# Patient Record
Sex: MALE
[De-identification: names, ages, dates, MRNs, and addresses within clinical notes are randomized per-mention and may not be internally consistent; named-entity substitution may affect disease eponyms.]

---

## 2017-05-05 ENCOUNTER — HOSPITAL ENCOUNTER (EMERGENCY)
Dept: HOSPITAL 31 - C.ER | Age: 77
Discharge: HOME | End: 2017-05-05
Payer: MEDICARE

## 2017-05-05 VITALS
HEART RATE: 97 BPM | TEMPERATURE: 98.7 F | RESPIRATION RATE: 24 BRPM | SYSTOLIC BLOOD PRESSURE: 120 MMHG | DIASTOLIC BLOOD PRESSURE: 63 MMHG

## 2017-05-05 VITALS — BODY MASS INDEX: 26.8 KG/M2

## 2017-05-05 VITALS — OXYGEN SATURATION: 96 %

## 2017-05-05 DIAGNOSIS — J45.901: Primary | ICD-10-CM

## 2017-05-05 LAB
ALBUMIN/GLOB SERPL: 1.4 {RATIO} (ref 1–2.1)
ALP SERPL-CCNC: 37 U/L (ref 38–126)
ALT SERPL-CCNC: 18 U/L (ref 21–72)
AST SERPL-CCNC: 26 U/L (ref 17–59)
BASOPHILS # BLD AUTO: 0 K/UL (ref 0–0.2)
BASOPHILS NFR BLD: 0.1 % (ref 0–2)
BILIRUB SERPL-MCNC: 0.3 MG/DL (ref 0.2–1.3)
BUN SERPL-MCNC: 17 MG/DL (ref 9–20)
CALCIUM SERPL-MCNC: 8.8 MG/DL (ref 8.6–10.4)
CHLORIDE SERPL-SCNC: 99 MMOL/L (ref 98–107)
CO2 SERPL-SCNC: 19 MMOL/L (ref 22–30)
EOSINOPHIL # BLD AUTO: 0 K/UL (ref 0–0.7)
EOSINOPHIL NFR BLD: 0 % (ref 0–4)
ERYTHROCYTE [DISTWIDTH] IN BLOOD BY AUTOMATED COUNT: 18.7 % (ref 11.5–14.5)
GLOBULIN SER-MCNC: 3.2 GM/DL (ref 2.2–3.9)
GLUCOSE SERPL-MCNC: 196 MG/DL (ref 75–110)
HCT VFR BLD CALC: 39.6 % (ref 35–51)
LYMPHOCYTES # BLD AUTO: 0.5 K/UL (ref 1–4.3)
LYMPHOCYTES NFR BLD AUTO: 5.7 % (ref 20–40)
MCH RBC QN AUTO: 23.5 PG (ref 27–31)
MCHC RBC AUTO-ENTMCNC: 31.4 G/DL (ref 33–37)
MCV RBC AUTO: 74.9 FL (ref 80–94)
MONOCYTES # BLD: 0.5 K/UL (ref 0–0.8)
MONOCYTES NFR BLD: 5.7 % (ref 0–10)
NEUTROPHILS NFR BLD AUTO: 92 % (ref 50–75)
NRBC BLD AUTO-RTO: 0 % (ref 0–2)
PLATELET # BLD: 276 K/UL (ref 130–400)
PMV BLD AUTO: 8 FL (ref 7.2–11.7)
POTASSIUM SERPL-SCNC: 5 MMOL/L (ref 3.6–5.2)
PROT SERPL-MCNC: 7.7 G/DL (ref 6.3–8.3)
SODIUM SERPL-SCNC: 133 MMOL/L (ref 132–148)
TOTAL CELLS COUNTED BLD: 100
WBC # BLD AUTO: 8.5 K/UL (ref 4.8–10.8)

## 2017-05-05 PROCEDURE — 96374 THER/PROPH/DIAG INJ IV PUSH: CPT

## 2017-05-05 PROCEDURE — 94150 VITAL CAPACITY TEST: CPT

## 2017-05-05 PROCEDURE — 99285 EMERGENCY DEPT VISIT HI MDM: CPT

## 2017-05-05 PROCEDURE — 96375 TX/PRO/DX INJ NEW DRUG ADDON: CPT

## 2017-05-05 PROCEDURE — 94640 AIRWAY INHALATION TREATMENT: CPT

## 2017-05-05 PROCEDURE — 80053 COMPREHEN METABOLIC PANEL: CPT

## 2017-05-05 PROCEDURE — 84484 ASSAY OF TROPONIN QUANT: CPT

## 2017-05-05 PROCEDURE — 85025 COMPLETE CBC W/AUTO DIFF WBC: CPT

## 2017-05-05 NOTE — C.PDOC
History Of Present Illness


77 year old patient, with a past medical history of arthritis, asthma, 

bronchitis, and hypertension, presents to the ED complaining of


Time Seen by Provider: 05/05/17 18:30


Chief Complaint (Nursing): Shortness Of Breath


History Per: Patient


History/Exam Limitations: no limitations


Current Symptoms Are (Timing): Still Present


Severity: Mild


Pain Scale Rating Of: 3





Past Medical History


Reviewed: Historical Data, Nursing Documentation, Vital Signs


Vital Signs: 





 Last Vital Signs











Temp  98.1 F   05/05/17 17:26


 


Pulse  102 H  05/05/17 17:26


 


Resp  20   05/05/17 17:35


 


BP  120/64   05/05/17 17:26


 


Pulse Ox  96   05/05/17 17:35














- Medical History


PMH: Arthritis, Asthma, Gastritis, HTN


Surgical History: Endoscopy (2013)





- [facility] Procedures











EXCISION OF UPPER ESOPHAGUS, ENDO, DIAGN (04/27/16)








Family History: States: Unknown Family Hx





- Social History


Hx Alcohol Use: No


Hx Substance Use: No





Review Of Systems


Except As Marked, All Systems Reviewed And Found Negative.





Physical Exam





- Physical Exam


Appears: Non-toxic, No Acute Distress





ED Course And Treatment





- Laboratory Results


Result Diagrams: 


 05/05/17 18:19





 05/05/17 18:19


O2 Sat by Pulse Oximetry: 96 (room air)


Pulse Ox Interpretation: Normal


Progress Note: Plan:  -Labs.  -Duoneb, Pepcid, Solu-Medrol





- Scribe Statement


The provider has reviewed the documentation as recorded by the Scribe


Dyana Street


Provider Attestation: 


All medical record entries made by the Scribe were at my direction and 

personally dictated by me. I have reviewed the chart and agree that the record 

accurately reflects my personal performance of the history, physical exam, 

medical decision making, and the department course for this patient. I have 

also personally directed, reviewed, and agree with the discharge instructions 

and disposition.

## 2017-05-05 NOTE — C.PDOC
History Of Present Illness


77 year old patient, with a past medical history of arthritis, asthma, 

bronchitis, and hypertension, presents to the ED complaining of wheezing for 

the past 2 days. Patient denies fever, chills, chest pain, nausea, or vomiting.


Time Seen by Provider: 17 18:30


Chief Complaint (Nursing): Shortness Of Breath


History Per: Patient


History/Exam Limitations: no limitations


Onset/Duration Of Symptoms: Days (2)


Current Symptoms Are (Timing): Still Present


Current Respiratory Medications: See Home Med List


Severity: Mild


Pain Scale Rating Of: 3


Reports Recently: Treated By A Physician


Recent travel outside of the [place]: No





Past Medical History


Reviewed: Historical Data, Nursing Documentation, Vital Signs


Vital Signs: 


 Last Vital Signs











Temp  98.1 F   17 17:26


 


Pulse  102 H  17 17:26


 


Resp  20   17 17:35


 


BP  120/64   17 17:26


 


Pulse Ox  96   17 19:42














- Medical History


PMH: Arthritis, Asthma, Gastritis, HTN


Surgical History: Endoscopy ()





- [facility] Procedures








EXCISION OF UPPER ESOPHAGUS, ENDO, DIAGN (16)








Family History: States: Unknown Family Hx





- Social History


Hx Alcohol Use: No


Hx Substance Use: No





Review Of Systems


Except As Marked, All Systems Reviewed And Found Negative.


Constitutional: Negative for: Fever, Chills


Cardiovascular: Negative for: Chest Pain


Respiratory: Positive for: Wheezing


Gastrointestinal: Negative for: Nausea, Vomiting





Physical Exam





- Physical Exam


Appears: Non-toxic, No Acute Distress


Skin: Warm, Dry


Head: Atraumatic, Normacephalic


Eye(s): bilateral: Normal Inspection, EOMI


Ear(s): Bilateral: Normal


Nose: Normal


Throat: Normal, No Erythema, No Exudate


Neck: Normal ROM, Supple


Chest: Symmetrical


Cardiovascular: Rhythm Regular


Respiratory: No Accessory Muscle Use, No Rales, No Rhonchi, Wheezing (scant and 

scattered)


Gastrointestinal/Abdominal: Soft, No Tenderness


Back: Normal Inspection, No CVA Tenderness


Extremity: Normal ROM


Neurological/Psych: Oriented x3, Normal Speech, Normal Cognition


Gait: Steady





ED Course And Treatment





- Laboratory Results


Result Diagrams: 


 17 18:19





 17 18:19


Lab Interpretation: Normal (trop neg.)


ECG: Interpreted By Me


ECG Rhythm: Sinus Rhythm


ECG Interpretation: Normal


Rate From EC


O2 Sat by Pulse Oximetry: 96


Pulse Ox Interpretation: Normal


Progress Note: Plan:  -Labs.  -Duoneb, Pepcid, Solu-Medrol.  Case discussed 

with ISAURA Dennis who is aware of the plan. She states patient was seen in 

the office 2 days ago. He was started on Medrol dose packs at that time. He did 

not need refills at that time.


Reevaluation Time: 19:41


Reassessment Condition: Improved





Critical Care Time





- Critical Care Note


Total Time (in mins): 90


Documented critical care: time excludes all time spent performing seperately 

billable procedures.





Medical Decision Making


Medical Decision Making: 





asthma exacerbation, low susp of PNA





Disposition


Doctor Will See Patient In The: Office


Counseled Patient/Family Regarding: Studies Performed, Diagnosis





- Disposition


Referrals: 


Julissa Lee MD [Staff Provider] - 


Disposition: HOME/ ROUTINE


Disposition Time: 19:41


Condition: GOOD


Additional Instructions: 


Duoneb inhaled treatments with 2 ampules every 4 hours


Prednisone 40 mg daily for 4 more days (given prednisone 40 in ED)


COntinue Omeprazole @ night to prevent stomach irritation from the steroids





Albuterol puffer 2 puffs with Aerochamber Spacer every 4 hours when not @ home.





Call to follow-up w Dr. Varner in 2 days for re-evaluation or return to the ED


Prescriptions: 


Albuterol HFA [Ventolin HFA 90 mcg/actuation (8 g)] 200 puff IH Q4H PRN #2 puff


 PRN Reason: asthma


Albuterol/Ipratropium [Duoneb 3 MG/3 Ml-0.5 MG/3 Ml 3 Ml] 6 ml IH Q4H PRN #100 

neb


 PRN Reason: asthma


Prednisone [Deltasone] 40 mg PO DAILY #8 tablet


Spacer, Inhalation [Aerochamber] 1 dev IH DAILY #1 dev


Instructions:  Asthma (ED)





- Clinical Impression


Clinical Impression: 


 Asthma attack








- Scribe Statement


The provider has reviewed the documentation as recorded by the Scribe


Dyana Street


Provider Attestation: 


All medical record entries made by the Scribe were at my direction and 

personally dictated by me. I have reviewed the chart and agree that the record 

accurately reflects my personal performance of the history, physical exam, 

medical decision making, and the department course for this patient. I have 

also personally directed, reviewed, and agree with the discharge instructions 

and disposition.

## 2017-05-16 ENCOUNTER — HOSPITAL ENCOUNTER (INPATIENT)
Dept: HOSPITAL 31 - C.ER | Age: 77
LOS: 4 days | Discharge: HOME | DRG: 190 | End: 2017-05-20
Attending: INTERNAL MEDICINE | Admitting: INTERNAL MEDICINE
Payer: MEDICARE

## 2017-05-16 VITALS — BODY MASS INDEX: 26.8 KG/M2

## 2017-05-16 DIAGNOSIS — J44.1: Primary | ICD-10-CM

## 2017-05-16 DIAGNOSIS — I25.10: ICD-10-CM

## 2017-05-16 DIAGNOSIS — I44.7: ICD-10-CM

## 2017-05-16 DIAGNOSIS — K30: ICD-10-CM

## 2017-05-16 DIAGNOSIS — K27.9: ICD-10-CM

## 2017-05-16 DIAGNOSIS — Z79.4: ICD-10-CM

## 2017-05-16 DIAGNOSIS — I49.3: ICD-10-CM

## 2017-05-16 DIAGNOSIS — E11.65: ICD-10-CM

## 2017-05-16 DIAGNOSIS — I27.2: ICD-10-CM

## 2017-05-16 DIAGNOSIS — M19.90: ICD-10-CM

## 2017-05-16 DIAGNOSIS — J18.9: ICD-10-CM

## 2017-05-16 DIAGNOSIS — J45.901: ICD-10-CM

## 2017-05-16 DIAGNOSIS — K21.9: ICD-10-CM

## 2017-05-16 DIAGNOSIS — Z87.891: ICD-10-CM

## 2017-05-16 DIAGNOSIS — I10: ICD-10-CM

## 2017-05-16 LAB
ALBUMIN/GLOB SERPL: 1.1 {RATIO} (ref 1–2.1)
ALP SERPL-CCNC: 61 U/L (ref 38–126)
ALT SERPL-CCNC: 38 U/L (ref 21–72)
APTT BLD: 30 SECONDS (ref 21–34)
ARTERIAL PATENCY WRIST A: (no result)
AST SERPL-CCNC: 26 U/L (ref 17–59)
BASOPHILS # BLD AUTO: 0.1 K/UL (ref 0–0.2)
BASOPHILS NFR BLD: 0.4 % (ref 0–2)
BILIRUB SERPL-MCNC: 1 MG/DL (ref 0.2–1.3)
BILIRUB UR-MCNC: NEGATIVE MG/DL
BUN SERPL-MCNC: 19 MG/DL (ref 9–20)
CALCIUM SERPL-MCNC: 8.4 MG/DL (ref 8.6–10.4)
CHLORIDE SERPL-SCNC: 92 MMOL/L (ref 98–107)
CO2 SERPL-SCNC: 27 MMOL/L (ref 22–30)
DRAW SITE: (no result)
EOSINOPHIL # BLD AUTO: 0 K/UL (ref 0–0.7)
EOSINOPHIL NFR BLD: 0.1 % (ref 0–4)
ERYTHROCYTE [DISTWIDTH] IN BLOOD BY AUTOMATED COUNT: 18.6 % (ref 11.5–14.5)
GLOBULIN SER-MCNC: 3.3 GM/DL (ref 2.2–3.9)
GLUCOSE SERPL-MCNC: 162 MG/DL (ref 75–110)
GLUCOSE UR STRIP-MCNC: NORMAL MG/DL
HCT VFR BLD CALC: 43.4 % (ref 35–51)
INR PPP: 1.1
KETONES UR STRIP-MCNC: NEGATIVE MG/DL
LEUKOCYTE ESTERASE UR-ACNC: (no result) LEU/UL
LYMPHOCYTES # BLD AUTO: 1.3 K/UL (ref 1–4.3)
LYMPHOCYTES NFR BLD AUTO: 7.1 % (ref 20–40)
MAGNESIUM SERPL-MCNC: 1.8 MG/DL (ref 1.6–2.3)
MCH RBC QN AUTO: 24 PG (ref 27–31)
MCHC RBC AUTO-ENTMCNC: 32.5 G/DL (ref 33–37)
MCV RBC AUTO: 73.8 FL (ref 80–94)
MONOCYTES # BLD: 2.3 K/UL (ref 0–0.8)
MONOCYTES NFR BLD: 12.9 % (ref 0–10)
NEUTROPHILS NFR BLD AUTO: 77 % (ref 50–75)
NRBC BLD AUTO-RTO: 0 % (ref 0–2)
PH UR STRIP: 6 [PH] (ref 5–8)
PLATELET # BLD: 238 K/UL (ref 130–400)
PMV BLD AUTO: 7.8 FL (ref 7.2–11.7)
POTASSIUM SERPL-SCNC: 4.2 MMOL/L (ref 3.6–5.2)
PROT SERPL-MCNC: 6.9 G/DL (ref 6.3–8.3)
PROT UR STRIP-MCNC: NEGATIVE MG/DL
RBC # UR STRIP: NEGATIVE /UL
RBC #/AREA URNS HPF: 1 /HPF (ref 0–3)
SODIUM SERPL-SCNC: 129 MMOL/L (ref 132–148)
SP GR UR STRIP: 1.02 (ref 1–1.03)
TOTAL CELLS COUNTED BLD: 100
UROBILINOGEN UR-MCNC: NORMAL MG/DL (ref 0.2–1)
VARIANT LYMPHS NFR BLD MANUAL: 4 % (ref 0–0)
WBC # BLD AUTO: 17.9 K/UL (ref 4.8–10.8)
WBC #/AREA URNS HPF: < 1 /HPF (ref 0–5)

## 2017-05-16 RX ADMIN — TAZOBACTAM SODIUM AND PIPERACILLIN SODIUM SCH MLS/HR: 375; 3 INJECTION, SOLUTION INTRAVENOUS at 22:16

## 2017-05-16 RX ADMIN — IPRATROPIUM BROMIDE AND ALBUTEROL SULFATE SCH ML: .5; 3 SOLUTION RESPIRATORY (INHALATION) at 19:17

## 2017-05-16 RX ADMIN — METHYLPREDNISOLONE SODIUM SUCCINATE SCH MG: 40 INJECTION, POWDER, FOR SOLUTION INTRAMUSCULAR; INTRAVENOUS at 11:31

## 2017-05-16 RX ADMIN — IPRATROPIUM BROMIDE AND ALBUTEROL SULFATE SCH ML: .5; 3 SOLUTION RESPIRATORY (INHALATION) at 13:21

## 2017-05-16 RX ADMIN — METHYLPREDNISOLONE SODIUM SUCCINATE SCH MG: 40 INJECTION, POWDER, FOR SOLUTION INTRAMUSCULAR; INTRAVENOUS at 17:44

## 2017-05-16 RX ADMIN — TAZOBACTAM SODIUM AND PIPERACILLIN SODIUM SCH MLS/HR: 375; 3 INJECTION, SOLUTION INTRAVENOUS at 11:32

## 2017-05-16 RX ADMIN — TAZOBACTAM SODIUM AND PIPERACILLIN SODIUM SCH MLS/HR: 375; 3 INJECTION, SOLUTION INTRAVENOUS at 17:00

## 2017-05-16 RX ADMIN — ENOXAPARIN SODIUM SCH MG: 40 INJECTION SUBCUTANEOUS at 11:30

## 2017-05-16 RX ADMIN — HUMAN INSULIN SCH UNIT: 100 INJECTION, SOLUTION SUBCUTANEOUS at 22:16

## 2017-05-16 RX ADMIN — IPRATROPIUM BROMIDE AND ALBUTEROL SULFATE SCH: .5; 3 SOLUTION RESPIRATORY (INHALATION) at 11:49

## 2017-05-16 NOTE — RAD
PROCEDURE:  CHEST RADIOGRAPH, 1 VIEW



HISTORY:

Shortness of breath 



COMPARISON:

03/06/2013



FINDINGS:



LUNGS:

Moderate to severe venous congestion with diffuse increased 

interstitial lung markings.  Biapical pleural thickening with upper 

lobe granulomatous changes.  Right hilar prominence. Small left 

pleural effusion. 



PLEURA:

As above.



CARDIOVASCULAR:

Cardiomegaly.



OSSEOUS STRUCTURES:

No significant abnormalities.



VISUALIZED UPPER ABDOMEN:

Normal.



OTHER FINDINGS:

None. 



IMPRESSION:

Moderate to severe venous congestion with diffuse increased 

interstitial lung markings.  Biapical pleural thickening with upper 

lobe granulomatous changes.  Right hilar prominence. Small left 

pleural effusion. 



Cardiomegaly.

## 2017-05-16 NOTE — CT
CT chest with IV contrast 



Indication: Rule out malignancy 



Technique: 



Contiguous axial images were obtained through the chest without 

intravenous contrast enhancement. Sagittal and coronal 

reconstructions were generated and reviewed. 



This CT exam was performed using 1 or more of the falling dose 

reduction techniques: Automated exposure control, adjustment of the 

MAA and/or kV according to patient size, and/or use of iterative 

reconstruction technique. 



IV Contrast: None available 







Comparison: Chest x-ray performed 5/16/17 



Findings: 



Visualized portions of the inferior thyroid gland appear 

unremarkable. Cardiomegaly. 



Consolidation and/or spiculated lesion within the medial right lung 

apex. Consolidation and/or spiculated mass within the medial right 

middle lobe. Scattered tiny nodular densities throughout the upper 

lobes worrisome for infection or neoplasm. Hazy but somewhat focal 

opacity at the right lung base spanning approximately 2.4 x 2.4 cm 

(series 3, image 78). Biapical pleural thickening. No pleural 

effusion.  No pneumothorax. 



1.7 x 1.7 cm low-density approximately 3 HU rounded focus abutting or 

arising from the right distal esophagus (series 4, image 73). Small 

to moderate hiatal hernia/distal esophageal wall thickening. 



Limited visualization of the noncontrast upper abdomen reveals upper 

abdominal calcifications, possibly calcified lymph nodes.  Fatty 

atrophy of the pancreas. Partially imaged probable exophytic left 

renal cyst. 



Degenerative changes. Kyphosis. 



Impression:  



Regions of consolidation and/or malignant neoplasm involving the 

medial right lung apex, and medial right middle lobe. Hazy but 

somewhat focal opacity at the right lung base measuring approximately 

2.4 x 2.4 cm.  Recommend clinical correlation and further evaluation 

as indicated with biopsy, PET- CT, or follow-up CT at 3 months, and 9 

months, and 24 months. 



1.7 x 1.7 cm low-density rounded focus abutting or arising from the 

right distal esophagus. Diverticulum is a consideration. Focus 

appears cystic. Correlate clinically. 



Additional findings as above.

## 2017-05-16 NOTE — C.PDOC
History Of Present Illness


Patient presents with shortness of breath, wheezing. Did 1 neb treatment at home

, but no improvement. Speaking in 2-3 word sentences


Time Seen by Provider: 05/16/17 05:47


History Per: Patient


History/Exam Limitations: no limitations


Onset/Duration Of Symptoms: Hrs


Current Symptoms Are (Timing): Worse


Initiating Event: Other


Exacerbating Factor(s): Exertion, Coughing


Current Respiratory Medications: See Home Med List


Severity: Severe


Pain Scale Rating Of: 7


Associated Symptoms: denies: Fever, Chills


Reports Recently: Seen In ED, Treated By A Physician


Recent travel outside of the [place]: No


Additional History Per: Family





Past Medical History


Reviewed: Historical Data, Nursing Documentation, Vital Signs


Vital Signs: 


 Last Vital Signs











Temp  99.9 F H  05/16/17 06:46


 


Pulse  97 H  05/16/17 06:08


 


Resp  34 H  05/16/17 06:08


 


BP  126/72   05/16/17 06:08


 


Pulse Ox  94 L  05/16/17 06:08














- Medical History


PMH: Arthritis, Asthma, Gastritis, HTN


   Denies: Chronic Kidney Disease


Surgical History: Endoscopy (2013)





- [facility] Procedures








EXCISION OF UPPER ESOPHAGUS, ENDO, DIAGN (04/27/16)








Family History: States: No Known Family Hx





- Social History


Hx Alcohol Use: No


Hx Substance Use: No





Review Of Systems


Constitutional: Negative for: Fever, Chills


Eyes: Negative for: Redness


ENT: Negative for: Throat Pain


Cardiovascular: Negative for: Chest Pain, Palpitations


Respiratory: Positive for: Shortness of Breath, Wheezing


Gastrointestinal: Negative for: Nausea, Vomiting


Genitourinary: Negative for: Dysuria


Musculoskeletal: Negative for: Back Pain


Skin: Negative for: Rash, Lesions, Jaundice, Bruising


Neurological: Negative for: Weakness


Psych: Negative for: Anxiety





Physical Exam





- Physical Exam


Appears: In Acute Distress


Skin: Warm, Dry


Head: Normacephalic


Eye(s): bilateral: Normal Inspection


Oral Mucosa: Moist


Throat: No Erythema


Neck: Trachea Midline, Supple


Chest: Symmetrical


Cardiovascular: Rhythm Regular


Respiratory: Decreased Breath Sounds, No Rales, No Rhonchi, Wheezing


Gastrointestinal/Abdominal: Soft, No Tenderness, No Distention, No Guarding


Back: Normal Inspection


Extremity: Normal ROM


Extremity: Bilateral: Atraumatic, Normal Color And Temperature, Normal ROM


Neurological/Psych: Oriented x3, Normal Speech, Normal Cognition


Gait: Unable To Assess





ED Course And Treatment





- Laboratory Results


Result Diagrams: 


 05/16/17 05:59





 05/16/17 05:59


ECG: Interpreted By Me, Viewed By Me


ECG Rhythm: Sinus Rhythm (94), R BBB, Nonspecific Changes (freq pvc's)


O2 Sat by Pulse Oximetry: 94


Pulse Ox Interpretation: Normal





- Radiology


CXR: Interpreted by Me, Viewed By Me


CXR Interpretation: Yes: Cardiomegaly, Other (rll infiltrate, also mild vasc 

congestion)


Progress Note: blood work, nebs., steroids





Critical Care Time





- Critical Care Note


Total Time (in mins): 30


Documented critical care: time excludes all time spent performing seperately 

billable procedures.





Disposition


Discussed With Dr.: Raza Gonsalez


Comment: accepted the pt on his service and took over the care at 6:47 AM


Doctor Will See Patient In The: Hospital


Counseled Patient/Family Regarding: Studies Performed, Diagnosis





- Disposition


Disposition: HOSPITALIZED


Disposition Time: 05:50


Condition: FAIR





- POA


Present On Arrival: Poor Glycemic Control





- Clinical Impression


Clinical Impression: 


 Respiratory distress, Asthma exacerbation, Pneumonia








Decision To Admit





- Pt Status Changed To:


Hospital Disposition Of: Inpatient





- Admit Certification


Admit to Inpatient:: After my assessment, the patient will require 

hospitalization for at least two midnights.  This is because of the severity of 

symptoms shown, intensity of services needed, and/or the medical risk in this 

patient being treated as an outpatient.





- InPatient:


Physician Admission Certification: I certify that this patient requires 2 or 

more midnights of care for the following reason:: After my assessment, the 

patient will require hospitalization for at least two midnights.  This is 

because of the severity of symptoms shown, intensity of services needed, and/or 

the medical risk in this patient being treated as an outpatient.





- .


Bed Request Type: Telemetry


Admitting Physician: Raza Gonsalez


Patient Diagnosis: 


 Respiratory distress, Asthma exacerbation

## 2017-05-16 NOTE — CP.PCM.HP
History of Present Illness





- History of Present Illness


History of Present Illness: 





Shortness of breath and cough





77-year-old male with history of COPD, hypertension, diabetes, GERD presented 

to the emergency room with progressively worsening shortness of breath and 

cough for the past few weeks. Patient treated with antibiotics, steroids and 

bronchodilators without any relief. Also complaining of cough productive cough. 

Phlegm. Denies fever or chills, denies chest pain.





Present on Admission





- Present on Admission


Any Indicators Present on Admission: No


History of DVT/PE: No


History of Uncontrolled Diabetes: No


Urinary Catheter: No


Decubitus Ulcer Present: No





Review of Systems





- Review of Systems


All systems: reviewed and no additional remarkable complaints except (Shortness 

of breath and cough)





Past Patient History





- Infectious Disease


Hx of Infectious Diseases: None





- Past Medical History & Family History


Past Medical History?: Yes





- Past Social History


Smoking Status: Former Smoker





- CARDIAC


Hx Hypertension: Yes





- PULMONARY


Hx Asthma: Yes





- NEUROLOGICAL


Hx Neurological Disorder: No





- HEENT


Hx HEENT Problems: No





- RENAL


Hx Chronic Kidney Disease: No





- ENDOCRINE/METABOLIC


Hx Endocrine Disorders: Yes


Hx Diabetes Mellitus Type 1: Yes





- HEMATOLOGICAL/ONCOLOGICAL


Hx Blood Disorders: No





- INTEGUMENTARY


Hx Dermatological Problems: Yes


Other/Comment: SKIN ASTHMA





- MUSCULOSKELETAL/RHEUMATOLOGICAL


Hx Arthritis: Yes





- GASTROINTESTINAL


Hx Gastritis: Yes





- GENITOURINARY/GYNECOLOGICAL


Hx Genitourinary Disorders: Yes


Hx Prostate Problems: Yes





- PSYCHIATRIC


Hx Substance Use: No





- SURGICAL HISTORY


Hx Surgeries: Yes





- ANESTHESIA


Hx Anesthesia: Yes


Hx Anesthesia Reactions: No


Hx Malignant Hyperthermia: No





Meds


Allergies/Adverse Reactions: 


 Allergies











Allergy/AdvReac Type Severity Reaction Status Date / Time


 


seasonal allergies Allergy Mild CONGESTION Uncoded 05/16/17 05:54














Physical Exam





- Constitutional


Appears: No Acute Distress





- Head Exam


Head Exam: ATRAUMATIC, NORMOCEPHALIC





- Eye Exam


Eye Exam: Normal appearance





- ENT Exam


ENT Exam: Mucous Membranes Moist





- Neck Exam


Neck exam: Positive for: Normal Inspection





- Respiratory Exam


Respiratory Exam: Rales, Rhonchi





- Cardiovascular Exam


Cardiovascular Exam: REGULAR RHYTHM





- GI/Abdominal Exam


GI & Abdominal Exam: Normal Bowel Sounds, Soft





- Extremities Exam


Extremities exam: Positive for: normal inspection





- Neurological Exam


Neurological exam: Alert, Oriented x3





Results





- Vital Signs


Recent Vital Signs: 





 Last Vital Signs











Temp  98.2 F   05/16/17 08:48


 


Pulse  87   05/16/17 08:48


 


Resp  16   05/16/17 08:48


 


BP  125/73   05/16/17 08:48


 


Pulse Ox  94 L  05/16/17 08:48














- Labs


Result Diagrams: 


 05/16/17 05:59





 05/16/17 05:59


Labs: 





 Laboratory Results - last 24 hr











  05/16/17





  07:09


 


Urine Color  Yellow


 


Urine Clarity  Clear


 


Urine pH  6.0


 


Ur Specific Gravity  1.016


 


Urine Protein  Negative


 


Urine Glucose (UA)  Normal


 


Urine Ketones  Negative


 


Urine Blood  Negative


 


Urine Nitrate  Negative


 


Urine Bilirubin  Negative


 


Urine Urobilinogen  Normal


 


Ur Leukocyte Esterase  Neg


 


Urine WBC (Auto)  < 1


 


Urine RBC (Auto)  1


 


Ur Squamous Epith Cells  < 1














Assessment & Plan


(1) Pneumonia


Status: Acute   


Comment: Chest x-ray consistent with right lung infiltrate.  IV antibiotics.  

Continue bronchodilators and IV steroids.  Follow-up culture and sensitivity.  

CAT scan of the chest   





(2) COPD exacerbation


Status: Acute

## 2017-05-17 LAB
ALBUMIN/GLOB SERPL: 0.9 {RATIO} (ref 1–2.1)
ALP SERPL-CCNC: 49 U/L (ref 38–126)
ALT SERPL-CCNC: 37 U/L (ref 21–72)
AMYLASE SERPL-CCNC: 45 U/L (ref 30–110)
AST SERPL-CCNC: 19 U/L (ref 17–59)
BASOPHILS # BLD AUTO: 0 K/UL (ref 0–0.2)
BASOPHILS NFR BLD: 0.1 % (ref 0–2)
BILIRUB SERPL-MCNC: 0.8 MG/DL (ref 0.2–1.3)
BUN SERPL-MCNC: 25 MG/DL (ref 9–20)
CALCIUM SERPL-MCNC: 8 MG/DL (ref 8.6–10.4)
CANCER AG19-9 SERPL-ACNC: 39.3 U/ML (ref 0–37)
CEA SERPL-MCNC: 3.3 NG/ML (ref 0–3)
CHLORIDE SERPL-SCNC: 96 MMOL/L (ref 98–107)
CO2 SERPL-SCNC: 25 MMOL/L (ref 22–30)
EOSINOPHIL # BLD AUTO: 0 K/UL (ref 0–0.7)
EOSINOPHIL NFR BLD: 0.1 % (ref 0–4)
ERYTHROCYTE [DISTWIDTH] IN BLOOD BY AUTOMATED COUNT: 18.4 % (ref 11.5–14.5)
GLOBULIN SER-MCNC: 3.4 GM/DL (ref 2.2–3.9)
GLUCOSE SERPL-MCNC: 210 MG/DL (ref 75–110)
HCT VFR BLD CALC: 40 % (ref 35–51)
LG PLATELETS BLD QL SMEAR: PRESENT
LYMPHOCYTES # BLD AUTO: 0.5 K/UL (ref 1–4.3)
LYMPHOCYTES NFR BLD AUTO: 2.6 % (ref 20–40)
MAGNESIUM SERPL-MCNC: 2.1 MG/DL (ref 1.6–2.3)
MCH RBC QN AUTO: 24.1 PG (ref 27–31)
MCHC RBC AUTO-ENTMCNC: 32 G/DL (ref 33–37)
MCV RBC AUTO: 75.1 FL (ref 80–94)
MONOCYTES # BLD: 0.5 K/UL (ref 0–0.8)
MONOCYTES NFR BLD: 2.9 % (ref 0–10)
NEUTROPHILS NFR BLD AUTO: 93 % (ref 50–75)
NRBC BLD AUTO-RTO: 0 % (ref 0–2)
PHOSPHATE SERPL-MCNC: 3.4 MG/DL (ref 2.5–4.5)
PLATELET # BLD: 239 K/UL (ref 130–400)
PMV BLD AUTO: 8.4 FL (ref 7.2–11.7)
POTASSIUM SERPL-SCNC: 4 MMOL/L (ref 3.6–5.2)
PROT SERPL-MCNC: 6.2 G/DL (ref 6.3–8.3)
SODIUM SERPL-SCNC: 132 MMOL/L (ref 132–148)
TOTAL CELLS COUNTED BLD: 100
WBC # BLD AUTO: 18.1 K/UL (ref 4.8–10.8)

## 2017-05-17 RX ADMIN — HUMAN INSULIN SCH UNIT: 100 INJECTION, SOLUTION SUBCUTANEOUS at 11:58

## 2017-05-17 RX ADMIN — IPRATROPIUM BROMIDE AND ALBUTEROL SULFATE SCH ML: .5; 3 SOLUTION RESPIRATORY (INHALATION) at 07:31

## 2017-05-17 RX ADMIN — METHYLPREDNISOLONE SODIUM SUCCINATE SCH MG: 40 INJECTION, POWDER, FOR SOLUTION INTRAMUSCULAR; INTRAVENOUS at 17:49

## 2017-05-17 RX ADMIN — IPRATROPIUM BROMIDE AND ALBUTEROL SULFATE SCH ML: .5; 3 SOLUTION RESPIRATORY (INHALATION) at 01:25

## 2017-05-17 RX ADMIN — TIOTROPIUM BROMIDE SCH MCG: 18 CAPSULE ORAL; RESPIRATORY (INHALATION) at 07:31

## 2017-05-17 RX ADMIN — IPRATROPIUM BROMIDE AND ALBUTEROL SULFATE SCH ML: .5; 3 SOLUTION RESPIRATORY (INHALATION) at 13:43

## 2017-05-17 RX ADMIN — TAZOBACTAM SODIUM AND PIPERACILLIN SODIUM SCH MLS/HR: 375; 3 INJECTION, SOLUTION INTRAVENOUS at 22:06

## 2017-05-17 RX ADMIN — METHYLPREDNISOLONE SODIUM SUCCINATE SCH MG: 40 INJECTION, POWDER, FOR SOLUTION INTRAMUSCULAR; INTRAVENOUS at 03:01

## 2017-05-17 RX ADMIN — TAZOBACTAM SODIUM AND PIPERACILLIN SODIUM SCH MLS/HR: 375; 3 INJECTION, SOLUTION INTRAVENOUS at 03:03

## 2017-05-17 RX ADMIN — TAZOBACTAM SODIUM AND PIPERACILLIN SODIUM SCH MLS/HR: 375; 3 INJECTION, SOLUTION INTRAVENOUS at 09:47

## 2017-05-17 RX ADMIN — ENOXAPARIN SODIUM SCH MG: 40 INJECTION SUBCUTANEOUS at 09:40

## 2017-05-17 RX ADMIN — METHYLPREDNISOLONE SODIUM SUCCINATE SCH MG: 40 INJECTION, POWDER, FOR SOLUTION INTRAMUSCULAR; INTRAVENOUS at 09:41

## 2017-05-17 RX ADMIN — IPRATROPIUM BROMIDE AND ALBUTEROL SULFATE SCH ML: .5; 3 SOLUTION RESPIRATORY (INHALATION) at 19:30

## 2017-05-17 RX ADMIN — INSULIN GLARGINE SCH UNITS: 100 INJECTION, SOLUTION SUBCUTANEOUS at 22:07

## 2017-05-17 RX ADMIN — HUMAN INSULIN SCH UNIT: 100 INJECTION, SOLUTION SUBCUTANEOUS at 16:46

## 2017-05-17 RX ADMIN — HUMAN INSULIN SCH UNIT: 100 INJECTION, SOLUTION SUBCUTANEOUS at 07:40

## 2017-05-17 RX ADMIN — HUMAN INSULIN SCH UNIT: 100 INJECTION, SOLUTION SUBCUTANEOUS at 22:08

## 2017-05-17 RX ADMIN — TAZOBACTAM SODIUM AND PIPERACILLIN SODIUM SCH MLS/HR: 375; 3 INJECTION, SOLUTION INTRAVENOUS at 15:55

## 2017-05-17 NOTE — CP.PCM.PN
Subjective





- Date & Time of Evaluation


Date of Evaluation: 05/17/17


Time of Evaluation: 07:00





- Subjective


Subjective: 





Patient seen and examined.


still complaining of cough and dyspnea on minimal exertion


Denies fevers chills, denies chest pain


Frequent PVCs noted on the EKG monitor








Objective





- Vital Signs/Intake and Output


Vital Signs (last 24 hours): 


 











Temp Pulse Resp BP Pulse Ox


 


 98.2 F   78   17   115/67   98 


 


 05/17/17 09:00  05/17/17 11:10  05/17/17 09:00  05/17/17 09:00  05/17/17 09:00








Intake and Output: 


 











 05/17/17 05/17/17





 06:59 18:59


 


Intake Total 965 590


 


Output Total 900 


 


Balance 65 590














- Medications


Medications: 


 Current Medications





Albuterol/Ipratropium (Duoneb 3 Mg/0.5 Mg (3 Ml) Ud)  3 ml INH RQ6 [facility]


   Last Admin: 05/17/17 13:43 Dose:  3 ml


Amlodipine Besylate (Norvasc)  10 mg PO DAILY [facility]


   Last Admin: 05/17/17 09:40 Dose:  10 mg


Enoxaparin Sodium (Lovenox)  40 mg SC DAILY [facility]


   Last Admin: 05/17/17 09:40 Dose:  40 mg


Finasteride (Proscar)  5 mg PO DAILY [facility]


   Last Admin: 05/17/17 09:40 Dose:  5 mg


Piperacillin Sod/Tazobactam Sod (Zosyn 3.375 Gm Iv Premix)  3.375 gm in 50 mls 

@ 100 mls/hr IVPB Q6H [facility]


   Last Admin: 05/17/17 09:47 Dose:  100 mls/hr


Azithromycin 500 mg/ Sodium (Chloride)  250 mls @ 250 mls/hr IVPB DAILY@1030 [facility]


   Last Admin: 05/17/17 09:46 Dose:  250 mls/hr


Insulin Human Regular (Novolin R)  0 unit SC ACHS [facility]


   PRN Reason: Protocol


   Last Admin: 05/17/17 11:58 Dose:  6 unit


Losartan Potassium (Cozaar)  100 mg PO DAILY [facility]


   Last Admin: 05/17/17 09:41 Dose:  100 mg


Metformin HCl (Glucophage)  500 mg PO BIDCC [facility]


   Last Admin: 05/17/17 09:00 Dose:  500 mg


Methylprednisolone (Solu-Medrol)  40 mg IVP Q8H [facility]


   Last Admin: 05/17/17 09:41 Dose:  40 mg


Pantoprazole Sodium (Protonix Inj)  40 mg IVP DAILY [facility]


   Last Admin: 05/17/17 09:41 Dose:  40 mg


Promethazine HCl/Dextromethorphan (Phenergan Dm Syrup)  5 ml PO Q6H PRN


   PRN Reason: Cough and congestion


Tiotropium Bromide (Spiriva)  18 mcg INH RQ24 [facility]


   Last Admin: 05/17/17 07:31 Dose:  18 mcg











- Labs


Labs: 


 





 05/17/17 06:34 





 05/17/17 06:34 





 











PT  12.0 SECONDS (9.7-12.2)   05/16/17  05:59    


 


INR  1.1   05/16/17  05:59    


 


APTT  30 SECONDS (21-34)   05/16/17  05:59    














- Constitutional


Appears: No Acute Distress





- Head Exam


Head Exam: ATRAUMATIC, NORMOCEPHALIC





- Eye Exam


Eye Exam: Normal appearance





- Neck Exam


Neck Exam: Normal Inspection





- Respiratory Exam


Respiratory Exam: Rales, Rhonchi





- Cardiovascular Exam


Cardiovascular Exam: REGULAR RHYTHM





- GI/Abdominal Exam


GI & Abdominal Exam: Soft, Normal Bowel Sounds





- Extremities Exam


Extremities Exam: Normal Inspection





- Neurological Exam


Neurological Exam: Alert, Oriented x3





Assessment and Plan


(1) Pneumonia


Assessment & Plan: 


CAT scan of the chest consistent with pneumonia/rule out malignancy


Continue antibiotics and follow up culture and sensitivity


Continue nebulizer treatment


Cardiology evaluation for left bundle branch block and frequent PVCs


GI consult for esophageal thickening


Continue Protonix and DVT prophylaxis


Status: Acute   





(2) COPD exacerbation


Status: Acute

## 2017-05-17 NOTE — CON
DATE: 05/17/2017



REASON FOR CONSULTATION:  I was called for GI consultation by the admitting MD.  The patient is seen 
and fully examined on 05/17/2017.  The entire chart is reviewed, including but not limited to the mos
t recent lab and radiology study results, current and previous medication list, current and previous 
medical events, allergy to medication list, as well as all the available current and previous medical
 record.  Case discussed at length with the staff in the intensive care unit.



HISTORY OF PRESENT ILLNESS:  This is a 77-year-old male who was admitted to the hospital with the rickey
n complaint of shortness of breath, bilateral wheezing with mid epigastric and mid abdominal line [place]
n, dyspepsia with some nausea.  No reported active bleeding, but coughing, productive.  No reported c
hills or fever at home, but in the hospital.



PAST MEDICAL HISTORY:  Including but not limited to:

1.  Bronchial asthma.

2.  Peptic ulcer disease.

3.  Osteoarthritis.

4.  Hypertension.



FAMILY HISTORY:  Unknown.



SOCIAL HISTORY:  Denied any alcohol intake or recent history of cigarette smoking.



CURRENT MEDICATIONS:  Medication lists were reviewed.



ALLERGY TO MEDICATION:  None.  The patient has seasonal allergies.



LABORATORY AND DIAGNOSTIC DATA: After being admitted to the hospital, the most recent lab results don
e today showed leukocytosis of 18.1 with normal hemoglobin and hematocrit as well as normal platelet 
count with increased BUN at 25, but normal creatinine, with increased blood glucose level to 248 with
 low calcium of 8.0, total protein 6.2 and low albumin 2.9.



The patient had a chest CAT scan yesterday after the chest x-ray. Reports and films are seen, indicat
cata with possible consolidation and/or malignant neoplasm involving the middle right lung apex and th
e right middle lobe.  There is also low density around the focus area arising from the right distal e
sophagus with possible diverticulum.



PHYSICAL EXAMINATION:

GENERAL:  A 77-year-old male appeared to be awake, alert, oriented, complaining of dysphagia and dysp
epsia.

VITAL SIGNS:  Afebrile at the time he was seen by me with pulse of 76, respiratory rate 20-22, blood 
pressure of 108/64.

HEENT:  Showed pale, dry oral mucoid membrane.  Nonicteric sclerae.

LUNGS:  Few scattered bilateral crepitation with decreased air entry at bases.

HEART:  Positive S1 and S2.

LYMPH NODES:  No lymphadenitis or lymphadenopathy.

ABDOMEN:  Soft.  Bowel sounds are present with slight to moderate midepigastric and mid abdominal leola
e tenderness.  No mass or organomegaly.  No rebound tenderness or guarding.

RECTAL:  Deferred due to the patient's respiratory status.

EXTREMITIES:  With mild lower extremity edematous changes.  No clubbing or cyanosis.

NEUROLOGIC:  No new reported neurological deficit, sensory or motor.



IMPRESSION:

1.  Dysphagia with dyspepsia and abnormal chest CAT scan with possible esophageal diverticulum versus
 mass lesion.

2.  Reexacerbation of peptic ulcer disease.

3.  Reexacerbation of bronchial asthma.

4.  Known history of hypertension with osteoarthritis.

5.  Possible pneumonia versus lung mass lesion.

6.  Hyperglycemia.



SUGGESTION:

1.  Agree with your plan.

2.  Due to the patient's current shortness of breath, barium swallow with Gastrografin material is watson
ggested in the meantime. Then may consider upper endoscopy when the patient is more stable clinically
.

3.  Sectional abdominal and pelvic CAT scan.

4.  Cancer markers.

5.  Carafate p.o.

6.  PPI IV. 

7.  Guaiac all the stool _____ x 3.

8.  Further recommendation to follow.



Thank you for letting me participate in your patient's case management.





__________________________________________

Candace Solorio MD







cc:



DD: 05/17/2017 11:33:13  14

TT: 05/17/2017 17:32:24

Confirmation # [id number]

Dictation # [id number]

ln

## 2017-05-18 LAB
ALBUMIN/GLOB SERPL: 0.6 {RATIO} (ref 1–2.1)
ALP SERPL-CCNC: 42 U/L (ref 38–126)
ALT SERPL-CCNC: 38 U/L (ref 21–72)
AST SERPL-CCNC: 19 U/L (ref 17–59)
BASOPHILS # BLD AUTO: 0.1 K/UL (ref 0–0.2)
BASOPHILS NFR BLD: 0.2 % (ref 0–2)
BILIRUB SERPL-MCNC: 0.6 MG/DL (ref 0.2–1.3)
BUN SERPL-MCNC: 24 MG/DL (ref 9–20)
CALCIUM SERPL-MCNC: 6.5 MG/DL (ref 8.6–10.4)
CHLORIDE SERPL-SCNC: 88 MMOL/L (ref 98–107)
CO2 SERPL-SCNC: 17 MMOL/L (ref 22–30)
EOSINOPHIL # BLD AUTO: 0 K/UL (ref 0–0.7)
EOSINOPHIL NFR BLD: 0 % (ref 0–4)
ERYTHROCYTE [DISTWIDTH] IN BLOOD BY AUTOMATED COUNT: 18.5 % (ref 11.5–14.5)
GLOBULIN SER-MCNC: 3.7 GM/DL (ref 2.2–3.9)
GLUCOSE SERPL-MCNC: 229 MG/DL (ref 75–110)
HCT VFR BLD CALC: 37.8 % (ref 35–51)
LYMPHOCYTES # BLD AUTO: 0.6 K/UL (ref 1–4.3)
LYMPHOCYTES NFR BLD AUTO: 2.4 % (ref 20–40)
MAGNESIUM SERPL-MCNC: 1.9 MG/DL (ref 1.6–2.3)
MCH RBC QN AUTO: 23.7 PG (ref 27–31)
MCHC RBC AUTO-ENTMCNC: 31.6 G/DL (ref 33–37)
MCV RBC AUTO: 75.1 FL (ref 80–94)
MONOCYTES # BLD: 1 K/UL (ref 0–0.8)
MONOCYTES NFR BLD: 4.2 % (ref 0–10)
NRBC BLD AUTO-RTO: 0 % (ref 0–2)
PHOSPHATE SERPL-MCNC: 2.6 MG/DL (ref 2.5–4.5)
PLATELET # BLD: 216 K/UL (ref 130–400)
PMV BLD AUTO: 8.4 FL (ref 7.2–11.7)
POTASSIUM SERPL-SCNC: 4 MMOL/L (ref 3.6–5.2)
PROT SERPL-MCNC: 6 G/DL (ref 6.3–8.3)
SODIUM SERPL-SCNC: 134 MMOL/L (ref 132–148)
WBC # BLD AUTO: 23.4 K/UL (ref 4.8–10.8)

## 2017-05-18 RX ADMIN — HUMAN INSULIN SCH: 100 INJECTION, SOLUTION SUBCUTANEOUS at 21:55

## 2017-05-18 RX ADMIN — INSULIN GLARGINE SCH: 100 INJECTION, SOLUTION SUBCUTANEOUS at 21:54

## 2017-05-18 RX ADMIN — IPRATROPIUM BROMIDE AND ALBUTEROL SULFATE SCH ML: .5; 3 SOLUTION RESPIRATORY (INHALATION) at 01:30

## 2017-05-18 RX ADMIN — IPRATROPIUM BROMIDE AND ALBUTEROL SULFATE SCH ML: .5; 3 SOLUTION RESPIRATORY (INHALATION) at 20:15

## 2017-05-18 RX ADMIN — TAZOBACTAM SODIUM AND PIPERACILLIN SODIUM SCH: 375; 3 INJECTION, SOLUTION INTRAVENOUS at 21:56

## 2017-05-18 RX ADMIN — TIOTROPIUM BROMIDE SCH MCG: 18 CAPSULE ORAL; RESPIRATORY (INHALATION) at 07:19

## 2017-05-18 RX ADMIN — IPRATROPIUM BROMIDE AND ALBUTEROL SULFATE SCH ML: .5; 3 SOLUTION RESPIRATORY (INHALATION) at 13:20

## 2017-05-18 RX ADMIN — METHYLPREDNISOLONE SODIUM SUCCINATE SCH: 40 INJECTION, POWDER, FOR SOLUTION INTRAMUSCULAR; INTRAVENOUS at 21:55

## 2017-05-18 RX ADMIN — IPRATROPIUM BROMIDE AND ALBUTEROL SULFATE SCH ML: .5; 3 SOLUTION RESPIRATORY (INHALATION) at 07:19

## 2017-05-18 NOTE — CARD
--------------- APPROVED REPORT --------------





EXAM: Two-dimensional and M-mode echocardiogram with Doppler and 

color Doppler.



Other Information 

Quality : GoodRhythm : NSR



INDICATION

Dyspnea 



2D DIMENSIONS 

LVOT Diameter2.1   (1.8-2.4cm)



M-Mode DIMENSIONS 

RVDd3.06   (2.1-3.2cm)Left Atrium (MM)3.39   (2.5-4.0cm)

IVSd1.03   (0.7-1.1cm)Aortic Root3.61   (2.2-3.7cm)

LVDd5.49   (4.0-5.6cm)Aortic Cusp Exc.2.18   (1.5-2.0cm)

PWd1.14   (0.7-1.1cm)FS (%) 23   %

LVDs4.24   (2.0-3.8cm)LVEF (%)45   (>50%)



Aortic Valve

AoV Peak Dksdbflh709.6cm/sAoV VTI53.4cmAO Peak GR.28mmHg

LVOT Peak Pghclraz048.4cm/sLVOT VTI22.37cmAO Mean GR.15mmHg

RIKKI (VMAX)1.02qc5MNW (VTI)1.86od3XG P 1/2 Evqa771xi



Mitral Valve

MV E Xjzekzrn46.4cm/sMV A Klfbgggs81.8cm/sE/A ratio0.4



TDI

E/Lateral E'0.0E/Medial E'0.0



Tricuspid Valve

TR Peak Rifohbnn066cq/sTR Peak Gr.53mhMfWBAR87hiTy



 LEFT VENTRICLE 

The left ventricle is normal size.

There is normal left ventricular wall thickness. 

The left ventricular function is normal.

The left ventricular ejection fraction is within the normal range.

No regional wall motion abnormalities noted.

Transmitral Doppler flow pattern is Grade I-abnormal relaxation 

pattern.

No left ventricle thrombus noted on this study.

There is no ventricular septal defect visualized.

There is no left ventricular aneurysm. 

There is no mass noted in the left ventricle.



 RIGHT VENTRICLE 

The right ventricle is mildly to moderately dilated.

There is normal right ventricular wall thickness.

The right ventricular systolic function is normal.



 ATRIA 

The left atrium size is normal.

The right atrium size is normal.

The interatrial septum is intact with no evidence for an atrial 

septal defect.



 AORTIC VALVE 

The aortic valve ,LEFT CORONARY CUSP REVEALS  VARIEGATED ECHODENSE 

MASS, MOBILE MOST PROBABLY PAPILLOMA 

There is mild to moderate aortic regurgitation.

There is no aortic valvular stenosis. 

There is no aortic valvular vegetation.



 MITRAL VALVE 

The mitral valve is normal in structure and function.

There is no evidence of mitral valve prolapse.

There is no mitral valve stenosis. 

Mitral regurgitation is mild.



 TRICUSPID VALVE 

The tricuspid valve is normal in structure and function.

There is mild to moderate tricuspid regurgitation.

Right ventricular systolic pressure is estimated at 50-60 mmHg. 

There is moderate pulmonary hypertension.

There is no tricuspid valve prolapse or vegetation.

There is no tricuspid valve stenosis. 



 PULMONIC VALVE 

The pulmonary valve is normal in structure and function.

There is no pulmonic valvular regurgitation. 

There is no pulmonic valvular stenosis.



 GREAT VESSELS 

The aortic root is normal in size.

The ascending aorta is normal in size.

The pulmonary artery is normal.

The IVC is normal in size and collapses >50% with inspiration.



 PERICARDIAL EFFUSION 

The pericardium appears normal.

There is no pleural effusion.



<Conclusion>

The left ventricular function is normal.

The left ventricular ejection fraction is within the normal range.

No regional wall motion abnormalities noted.

The right ventricle is mildly to moderately dilated.

There is mild to moderate aortic regurgitation.

The aortic valve ,LEFT CORONARY CUSP REVEALS  VARIEGATED ECHODENSE 

MASS, MOBILE MOST PROBABLY PAPILLOMA

There is mild to moderate tricuspid regurgitation.

Right ventricular systolic pressure is estimated at 50-60 mmHg. 

There is moderate pulmonary hypertension.

Mitral regurgitation is mild.

## 2017-05-18 NOTE — CP.PCM.PN
Subjective





- Date & Time of Evaluation


Date of Evaluation: 05/18/17


Time of Evaluation: 17:30





- Subjective


Subjective: 





Patient seen and evaluated


Still experiencing dyspnea


For Cath tomorrow





Review Of Systems


Constitutional: Negative for: Fever, Chills


Eyes: Negative for: Redness


ENT: Negative for: Throat Pain


Cardiovascular: Negative for: Chest Pain, Palpitations


Respiratory: Positive for: Shortness of Breath, Wheezing


Gastrointestinal: Negative for: Nausea, Vomiting


Genitourinary: Negative for: Dysuria


Musculoskeletal: Negative for: Back Pain


Skin: Negative for: Rash, Lesions, Jaundice, Bruising


Neurological: Negative for: Weakness


Psych: Negative for: Anxiety





Physical Exam





- Physical Exam


Appears: In Acute Distress


Skin: Warm, Dry


Head: Normacephalic


Eye(s): bilateral: Normal Inspection


Oral Mucosa: Moist


Throat: No Erythema


Neck: Trachea Midline, Supple


Chest: Symmetrical


Cardiovascular: Rhythm Regular


Respiratory: Decreased Breath Sounds, No Rales, No Rhonchi, Wheezing


Gastrointestinal/Abdominal: Soft, No Tenderness, No Distention, No Guarding


Back: Normal Inspection


Extremity: Normal ROM


Extremity: Bilateral: Atraumatic, Normal Color And Temperature, Normal ROM


Neurological/Psych: Oriented x3, Normal Speech, Normal Cognition


Gait: Unable To Assess





Objective





- Vital Signs/Intake and Output


Vital Signs (last 24 hours): 


 











Temp Pulse Resp BP Pulse Ox


 


 98.2 F   96 H  18   117/69   98 


 


 05/17/17 21:00  05/17/17 21:00  05/17/17 21:00  05/17/17 21:00  05/17/17 21:00











- Medications


Medications: 


 Current Medications





Albuterol/Ipratropium (Duoneb 3 Mg/0.5 Mg (3 Ml) Ud)  3 ml INH RQ6 [facility]


   Last Admin: 05/18/17 20:15 Dose:  3 ml


Amlodipine Besylate (Norvasc)  10 mg PO DAILY [facility]


   Last Admin: 05/17/17 09:40 Dose:  10 mg


Enoxaparin Sodium (Lovenox)  40 mg SC DAILY [facility]


   Last Admin: 05/17/17 09:40 Dose:  40 mg


Finasteride (Proscar)  5 mg PO DAILY [facility]


   Last Admin: 05/17/17 09:40 Dose:  5 mg


Piperacillin Sod/Tazobactam Sod (Zosyn 3.375 Gm Iv Premix)  3.375 gm in 50 mls 

@ 100 mls/hr IVPB Q6H [facility]


   Last Admin: 05/17/17 22:06 Dose:  100 mls/hr


Azithromycin 500 mg/ Sodium (Chloride)  250 mls @ 250 mls/hr IVPB DAILY@1030 [facility]


   Last Admin: 05/17/17 09:46 Dose:  250 mls/hr


Insulin Glargine (Lantus)  10 unit SC Q12 [facility]


   Last Admin: 05/17/17 22:07 Dose:  10 units


Insulin Human Regular (Novolin R)  0 unit SC ACHS [facility]


   PRN Reason: Protocol


   Last Admin: 05/17/17 22:08 Dose:  3 unit


Losartan Potassium (Cozaar)  100 mg PO DAILY [facility]


   Last Admin: 05/17/17 09:41 Dose:  100 mg


Metformin HCl (Glucophage)  500 mg PO BIDCC [facility]


   Last Admin: 05/17/17 16:46 Dose:  500 mg


Methylprednisolone (Solu-Medrol)  40 mg IVP Q8H [facility]


   Last Admin: 05/17/17 17:49 Dose:  40 mg


Pantoprazole Sodium (Protonix Inj)  40 mg IVP DAILY [facility]


   Last Admin: 05/17/17 09:41 Dose:  40 mg


Promethazine HCl/Dextromethorphan (Phenergan Dm Syrup)  5 ml PO Q6H [facility]


   PRN Reason: Cough and congestion


Tiotropium Bromide (Spiriva)  18 mcg INH RQ24 [facility]


   Last Admin: 05/17/17 07:31 Dose:  18 mcg











- Labs


Labs: 


 





 05/17/17 06:34 





 05/17/17 06:34 





 











PT  12.0 SECONDS (9.7-12.2)   05/16/17  05:59    


 


INR  1.1   05/16/17  05:59    


 


APTT  30 SECONDS (21-34)   05/16/17  05:59    














Assessment and Plan





- Assessment and Plan (Free Text)


Assessment: 





Patient was a Chronic smoker


Admitted for COPD exacerbation


Abnormal EKG (LBBB and multiple PVCs)


Caronary risk factors


Not a good candidate for stress test


For Cath Tomorrow

## 2017-05-19 LAB
BUN SERPL-MCNC: 23 MG/DL (ref 9–20)
CALCIUM SERPL-MCNC: 8.2 MG/DL (ref 8.6–10.4)
CHLORIDE SERPL-SCNC: 97 MMOL/L (ref 98–107)
CO2 SERPL-SCNC: 27 MMOL/L (ref 22–30)
ERYTHROCYTE [DISTWIDTH] IN BLOOD BY AUTOMATED COUNT: 19 % (ref 11.5–14.5)
GLUCOSE SERPL-MCNC: 277 MG/DL (ref 75–110)
HCT VFR BLD CALC: 39.1 % (ref 35–51)
MCH RBC QN AUTO: 24 PG (ref 27–31)
MCHC RBC AUTO-ENTMCNC: 32.4 G/DL (ref 33–37)
MCV RBC AUTO: 74.2 FL (ref 80–94)
NEUTROPHILS NFR BLD AUTO: 96 % (ref 50–75)
PLATELET # BLD: 232 K/UL (ref 130–400)
PMV BLD AUTO: 8.1 FL (ref 7.2–11.7)
POTASSIUM SERPL-SCNC: 4.4 MMOL/L (ref 3.6–5.2)
SODIUM SERPL-SCNC: 133 MMOL/L (ref 132–148)
TOTAL CELLS COUNTED BLD: 100
WBC # BLD AUTO: 17.1 K/UL (ref 4.8–10.8)

## 2017-05-19 PROCEDURE — B2161ZZ FLUOROSCOPY OF RIGHT AND LEFT HEART USING LOW OSMOLAR CONTRAST: ICD-10-PCS | Performed by: INTERNAL MEDICINE

## 2017-05-19 PROCEDURE — B2111ZZ FLUOROSCOPY OF MULTIPLE CORONARY ARTERIES USING LOW OSMOLAR CONTRAST: ICD-10-PCS | Performed by: INTERNAL MEDICINE

## 2017-05-19 PROCEDURE — 4A023N8 MEASUREMENT OF CARDIAC SAMPLING AND PRESSURE, BILATERAL, PERCUTANEOUS APPROACH: ICD-10-PCS | Performed by: INTERNAL MEDICINE

## 2017-05-19 RX ADMIN — TIOTROPIUM BROMIDE SCH MCG: 18 CAPSULE ORAL; RESPIRATORY (INHALATION) at 07:19

## 2017-05-19 RX ADMIN — TAZOBACTAM SODIUM AND PIPERACILLIN SODIUM SCH MLS/HR: 375; 3 INJECTION, SOLUTION INTRAVENOUS at 21:47

## 2017-05-19 RX ADMIN — INSULIN GLARGINE SCH UNITS: 100 INJECTION, SOLUTION SUBCUTANEOUS at 21:48

## 2017-05-19 RX ADMIN — HUMAN INSULIN SCH UNIT: 100 INJECTION, SOLUTION SUBCUTANEOUS at 18:10

## 2017-05-19 RX ADMIN — IPRATROPIUM BROMIDE AND ALBUTEROL SULFATE SCH ML: .5; 3 SOLUTION RESPIRATORY (INHALATION) at 07:18

## 2017-05-19 RX ADMIN — METHYLPREDNISOLONE SODIUM SUCCINATE SCH MG: 40 INJECTION, POWDER, FOR SOLUTION INTRAMUSCULAR; INTRAVENOUS at 21:47

## 2017-05-19 RX ADMIN — TAZOBACTAM SODIUM AND PIPERACILLIN SODIUM SCH MLS/HR: 375; 3 INJECTION, SOLUTION INTRAVENOUS at 11:20

## 2017-05-19 RX ADMIN — HUMAN INSULIN SCH UNIT: 100 INJECTION, SOLUTION SUBCUTANEOUS at 21:48

## 2017-05-19 RX ADMIN — IPRATROPIUM BROMIDE AND ALBUTEROL SULFATE SCH ML: .5; 3 SOLUTION RESPIRATORY (INHALATION) at 13:07

## 2017-05-19 RX ADMIN — HUMAN INSULIN SCH UNIT: 100 INJECTION, SOLUTION SUBCUTANEOUS at 08:41

## 2017-05-19 RX ADMIN — ENOXAPARIN SODIUM SCH: 40 INJECTION SUBCUTANEOUS at 11:05

## 2017-05-19 RX ADMIN — IPRATROPIUM BROMIDE AND ALBUTEROL SULFATE SCH ML: .5; 3 SOLUTION RESPIRATORY (INHALATION) at 20:14

## 2017-05-19 RX ADMIN — IPRATROPIUM BROMIDE AND ALBUTEROL SULFATE SCH ML: .5; 3 SOLUTION RESPIRATORY (INHALATION) at 01:14

## 2017-05-19 RX ADMIN — HUMAN INSULIN SCH: 100 INJECTION, SOLUTION SUBCUTANEOUS at 14:35

## 2017-05-19 RX ADMIN — INSULIN GLARGINE SCH UNITS: 100 INJECTION, SOLUTION SUBCUTANEOUS at 11:21

## 2017-05-19 RX ADMIN — TAZOBACTAM SODIUM AND PIPERACILLIN SODIUM SCH MLS/HR: 375; 3 INJECTION, SOLUTION INTRAVENOUS at 18:10

## 2017-05-19 RX ADMIN — TAZOBACTAM SODIUM AND PIPERACILLIN SODIUM SCH MLS/HR: 375; 3 INJECTION, SOLUTION INTRAVENOUS at 03:45

## 2017-05-19 RX ADMIN — METHYLPREDNISOLONE SODIUM SUCCINATE SCH MG: 40 INJECTION, POWDER, FOR SOLUTION INTRAMUSCULAR; INTRAVENOUS at 11:19

## 2017-05-19 RX ADMIN — HUMAN INSULIN SCH UNIT: 100 INJECTION, SOLUTION SUBCUTANEOUS at 08:45

## 2017-05-19 RX ADMIN — METHYLPREDNISOLONE SODIUM SUCCINATE SCH MG: 40 INJECTION, POWDER, FOR SOLUTION INTRAMUSCULAR; INTRAVENOUS at 02:33

## 2017-05-19 RX ADMIN — METHYLPREDNISOLONE SODIUM SUCCINATE SCH MG: 40 INJECTION, POWDER, FOR SOLUTION INTRAMUSCULAR; INTRAVENOUS at 02:34

## 2017-05-19 NOTE — CARDCATH
PROCEDURE DATE: 05/19/2017



PROCEDURES:

1.  Right heart catheterization.

2.  Left heart catheterization.

3.  Coronary angiogram.



REFERRING PHYSICIAN:  Dr. Wallace Person.



PERFORMING PHYSICIAN:  Dr. Quinton Randle.



CLINICAL INDICATIONS:

1.  Dyspnea.

2.  Chronic obstructive pulmonary disease.

3.  Hypertension.

4.  Hyperlipidemia.

5.  Left bundle branch block with PVCs.



PROCEDURE:  After informed consent, patient was prepped and draped in the usual sterile fashion.  2% 
lidocaine was given in the right groin for local anesthesia.  Using micropuncture technique, 6-Georgian
 sheath was introduced into right common femoral artery and 7-Georgian sheath was introduced into right
 common femoral vein.



Using Warwick-Pratima catheter and usual diagnostic catheters, both left and right heart catheterization wa
s performed.  The patient tolerated the procedure well.



FINDINGS OF LEFT HEART CATHETERIZATION:

1.  Left main coronary artery is patent.

2.  LAD has a proximal 30% nonobstructive stenosis.  Mid and distal LAD is patent.  Diagonal branches
 are patent.

3.  Left circumflex and obtuse marginal branches are patent.

4.  Right coronary artery is dominant and patent.

5.  LV ejection fraction is 65%.  No wall motion abnormalities.  EDP is 25.  No gradient across the a
ortic valve.



FINDINGS OF THE RIGHT HEART CATHETERIZATION: RV pressure 66 x 6/15, RA 12, PA 73 x 21/40.



IMPRESSION:

1.  Nonobstructive coronaries.

2.  Normal left ventricular systolic function.

3.  Severe pulmonary hypertension.





__________________________________________

Quinton Randle MD







cc:



DD: 05/19/2017 10:43:59  308

TT: 05/19/2017 12:40:31

Job # [id number]

rn

## 2017-05-19 NOTE — CP.PCM.PN
Subjective





- Date & Time of Evaluation


Date of Evaluation: 05/19/17


Time of Evaluation: 14:20





- Subjective


Subjective: 





Patient seen and examined.


Breathing and cough  improved


Status post cardiac cath


Afebrile


Dyspnea on exertion





Objective





- Vital Signs/Intake and Output


Vital Signs (last 24 hours): 


 











Temp Pulse Resp BP Pulse Ox


 


 98.4 F   86   22   117/62   96 


 


 05/19/17 05:00  05/19/17 05:00  05/19/17 05:00  05/19/17 05:00  05/19/17 05:00








Intake and Output: 


 











 05/19/17 05/19/17





 06:59 18:59


 


Intake Total 500 


 


Output Total 1250 


 


Balance -750 














- Medications


Medications: 


 Current Medications





Albuterol/Ipratropium (Duoneb 3 Mg/0.5 Mg (3 Ml) Ud)  3 ml INH RQ6 [facility]


   Last Admin: 05/19/17 13:07 Dose:  3 ml


Amlodipine Besylate (Norvasc)  10 mg PO DAILY [facility]


   Last Admin: 05/19/17 11:20 Dose:  10 mg


Enoxaparin Sodium (Lovenox)  40 mg SC DAILY [facility]


   Last Admin: 05/19/17 11:05 Dose:  Not Given


Finasteride (Proscar)  5 mg PO DAILY [facility]


   Last Admin: 05/19/17 11:20 Dose:  5 mg


Piperacillin Sod/Tazobactam Sod (Zosyn 3.375 Gm Iv Premix)  3.375 gm in 50 mls 

@ 100 mls/hr IVPB Q6H [facility]


   Last Admin: 05/19/17 11:20 Dose:  100 mls/hr


Azithromycin 500 mg/ Sodium (Chloride)  250 mls @ 250 mls/hr IVPB DAILY@1030 [facility]


   Last Admin: 05/19/17 11:20 Dose:  250 mls/hr


Sodium Chloride (Sodium Chloride 0.9%)  1,000 mls @ 100 mls/hr IV .Q10H [facility]


   Last Admin: 05/19/17 08:41 Dose:  100 mls/hr


Insulin Glargine (Lantus)  10 unit SC Q12 [facility]


   Last Admin: 05/19/17 11:21 Dose:  10 units


Insulin Human Regular (Novolin R)  0 unit SC ACHS [facility]


   PRN Reason: Protocol


   Last Admin: 05/19/17 14:35 Dose:  Not Given


Losartan Potassium (Cozaar)  100 mg PO DAILY [facility]


   Last Admin: 05/17/17 09:41 Dose:  100 mg


Metformin HCl (Glucophage)  500 mg PO BIDCC [facility]


   Last Admin: 05/18/17 21:50 Dose:  Not Given


Methylprednisolone (Solu-Medrol)  40 mg IVP Q8H [facility]


   Last Admin: 05/19/17 11:19 Dose:  40 mg


Pantoprazole Sodium (Protonix Inj)  40 mg IVP DAILY [facility]


   Last Admin: 05/19/17 11:19 Dose:  40 mg


Promethazine HCl/Dextromethorphan (Phenergan Dm Syrup)  5 ml PO Q6H [facility]


   PRN Reason: Cough and congestion


Tiotropium Bromide (Spiriva)  18 mcg INH RQ24 [facility]


   Last Admin: 05/19/17 07:19 Dose:  18 mcg











- Labs


Labs: 


 





 05/19/17 06:04 





 05/19/17 06:04 





 











PT  12.0 SECONDS (9.7-12.2)   05/16/17  05:59    


 


INR  1.1   05/16/17  05:59    


 


APTT  30 SECONDS (21-34)   05/16/17  05:59    














- Head Exam


Head Exam: ATRAUMATIC, NORMOCEPHALIC





- Eye Exam


Eye Exam: Normal appearance





- ENT Exam


ENT Exam: Mucous Membranes Moist





- Neck Exam


Neck Exam: Normal Inspection





- Respiratory Exam


Respiratory Exam: Decreased Breath Sounds





- Cardiovascular Exam


Cardiovascular Exam: REGULAR RHYTHM





- GI/Abdominal Exam


GI & Abdominal Exam: Soft, Normal Bowel Sounds





- Extremities Exam


Extremities Exam: Normal Inspection





Assessment and Plan


(1) Pneumonia


Assessment & Plan: 


Continue antibiotics and follow up chest x-ray


Status: Acute   





(2) COPD exacerbation


Assessment & Plan: 


Taper steroids and continue nebulizer treatment


Status: Acute

## 2017-05-19 NOTE — CP.PCM.PN
Subjective





- Date & Time of Evaluation


Date of Evaluation: 05/19/17


Time of Evaluation: 15:25





- Subjective


Subjective: 





Patient s/p [facility]





1. Non Obstructive Coronaries


2. Normal EF


3. Severe Pulmonary HTN (PASP 77mmHg)





Medical management





Objective





- Vital Signs/Intake and Output


Vital Signs (last 24 hours): 


 











Temp Pulse Resp BP Pulse Ox


 


 98.4 F   86   22   117/62   96 


 


 05/19/17 05:00  05/19/17 05:00  05/19/17 05:00  05/19/17 05:00  05/19/17 05:00








Intake and Output: 


 











 05/19/17 05/19/17





 06:59 18:59


 


Intake Total 500 


 


Output Total 1250 


 


Balance -750 














- Medications


Medications: 


 Current Medications





Albuterol/Ipratropium (Duoneb 3 Mg/0.5 Mg (3 Ml) Ud)  3 ml [facility]H [facility]Q6 [facility]


   Last Admin: 05/19/17 13:07 Dose:  3 ml


Amlodipine Besylate (Norvasc)  10 mg PO DAILY [facility]


   Last Admin: 05/19/17 11:20 Dose:  10 mg


Enoxaparin Sodium (Lovenox)  40 mg SC DAILY [facility]


   Last Admin: 05/19/17 11:05 Dose:  Not Given


Finasteride (Proscar)  5 mg PO DAILY [facility]


   Last Admin: 05/19/17 11:20 Dose:  5 mg


Piperacillin Sod/Tazobactam Sod (Zosyn 3.375 Gm Iv Premix)  3.375 gm in 50 mls 

@ 100 mls/hr IVPB [facility]6H [facility]


   Last Admin: 05/19/17 11:20 Dose:  100 mls/hr


Azithromycin 500 mg/ Sodium (Chloride)  250 mls @ 250 mls/hr IVPB DAILY@1030 [facility]


   Last Admin: 05/19/17 11:20 Dose:  250 mls/hr


Sodium Chloride (Sodium Chloride 0.9%)  1,000 mls @ 100 mls/hr IV .Q10H [facility]


   Last Admin: 05/19/17 08:41 Dose:  100 mls/hr


Insulin Glargine (Lantus)  10 unit SC Q12 [facility]


   Last Admin: 05/19/17 11:21 Dose:  10 units


Insulin Human Regular (Novolin R)  0 unit SC ACHS [facility]


   PRN Reason: Protocol


   Last Admin: 05/19/17 14:35 Dose:  Not Given


Losartan Potassium (Cozaar)  100 mg PO DAILY [facility]


   Last Admin: 05/17/17 09:41 Dose:  100 mg


Metformin HCl (Glucophage)  500 mg PO BIDCC [facility]


   Last Admin: 05/18/17 21:50 Dose:  Not Given


Methylprednisolone (Solu-Medrol)  40 mg IVP Q8H [facility]


   Last Admin: 05/19/17 11:19 Dose:  40 mg


Pantoprazole Sodium (Protonix Inj)  40 mg IVP DAILY [facility]


   Last Admin: 05/19/17 11:19 Dose:  40 mg


Promethazine HCl/Dextromethorphan (Phenergan Dm Syrup)  5 ml PO Q6H [facility]


   PRN Reason: Cough and congestion


Tiotropium Bromide (Spiriva)  18 mcg INH RQ24 [facility]


   Last Admin: 05/19/17 07:19 Dose:  18 mcg











- Labs


Labs: 


 





 05/19/17 06:04 





 05/19/17 06:04 





 











PT  12.0 SECONDS (9.7-12.2)   05/16/17  05:59    


 


INR  1.1   05/16/17  05:59    


 


APTT  30 SECONDS (21-34)   05/16/17  05:59

## 2017-05-19 NOTE — PN
DATE: 05/19/2017



LOCATION:  ICU 11.



This 77-year-old male is post-cardiac catheterization, seen on rounds today without significant clini
demetris changes or reported active bleeding.  Report of the cardiac catheterization is seen, but with reji
dence of severe pulmonary edema.



The patient is still having intermittent periods of dyspepsia and dysphagia.  The entire chart is rev
iewed, including but not limited to the most recent lab and radiology study results, current and prev
ious medication lists, current and the previous medical events.  The latest white blood cells is 17.1
 with low indices but normal hemoglobin and hematocrit with BUN of 23, but normal creatinine with ryne
vated blood glucose level to 277 with low calcium 8.2 with low albumin 2.3.



PHYSICAL EXAMINATION:

GENERAL:  A 77-year-old male appeared to be awake, alert.

VITAL SIGNS:  Afebrile with pulse of 80, respiratory rate 20-22, blood pressure 122/64.

HEENT:  Showed pale, dry oral mucoid membrane.  Nonicteric sclerae.

LUNGS:  Few scattered crepitation, decreased air entry at bases.

HEART:  Positive S1 and S2.

ABDOMEN:  Soft.  Bowel sounds are present.  No mass or organomegaly.  No rebound tenderness or guardi
ng.

EXTREMITIES:  Without significant edema, clubbing or cyanosis.

NEUROLOGIC:  No reported new neurological deficits, sensory or motor.



IMPRESSION:

1.  Dysphagia with abnormal chest CAT scan with possible diverticulum versus intraluminal esophageal 
lesion.

2.  Reexacerbation of bronchial asthma.

3.  Pulmonary hypertension.

4.  Known history of osteoarthritis.

5.  Known history of hypertension.

6.  Possible pneumonia versus lung mass lesion.

7.  Hyperglycemia by recent history.



SUGGESTIONS:

1.  Continue current management.

2.  Awaiting for esophagogram to be done before any endoscopic evaluation of the upper GI tract.

3.  Further recommendation to follow.





__________________________________________

Candace Solorio MD







cc:



DD: 05/19/2017 13:15:52  14

TT: 05/19/2017 13:40:33

Confirmation # [id number]

Dictation # [id number]

sha

## 2017-05-20 VITALS — HEART RATE: 83 BPM | DIASTOLIC BLOOD PRESSURE: 71 MMHG | SYSTOLIC BLOOD PRESSURE: 136 MMHG | TEMPERATURE: 97.5 F

## 2017-05-20 RX ADMIN — INSULIN GLARGINE SCH UNITS: 100 INJECTION, SOLUTION SUBCUTANEOUS at 09:38

## 2017-05-20 RX ADMIN — HUMAN INSULIN SCH: 100 INJECTION, SOLUTION SUBCUTANEOUS at 12:14

## 2017-05-20 RX ADMIN — TIOTROPIUM BROMIDE SCH MCG: 18 CAPSULE ORAL; RESPIRATORY (INHALATION) at 07:50

## 2017-05-20 RX ADMIN — METHYLPREDNISOLONE SODIUM SUCCINATE SCH MG: 40 INJECTION, POWDER, FOR SOLUTION INTRAMUSCULAR; INTRAVENOUS at 09:36

## 2017-05-20 RX ADMIN — IPRATROPIUM BROMIDE AND ALBUTEROL SULFATE SCH: .5; 3 SOLUTION RESPIRATORY (INHALATION) at 02:15

## 2017-05-20 RX ADMIN — TAZOBACTAM SODIUM AND PIPERACILLIN SODIUM SCH MLS/HR: 375; 3 INJECTION, SOLUTION INTRAVENOUS at 03:15

## 2017-05-20 RX ADMIN — ENOXAPARIN SODIUM SCH MG: 40 INJECTION SUBCUTANEOUS at 09:36

## 2017-05-20 RX ADMIN — IPRATROPIUM BROMIDE AND ALBUTEROL SULFATE SCH ML: .5; 3 SOLUTION RESPIRATORY (INHALATION) at 07:50

## 2017-05-20 RX ADMIN — TAZOBACTAM SODIUM AND PIPERACILLIN SODIUM SCH MLS/HR: 375; 3 INJECTION, SOLUTION INTRAVENOUS at 09:40

## 2017-05-20 RX ADMIN — HUMAN INSULIN SCH UNIT: 100 INJECTION, SOLUTION SUBCUTANEOUS at 08:10

## 2017-05-20 RX ADMIN — HUMAN INSULIN SCH UNIT: 100 INJECTION, SOLUTION SUBCUTANEOUS at 08:12

## 2017-05-20 NOTE — CP.PCM.PN
Subjective





- Date & Time of Evaluation


Date of Evaluation: 05/20/17


Time of Evaluation: 07:00





- Subjective


Subjective: 





Patient seen and examined.


Still complained of cough and dyspnea on minimal exertion


Overall feeling  better


CAT scan report explained to the family and patient


Status post cardiac cath





Objective





- Vital Signs/Intake and Output


Vital Signs (last 24 hours): 


 











Temp Pulse Resp BP Pulse Ox


 


 97.5 F L  83   20   136/71   96 


 


 05/20/17 08:00  05/20/17 08:00  05/20/17 08:00  05/20/17 08:00  05/20/17 08:00








Intake and Output: 


 











 05/20/17 05/20/17





 06:59 18:59


 


Intake Total 750 


 


Output Total 600 


 


Balance 150 














- Medications


Medications: 


 Current Medications





Albuterol/Ipratropium (Duoneb 3 Mg/0.5 Mg (3 Ml) Ud)  3 ml INH RQ6 [facility]


   Last Admin: 05/20/17 07:50 Dose:  3 ml


Amlodipine Besylate (Norvasc)  10 mg PO DAILY [facility]


   Last Admin: 05/20/17 09:36 Dose:  10 mg


Enoxaparin Sodium (Lovenox)  40 mg SC DAILY [facility]


   Last Admin: 05/20/17 09:36 Dose:  40 mg


Finasteride (Proscar)  5 mg PO DAILY [facility]


   Last Admin: 05/20/17 09:36 Dose:  5 mg


Piperacillin Sod/Tazobactam Sod (Zosyn 3.375 Gm Iv Premix)  3.375 gm in 50 mls 

@ 100 mls/hr IVPB Q6H [facility]


   Last Admin: 05/20/17 09:40 Dose:  100 mls/hr


Azithromycin 500 mg/ Sodium (Chloride)  250 mls @ 250 mls/hr IVPB DAILY@1030 [facility]


   Last Admin: 05/20/17 09:39 Dose:  250 mls/hr


Insulin Glargine (Lantus)  10 unit SC Q12 [facility]


   Last Admin: 05/20/17 09:38 Dose:  10 units


Insulin Human Regular (Novolin R)  0 unit SC ACHS [facility]


   PRN Reason: Protocol


   Last Admin: 05/20/17 08:12 Dose:  3 unit


Losartan Potassium (Cozaar)  100 mg PO DAILY [facility]


   Last Admin: 05/17/17 09:41 Dose:  100 mg


Metformin HCl (Glucophage)  500 mg PO BIDCC [facility]


   Last Admin: 05/18/17 21:50 Dose:  Not Given


Methylprednisolone (Solu-Medrol)  40 mg IVP Q12 [facility]


   Last Admin: 05/20/17 09:36 Dose:  40 mg


Pantoprazole Sodium (Protonix Inj)  40 mg IVP DAILY [facility]


   Last Admin: 05/20/17 09:33 Dose:  40 mg


Promethazine HCl/Dextromethorphan (Phenergan Dm Syrup)  5 ml PO Q6H [facility]


   PRN Reason: Cough and congestion


Tiotropium Bromide (Spiriva)  18 mcg INH RQ24 [facility]


   Last Admin: 05/20/17 07:50 Dose:  18 mcg











- Labs


Labs: 


 





 05/19/17 06:04 





 05/19/17 06:04 





 











PT  12.0 SECONDS (9.7-12.2)   05/16/17  05:59    


 


INR  1.1   05/16/17  05:59    


 


APTT  30 SECONDS (21-34)   05/16/17  05:59    














Assessment and Plan


(1) Pneumonia


Status: Acute   





(2) COPD exacerbation


Status: Acute

## 2017-05-20 NOTE — RAD
HISTORY:

pneumonia  



COMPARISON:

05/16/2017 



FINDINGS:



LUNGS:

Biapical pleural thickening with upper lobe granulomatous changes.  

Focal consolidation seen within the medial right lung apex.  

Bilateral paratracheal prominence may represent prominent 

vascularity. Right hilar consolidation and or prominence.



PLEURA:

As above.



CARDIOVASCULAR:

Cardiomegaly.



OSSEOUS STRUCTURES:

Degenerative changes in the spine and shoulders.



VISUALIZED UPPER ABDOMEN:

Normal.



OTHER FINDINGS:

None.



IMPRESSION:

Biapical pleural thickening with upper lobe granulomatous changes.  

Focal consolidation seen within the medial right lung apex.  

Bilateral paratracheal prominence may represent prominent 

vascularity. Right hilar consolidation and or prominence.

## 2017-05-21 NOTE — CARD
--------------- APPROVED REPORT --------------





EKG Measurement

Heart [id number]

CO 200P75

[id number]

[id number]

[id number]



<Conclusion>

Sinus rhythm with frequent premature ventricular complexes

Left axis deviation

Right bundle branch block

Abnormal ECG

## 2017-05-21 NOTE — CARD
--------------- APPROVED REPORT --------------





EKG Measurement

Heart [id number]

ND 178P37

[id number]

[id number]

[id number]



<Conclusion>

Sinus rhythm with frequent premature ventricular complexes

Right bundle branch block

Abnormal ECG

## 2017-05-22 VITALS — RESPIRATION RATE: 18 BRPM | OXYGEN SATURATION: 98 %

## 2017-05-24 NOTE — CP.PCM.DIS
Provider





- Provider


Date of Admission: 


05/16/17 06:49





Attending physician: 


Raza Gonsalez MD





Time Spent in preparation of Discharge (in minutes): 35





Diagnosis





- Discharge Diagnosis


(1) Pneumonia


Status: Acute   





(2) COPD exacerbation


Status: Acute   





Hospital Course





- Lab Results


Lab Results: 


 Most Recent Lab Values











WBC  17.1 K/uL (4.8-10.8)  H  05/19/17  06:04    


 


RBC  5.27 Mil/uL (4.40-5.90)   05/19/17  06:04    


 


Hgb  12.7 g/dL (12.0-18.0)   05/19/17  06:04    


 


Hct  39.1 % (35.0-51.0)   05/19/17  06:04    


 


MCV  74.2 fL (80.0-94.0)  L  05/19/17  06:04    


 


MCH  24.0 pg (27.0-31.0)  L  05/19/17  06:04    


 


MCHC  32.4 g/dL (33.0-37.0)  L  05/19/17  06:04    


 


RDW  19.0 % (11.5-14.5)  H  05/19/17  06:04    


 


Plt Count  232 K/uL (130-400)   05/19/17  06:04    


 


MPV  8.1 fL (7.2-11.7)   05/19/17  06:04    


 


Neut % (Auto)  93.2 % (50.0-75.0)  H  05/18/17  06:00    


 


Lymph % (Auto)  2.4 % (20.0-40.0)  L  05/18/17  06:00    


 


Mono % (Auto)  4.2 % (0.0-10.0)   05/18/17  06:00    


 


Eos % (Auto)  0.0 % (0.0-4.0)   05/18/17  06:00    


 


Baso % (Auto)  0.2 % (0.0-2.0)   05/18/17  06:00    


 


Neut #  21.8 K/uL (1.8-7.0)  H  05/18/17  06:00    


 


Lymph #  0.6 K/uL (1.0-4.3)  L  05/18/17  06:00    


 


Mono #  1.0 K/uL (0.0-0.8)  H  05/18/17  06:00    


 


Eos #  0.0 K/uL (0.0-0.7)   05/18/17  06:00    


 


Baso #  0.1 K/uL (0.0-0.2)   05/18/17  06:00    


 


Neutrophils % (Manual)  96 % (50-75)  H  05/18/17  06:00    


 


Band Neutrophils %  2 % (0-2)   05/17/17  06:34    


 


Lymphocytes % (Manual)  2 % (20-40)  L  05/18/17  06:00    


 


Reactive Lymphs %  4 % (0-0)  H  05/16/17  05:59    


 


Monocytes % (Manual)  2 % (0-10)   05/18/17  06:00    


 


Toxic Granulation  Present   05/17/17  06:34    


 


Platelet Estimate  Normal  (NORMAL)   05/18/17  06:00    


 


Large Platelets  Present   05/17/17  06:34    


 


Hypochromasia (manual)  Slight   05/18/17  06:00    


 


Poikilocytosis (manual  Slight   05/18/17  06:00    


 


Anisocytosis (manual)  Slight   05/18/17  06:00    


 


Microcytosis (manual)  Slight   05/16/17  05:59    


 


Ovalocytes  Slight   05/18/17  06:00    


 


PT  12.0 SECONDS (9.7-12.2)   05/16/17  05:59    


 


INR  1.1   05/16/17  05:59    


 


APTT  30 SECONDS (21-34)   05/16/17  05:59    


 


Puncture Site  Rt radial   05/16/17  06:09    


 


pCO2  35 mm/Hg (35-45)   05/16/17  06:09    


 


pO2  101 mm/Hg ()  H  05/16/17  06:09    


 


HCO3  25.5 mmol/L (21-28)   05/16/17  06:09    


 


ABG pH  7.45  (7.35-7.45)   05/16/17  06:09    


 


ABG Total CO2  25.4 mmol/L (22-28)   05/16/17  06:09    


 


ABG O2 Saturation  96.4 % (95-98)   05/16/17  06:09    


 


ABG Base Excess  0.7 mmol/L (-2.0-3.0)   05/16/17  06:09    


 


[place] Test  Pos   05/16/17  06:09    


 


ABG Potassium  3.5 mmol/L (3.6-5.2)  L  05/16/17  06:09    


 


Sodium  133.0 mmol/l (132-148)   05/16/17  06:09    


 


Chloride  102.0 mmol/L ()   05/16/17  06:09    


 


Glucose  154 mg/dl ()  H  05/16/17  06:09    


 


Lactate  1.2 mmol/L (0.7-2.1)   05/16/17  06:09    


 


Crit Value Called To  Md cort[place]   05/16/17  06:09    


 


Crit Value Called By  R alert rrt   05/16/17  06:09    


 


Crit Value Read Back  Y   05/16/17  06:09    


 


Blood Gas Notified Time  625   05/16/17  06:09    


 


Sodium  133 mmol/L (132-148)   05/19/17  06:04    


 


Potassium  4.4 mmol/L (3.6-5.2)   05/19/17  06:04    


 


Chloride  97 mmol/L ()  L  05/19/17  06:04    


 


Carbon Dioxide  27 mmol/L (22-30)   05/19/17  06:04    


 


Anion Gap  13  (10-20)   05/19/17  06:04    


 


BUN  23 mg/dL (9-20)  H  05/19/17  06:04    


 


Creatinine  0.7 MG/DL (0.8-1.5)  L  05/19/17  06:04    


 


Est GFR ( Amer)  > 60   05/19/17  06:04    


 


Est GFR (Non-Af Amer)  > 60   05/19/17  06:04    


 


POC Glucose (mg/dL)  405 mg/dL ()  H*  05/20/17  11:35    


 


Random Glucose  277 mg/dL ()  H  05/19/17  06:04    


 


Calcium  8.2 mg/dl (8.6-10.4)  L  05/19/17  06:04    


 


Phosphorus  2.6 mg/dL (2.5-4.5)   05/18/17  06:00    


 


Magnesium  1.9 mg/dL (1.6-2.3)   05/18/17  06:00    


 


Total Bilirubin  0.6 mg/dL (0.2-1.3)   05/18/17  06:00    


 


AST  19 U/L (17-59)   05/18/17  06:00    


 


ALT  38 U/L (21-72)   05/18/17  06:00    


 


Alkaline Phosphatase  42 U/L ()   05/18/17  06:00    


 


Troponin I  < 0.0120 ng/mL (0.00-0.120)   05/16/17  06:08    


 


NT-Pro-B Natriuret Pep  153 pg/mL (0-900)   05/16/17  06:08    


 


Total Protein  6.0 g/dL (6.3-8.3)  L  05/18/17  06:00    


 


Albumin  2.3 g/dL (3.5-5.0)  L D 05/18/17  06:00    


 


Globulin  3.7 gm/dL (2.2-3.9)   05/18/17  06:00    


 


Albumin/Globulin Ratio  0.6  (1.0-2.1)  L  05/18/17  06:00    


 


Amylase  45 U/L ()   05/17/17  13:50    


 


Lipase  38 U/L ()   05/17/17  13:50    


 


Carcinoembryonic Ag  3.3 ng/mL (0-3.0)  H  05/17/17  13:50    


 


CA 19-9 Antigen  39.3 U/mL (0-37)  H  05/17/17  13:50    


 


Arterial Blood Potassium  3.5 mmol/L (3.6-5.2)  L  05/16/17  06:09    


 


Urine Color  Yellow  (YELLOW)   05/16/17  07:09    


 


Urine Clarity  Clear  (Clear)   05/16/17  07:09    


 


Urine pH  6.0  (5.0-8.0)   05/16/17  07:09    


 


Ur Specific Gravity  1.016  (1.003-1.030)   05/16/17  07:09    


 


Urine Protein  Negative mg/dL (NEGATIVE)   05/16/17  07:09    


 


Urine Glucose (UA)  Normal mg/dL (Normal)   05/16/17  07:09    


 


Urine Ketones  Negative mg/dL (NEGATIVE)   05/16/17  07:09    


 


Urine Blood  Negative  (NEGATIVE)   05/16/17  07:09    


 


Urine Nitrate  Negative  (NEGATIVE)   05/16/17  07:09    


 


Urine Bilirubin  Negative  (NEGATIVE)   05/16/17  07:09    


 


Urine Urobilinogen  Normal mg/dL (0.2-1.0)   05/16/17  07:09    


 


Ur Leukocyte Esterase  Neg Sherry/uL (Negative)   05/16/17  07:09    


 


Urine WBC (Auto)  < 1 /hpf (0-5)   05/16/17  07:09    


 


Urine RBC (Auto)  1 /hpf (0-3)   05/16/17  07:09    


 


Ur Squamous Epith Cells  < 1 /hpf (0-5)   05/16/17  07:09    














- Hospital Course


Hospital Course: 





On hospital presentation:





77-year-old male with history of COPD, hypertension, diabetes, GERD presented 

to the emergency room with progressively worsening shortness of breath and 

cough for the past few weeks. Patient treated with antibiotics, steroids and 

bronchodilators without any relief. Also complaining of cough productive cough. 

Phlegm. Denies fever or chills, denies chest pain.





Hospital course:


Patient was started on IV antibiotics, IV steroids and bronchodilators. CAT 

scan of the chest done showed pneumonia or possible mass. GI was consulted for 

mucosal thickening. Patient condition significantly improved and was discharged 

on by mouth antibiotics, by mouth prednisone, inhaled steroids and albuterol. 

Patient was instructed/advised to get PET scan of the chest to rule out 

malignancy.








Diagnoses


Pneumonia and COPD exacerbation


Possible lung mass














Discharge Exam





- Head Exam


Head Exam: ATRAUMATIC, NORMOCEPHALIC





Discharge Plan





- Follow Up Plan


Condition: IMPROVED


Disposition: HOME/ ROUTINE


Patient education suggested?: Yes


Instructions:  Pneumonia (DC)

## 2017-05-27 ENCOUNTER — HOSPITAL ENCOUNTER (INPATIENT)
Dept: HOSPITAL 31 - C.ER | Age: 77
LOS: 9 days | Discharge: NURSING, REHAB OR LONG-TERM CARE | DRG: 190 | End: 2017-06-05
Attending: INTERNAL MEDICINE | Admitting: INTERNAL MEDICINE
Payer: MEDICARE

## 2017-05-27 VITALS — BODY MASS INDEX: 26.8 KG/M2

## 2017-05-27 DIAGNOSIS — J44.1: ICD-10-CM

## 2017-05-27 DIAGNOSIS — N39.0: ICD-10-CM

## 2017-05-27 DIAGNOSIS — I10: ICD-10-CM

## 2017-05-27 DIAGNOSIS — B96.20: ICD-10-CM

## 2017-05-27 DIAGNOSIS — K21.9: ICD-10-CM

## 2017-05-27 DIAGNOSIS — J44.0: Primary | ICD-10-CM

## 2017-05-27 DIAGNOSIS — Z87.891: ICD-10-CM

## 2017-05-27 DIAGNOSIS — Z79.899: ICD-10-CM

## 2017-05-27 DIAGNOSIS — J18.9: ICD-10-CM

## 2017-05-27 DIAGNOSIS — J45.901: ICD-10-CM

## 2017-05-27 DIAGNOSIS — N40.0: ICD-10-CM

## 2017-05-27 DIAGNOSIS — Z79.4: ICD-10-CM

## 2017-05-27 DIAGNOSIS — E10.9: ICD-10-CM

## 2017-05-27 LAB
ALBUMIN/GLOB SERPL: 1.2 {RATIO} (ref 1–2.1)
ALP SERPL-CCNC: 45 U/L (ref 38–126)
ALT SERPL-CCNC: 27 U/L (ref 21–72)
AST SERPL-CCNC: 13 U/L (ref 17–59)
BASOPHILS # BLD AUTO: 0.1 K/UL (ref 0–0.2)
BASOPHILS NFR BLD: 0.5 % (ref 0–2)
BILIRUB SERPL-MCNC: 0.6 MG/DL (ref 0.2–1.3)
BUN SERPL-MCNC: 28 MG/DL (ref 9–20)
CALCIUM SERPL-MCNC: 8.5 MG/DL (ref 8.6–10.4)
CHLORIDE SERPL-SCNC: 96 MMOL/L (ref 98–107)
CO2 SERPL-SCNC: 24 MMOL/L (ref 22–30)
EOSINOPHIL # BLD AUTO: 0 K/UL (ref 0–0.7)
EOSINOPHIL NFR BLD: 0 % (ref 0–4)
ERYTHROCYTE [DISTWIDTH] IN BLOOD BY AUTOMATED COUNT: 18.9 % (ref 11.5–14.5)
GLOBULIN SER-MCNC: 2.7 GM/DL (ref 2.2–3.9)
GLUCOSE SERPL-MCNC: 196 MG/DL (ref 75–110)
HCT VFR BLD CALC: 40.6 % (ref 35–51)
LYMPHOCYTES # BLD AUTO: 0.6 K/UL (ref 1–4.3)
LYMPHOCYTES NFR BLD AUTO: 3.4 % (ref 20–40)
MCH RBC QN AUTO: 24.3 PG (ref 27–31)
MCHC RBC AUTO-ENTMCNC: 32.3 G/DL (ref 33–37)
MCV RBC AUTO: 75 FL (ref 80–94)
MONOCYTES # BLD: 1 K/UL (ref 0–0.8)
MONOCYTES NFR BLD: 5.9 % (ref 0–10)
NEUTROPHILS NFR BLD AUTO: 89 % (ref 50–75)
NRBC BLD AUTO-RTO: 0 % (ref 0–2)
PLATELET # BLD: 197 K/UL (ref 130–400)
PMV BLD AUTO: 7.5 FL (ref 7.2–11.7)
POTASSIUM SERPL-SCNC: 4.6 MMOL/L (ref 3.6–5.2)
PROT SERPL-MCNC: 5.9 G/DL (ref 6.3–8.3)
SODIUM SERPL-SCNC: 130 MMOL/L (ref 132–148)
TOTAL CELLS COUNTED BLD: 100
VARIANT LYMPHS NFR BLD MANUAL: 2 % (ref 0–0)
WBC # BLD AUTO: 16.7 K/UL (ref 4.8–10.8)

## 2017-05-27 RX ADMIN — SODIUM CHLORIDE SCH MLS/HR: 9 INJECTION, SOLUTION INTRAVENOUS at 20:20

## 2017-05-27 RX ADMIN — METHYLPREDNISOLONE SODIUM SUCCINATE SCH MG: 40 INJECTION, POWDER, FOR SOLUTION INTRAMUSCULAR; INTRAVENOUS at 18:10

## 2017-05-27 RX ADMIN — IPRATROPIUM BROMIDE AND ALBUTEROL SULFATE SCH ML: .5; 3 SOLUTION RESPIRATORY (INHALATION) at 12:36

## 2017-05-27 RX ADMIN — HUMAN INSULIN SCH UNIT: 100 INJECTION, SOLUTION SUBCUTANEOUS at 16:30

## 2017-05-27 RX ADMIN — HUMAN INSULIN SCH: 100 INJECTION, SOLUTION SUBCUTANEOUS at 21:53

## 2017-05-27 RX ADMIN — IPRATROPIUM BROMIDE AND ALBUTEROL SULFATE SCH ML: .5; 3 SOLUTION RESPIRATORY (INHALATION) at 22:18

## 2017-05-27 NOTE — CP.PCM.HP
<TommySurekha - Last Filed: 05/28/17 13:02>





History of Present Illness





- History of Present Illness


History of Present Illness: 





CC: "cough and pneumonia"





78 yearold male with history of asthma/COPD, hypertension, diabetes, GERD 

presents to the ED with 2 week history of cough. He denies SOB at this time. 

Patient with recent admission on 5/16/16 and discharged 3 days ago on 5/24/17 

under Dr. Gonsalez's service. Patient was treated for pneumonia and COPD 

exacerbation at the time. Denies ever being intubated. Admits to compliance 

with medications. Admits cough was productive for the first 2 weeks of cough (

while hospitalized) and now cough is dry. He was unable to sleep last night 

secondary to cough. Denies chest pain, SOB, fevers, chills, nausea, vomiting, 

diarrhea, constipation. Patient's PMD is Dr. Lee whom he saw 3 days ago. 





PMHx: asthma/COPD, hypertension, diabetes, GERD 


SHx: none


Medications: patient does not know them. 


Family Hx: none


NKDA


Social Hx: denies tobacco, alcohol or illicit drug use. 


PMD:  Dr. Lee 





Present on Admission





- Present on Admission


Any Indicators Present on Admission: Yes


History of Uncontrolled Diabetes: Yes





Review of Systems





- Constitutional


Constitutional: absent: Chills, Fever, Frequent Falls





- EENT


Eyes: absent: Blurred Vision, Change in Vision





- Cardiovascular


Cardiovascular: absent: Chest Pain, Chest Pain with Activity, Dyspnea, Dyspnea 

on Exertion, Edema





- Respiratory


Respiratory: Cough.  absent: Dyspnea, Hemoptysis, Dyspnea on Exertion, Wheezing





- Gastrointestinal


Gastrointestinal: absent: Abdominal Pain, Constipation, Diarrhea, Nausea, 

Vomiting





- Genitourinary


Genitourinary: absent: Difficulty Urinating, Dysuria





- Musculoskeletal


Musculoskeletal: absent: Back Pain, Numbness, Tingling





- Integumentary


Integumentary: absent: Rash, Wounds





- Neurological


Neurological: absent: Dizziness, Numbness, Syncope, Tingling, Weakness





- Psychiatric


Psychiatric: absent: Anxiety





- Endocrine


Endocrine: absent: Polyuria





- Hematologic/Lymphatic


Hematologic: absent: Easy Bruising





Past Patient History





- Infectious Disease


Hx of Infectious Diseases: None





- Past Medical History & Family History


Past Medical History?: Yes





- Past Social History


Smoking Status: Former Smoker





- CARDIAC


Hx Hypertension: Yes





- PULMONARY


Hx Asthma: Yes





- NEUROLOGICAL


Hx Neurological Disorder: No





- HEENT


Hx HEENT Problems: No





- RENAL


Hx Chronic Kidney Disease: No





- ENDOCRINE/METABOLIC


Hx Endocrine Disorders: Yes


Hx Diabetes Mellitus Type 1: Yes





- HEMATOLOGICAL/ONCOLOGICAL


Hx Blood Disorders: No





- INTEGUMENTARY


Hx Dermatological Problems: Yes


Other/Comment: SKIN ASTHMA





- MUSCULOSKELETAL/RHEUMATOLOGICAL


Hx Arthritis: Yes





- GASTROINTESTINAL


Hx Gastritis: Yes





- GENITOURINARY/GYNECOLOGICAL


Hx Genitourinary Disorders: Yes


Hx Prostate Problems: Yes





- PSYCHIATRIC


Hx Substance Use: No





- SURGICAL HISTORY


Hx Surgeries: Yes





- ANESTHESIA


Hx Anesthesia: Yes


Hx Anesthesia Reactions: No


Hx Malignant Hyperthermia: No





Meds


Allergies/Adverse Reactions: 


 Allergies











Allergy/AdvReac Type Severity Reaction Status Date / Time


 


seasonal allergies Allergy Mild CONGESTION Uncoded 05/27/17 12:17














Physical Exam





- Constitutional


Appears: No Acute Distress, Chronically Ill





- Head Exam


Head Exam: NORMAL INSPECTION, NORMOCEPHALIC





- Eye Exam


Eye Exam: EOMI, Normal appearance





- ENT Exam


ENT Exam: Mucous Membranes Moist





- Neck Exam


Neck exam: Positive for: Full Rom, Normal Inspection





- Respiratory Exam


Respiratory Exam: Decreased Breath Sounds, Rhonchi, Wheezes





- Cardiovascular Exam


Cardiovascular Exam: REGULAR RHYTHM, +S1, +S2.  absent: Tachycardia





- GI/Abdominal Exam


GI & Abdominal Exam: Soft.  absent: Distended, Tenderness





- Extremities Exam


Extremities exam: Positive for: full ROM, pedal edema.  Negative for: tenderness





- Back Exam


Back exam: NORMAL INSPECTION





- Neurological Exam


Neurological exam: Alert, Oriented x3





Results





- Vital Signs


Recent Vital Signs: 





 Last Vital Signs











Temp  98.0 F   05/27/17 12:14


 


Pulse  90   05/27/17 12:14


 


Resp  25 H  05/27/17 12:14


 


BP  118/70   05/27/17 12:14


 


Pulse Ox  97   05/27/17 13:36














- Labs


Result Diagrams: 


 05/28/17 11:44





 05/28/17 11:44





Assessment & Plan


(1) Pneumonia


Assessment and Plan: 


Patient with Leukocytosis on admission: 16.7. Afebrile. 


CXR 5/27/17 shows right lower lobe infiltrate, unchanged compared to prior 

studies.


PET/CT scan done 5/24/17: consolidation on b/l upper lobe as well as RLL and 

RML. Low probability of neoplasm. 


* Start Vanco IVPB


* Start Zosyn IVPB


* Start Tessalon Pearls TID 


f/u sputum cultures





ID consult placed- Dr. Freed-help appreciated. 


Pulm consult placed-Dr. Gonsalez- help appreciated. 


Status: Acute   





(2) COPD exacerbation


Assessment and Plan: 


Start Vanco IVPB 


Start Zosyn IVPB


Start Tessalon Pearls TID


Start Solumedrol 40 mg IVP Q8H


Start Singulair 10 mg PO HS





Pulm consult placed- Dr. Gonsalez- help appreciated. 





Status: Acute   





(3) Diabetes


Assessment and Plan: 


Accuchecks ACHS


ISS


Consistent Carb Diet





Status: Acute   





(4) BPH (benign prostatic hyperplasia)


Assessment and Plan: 


Proscar 5 mg PO daily 


Flomax 0.4 mg PO BID





Status: Acute   





(5) HTN (hypertension)


Assessment and Plan: 


Norvasc 10 mg PO daily


Cozaar 100 mg PO daily 





Status: Acute   





(6) Prophylactic measure


Assessment and Plan: 


Protonix 40 mg PO daily 


Lovenox 40 mg SC daily 


Status: Acute   





<Trent Brooks M - Last Filed: 05/28/17 14:13>





Results





- Vital Signs


Recent Vital Signs: 





 Last Vital Signs











Temp  97.5 F L  05/28/17 07:23


 


Pulse  83   05/28/17 07:23


 


Resp  20   05/28/17 07:23


 


BP  143/77   05/28/17 07:23


 


Pulse Ox  98   05/28/17 07:23














- Labs


Result Diagrams: 


 05/28/17 11:44





 05/28/17 11:44


Labs: 





 Laboratory Results - last 24 hr











  05/27/17 05/27/17 05/28/17





  16:26 21:05 07:27


 


WBC   


 


RBC   


 


Hgb   


 


Hct   


 


MCV   


 


MCH   


 


MCHC   


 


RDW   


 


Plt Count   


 


MPV   


 


Neut % (Auto)   


 


Lymph % (Auto)   


 


Mono % (Auto)   


 


Eos % (Auto)   


 


Baso % (Auto)   


 


Neut #   


 


Lymph #   


 


Mono #   


 


Eos #   


 


Baso #   


 


Neutrophils % (Manual)   


 


Lymphocytes % (Manual)   


 


Monocytes % (Manual)   


 


Toxic Granulation   


 


Platelet Estimate   


 


Large Platelets   


 


Polychromasia   


 


Hypochromasia (manual)   


 


Poikilocytosis (manual   


 


Anisocytosis (manual)   


 


Target Cells   


 


Tear Drop Cells   


 


Ovalocytes   


 


Sodium   


 


Potassium   


 


Chloride   


 


Carbon Dioxide   


 


Anion Gap   


 


BUN   


 


Creatinine   


 


Est GFR ( Amer)   


 


Est GFR (Non-Af Amer)   


 


POC Glucose (mg/dL)  238 H  218 H  212 H


 


Random Glucose   


 


Calcium   


 


Phosphorus   


 


Magnesium   


 


Total Bilirubin   


 


AST   


 


ALT   


 


Alkaline Phosphatase   


 


Total Protein   


 


Albumin   


 


Globulin   


 


Albumin/Globulin Ratio   














  05/28/17 05/28/17 05/28/17





  11:20 11:44 11:44


 


WBC   12.6 H 


 


RBC   5.17 


 


Hgb   12.4 


 


Hct   39.3 


 


MCV   76.0 L 


 


MCH   24.1 L 


 


MCHC   31.7 L 


 


RDW   19.4 H 


 


Plt Count   210 


 


MPV   7.9 


 


Neut % (Auto)   93.6 H 


 


Lymph % (Auto)   2.0 L 


 


Mono % (Auto)   4.3 


 


Eos % (Auto)   0.0 


 


Baso % (Auto)   0.1 


 


Neut #   11.8 H 


 


Lymph #   0.3 L 


 


Mono #   0.5 


 


Eos #   0.0 


 


Baso #   0.0 


 


Neutrophils % (Manual)   96 H 


 


Lymphocytes % (Manual)   2 L 


 


Monocytes % (Manual)   2 


 


Toxic Granulation   Present 


 


Platelet Estimate   Normal 


 


Large Platelets   Present 


 


Polychromasia   Slight 


 


Hypochromasia (manual)   Slight 


 


Poikilocytosis (manual   Slight 


 


Anisocytosis (manual)   Slight 


 


Target Cells   Slight 


 


Tear Drop Cells   Slight 


 


Ovalocytes   Slight 


 


Sodium    131 L


 


Potassium    4.0


 


Chloride    94 L


 


Carbon Dioxide    27


 


Anion Gap    14


 


BUN    24 H


 


Creatinine    0.8


 


Est GFR ( Amer)    > 60


 


Est GFR (Non-Af Amer)    > 60


 


POC Glucose (mg/dL)  302 H  


 


Random Glucose    326 H


 


Calcium    8.1 L


 


Phosphorus    2.6


 


Magnesium    2.0


 


Total Bilirubin    0.5


 


AST    15 L


 


ALT    26


 


Alkaline Phosphatase    48


 


Total Protein    5.8 L


 


Albumin    3.2 L


 


Globulin    2.6


 


Albumin/Globulin Ratio    1.2














Attending/Attestation





- Attestation


I have personally seen and examined this patient.: Yes


I have fully participated in the care of the patient.: Yes


I have reviewed all pertinent clinical information: Yes


Notes (Text): 





05/28/17 14:12


Patient was seen and examined at bedside with the resident. 


Patient failed outpatient therapy for pneumonia. Started on treatment for 

recurrent pneumonia and COPD


I reviewed the medical record, labs imaging studies . I agree with above 

history and physical and assessment/plan by the resident.

## 2017-05-27 NOTE — C.PDOC
History Of Present Illness


Patient is a 76 y/o male, whose PMHx includes HTN, and asthma, presents to the 

ED for evaluation of cough and shortness of breath for the past week. Patient 

reports being seen by PMD last week, and was given antibiotics. PET CT from 

[facility] shows findings likely infection or inflammation process, 

less likely neoplasm. Otherwise, denies any chest pain, sputum, wheezing, fever

, chills, or any other associated symptoms at this time.  





Time Seen by Provider: 17 12:21


Chief Complaint (Nursing): Shortness Of Breath


History Per: Patient


History/Exam Limitations: no limitations


Onset/Duration Of Symptoms: Days


Current Symptoms Are (Timing): Still Present


Severity: None


Pain Scale Rating Of: 0


Associated Symptoms: denies: Fever, Chills, Sweating, Chest Pain, Bloody Cough, 

Heart Racing, Leg/Calf Pain, Ankle/Leg Swelling, Dizziness, Light-headedness, 

Anxiety, Tingling In Hands Or Face, Musle Spasms In Hands Or Feet


Recent travel outside of the [place]: No


Additional History Per: Patient





Past Medical History


Reviewed: Historical Data, Nursing Documentation, Vital Signs


Vital Signs: 


 Last Vital Signs











Temp  98.0 F   17 12:14


 


Pulse  90   17 12:14


 


Resp  25 H  17 12:14


 


BP  118/70   17 12:14


 


Pulse Ox  97   17 13:28














- Medical History


PMH: Arthritis, Asthma, Gastritis, HTN


   Denies: Chronic Kidney Disease


Surgical History: Endoscopy ()





- [facility] Procedures








EXCISION OF UPPER ESOPHAGUS, ENDO, DIAGN (16)


FLUOROSCOPY OF MULT COR ART USING L OSM CONTRAST (17)


FLUOROSCOPY OF RIGHT AND LEFT HEART USING L OSM CONTRAST (17)


MEASURE CARDIAC SAMPL & PRESSURE, BILATERAL, PERC (17)








Family History: States: Unknown Family Hx





- Social History


Hx Alcohol Use: No


Hx Substance Use: No





- Immunization History


Hx Tetanus Toxoid Vaccination: No


Hx Influenza Vaccination: No


Hx Pneumococcal Vaccination: No





Review Of Systems


Except As Marked, All Systems Reviewed And Found Negative.


Constitutional: Negative for: Fever, Chills


Cardiovascular: Negative for: Chest Pain, Palpitations, Light Headedness


Respiratory: Positive for: Cough, Shortness of Breath.  Negative for: Hemoptysis

, Sputum, Wheezing


Gastrointestinal: Negative for: Nausea, Vomiting


Neurological: Negative for: Dizziness





Physical Exam





- Physical Exam


Appears: Non-toxic, No Acute Distress


Skin: Normal Color, Warm, Dry


Head: Atraumatic, Normacephalic


Eye(s): bilateral: Normal Inspection, EOMI


Neck: Normal ROM, Supple


Chest: Symmetrical, No Tenderness


Cardiovascular: Rhythm Regular, No Murmur


Respiratory: Decreased Breath Sounds, No Rales, No Rhonchi, No Wheezing


Gastrointestinal/Abdominal: Soft, No Tenderness


Extremity: Normal ROM, No Deformity


Neurological/Psych: Oriented x3, Normal Speech, Normal Cognition





ED Course And Treatment





- Laboratory Results


Result Diagrams: 


 17 12:49





 17 12:49


ECG: Interpreted By Me, Viewed By Me


ECG Rhythm: Sinus Rhythm


ECG Interpretation: No Acute Changes, No Changes From Prior


Interpretation Of ECG: RBBB. Similar to prior ECG from 17.


Rate From EC (bpm)


O2 Sat by Pulse Oximetry: 97 (on RA)


Pulse Ox Interpretation: Normal





Medical Decision Making


Medical Decision Making: 





CXR


FINDINGS:





LUNGS:


Stable perihilar, right lower lobe infiltrate.





PLEURA:


No significant pleural effusion identified, no pneumothorax apparent.





CARDIOVASCULAR:


Cardiomegaly.  No evidence of acute, significant cardiovascular disease. 





OSSEOUS STRUCTURES:


No significant abnormalities.





VISUALIZED UPPER ABDOMEN:


Normal.





OTHER FINDINGS:


None.





IMPRESSION:


Right lower lobe infiltrate, unchanged compared to prior studies.








135pm disc w Dr Gonsalez- becki laws spec abx and admit to hospitalist





Disposition





- Disposition


Disposition: HOSPITALIZED


Disposition Time: 13:36


Condition: STABLE





- Clinical Impression


Clinical Impression: 


 Pneumonia








- Scribe Statement


The provider has reviewed the documentation as recorded by the [address]


Provider Attestation: 








All medical record entries made by the Malick were at my direction and 

personally dictated by me. I have reviewed the chart and agree that the record 

accurately reflects my personal performance of the history, physical exam, 

medical decision making, and the department course for this patient. I have 

also personally directed, reviewed, and agree with the discharge instructions 

and disposition.

## 2017-05-28 LAB
ALBUMIN/GLOB SERPL: 1.2 {RATIO} (ref 1–2.1)
ALP SERPL-CCNC: 48 U/L (ref 38–126)
ALT SERPL-CCNC: 26 U/L (ref 21–72)
AST SERPL-CCNC: 15 U/L (ref 17–59)
BASOPHILS # BLD AUTO: 0 K/UL (ref 0–0.2)
BASOPHILS NFR BLD: 0.1 % (ref 0–2)
BILIRUB SERPL-MCNC: 0.5 MG/DL (ref 0.2–1.3)
BUN SERPL-MCNC: 24 MG/DL (ref 9–20)
CALCIUM SERPL-MCNC: 8.1 MG/DL (ref 8.6–10.4)
CHLORIDE SERPL-SCNC: 94 MMOL/L (ref 98–107)
CO2 SERPL-SCNC: 27 MMOL/L (ref 22–30)
EOSINOPHIL # BLD AUTO: 0 K/UL (ref 0–0.7)
EOSINOPHIL NFR BLD: 0 % (ref 0–4)
ERYTHROCYTE [DISTWIDTH] IN BLOOD BY AUTOMATED COUNT: 19.4 % (ref 11.5–14.5)
GLOBULIN SER-MCNC: 2.6 GM/DL (ref 2.2–3.9)
GLUCOSE SERPL-MCNC: 326 MG/DL (ref 75–110)
HCT VFR BLD CALC: 39.3 % (ref 35–51)
LG PLATELETS BLD QL SMEAR: PRESENT
LYMPHOCYTES # BLD AUTO: 0.3 K/UL (ref 1–4.3)
LYMPHOCYTES NFR BLD AUTO: 2 % (ref 20–40)
MAGNESIUM SERPL-MCNC: 2 MG/DL (ref 1.6–2.3)
MCH RBC QN AUTO: 24.1 PG (ref 27–31)
MCHC RBC AUTO-ENTMCNC: 31.7 G/DL (ref 33–37)
MCV RBC AUTO: 76 FL (ref 80–94)
MONOCYTES # BLD: 0.5 K/UL (ref 0–0.8)
MONOCYTES NFR BLD: 4.3 % (ref 0–10)
NEUTROPHILS NFR BLD AUTO: 96 % (ref 50–75)
NRBC BLD AUTO-RTO: 0 % (ref 0–2)
PHOSPHATE SERPL-MCNC: 2.6 MG/DL (ref 2.5–4.5)
PLATELET # BLD: 210 K/UL (ref 130–400)
PMV BLD AUTO: 7.9 FL (ref 7.2–11.7)
POTASSIUM SERPL-SCNC: 4 MMOL/L (ref 3.6–5.2)
PROT SERPL-MCNC: 5.8 G/DL (ref 6.3–8.3)
SODIUM SERPL-SCNC: 131 MMOL/L (ref 132–148)
TOTAL CELLS COUNTED BLD: 100
WBC # BLD AUTO: 12.6 K/UL (ref 4.8–10.8)

## 2017-05-28 RX ADMIN — METHYLPREDNISOLONE SODIUM SUCCINATE SCH MG: 40 INJECTION, POWDER, FOR SOLUTION INTRAMUSCULAR; INTRAVENOUS at 17:06

## 2017-05-28 RX ADMIN — INSULIN GLARGINE SCH U: 100 INJECTION, SOLUTION SUBCUTANEOUS at 13:49

## 2017-05-28 RX ADMIN — ENOXAPARIN SODIUM SCH MG: 40 INJECTION SUBCUTANEOUS at 10:17

## 2017-05-28 RX ADMIN — HUMAN INSULIN SCH UNIT: 100 INJECTION, SOLUTION SUBCUTANEOUS at 12:30

## 2017-05-28 RX ADMIN — METHYLPREDNISOLONE SODIUM SUCCINATE SCH MG: 40 INJECTION, POWDER, FOR SOLUTION INTRAMUSCULAR; INTRAVENOUS at 23:57

## 2017-05-28 RX ADMIN — SODIUM CHLORIDE SCH MLS/HR: 9 INJECTION, SOLUTION INTRAVENOUS at 13:43

## 2017-05-28 RX ADMIN — HUMAN INSULIN SCH UNIT: 100 INJECTION, SOLUTION SUBCUTANEOUS at 08:30

## 2017-05-28 RX ADMIN — IPRATROPIUM BROMIDE AND ALBUTEROL SULFATE SCH ML: .5; 3 SOLUTION RESPIRATORY (INHALATION) at 01:06

## 2017-05-28 RX ADMIN — HUMAN INSULIN SCH UNIT: 100 INJECTION, SOLUTION SUBCUTANEOUS at 17:12

## 2017-05-28 RX ADMIN — METHYLPREDNISOLONE SODIUM SUCCINATE SCH MG: 40 INJECTION, POWDER, FOR SOLUTION INTRAMUSCULAR; INTRAVENOUS at 08:51

## 2017-05-28 RX ADMIN — SODIUM CHLORIDE SCH MLS/HR: 9 INJECTION, SOLUTION INTRAVENOUS at 20:00

## 2017-05-28 RX ADMIN — SODIUM CHLORIDE SCH MLS/HR: 9 INJECTION, SOLUTION INTRAVENOUS at 08:51

## 2017-05-28 RX ADMIN — PANTOPRAZOLE SODIUM SCH MG: 40 TABLET, DELAYED RELEASE ORAL at 10:17

## 2017-05-28 RX ADMIN — IPRATROPIUM BROMIDE AND ALBUTEROL SULFATE SCH ML: .5; 3 SOLUTION RESPIRATORY (INHALATION) at 08:06

## 2017-05-28 RX ADMIN — IPRATROPIUM BROMIDE AND ALBUTEROL SULFATE SCH ML: .5; 3 SOLUTION RESPIRATORY (INHALATION) at 19:26

## 2017-05-28 RX ADMIN — METHYLPREDNISOLONE SODIUM SUCCINATE SCH MG: 40 INJECTION, POWDER, FOR SOLUTION INTRAMUSCULAR; INTRAVENOUS at 00:04

## 2017-05-28 RX ADMIN — SODIUM CHLORIDE SCH MLS/HR: 9 INJECTION, SOLUTION INTRAVENOUS at 01:05

## 2017-05-28 RX ADMIN — IPRATROPIUM BROMIDE AND ALBUTEROL SULFATE SCH ML: .5; 3 SOLUTION RESPIRATORY (INHALATION) at 13:34

## 2017-05-28 RX ADMIN — HUMAN INSULIN SCH UNIT: 100 INJECTION, SOLUTION SUBCUTANEOUS at 22:54

## 2017-05-28 NOTE — CP.PCM.PN
<Surekha Sanders - Last Filed: 05/28/17 13:09>





Subjective





- Date & Time of Evaluation


Date of Evaluation: 05/28/17


Time of Evaluation: 13:10





- Subjective


Subjective: 





Medicine Progress Note- Dr. Brooks's Service:





Patient seen and examined at bedside this AM. Patient was up and ambulating. He 

continues to have episodes of coughing fits. He was able to sleep throught the 

night. No acute events overnight. Denies chest pain, fevers, chills, nausea, 

vomiting. No other complaints at this time. 





Objective





- Vital Signs/Intake and Output


Vital Signs (last 24 hours): 


 











Temp Pulse Resp BP Pulse Ox


 


 97.5 F L  83   20   143/77   98 


 


 05/28/17 07:23  05/28/17 07:23  05/28/17 07:23  05/28/17 07:23  05/28/17 07:23








Intake and Output: 


 











 05/28/17 05/28/17





 06:59 18:59


 


Intake Total 990 


 


Balance 990 














- Medications


Medications: 


 Current Medications





Acetaminophen (Tylenol 325mg Tab)  650 mg PO Q6 PRN


   PRN Reason: Pain, Mild (1-3)


Albuterol/Ipratropium (Duoneb 3 Mg/0.5 Mg (3 Ml) Ud)  3 ml INH RQ6 [facility]


   Last Admin: 05/28/17 08:06 Dose:  3 ml


Amlodipine Besylate (Norvasc)  10 mg PO DAILY [facility]


   Last Admin: 05/28/17 10:17 Dose:  10 mg


Benzonatate (Tessalon Perles)  100 mg PO TID [facility]


   Last Admin: 05/28/17 10:17 Dose:  100 mg


Enoxaparin Sodium (Lovenox)  40 mg SC DAILY [facility]


   Last Admin: 05/28/17 10:17 Dose:  40 mg


Ferrous Sulfate (Feosol)  325 mg PO DAILY [facility]


   Last Admin: 05/28/17 10:17 Dose:  325 mg


Finasteride (Proscar)  5 mg PO DAILY [facility]


   Last Admin: 05/28/17 10:17 Dose:  5 mg


Home Med (Mirabegron [Myrbetriq])  1 tab PO DAILY [facility]


Piperacillin Sod/Tazobactam (Sod 3.375 gm/ Sodium Chloride)  100 mls @ 200 mls/

hr IVPB Q6H [facility]


   Last Admin: 05/28/17 08:51 Dose:  200 mls/hr


Vancomycin HCl 1 gm/ Sodium (Chloride)  250 mls @ 166.7 mls/hr IVPB Q24H [facility]


Insulin Human Regular (Novolin R)  0 unit SC ACHS [facility]


   PRN Reason: Protocol


   Last Admin: 05/28/17 12:30 Dose:  6 unit


Losartan Potassium (Cozaar)  100 mg PO DAILY [facility]


   Last Admin: 05/28/17 10:17 Dose:  100 mg


Methylprednisolone (Solu-Medrol)  40 mg IVP Q8H [facility]


   Last Admin: 05/28/17 08:51 Dose:  40 mg


Montelukast Sodium (Singulair)  10 mg PO HS [facility]


   Last Admin: 05/27/17 21:53 Dose:  10 mg


Pantoprazole Sodium (Protonix Ec Tab)  40 mg PO DAILY [facility]


   Last Admin: 05/28/17 10:17 Dose:  40 mg


Tamsulosin HCl (Flomax)  0.4 mg PO BID [facility]


   Last Admin: 05/28/17 10:17 Dose:  0.4 mg











- Labs


Labs: 


 





 05/28/17 11:44 





 05/28/17 11:44 











- Constitutional


Appears: No Acute Distress





- Head Exam


Head Exam: NORMAL INSPECTION, NORMOCEPHALIC





- Eye Exam


Eye Exam: EOMI, Normal appearance





- ENT Exam


ENT Exam: Mucous Membranes Moist





- Neck Exam


Neck Exam: Full ROM, Normal Inspection





- Respiratory Exam


Respiratory Exam: Decreased Breath Sounds, Rhonchi, Wheezes





- Cardiovascular Exam


Cardiovascular Exam: REGULAR RHYTHM, +S1, +S2





- GI/Abdominal Exam


GI & Abdominal Exam: Soft.  absent: Distended, Tenderness





- Extremities Exam


Extremities Exam: Full ROM, Normal Inspection.  absent: Pedal Edema, Tenderness





- Back Exam


Back Exam: NORMAL INSPECTION





- Neurological Exam


Neurological Exam: Alert, Awake, Oriented x3





- Psychiatric Exam


Psychiatric exam: Normal Affect, Normal Mood





- Skin


Skin Exam: Normal Color, Warm





Assessment and Plan





- Assessment and Plan (Free Text)


Assessment: 





(1) Pneumonia


Assessment and Plan: 


Patient with Leukocytosis on admission: 16.7. Now 12.6. Afebrile. 


CXR 5/27/17 shows right lower lobe infiltrate, unchanged compared to prior 

studies.


PET/CT scan done 5/24/17: consolidation on b/l upper lobe as well as RLL and 

RML. Low probability of neoplasm. 


* Vanco IVPB- DAY 2


* Zosyn IVPB- DAY 2


* Tessalon Pearls TID 


f/u sputum cultures


f/y legionella and mycoplasma





ID consult placed- Dr. Freed-help appreciated. 


Pulm consult placed-Dr. Gonsalez- help appreciated. 


Status: Acute   





(2) COPD exacerbation


Assessment and Plan: 


* Vanco IVPB 


* Zosyn IVPB


* Solumedrol 40 mg IVP Q8H


* Singulair 10 mg PO HS





Pulm consult placed- Dr. Gonsalez- help appreciated. 


Status: Acute   





(3) Diabetes


Assessment and Plan: 


Accuchecks ACHS


ISS


Consistent Carb Diet


Status: Chronic   





(4) BPH (benign prostatic hyperplasia)


Assessment and Plan: 


* Proscar 5 mg PO daily 


* Flomax 0.4 mg PO BID





Status: Chronic





(5) HTN (hypertension)


Assessment and Plan: 


* Norvasc 10 mg PO daily


* Cozaar 100 mg PO daily 


Status: Chronic   





(6) Prophylactic measure


Assessment and Plan: 


* Protonix 40 mg PO daily 


* Lovenox 40 mg SC daily 








<Trent Brooks - Last Filed: 05/28/17 14:16>





Objective





- Vital Signs/Intake and Output


Vital Signs (last 24 hours): 


 











Temp Pulse Resp BP Pulse Ox


 


 97.5 F L  83   20   143/77   98 


 


 05/28/17 07:23  05/28/17 07:23  05/28/17 07:23  05/28/17 07:23  05/28/17 07:23








Intake and Output: 


 











 05/28/17 05/28/17





 06:59 18:59


 


Intake Total 990 


 


Balance 990 














- Medications


Medications: 


 Current Medications





Acetaminophen (Tylenol 325mg Tab)  650 mg PO Q6 PRN


   PRN Reason: Pain, Mild (1-3)


Albuterol/Ipratropium (Duoneb 3 Mg/0.5 Mg (3 Ml) Ud)  3 ml INH RQ6 [facility]


   Last Admin: 05/28/17 13:34 Dose:  3 ml


Amlodipine Besylate (Norvasc)  10 mg PO DAILY [facility]


   Last Admin: 05/28/17 10:17 Dose:  10 mg


Benzonatate (Tessalon Perles)  100 mg PO TID [facility]


   Last Admin: 05/28/17 13:44 Dose:  100 mg


Enoxaparin Sodium (Lovenox)  40 mg SC DAILY [facility]


   Last Admin: 05/28/17 10:17 Dose:  40 mg


Ferrous Sulfate (Feosol)  325 mg PO DAILY [facility]


   Last Admin: 05/28/17 10:17 Dose:  325 mg


Finasteride (Proscar)  5 mg PO DAILY [facility]


   Last Admin: 05/28/17 10:17 Dose:  5 mg


Home Med (Mirabegron [Myrbetriq])  1 tab PO DAILY [facility]


Piperacillin Sod/Tazobactam (Sod 3.375 gm/ Sodium Chloride)  100 mls @ 200 mls/

hr IVPB Q6H [facility]


   Last Admin: 05/28/17 13:43 Dose:  200 mls/hr


Vancomycin HCl 1 gm/ Sodium (Chloride)  250 mls @ 166.7 mls/hr IVPB Q24H [facility]


Insulin Glargine (Lantus)  10 unit SC DAILY [facility]


   Last Admin: 05/28/17 13:49 Dose:  10 u


Insulin Human Regular (Novolin R)  0 unit SC ACHS [facility]


   PRN Reason: Protocol


   Last Admin: 05/28/17 12:30 Dose:  6 unit


Losartan Potassium (Cozaar)  100 mg PO DAILY [facility]


   Last Admin: 05/28/17 10:17 Dose:  100 mg


Methylprednisolone (Solu-Medrol)  40 mg IVP Q8H [facility]


   Last Admin: 05/28/17 08:51 Dose:  40 mg


Montelukast Sodium (Singulair)  10 mg PO HS [facility]


   Last Admin: 05/27/17 21:53 Dose:  10 mg


Pantoprazole Sodium (Protonix Ec Tab)  40 mg PO DAILY [facility]


   Last Admin: 05/28/17 10:17 Dose:  40 mg


Tamsulosin HCl (Flomax)  0.4 mg PO BID [facility]


   Last Admin: 05/28/17 10:17 Dose:  0.4 mg











- Labs


Labs: 


 





 05/28/17 11:44 





 05/28/17 11:44 











Attending/Attestation





- Attestation


I have personally seen and examined this patient.: Yes


I have fully participated in the care of the patient.: Yes


I have reviewed all pertinent clinical information, including history, physical 

exam and plan: Yes


Notes (Text): 





05/28/17 14:14


Patient was seen and examined at bedside. 


Still has coughing spells. Continue antibiotics, steroids and nebulizing  

treatment


Pulmonary and ID consultation requested.


I reviewed the chart, labs imaging studies . I agree with above history and 

physical and assessment/plan by the resident.

## 2017-05-28 NOTE — CP.PCM.CON
History of Present Illness





- History of Present Illness


History of Present Illness: 





Complaining of shortness of breath and cough








78 yearold male with history of COPD, hypertension, diabetes, GERD presents to 

the ED with 2 week history of cough and shortness of breath. Patient with 

recent admission on 5/16/16 and discharged 3 days ago on 5/24/17,  Patient was 

treated for pneumonia and COPD exacerbation at the time. Denies ever being 

intubated. Admits to compliance with medications. Admits cough was productive 

for the first 2 weeks of cough (while hospitalized) and now cough is dry. 

Patient had PET scan done outpatient consistent with inflammation/pneumonia. 

Patient was started on Levaquin and prednisone by PMD and condition was 

improving.





PMHx: COPD, hypertension, diabetes, GERD 


SHx: none


Medications: patient does not know them. 


Family Hx: none


NKDA


Social Hx: Former smoker, alcohol or illicit drug use. 





Review of Systems





- Review of Systems


All systems: reviewed and no additional remarkable complaints except (Shortness 

of breath and cough)





Past Patient History





- Infectious Disease


Hx of Infectious Diseases: None





- Past Medical History & Family History


Past Medical History?: Yes





- Past Social History


Smoking Status: Former Smoker





- CARDIAC


Hx Hypertension: Yes





- PULMONARY


Hx Asthma: Yes





- NEUROLOGICAL


Hx Neurological Disorder: No





- HEENT


Hx HEENT Problems: No





- RENAL


Hx Chronic Kidney Disease: No





- ENDOCRINE/METABOLIC


Hx Endocrine Disorders: Yes


Hx Diabetes Mellitus Type 1: Yes





- HEMATOLOGICAL/ONCOLOGICAL


Hx Blood Disorders: No





- INTEGUMENTARY


Hx Dermatological Problems: Yes


Other/Comment: SKIN ASTHMA





- MUSCULOSKELETAL/RHEUMATOLOGICAL


Hx Arthritis: Yes





- GASTROINTESTINAL


Hx Gastritis: Yes





- GENITOURINARY/GYNECOLOGICAL


Hx Genitourinary Disorders: Yes


Hx Prostate Problems: Yes





- PSYCHIATRIC


Hx Substance Use: No





- SURGICAL HISTORY


Hx Surgeries: Yes





- ANESTHESIA


Hx Anesthesia: Yes


Hx Anesthesia Reactions: No


Hx Malignant Hyperthermia: No





Meds


Allergies/Adverse Reactions: 


 Allergies











Allergy/AdvReac Type Severity Reaction Status Date / Time


 


seasonal allergies Allergy Mild CONGESTION Uncoded 05/27/17 12:17














- Medications


Medications: 


 Current Medications





Acetaminophen (Tylenol 325mg Tab)  650 mg PO Q6 PRN


   PRN Reason: Pain, Mild (1-3)


Albuterol/Ipratropium (Duoneb 3 Mg/0.5 Mg (3 Ml) Ud)  3 ml INH RQ6 [facility]


   Last Admin: 05/28/17 13:34 Dose:  3 ml


Amlodipine Besylate (Norvasc)  10 mg PO DAILY [facility]


   Last Admin: 05/28/17 10:17 Dose:  10 mg


Benzonatate (Tessalon Perles)  100 mg PO TID [facility]


   Last Admin: 05/28/17 13:44 Dose:  100 mg


Enoxaparin Sodium (Lovenox)  40 mg SC DAILY [facility]


   Last Admin: 05/28/17 10:17 Dose:  40 mg


Ferrous Sulfate (Feosol)  325 mg PO DAILY [facility]


   Last Admin: 05/28/17 10:17 Dose:  325 mg


Finasteride (Proscar)  5 mg PO DAILY [facility]


   Last Admin: 05/28/17 10:17 Dose:  5 mg


Home Med (Mirabegron [Myrbetriq])  1 tab PO DAILY [facility]


Piperacillin Sod/Tazobactam (Sod 3.375 gm/ Sodium Chloride)  100 mls @ 200 mls/

hr IVPB Q6H [facility]


   Last Admin: 05/28/17 13:43 Dose:  200 mls/hr


Vancomycin HCl 1 gm/ Sodium (Chloride)  250 mls @ 166.7 mls/hr IVPB Q24H [facility]


Insulin Glargine (Lantus)  10 unit SC DAILY [facility]


   Last Admin: 05/28/17 13:49 Dose:  10 u


Ins[facility]  [facility]


   PRN Reason: Protocol


   Last Admin: 05/28/17 12:30 Dose:  6 unit


Losartan Potassium (Cozaar)  100 mg PO DAILY [facility]


   Last Admin: 05/28/17 10:17 Dose:  100 mg


Methylprednisolone (Solu-Medrol)  40 mg IVP Q8H [facility]


   Last Admin: 05/28/17 08:51 Dose:  40 mg


Montelukast Sodium (Singulair)  10 mg PO HS [facility]


   Last Admin: 05/27/17 21:53 Dose:  10 mg


Pantoprazole Sodium (Protonix Ec Tab)  40 mg PO DAILY [facility]


   Last Admin: 05/28/17 10:17 Dose:  40 mg


Tamsulosin HCl (Flomax)  0.4 mg PO BID [facility]


   Last Admin: 05/28/17 10:17 Dose:  0.4 mg











Physical Exam





- Head Exam


Head Exam: ATRAUMATIC, NORMOCEPHALIC





- Eye Exam


Eye Exam: Normal appearance





- ENT Exam


ENT Exam: Mucous Membranes Moist





- Neck Exam


Neck exam: Positive for: Normal Inspection





- Respiratory Exam


Respiratory Exam: Rales, Rhonchi, Wheezes





- Cardiovascular Exam


Cardiovascular Exam: REGULAR RHYTHM





- GI/Abdominal Exam


GI & Abdominal Exam: Normal Bowel Sounds, Soft





- Extremities Exam


Extremities exam: Positive for: normal inspection





- Neurological Exam


Neurological exam: Alert, Oriented x3





Results





- Vital Signs


Recent Vital Signs: 


 Last Vital Signs











Temp  97.5 F L  05/28/17 07:23


 


Pulse  83   05/28/17 07:23


 


Resp  20   05/28/17 07:23


 


BP  143/77   05/28/17 07:23


 


Pulse Ox  98   05/28/17 07:23














- Labs


Result Diagrams: 


 05/30/17 06:46





 05/29/17 06:11


Labs: 


 Laboratory Results - last 24 hr











  05/27/17 05/27/17 05/28/17





  16:26 21:05 07:27


 


WBC   


 


RBC   


 


Hgb   


 


Hct   


 


MCV   


 


MCH   


 


MCHC   


 


RDW   


 


Plt Count   


 


MPV   


 


Neut % (Auto)   


 


Lymph % (Auto)   


 


Mono % (Auto)   


 


Eos % (Auto)   


 


Baso % (Auto)   


 


Neut #   


 


Lymph #   


 


Mono #   


 


Eos #   


 


Baso #   


 


Neutrophils % (Manual)   


 


Lymphocytes % (Manual)   


 


Monocytes % (Manual)   


 


Toxic Granulation   


 


Platelet Estimate   


 


Large Platelets   


 


Polychromasia   


 


Hypochromasia (manual)   


 


Poikilocytosis (manual   


 


Anisocytosis (manual)   


 


Target Cells   


 


Tear Drop Cells   


 


Ovalocytes   


 


Sodium   


 


Potassium   


 


Chloride   


 


Carbon Dioxide   


 


Anion Gap   


 


BUN   


 


Creatinine   


 


Est GFR ( Amer)   


 


Est GFR (Non-Af Amer)   


 


POC Glucose (mg/dL)  238 H  218 H  212 H


 


Random Glucose   


 


Calcium   


 


Phosphorus   


 


Magnesium   


 


Total Bilirubin   


 


AST   


 


ALT   


 


Alkaline Phosphatase   


 


Total Protein   


 


Albumin   


 


Globulin   


 


Albumin/Globulin Ratio   














  05/28/17 05/28/17 05/28/17





  11:20 11:44 11:44


 


WBC   12.6 H 


 


RBC   5.17 


 


Hgb   12.4 


 


Hct   39.3 


 


MCV   76.0 L 


 


MCH   24.1 L 


 


MCHC   31.7 L 


 


RDW   19.4 H 


 


Plt Count   210 


 


MPV   7.9 


 


Neut % (Auto)   93.6 H 


 


Lymph % (Auto)   2.0 L 


 


Mono % (Auto)   4.3 


 


Eos % (Auto)   0.0 


 


Baso % (Auto)   0.1 


 


Neut #   11.8 H 


 


Lymph #   0.3 L 


 


Mono #   0.5 


 


Eos #   0.0 


 


Baso #   0.0 


 


Neutrophils % (Manual)   96 H 


 


Lymphocytes % (Manual)   2 L 


 


Monocytes % (Manual)   2 


 


Toxic Granulation   Present 


 


Platelet Estimate   Normal 


 


Large Platelets   Present 


 


Polychromasia   Slight 


 


Hypochromasia (manual)   Slight 


 


Poikilocytosis (manual   Slight 


 


Anisocytosis (manual)   Slight 


 


Target Cells   Slight 


 


Tear Drop Cells   Slight 


 


Ovalocytes   Slight 


 


Sodium    131 L


 


Potassium    4.0


 


Chloride    94 L


 


Carbon Dioxide    27


 


Anion Gap    14


 


BUN    24 H


 


Creatinine    0.8


 


Est GFR ( Amer)    > 60


 


Est GFR (Non-Af Amer)    > 60


 


POC Glucose (mg/dL)  302 H  


 


Random Glucose    326 H


 


Calcium    8.1 L


 


Phosphorus    2.6


 


Magnesium    2.0


 


Total Bilirubin    0.5


 


AST    15 L


 


ALT    26


 


Alkaline Phosphatase    48


 


Total Protein    5.8 L


 


Albumin    3.2 L


 


Globulin    2.6


 


Albumin/Globulin Ratio    1.2














Assessment & Plan


(1) Pneumonia


Status: Acute   


Comment: Continue IV antibiotics and follow up culture and sensitivity.  

Continue IV steroids.  Consider ID evaluation.  BiPAP as needed.  daliresp   





(2) COPD exacerbation


Status: Acute   





(3) Diabetes


Status: Acute

## 2017-05-28 NOTE — CP.PCM.CON
History of Present Illness





- History of Present Illness


History of Present Illness: 





76 yearold male with history of asthma/COPD, hypertension, diabetes, GERD 

presents to the ED with 2 week history of cough. 





 Patient with recent admission on 5/16/16 and discharged 3 days ago   Patient 

was treated for pneumonia and COPD exacerbation at the time. Denies ever being 

intubated. Admits to compliance with medications. Admitted for failed out pt rx 





PMHx: asthma/COPD, hypertension, diabetes, GERD 


SHx: none


Medications: patient does not know them. 


Family Hx: none


NKDA


Social Hx: denies tobacco, alcohol or illicit drug use. 





Review of Systems





- Constitutional


Constitutional: As Per HPI





- EENT


Eyes: As Per HPI.  absent: Blind Spots, Blurred Vision, Change in Vision, 

Decreased Night Vision, Diplopia, Discharge, Dry Eye, Exophthalmos, Floaters, 

Irritation, Itchy Eyes, Loss of Peripheral Vision, Pain, Photophobia, Requires 

Corrective Lenses, Sees Flashes, Spots in Vision, Tunnel Vision, Other Visual 

Disturbances, Loss of Vision, Other


Ears: absent: As Per HPI, Decreased Hearing, Ear Discharge, Ear Pain, Tinnitus, 

Abnormal Hearing, Disequilibrium, Dizziness, Other


Nose/Mouth/Throat: absent: As Per HPI, Epistaxis, Nasal Congestion, Nasal 

Discharge, Nasal Obstruction, Nasal Trauma, Nose Pain, Post Nasal Drip, Sinus 

Pain, Sinus Pressure, Bleeding Gums, Change in Voice, Dental Pain, Dry Mouth, 

Dysphagia, Halitosis, Hoarsness, Lip Swelling, Mouth Lesions, Mouth Pain, 

Odynophagia, Sore Throat, Throat Swelling, Tongue Swelling, Facial Pain, Neck 

Pain, Neck Mass, Other





- Cardiovascular


Cardiovascular: As Per HPI





- Respiratory


Respiratory: As Per HPI, Cough, Dyspnea.  absent: Hemoptysis





- Gastrointestinal


Gastrointestinal: absent: As Per HPI, Abdominal Pain, Belching, Bloating, 

Change in Bowel Habits, Change in Stool Character, Coffee Ground Emesis, 

Constipation, Cramping, Diarrhea, Dyspepsia, Dysphagia, Early Satiety, 

Excessive Flatus, Fecal Incontinence, Heartburn, Hematemesis, Hematochezia, 

Loose Stools, Melena, Nausea, Odynophagia, Temesmus, Vomiting, Other





- Genitourinary


Genitourinary: absent: As Per HPI, Change in Urinary Stream, Difficulty 

Urinating, Dysuria, Flank Pain, Hematuria, Pyuria, Nocturia, Urinary 

Incontinence, Urinary Frequency, Urinary Hesitance, Urinary Urgency, Voiding 

Freq/Small Amts, Freq UTI, Hx Renal/Bladder Calculi, Hx /Renal Surgery, 

Bladder Distension, Other





- Musculoskeletal


Musculoskeletal: absent: As Per HPI, Abnormal Gait, Arthralgias, Atrophy, Back 

Pain, Deformity, Joint Swelling, Limited Range of Motion, Loss of Height, 

Muscle Cramps, Muscle Weakness, Myalgias, Neck Pain, Numbness, Radiating Pain 

into Limb, Stiffness, Tingling, Other





- Integumentary


Integumentary: absent: As Per HPI, Acne, Alopecia, Bleeding Lesions, Change in 

Hair, Change in Nails, Change in Pigmentation, Changing Lesions, Dry Skin, 

Erythema, Furuncle, Hirsutism, Lesions, New Lesions, Non-Healing Lesions, 

Photosensitivity, Pruritus, Rash, Skin Pain, Skin Ulcer, Sores, Striae, Swelling

, Unusual Bruising, Wounds, Jaundice, Other





- Neurological


Neurological: absent: As Per HPI, Abnormal Gait, Abnormal Hearing, Abnormal 

Movements, Abnormal Speech, Behavioral Changes, Burning Sensations, Confusion, 

Convulsions, Disequilibrium, Dizziness, Numbness, Focal Weakness, Frequent Falls

, Headaches, Lack of Coordination, Loss of Vision, Memory Loss, Paresthesias, 

Radicular Pain, Restless Legs, Sensory Deficit, Syncope, Tingling, Tremor, 

Vertigo, Weakness, Other Visual Disturbances, Other





- Psychiatric


Psychiatric: absent: As Per HPI, Abnormal Sleep Pattern, Anhedonia, Anxiety, 

Auditory Hallucinations, Behavioral Changes, Change in Appetite, Change in 

Libido, Confusion, Depression, Difficulty Concentrating, Hallucinations, 

Homicidal Ideation, Hopelessness, Irritability, Memory Loss, Mood Swings, Panic 

Attacks, Paranoia, Suicidal Ideation, Visual Hallucinations, Tactile 

Hallucinations, Other





- Endocrine


Endocrine: absent: As Per HPI, Change in Body Appearance, Change in Libido, 

Cold Intolorance, Deepening of Voice, Excessive Sweating, Fatigue, Flushing, 

Heat Intolorance, Increase in Ring/Shoe/Hat Size, Palpitations, Polydipsia, 

Polyphagia, Polyuria, Other





- Hematologic/Lymphatic


Hematologic: absent: As Per HPI, Easy Bleeding, Easy Bruising, Lymphadenopathy, 

Other





Past Patient History





- Infectious Disease


Hx of Infectious Diseases: None





- Past Medical History & Family History


Past Medical History?: Yes





- Past Social History


Smoking Status: Former Smoker





- CARDIAC


Hx Hypertension: Yes





- PULMONARY


Hx Asthma: Yes





- NEUROLOGICAL


Hx Neurological Disorder: No





- HEENT


Hx HEENT Problems: No





- RENAL


Hx Chronic Kidney Disease: No





- ENDOCRINE/METABOLIC


Hx Endocrine Disorders: Yes


Hx Diabetes Mellitus Type 1: Yes





- HEMATOLOGICAL/ONCOLOGICAL


Hx Blood Disorders: No





- INTEGUMENTARY


Hx Dermatological Problems: Yes


Other/Comment: SKIN ASTHMA





- MUSCULOSKELETAL/RHEUMATOLOGICAL


Hx Arthritis: Yes





- GASTROINTESTINAL


Hx Gastritis: Yes





- GENITOURINARY/GYNECOLOGICAL


Hx Genitourinary Disorders: Yes


Hx Prostate Problems: Yes





- PSYCHIATRIC


Hx Substance Use: No





- SURGICAL HISTORY


Hx Surgeries: Yes





- ANESTHESIA


Hx Anesthesia: Yes


Hx Anesthesia Reactions: No


Hx Malignant Hyperthermia: No





Meds


Allergies/Adverse Reactions: 


 Allergies











Allergy/AdvReac Type Severity Reaction Status Date / Time


 


seasonal allergies Allergy Mild CONGESTION Uncoded 05/27/17 12:17














- Medications


Medications: 


 Current Medications





Acetaminophen (Tylenol 325mg Tab)  650 mg PO Q6 PRN


   PRN Reason: Pain, Mild (1-3)


Albuterol/Ipratropium (Duoneb 3 Mg/0.5 Mg (3 Ml) Ud)  3 ml INH RQ6 [facility]


   Last Admin: 05/28/17 13:34 Dose:  3 ml


Amlodipine Besylate (Norvasc)  10 mg PO DAILY [facility]


   Last Admin: 05/28/17 10:17 Dose:  10 mg


Benzonatate (Tessalon Perles)  100 mg PO TID [facility]


   Last Admin: 05/28/17 13:44 Dose:  100 mg


Enoxaparin Sodium (Lovenox)  40 mg SC DAILY [facility]


   Last Admin: 05/28/17 10:17 Dose:  40 mg


Ferrous Sulfate (Feosol)  325 mg PO DAILY [facility]


   Last Admin: 05/28/17 10:17 Dose:  325 mg


Finasteride (Proscar)  5 mg PO DAILY [facility]


   Last Admin: 05/28/17 10:17 Dose:  5 mg


Home Med (Mirabegron [Myrbetriq])  1 tab PO DAILY [facility]


Piperacillin Sod/Tazobactam (Sod 3.375 gm/ Sodium Chloride)  100 mls @ 200 mls/

hr IVPB Q6H [facility]


   Last Admin: 05/28/17 13:43 Dose:  200 mls/hr


Vancomycin HCl 1 gm/ Sodium (Chloride)  250 mls @ 166.7 mls/hr IVPB Q24H [facility]


   Last Admin: 05/28/17 14:45 Dose:  166.7 mls/hr


Insulin Glargine (Lantus)  10 unit SC DAILY [facility]


   Last Admin: 05/28/17 13:49 Dose:  10 u


Ins[facility]  [facility]


   PRN Reason: Protocol


   Last Admin: 05/28/17 12:30 Dose:  6 unit


Losartan Potassium (Cozaar)  100 mg PO DAILY [facility]


   Last Admin: 05/28/17 10:17 Dose:  100 mg


Methylprednisolone (Solu-Medrol)  40 mg IVP Q8H [facility]


   Last Admin: 05/28/17 08:51 Dose:  40 mg


Montelukast Sodium (Singulair)  10 mg PO HS [facility]


   Last Admin: 05/27/17 21:53 Dose:  10 mg


Pantoprazole Sodium (Protonix Ec Tab)  40 mg PO DAILY [facility]


   Last Admin: 05/28/17 10:17 Dose:  40 mg


Tamsulosin HCl (Flomax)  0.4 mg PO BID [facility]


   Last Admin: 05/28/17 10:17 Dose:  0.4 mg


Tiotropium Bromide (Spiriva)  18 mcg INH RQ24 [facility]











Physical Exam





- Constitutional


Appears: Non-toxic, Cachectic, Chronically Ill





- Head Exam


Head Exam: ATRAUMATIC, NORMAL INSPECTION, NORMOCEPHALIC





- Eye Exam


Eye Exam: PERRL.  absent: Scleral icterus





- ENT Exam


ENT Exam: Mucous Membranes Dry, Normal External Ear Exam, Normal Oropharynx





- Neck Exam


Neck exam: Negative for: Lymphadenopathy, Thyromegaly





- Respiratory Exam


Respiratory Exam: Decreased Breath Sounds, Rales, Rhonchi





- Cardiovascular Exam


Cardiovascular Exam: Tachycardia, REGULAR RHYTHM, +S1, +S2





- GI/Abdominal Exam


GI & Abdominal Exam: Diminished Bowel Sounds, Soft.  absent: Guarding, Rebound, 

Rigid, Tenderness





- Rectal Exam


Rectal Exam: Deferred





-  Exam


 Exam: NORMAL INSPECTION





- Extremities Exam


Extremities exam: Positive for: pedal pulses present.  Negative for: calf 

tenderness, pedal edema, tenderness





- Back Exam


Back exam: absent: CVA tenderness (L), CVA tenderness (R), paraspinal tenderness





- Neurological Exam


Neurological exam: Alert, CN II-XII Intact, Oriented x3, Reflexes Normal





- Psychiatric Exam


Psychiatric exam: Depressed





- Skin


Skin Exam: Dry, Intact





Results





- Vital Signs


Recent Vital Signs: 


 Last Vital Signs











Temp  97.5 F L  05/28/17 07:23


 


Pulse  83   05/28/17 07:23


 


Resp  20   05/28/17 07:23


 


BP  143/77   05/28/17 07:23


 


Pulse Ox  98   05/28/17 07:23














- Labs


Result Diagrams: 


 05/28/17 11:44





 05/28/17 11:44


Labs: 


 Laboratory Results - last 24 hr











  05/27/17 05/27/17 05/28/17





  16:26 21:05 07:27


 


WBC   


 


RBC   


 


Hgb   


 


Hct   


 


MCV   


 


MCH   


 


MCHC   


 


RDW   


 


Plt Count   


 


MPV   


 


Neut % (Auto)   


 


Lymph % (Auto)   


 


Mono % (Auto)   


 


Eos % (Auto)   


 


Baso % (Auto)   


 


Neut #   


 


Lymph #   


 


Mono #   


 


Eos #   


 


Baso #   


 


Neutrophils % (Manual)   


 


Lymphocytes % (Manual)   


 


Monocytes % (Manual)   


 


Toxic Granulation   


 


Platelet Estimate   


 


Large Platelets   


 


Polychromasia   


 


Hypochromasia (manual)   


 


Poikilocytosis (manual   


 


Anisocytosis (manual)   


 


Target Cells   


 


Tear Drop Cells   


 


Ovalocytes   


 


Sodium   


 


Potassium   


 


Chloride   


 


Carbon Dioxide   


 


Anion Gap   


 


BUN   


 


Creatinine   


 


Est GFR ( Amer)   


 


Est GFR (Non-Af Amer)   


 


POC Glucose (mg/dL)  238 H  218 H  212 H


 


Random Glucose   


 


Calcium   


 


Phosphorus   


 


Magnesium   


 


Total Bilirubin   


 


AST   


 


ALT   


 


Alkaline Phosphatase   


 


Total Protein   


 


Albumin   


 


Globulin   


 


Albumin/Globulin Ratio   














  05/28/17 05/28/17 05/28/17





  11:20 11:44 11:44


 


WBC   12.6 H 


 


RBC   5.17 


 


Hgb   12.4 


 


Hct   39.3 


 


MCV   76.0 L 


 


MCH   24.1 L 


 


MCHC   31.7 L 


 


RDW   19.4 H 


 


Plt Count   210 


 


MPV   7.9 


 


Neut % (Auto)   93.6 H 


 


Lymph % (Auto)   2.0 L 


 


Mono % (Auto)   4.3 


 


Eos % (Auto)   0.0 


 


Baso % (Auto)   0.1 


 


Neut #   11.8 H 


 


Lymph #   0.3 L 


 


Mono #   0.5 


 


Eos #   0.0 


 


Baso #   0.0 


 


Neutrophils % (Manual)   96 H 


 


Lymphocytes % (Manual)   2 L 


 


Monocytes % (Manual)   2 


 


Toxic Granulation   Present 


 


Platelet Estimate   Normal 


 


Large Platelets   Present 


 


Polychromasia   Slight 


 


Hypochromasia (manual)   Slight 


 


Poikilocytosis (manual   Slight 


 


Anisocytosis (manual)   Slight 


 


Target Cells   Slight 


 


Tear Drop Cells   Slight 


 


Ovalocytes   Slight 


 


Sodium    131 L


 


Potassium    4.0


 


Chloride    94 L


 


Carbon Dioxide    27


 


Anion Gap    14


 


BUN    24 H


 


Creatinine    0.8


 


Est GFR ( Amer)    > 60


 


Est GFR (Non-Af Amer)    > 60


 


POC Glucose (mg/dL)  302 H  


 


Random Glucose    326 H


 


Calcium    8.1 L


 


Phosphorus    2.6


 


Magnesium    2.0


 


Total Bilirubin    0.5


 


AST    15 L


 


ALT    26


 


Alkaline Phosphatase    48


 


Total Protein    5.8 L


 


Albumin    3.2 L


 


Globulin    2.6


 


Albumin/Globulin Ratio    1.2














Assessment & Plan


(1) BPH (benign prostatic hyperplasia)


Status: Acute   





(2) Diabetes


Status: Acute   





(3) HTN (hypertension)


Status: Acute   





(4) Pneumonia


Status: Acute   





(5) Asthma attack


Status: Acute   





(6) Asthma exacerbation


Status: Acute   





(7) COPD exacerbation


Status: Acute   





- Assessment and Plan (Free Text)


Assessment: 





will check cultures


cont iv antibiotics

## 2017-05-29 LAB
ALBUMIN/GLOB SERPL: 1.2 {RATIO} (ref 1–2.1)
ALP SERPL-CCNC: 45 U/L (ref 38–126)
ALT SERPL-CCNC: 29 U/L (ref 21–72)
AST SERPL-CCNC: 17 U/L (ref 17–59)
BASOPHILS # BLD AUTO: 0 K/UL (ref 0–0.2)
BASOPHILS NFR BLD: 0 % (ref 0–2)
BILIRUB SERPL-MCNC: 0.7 MG/DL (ref 0.2–1.3)
BUN SERPL-MCNC: 25 MG/DL (ref 9–20)
CALCIUM SERPL-MCNC: 8.2 MG/DL (ref 8.6–10.4)
CHLORIDE SERPL-SCNC: 98 MMOL/L (ref 98–107)
CO2 SERPL-SCNC: 28 MMOL/L (ref 22–30)
EOSINOPHIL # BLD AUTO: 0 K/UL (ref 0–0.7)
EOSINOPHIL NFR BLD: 0 % (ref 0–4)
ERYTHROCYTE [DISTWIDTH] IN BLOOD BY AUTOMATED COUNT: 19.3 % (ref 11.5–14.5)
GLOBULIN SER-MCNC: 2.5 GM/DL (ref 2.2–3.9)
GLUCOSE SERPL-MCNC: 271 MG/DL (ref 75–110)
HCT VFR BLD CALC: 37.7 % (ref 35–51)
LG PLATELETS BLD QL SMEAR: PRESENT
LYMPHOCYTES # BLD AUTO: 0.2 K/UL (ref 1–4.3)
LYMPHOCYTES NFR BLD AUTO: 1.8 % (ref 20–40)
MAGNESIUM SERPL-MCNC: 2.2 MG/DL (ref 1.6–2.3)
MCH RBC QN AUTO: 24.1 PG (ref 27–31)
MCHC RBC AUTO-ENTMCNC: 32.4 G/DL (ref 33–37)
MCV RBC AUTO: 74.6 FL (ref 80–94)
MONOCYTES # BLD: 0.5 K/UL (ref 0–0.8)
MONOCYTES NFR BLD: 3.4 % (ref 0–10)
NEUTROPHILS NFR BLD AUTO: 92 % (ref 50–75)
NRBC BLD AUTO-RTO: 0 % (ref 0–2)
PLATELET # BLD: 220 K/UL (ref 130–400)
PMV BLD AUTO: 8 FL (ref 7.2–11.7)
POTASSIUM SERPL-SCNC: 4.5 MMOL/L (ref 3.6–5.2)
PROT SERPL-MCNC: 5.5 G/DL (ref 6.3–8.3)
SODIUM SERPL-SCNC: 133 MMOL/L (ref 132–148)
TOTAL CELLS COUNTED BLD: 100
WBC # BLD AUTO: 13.8 K/UL (ref 4.8–10.8)

## 2017-05-29 RX ADMIN — METHYLPREDNISOLONE SODIUM SUCCINATE SCH MG: 40 INJECTION, POWDER, FOR SOLUTION INTRAMUSCULAR; INTRAVENOUS at 17:27

## 2017-05-29 RX ADMIN — IPRATROPIUM BROMIDE AND ALBUTEROL SULFATE SCH ML: .5; 3 SOLUTION RESPIRATORY (INHALATION) at 08:21

## 2017-05-29 RX ADMIN — SODIUM CHLORIDE SCH MLS/HR: 9 INJECTION, SOLUTION INTRAVENOUS at 08:30

## 2017-05-29 RX ADMIN — IPRATROPIUM BROMIDE AND ALBUTEROL SULFATE SCH ML: .5; 3 SOLUTION RESPIRATORY (INHALATION) at 20:29

## 2017-05-29 RX ADMIN — PANTOPRAZOLE SODIUM SCH MG: 40 TABLET, DELAYED RELEASE ORAL at 09:10

## 2017-05-29 RX ADMIN — SODIUM CHLORIDE SCH MLS/HR: 9 INJECTION, SOLUTION INTRAVENOUS at 14:57

## 2017-05-29 RX ADMIN — SODIUM CHLORIDE SCH MLS/HR: 9 INJECTION, SOLUTION INTRAVENOUS at 02:26

## 2017-05-29 RX ADMIN — HUMAN INSULIN SCH: 100 INJECTION, SOLUTION SUBCUTANEOUS at 21:44

## 2017-05-29 RX ADMIN — METHYLPREDNISOLONE SODIUM SUCCINATE SCH MG: 40 INJECTION, POWDER, FOR SOLUTION INTRAMUSCULAR; INTRAVENOUS at 08:29

## 2017-05-29 RX ADMIN — IPRATROPIUM BROMIDE AND ALBUTEROL SULFATE SCH: .5; 3 SOLUTION RESPIRATORY (INHALATION) at 01:28

## 2017-05-29 RX ADMIN — SODIUM CHLORIDE SCH MLS/HR: 9 INJECTION, SOLUTION INTRAVENOUS at 20:00

## 2017-05-29 RX ADMIN — TIOTROPIUM BROMIDE SCH: 18 CAPSULE ORAL; RESPIRATORY (INHALATION) at 10:26

## 2017-05-29 RX ADMIN — HUMAN INSULIN SCH UNIT: 100 INJECTION, SOLUTION SUBCUTANEOUS at 08:29

## 2017-05-29 RX ADMIN — INSULIN GLARGINE SCH U: 100 INJECTION, SOLUTION SUBCUTANEOUS at 09:14

## 2017-05-29 RX ADMIN — ENOXAPARIN SODIUM SCH MG: 40 INJECTION SUBCUTANEOUS at 09:09

## 2017-05-29 RX ADMIN — IPRATROPIUM BROMIDE AND ALBUTEROL SULFATE SCH ML: .5; 3 SOLUTION RESPIRATORY (INHALATION) at 14:40

## 2017-05-29 RX ADMIN — HUMAN INSULIN SCH UNIT: 100 INJECTION, SOLUTION SUBCUTANEOUS at 17:28

## 2017-05-29 RX ADMIN — HUMAN INSULIN SCH UNIT: 100 INJECTION, SOLUTION SUBCUTANEOUS at 12:13

## 2017-05-29 NOTE — CP.PCM.PN
<Malcolm Dietz - Last Filed: 05/29/17 04:04>





Subjective





- Date & Time of Evaluation


Date of Evaluation: 05/29/17


Time of Evaluation: 03:49





- Subjective


Subjective: 





PGY-1 Medicine Progress Note for Dr. Menezes





Patient seen and examined at bedside. No acute event overnight. Patient resting 

in bed comfortably.  Patient still complaining of cough. He states SOB has 

slightly improved. Patient tolerating diet. No other acute complaints at this 

time. Denies chest pain, fevers/chills, abd pain, n/v/d, incontinence, urinary 

symptoms.











Objective





- Vital Signs/Intake and Output


Vital Signs (last 24 hours): 


 











Temp Pulse Resp BP Pulse Ox


 


 98.3 F   86   20   120/66   98 


 


 05/29/17 00:00  05/29/17 00:00  05/29/17 00:00  05/29/17 00:00  05/29/17 00:00








Intake and Output: 


 











 05/28/17 05/29/17





 18:59 06:59


 


Intake Total 830 1100


 


Balance 830 1100














- Medications


Medications: 


 Current Medications





Acetaminophen (Tylenol 325mg Tab)  650 mg PO Q6 PRN


   PRN Reason: Pain, Mild (1-3)


Albuterol/Ipratropium (Duoneb 3 Mg/0.5 Mg (3 Ml) Ud)  3 ml INH RQ6 [facility]


   Last Admin: 05/29/17 01:28 Dose:  Not Given


Amlodipine Besylate (Norvasc)  10 mg PO DAILY [facility]


   Last Admin: 05/28/17 10:17 Dose:  10 mg


Benzonatate (Tessalon Perles)  100 mg PO TID [facility]


   Last Admin: 05/28/17 17:06 Dose:  100 mg


Enoxaparin Sodium (Lovenox)  40 mg SC DAILY [facility]


   Last Admin: 05/28/17 10:17 Dose:  40 mg


Ferrous Sulfate (Feosol)  325 mg PO DAILY [facility]


   Last Admin: 05/28/17 10:17 Dose:  325 mg


Finasteride (Proscar)  5 mg PO DAILY [facility]


   Last Admin: 05/28/17 10:17 Dose:  5 mg


Home Med (Mirabegron [Myrbetriq])  1 tab PO DAILY [facility]


Piperacillin Sod/Tazobactam (Sod 3.375 gm/ Sodium Chloride)  100 mls @ 200 mls/

hr IVPB Q6H [facility]


   Last Admin: 05/29/17 02:26 Dose:  200 mls/hr


Vancomycin HCl 1 gm/ Sodium (Chloride)  250 mls @ 166.7 mls/hr IVPB Q24H [facility]


   Last Admin: 05/28/17 14:45 Dose:  166.7 mls/hr


Insulin Glargine (Lantus)  10 unit SC DAILY [facility]


   Last Admin: 05/28/17 13:49 Dose:  10 u


Insulin Human Regular (Novolin R)  0 unit SC ACHS [facility]


   PRN Reason: Protocol


   Last Admin: 05/28/17 22:54 Dose:  2 unit


Losartan Potassium (Cozaar)  100 mg PO DAILY [facility]


   Last Admin: 05/28/17 10:17 Dose:  100 mg


Methylprednisolone (Solu-Medrol)  40 mg IVP Q8H [facility]


   Last Admin: 05/28/17 23:57 Dose:  40 mg


Montelukast Sodium (Singulair)  10 mg PO HS [facility]


   Last Admin: 05/28/17 21:33 Dose:  10 mg


Pantoprazole Sodium (Protonix Ec Tab)  40 mg PO DAILY [facility]


   Last Admin: 05/28/17 10:17 Dose:  40 mg


Tamsulosin HCl (Flomax)  0.4 mg PO BID [facility]


   Last Admin: 05/28/17 17:06 Dose:  0.4 mg


Tiotropium Bromide (Spiriva)  18 mcg INH RQ24 [facility]











- Labs


Labs: 


 





 05/28/17 11:44 





 05/28/17 11:44 











- Constitutional


Appears: No Acute Distress





- Head Exam


Head Exam: ATRAUMATIC, NORMOCEPHALIC





- Eye Exam


Eye Exam: EOMI, Normal appearance


Pupil Exam: PERRL





- ENT Exam


ENT Exam: Mucous Membranes Moist





- Neck Exam


Neck Exam: Normal Inspection





- Respiratory Exam


Respiratory Exam: Rhonchi, Wheezes, NORMAL BREATHING PATTERN.  absent: 

Accessory Muscle Use, Respiratory Distress





- Cardiovascular Exam


Cardiovascular Exam: REGULAR RHYTHM, +S1, +S2





- GI/Abdominal Exam


GI & Abdominal Exam: Soft, Normal Bowel Sounds.  absent: Tenderness





- Rectal Exam


Rectal Exam: Deferred





- Extremities Exam


Extremities Exam: Normal Capillary Refill.  absent: Calf Tenderness





- Back Exam


Back Exam: absent: CVA tenderness (L), CVA tenderness (R)





- Neurological Exam


Neurological Exam: Alert, Awake, CN II-XII Intact, Oriented x3





- Psychiatric Exam


Psychiatric exam: Normal Affect, Normal Mood





- Skin


Skin Exam: Dry, Intact, Normal Color, Warm





Assessment and Plan





- Assessment and Plan (Free Text)


Plan: 





(1) Pneumonia


Assessment and Plan: 


Leukocytosis, Afebrile 


CXR 5/27/17 shows right lower lobe infiltrate, unchanged compared to prior 

studies.


PET/CT scan done 5/24/17: consolidation on b/l upper lobe as well as RLL and 

RML. Low probability of neoplasm. 


Vanco IVPB DAY 3


Zosyn IVPB DAY 3


Tessalon Pearls TID 


Blood culture negative


f/u sputum cultures


f/u legionella and mycoplasma


ID consult placed- Dr. Freed-help appreciated. 


Pulm consult placed-Dr. Gonsalez- help appreciated. 


  





(2) COPD exacerbation


Assessment and Plan: 


Vanco IVPB 


Zosyn IVPB


Solumedrol 40 mg IVP Q8H


Singulair 10 mg PO HS


Pulm consult placed, Dr. Gonsalez, help appreciated. 








(3) Diabetes


Assessment and Plan: 


Accuchecks ACHS


ISS


Consistent Carb Diet


 





(4) BPH (benign prostatic hyperplasia)


Assessment and Plan: 


Proscar 5 mg PO daily 


Flomax 0.4 mg PO BID








(5) HTN (hypertension)


Assessment and Plan: 


Norvasc 10 mg PO daily


Cozaar 100 mg PO daily 


   





(6) Prophylactic measure


Assessment and Plan: 


Protonix 40 mg PO daily 


Lovenox 40 mg SC daily 





<Mihir Menezes - Last Filed: 05/29/17 12:31>





Objective





- Vital Signs/Intake and Output


Vital Signs (last 24 hours): 


 











Temp Pulse Resp BP Pulse Ox


 


 99.4 F   90   20   116/75   97 


 


 05/29/17 08:00  05/29/17 08:00  05/29/17 08:00  05/29/17 08:00  05/29/17 08:00








Intake and Output: 


 











 05/29/17 05/29/17





 06:59 18:59


 


Intake Total 1560 


 


Balance 1560 














- Medications


Medications: 


 Current Medications





Acetaminophen (Tylenol 325mg Tab)  650 mg PO Q6 PRN


   PRN Reason: Pain, Mild (1-3)


Albuterol/Ipratropium (Duoneb 3 Mg/0.5 Mg (3 Ml) Ud)  3 ml INH RQ6 [facility]


   Last Admin: 05/29/17 08:21 Dose:  3 ml


Amlodipine Besylate (Norvasc)  10 mg PO DAILY [facility]


   Last Admin: 05/29/17 09:10 Dose:  10 mg


Benzonatate (Tessalon Perles)  100 mg PO TID [facility]


   Last Admin: 05/29/17 09:10 Dose:  100 mg


Enoxaparin Sodium (Lovenox)  40 mg SC DAILY [facility]


   Last Admin: 05/29/17 09:09 Dose:  40 mg


Ferrous Sulfate (Feosol)  325 mg PO DAILY [facility]


   Last Admin: 05/29/17 09:10 Dose:  325 mg


Finasteride (Proscar)  5 mg PO DAILY [facility]


   Last Admin: 05/29/17 09:10 Dose:  5 mg


Home Med (Mirabegron [Myrbetriq])  1 tab PO DAILY [facility]


Piperacillin Sod/Tazobactam (Sod 3.375 gm/ Sodium Chloride)  100 mls @ 200 mls/

hr IVPB Q6H [facility]


   Last Admin: 05/29/17 08:30 Dose:  200 mls/hr


Vancomycin HCl 1 gm/ Sodium (Chloride)  250 mls @ 166.7 mls/hr IVPB Q24H [facility]


   Last Admin: 05/28/17 14:45 Dose:  166.7 mls/hr


Insulin Glargine (Lantus)  10 unit SC DAILY [facility]


   Last Admin: 05/29/17 09:14 Dose:  10 u


[facility]  [facility]


   PRN Reason: Protocol


   Last Admin: 05/29/17 12:13 Dose:  4 unit


Losartan Potassium (Cozaar)  100 mg PO DAILY [facility]


   Last Admin: 05/29/17 09:10 Dose:  100 mg


Methylprednisolone (Solu-Medrol)  40 mg IVP Q8H [facility]


   Last Admin: 05/29/17 08:29 Dose:  40 mg


Montelukast Sodium (Singulair)  10 mg PO HS [facility]


   Last Admin: 05/28/17 21:33 Dose:  10 mg


Pantoprazole Sodium (Protonix Ec Tab)  40 mg PO DAILY [facility]


   Last Admin: 05/29/17 09:10 Dose:  40 mg


Tamsulosin HCl (Flomax)  0.4 mg PO BID [facility]


   Last Admin: 05/29/17 09:10 Dose:  0.4 mg


Tiotropium Bromide (Spiriva)  18 mcg INH RQ24 [facility]


   Last Admin: 05/29/17 10:26 Dose:  Not Given











- Labs


Labs: 


 





 05/29/17 06:11 





 05/29/17 06:11 











Attending/Attestation





- Attestation


I have personally seen and examined this patient.: Yes


I have fully participated in the care of the patient.: Yes


I have reviewed all pertinent clinical information, including history, physical 

exam and plan: Yes


Notes (Text): 





05/29/17 12:28


Medical Attending: Patient was seen and examined by me. Agree with the above 

note by the resident. 


Family members were present and translated. 


Patient feels better, he reports able to walk to and from the bathroom several 

times without shortness of breath.


The patient was recently in hospital and then discharged but then came back. 


Currently on IV abx and also IV solumedrol





Mihir Menezes

## 2017-05-30 LAB
ALBUMIN/GLOB SERPL: 1.2 {RATIO} (ref 1–2.1)
ALP SERPL-CCNC: 49 U/L (ref 38–126)
ALT SERPL-CCNC: 32 U/L (ref 21–72)
AST SERPL-CCNC: 27 U/L (ref 17–59)
BASOPHILS # BLD AUTO: 0 K/UL (ref 0–0.2)
BASOPHILS NFR BLD: 0.3 % (ref 0–2)
BILIRUB SERPL-MCNC: 0.6 MG/DL (ref 0.2–1.3)
BUN SERPL-MCNC: 25 MG/DL (ref 9–20)
CALCIUM SERPL-MCNC: 8 MG/DL (ref 8.6–10.4)
CHLORIDE SERPL-SCNC: 98 MMOL/L (ref 98–107)
CO2 SERPL-SCNC: 27 MMOL/L (ref 22–30)
EOSINOPHIL # BLD AUTO: 0 K/UL (ref 0–0.7)
EOSINOPHIL NFR BLD: 0 % (ref 0–4)
ERYTHROCYTE [DISTWIDTH] IN BLOOD BY AUTOMATED COUNT: 19.9 % (ref 11.5–14.5)
GLOBULIN SER-MCNC: 2.6 GM/DL (ref 2.2–3.9)
GLUCOSE SERPL-MCNC: 325 MG/DL (ref 75–110)
HCT VFR BLD CALC: 38.8 % (ref 35–51)
LG PLATELETS BLD QL SMEAR: PRESENT
LYMPHOCYTES # BLD AUTO: 0.2 K/UL (ref 1–4.3)
LYMPHOCYTES NFR BLD AUTO: 1.9 % (ref 20–40)
MAGNESIUM SERPL-MCNC: 2.4 MG/DL (ref 1.6–2.3)
MCH RBC QN AUTO: 24.5 PG (ref 27–31)
MCHC RBC AUTO-ENTMCNC: 32.3 G/DL (ref 33–37)
MCV RBC AUTO: 75.9 FL (ref 80–94)
MONOCYTES # BLD: 0.4 K/UL (ref 0–0.8)
MONOCYTES NFR BLD: 3.6 % (ref 0–10)
NEUTROPHILS NFR BLD AUTO: 95 % (ref 50–75)
NRBC BLD AUTO-RTO: 0 % (ref 0–2)
PHOSPHATE SERPL-MCNC: 2.8 MG/DL (ref 2.5–4.5)
PLATELET # BLD: 204 K/UL (ref 130–400)
PMV BLD AUTO: 8.2 FL (ref 7.2–11.7)
POTASSIUM SERPL-SCNC: 4.6 MMOL/L (ref 3.6–5.2)
PROT SERPL-MCNC: 5.6 G/DL (ref 6.3–8.3)
SODIUM SERPL-SCNC: 132 MMOL/L (ref 132–148)
TOTAL CELLS COUNTED BLD: 100
WBC # BLD AUTO: 12.1 K/UL (ref 4.8–10.8)

## 2017-05-30 RX ADMIN — HUMAN INSULIN SCH UNIT: 100 INJECTION, SOLUTION SUBCUTANEOUS at 17:27

## 2017-05-30 RX ADMIN — HUMAN INSULIN SCH UNIT: 100 INJECTION, SOLUTION SUBCUTANEOUS at 21:57

## 2017-05-30 RX ADMIN — SODIUM CHLORIDE SCH MLS/HR: 9 INJECTION, SOLUTION INTRAVENOUS at 08:43

## 2017-05-30 RX ADMIN — INSULIN GLARGINE SCH U: 100 INJECTION, SOLUTION SUBCUTANEOUS at 11:54

## 2017-05-30 RX ADMIN — SODIUM CHLORIDE SCH MLS/HR: 9 INJECTION, SOLUTION INTRAVENOUS at 01:48

## 2017-05-30 RX ADMIN — ENOXAPARIN SODIUM SCH MG: 40 INJECTION SUBCUTANEOUS at 11:49

## 2017-05-30 RX ADMIN — METHYLPREDNISOLONE SODIUM SUCCINATE SCH MG: 40 INJECTION, POWDER, FOR SOLUTION INTRAMUSCULAR; INTRAVENOUS at 00:01

## 2017-05-30 RX ADMIN — IPRATROPIUM BROMIDE AND ALBUTEROL SULFATE SCH ML: .5; 3 SOLUTION RESPIRATORY (INHALATION) at 19:58

## 2017-05-30 RX ADMIN — METHYLPREDNISOLONE SODIUM SUCCINATE SCH MG: 40 INJECTION, POWDER, FOR SOLUTION INTRAMUSCULAR; INTRAVENOUS at 18:06

## 2017-05-30 RX ADMIN — SODIUM CHLORIDE SCH MLS/HR: 9 INJECTION, SOLUTION INTRAVENOUS at 20:30

## 2017-05-30 RX ADMIN — IPRATROPIUM BROMIDE AND ALBUTEROL SULFATE SCH ML: .5; 3 SOLUTION RESPIRATORY (INHALATION) at 02:09

## 2017-05-30 RX ADMIN — ACETYLCYSTEINE SCH ML: 200 INHALANT RESPIRATORY (INHALATION) at 19:58

## 2017-05-30 RX ADMIN — HUMAN INSULIN SCH UNIT: 100 INJECTION, SOLUTION SUBCUTANEOUS at 08:36

## 2017-05-30 RX ADMIN — HUMAN INSULIN SCH UNIT: 100 INJECTION, SOLUTION SUBCUTANEOUS at 12:35

## 2017-05-30 RX ADMIN — ACETYLCYSTEINE SCH ML: 200 INHALANT RESPIRATORY (INHALATION) at 14:36

## 2017-05-30 RX ADMIN — PANTOPRAZOLE SODIUM SCH MG: 40 TABLET, DELAYED RELEASE ORAL at 11:49

## 2017-05-30 RX ADMIN — METHYLPREDNISOLONE SODIUM SUCCINATE SCH MG: 40 INJECTION, POWDER, FOR SOLUTION INTRAMUSCULAR; INTRAVENOUS at 08:43

## 2017-05-30 RX ADMIN — SODIUM CHLORIDE SCH MLS/HR: 9 INJECTION, SOLUTION INTRAVENOUS at 13:30

## 2017-05-30 RX ADMIN — IPRATROPIUM BROMIDE AND ALBUTEROL SULFATE SCH ML: .5; 3 SOLUTION RESPIRATORY (INHALATION) at 09:16

## 2017-05-30 RX ADMIN — TIOTROPIUM BROMIDE SCH MCG: 18 CAPSULE ORAL; RESPIRATORY (INHALATION) at 10:00

## 2017-05-30 RX ADMIN — IPRATROPIUM BROMIDE AND ALBUTEROL SULFATE SCH ML: .5; 3 SOLUTION RESPIRATORY (INHALATION) at 14:36

## 2017-05-30 RX ADMIN — IPRATROPIUM BROMIDE AND ALBUTEROL SULFATE SCH: .5; 3 SOLUTION RESPIRATORY (INHALATION) at 01:16

## 2017-05-30 NOTE — CP.PCM.PN
<Eliel Wei - Last Filed: 05/30/17 11:43>





Subjective





- Date & Time of Evaluation


Date of Evaluation: 05/30/17


Time of Evaluation: 11:40





- Subjective


Subjective: 











Medicine progress note.








Attending: Dr. Menezes








Pt seen and examined at bedside. No acute distress. No events overnight. Pt 

still feeling congestion and unable to cough it up. No fevers, chills, vomiting

, diarrhea. 





Objective





- Vital Signs/Intake and Output


Vital Signs (last 24 hours): 


 











Temp Pulse Resp BP Pulse Ox


 


 98 F   100 H  22   153/77 H  97 


 


 05/30/17 08:00  05/30/17 08:00  05/30/17 08:00  05/30/17 08:00  05/30/17 08:00








Intake and Output: 


 











 05/30/17 05/30/17





 06:59 18:59


 


Intake Total 340 


 


Balance 340 














- Medications


Medications: 


 Current Medications





Acetaminophen (Tylenol 325mg Tab)  650 mg PO Q6 PRN


   PRN Reason: Pain, Mild (1-3)


Acetylcysteine (Acetylcysteine 20%)  4 ml INH Q6H [facility]


Albuterol/Ipratropium (Duoneb 3 Mg/0.5 Mg (3 Ml) Ud)  3 ml INH RQ6 [facility]


   Last Admin: 05/30/17 09:16 Dose:  3 ml


Amlodipine Besylate (Norvasc)  10 mg PO DAILY Count includes the [facility]


   Last Admin: 05/29/17 09:10 Dose:  10 mg


Benzonatate (Tessalon Perles)  100 mg PO TID Count includes the [facility]


   Last Admin: 05/29/17 17:27 Dose:  100 mg


Enoxaparin Sodium (Lovenox)  40 mg SC DAILY Count includes the [facility]


   Last Admin: 05/29/17 09:09 Dose:  40 mg


Ferrous Sulfate (Feosol)  325 mg PO DAILY Count includes the [facility]


   Last Admin: 05/29/17 09:10 Dose:  325 mg


Finasteride (Proscar)  5 mg PO DAILY Count includes the [facility]


   Last Admin: 05/29/17 09:10 Dose:  5 mg


Home Med (Mirabegron [Myrbetriq])  1 tab PO DAILY Count includes the [facility]


Piperacillin Sod/Tazobactam (Sod 3.375 gm/ Sodium Chloride)  100 mls @ 200 mls/

hr IVPB Q6H Count includes the [facility]


   Last Admin: 05/30/17 08:43 Dose:  200 mls/hr


Vancomycin HCl 1 gm/ Sodium (Chloride)  250 mls @ 166.7 mls/hr IVPB Q24H Count includes the [facility]


   Last Admin: 05/29/17 14:57 Dose:  166.7 mls/hr


Insulin Glargine (Lantus)  10 unit SC DAILY Count includes the [facility]


   Last Admin: 05/29/17 09:14 Dose:  10 u


Insulin Human Regular (Novolin R)  0 unit SC ACHS Count includes the [facility]


   PRN Reason: Protocol


   Last Admin: 05/30/17 08:36 Dose:  6 unit


Losartan Potassium (Cozaar)  100 mg PO DAILY Count includes the [facility]


   Last Admin: 05/29/17 09:10 Dose:  100 mg


Methylprednisolone (Solu-Medrol)  40 mg IVP Q8H Count includes the [facility]


   Last Admin: 05/30/17 08:43 Dose:  40 mg


Montelukast Sodium (Singulair)  10 mg PO HS Count includes the [facility]


   Last Admin: 05/29/17 21:44 Dose:  10 mg


Pantoprazole Sodium (Protonix Ec Tab)  40 mg PO DAILY Count includes the [facility]


   Last Admin: 05/29/17 09:10 Dose:  40 mg


Tamsulosin HCl (Flomax)  0.4 mg PO BID Count includes the [facility]


   Last Admin: 05/29/17 17:27 Dose:  0.4 mg


Tiotropium Bromide (Spiriva)  18 mcg INH RQ24 Count includes the [facility]


   Last Admin: 05/30/17 10:00 Dose:  18 mcg











- Labs


Labs: 


 





 05/30/17 06:46 





 05/30/17 06:46 











- Constitutional


Appears: Non-toxic, No Acute Distress





- Head Exam


Head Exam: ATRAUMATIC, NORMAL INSPECTION, NORMOCEPHALIC





- Eye Exam


Eye Exam: EOMI





- ENT Exam


ENT Exam: Mucous Membranes Moist





- Neck Exam


Neck Exam: Full ROM, Normal Inspection





- Respiratory Exam


Respiratory Exam: Decreased Breath Sounds.  absent: Respiratory Distress





- Cardiovascular Exam


Cardiovascular Exam: +S1, +S2





- GI/Abdominal Exam


GI & Abdominal Exam: Soft, Normal Bowel Sounds.  absent: Tenderness





- Extremities Exam


Extremities Exam: Full ROM, Normal Inspection





- Back Exam


Back Exam: NORMAL INSPECTION





- Neurological Exam


Neurological Exam: Alert, Awake, Oriented x3





- Psychiatric Exam


Psychiatric exam: Normal Affect, Normal Mood





- Skin


Skin Exam: Dry, Intact, Normal Color, Warm





Assessment and Plan





- Assessment and Plan (Free Text)


Assessment: 








This is a 76 yo male with past medical hx of asthma/copd, htn, dm, gerd 

presenting with 


(1) Pneumonia


Assessment and Plan: 


Leukocytosis improving, Afebrile 


CXR 5/27/17 shows right lower lobe infiltrate, unchanged compared to prior 

studies.


PET/CT scan done 5/24/17: consolidation on b/l upper lobe as well as RLL and 

RML. Low probability of neoplasm. 


Vanco IVPB 1 g daily DAY 4


Zosyn IVPB q 6 hrs DAY 4


Tessalon Pearls TID 


Blood culture negative x 48 hrs


f/u sputum cultures


f/u legionella and mycoplasma>>> both negative.


ID consult placed- Dr. Freed-help appreciated. 


Pulm consult placed-Dr. Gonsalez- help appreciated. 


  





(2) COPD exacerbation


Assessment and Plan: 


Vanco IVPB as above


Zosyn IVPB as above


Solumedrol 40 mg IVP Q8H


Singulair 10 mg PO HS


Pulm consult placed, Dr. Gonsalez, help appreciated. 


continue spiriva daily


will add mucomyst by nebulizer today








(3) Diabetes


Assessment and Plan: 


Accuchecks ACHS


ISS


Consistent Carb Diet


 





(4) BPH (benign prostatic hyperplasia)


Assessment and Plan: 


Proscar 5 mg PO daily 


Flomax 0.4 mg PO BID








(5) HTN (hypertension)


Assessment and Plan: 


Norvasc 10 mg PO daily


Cozaar 100 mg PO daily 


   





(6) Prophylactic measure


Assessment and Plan: 


Protonix 40 mg PO daily 


Lovenox 40 mg SC daily


will add SCDs


Lower ext dopplers negative








discussed with Dr. Menezes 





<Mihir Menezes - Last Filed: 05/31/17 07:43>





Objective





- Vital Signs/Intake and Output


Vital Signs (last 24 hours): 


 











Temp Pulse Resp BP Pulse Ox


 


 98.1 F   83   20   128/82   95 


 


 05/31/17 00:00  05/31/17 00:00  05/31/17 00:00  05/31/17 00:00  05/31/17 00:00








Intake and Output: 


 











 05/31/17 05/31/17





 06:59 18:59


 


Intake Total 400 


 


Balance 400 














- Medications


Medications: 


 Current Medications





Acetaminophen (Tylenol 325mg Tab)  650 mg PO Q6 PRN


   PRN Reason: Pain, Mild (1-3)


Acetylcysteine (Acetylcysteine 20%)  4 ml INH RQ6 [facility]


   Last Admin: 05/31/17 01:33 Dose:  4 ml


Albuterol/Ipratropium (Duoneb 3 Mg/0.5 Mg (3 Ml) Ud)  3 ml INH RQ6 Count includes the [facility]


   Last Admin: 05/31/17 01:33 Dose:  3 ml


Amlodipine Besylate (Norvasc)  10 mg PO DAILY Count includes the [facility]


   Last Admin: 05/30/17 11:49 Dose:  10 mg


Benzonatate (Tessalon Perles)  100 mg PO TID Count includes the [facility]


   Last Admin: 05/30/17 17:27 Dose:  100 mg


Enoxaparin Sodium (Lovenox)  40 mg SC DAILY Count includes the [facility]


   Last Admin: 05/30/17 11:49 Dose:  40 mg


Ferrous Sulfate (Feosol)  325 mg PO DAILY Count includes the [facility]


   Last Admin: 05/30/17 11:49 Dose:  325 mg


Finasteride (Proscar)  5 mg PO DAILY Count includes the [facility]


   Last Admin: 05/30/17 11:49 Dose:  5 mg


Home Med (Mirabegron [Myrbetriq])  1 tab PO DAILY Count includes the [facility]


Piperacillin Sod/Tazobactam (Sod 3.375 gm/ Sodium Chloride)  100 mls @ 200 mls/

hr IVPB Q6H Count includes the [facility]


   Last Admin: 05/31/17 02:35 Dose:  200 mls/hr


Vancomycin HCl 1 gm/ Sodium (Chloride)  250 mls @ 166.7 mls/hr IVPB Q24H Count includes the [facility]


   Last Admin: 05/30/17 15:13 Dose:  166.7 mls/hr


Insulin Glargine (Lantus)  10 unit SC DAILY Count includes the [facility]


   Last Admin: 05/30/17 11:54 Dose:  10 u


Insulin Human [facility] ([facility] R)  [facility]


   PRN Reason: Protocol


   Last Admin: 05/30/17 21:57 Dose:  3 unit


Losartan Potassium (Cozaar)  100 mg PO DAILY Count includes the [facility]


   Last Admin: 05/30/17 11:48 Dose:  100 mg


Methylprednisolone (Solu-Medrol)  20 mg IVP Q8H Count includes the [facility]


   Last Admin: 05/31/17 00:20 Dose:  20 mg


Montelukast Sodium (Singulair)  10 mg PO HS Count includes the [facility]


   Last Admin: 05/30/17 21:21 Dose:  10 mg


Pantoprazole Sodium (Protonix Ec Tab)  40 mg PO DAILY Count includes the [facility]


   Last Admin: 05/30/17 11:49 Dose:  40 mg


Tamsulosin HCl (Flomax)  0.4 mg PO BID Count includes the [facility]


   Last Admin: 05/30/17 17:27 Dose:  0.4 mg


Tiotropium Bromide (Spiriva)  18 mcg INH RQ24 Count includes the [facility]


   Last Admin: 05/30/17 10:00 Dose:  18 mcg











- Labs


Labs: 


 





 05/31/17 06:24 





 05/31/17 06:24 











Attending/Attestation





- Attestation


I have personally seen and examined this patient.: Yes


I have fully participated in the care of the patient.: Yes


I have reviewed all pertinent clinical information, including history, physical 

exam and plan: Yes


Notes (Text): 





05/31/17 07:39


Medical Attending: Patient was seen and examined by me. Agree with the above 

note by the resident. 


The patient was breathing better, however still reporting heavy coughing. He 

feels as if he has pheglm/sputum that is difficult for him to bring up.


Will try mucomyst nebulizers. So far the cultures are negative at this time. 





Will decrease the IV solumedrol, the WBC is 15 this maybe from the IV 

solumedrol. Continue on nebulizers, spiriva, and 





05/31/17 07:42

## 2017-05-30 NOTE — CARD
--------------- APPROVED REPORT --------------





EKG Measurement

Heart [id number]

VT [id number]

[id number]

[id number]

[id number]



<Conclusion>

Sinus rhythm with occasional premature ventricular complexes

Right bundle branch block

Abnormal ECG

## 2017-05-30 NOTE — VASCLAB
PROCEDURE:  Lower Extremity Venous Duplex Exam.



HISTORY:

assess for DVT 



PRIORS:

None. 



TECHNIQUE:

Bilateral common femoral, femoral, popliteal and posterior tibial, 

peroneal and great saphenous veins were evaluated. Flow was assessed 

with color Doppler, compressibility, assessment of phasic flow and 

augmentation response.



Report prepared by   Abhay Guardado, JORDON, RVT



FINDINGS:



RIGHT:

1. Common Femoral Vein: 



1.1. Compressibility - Fully compressible: Thrombus -  None : Flow - 

Phasic: Augmentation -Normal: Reflux - None.



2. Femoral Vein:



2.1. Compressibility - Fully compressible: Thrombus -  None : Flow - 

Phasic: Augmentation -Normal: Reflux - None.



3. Popliteal Vein: 



3.1. Compressibility - Fully compressible: Thrombus - None :  Flow - 

Phasic: Augmentation -Normal: Reflux - None.



4. Posterior Tibial Vein: 



4.1. Compressibility - Fully compressible: Thrombus -  None: Flow - 

Phasic: Augmentation -Normal: Reflux - None.



5. Peroneal Vein:



5.1. Compressibility - Fully compressible: Thrombus -  None: Flow - 

Phasic: Augmentation -Normal: Reflux - None.



6. Great Saphenous Vein:

6.1. Compressibility - Fully compressible: Thrombus - None: Flow - 

Phasic: Augmentation - Normal: Reflux - Severe.





LEFT:

1. Common Femoral Vein:



1.1.  Compressibility - Fully compressible: Thrombus -  None: Flow - 

Phasic: Augmentation -Normal: Reflux - None.



2. Femoral Vein:



2.1.  Compressibility - Fully compressible: Thrombus -  None: Flow - 

Phasic: Augmentation -Normal: Reflux - None.



3. Popliteal Vein:



3.1.  Compressibility - Fully compressible: Thrombus -  None : Flow - 

Phasic: Augmentation -Normal: Reflux - None.



4. Posterior Tibial Vein:



4.1.  Compressibility - Fully compressible: Thrombus -  None: Flow - 

Phasic: Augmentation -Normal: Reflux - None.



5. Peroneal Vein:



5.1.  Compressibility - Fully compressible: Thrombus -  None: Flow - 

Phasic: Augmentation -Normal: Reflux - None.



6. Great Saphenous Vein:

6.1.  Compressibility - Fully compressible: Thrombus -  None: Flow - 

Phasic: Augmentation - Normal: Reflux - None.





OTHER FINDINGS:  Right: Severe valvular incompetence of the right 

greater saphenous vein.



Left: None significant.



IMPRESSION:

Right: 



No evidence of deep or superficial vein thrombosis of the right lower 

extremity.     



Left: 



No evidence of deep or superficial vein thrombosis of the left lower 

extremity. Normal valve function noted of the left side.

## 2017-05-30 NOTE — CP.PCM.PN
Subjective





- Date & Time of Evaluation


Date of Evaluation: 05/30/17


Time of Evaluation: 08:00





- Subjective


Subjective: 





Patient seen and examined.


Sitting comfortably in no acute distress but still complaining of cough and 

dyspnea on minimal exertion


Denies fevers chills, denies chest pain





Objective





- Vital Signs/Intake and Output


Vital Signs (last 24 hours): 


 











Temp Pulse Resp BP Pulse Ox


 


 98 F   100 H  22   153/77 H  97 


 


 05/30/17 08:00  05/30/17 08:00  05/30/17 08:00  05/30/17 08:00  05/30/17 08:00








Intake and Output: 


 











 05/30/17 05/30/17





 06:59 18:59


 


Intake Total 340 


 


Balance 340 














- Medications


Medications: 


 Current Medications





Acetaminophen (Tylenol 325mg Tab)  650 mg PO Q6 PRN


   PRN Reason: Pain, Mild (1-3)


Acetylcysteine (Acetylcysteine 20%)  4 ml [facility]H [facility]Q6 [facility]


   Last Admin: 05/30/17 14:36 Dose:  4 ml


Albuterol/Ipratropium (Duoneb 3 Mg/0.5 Mg (3 Ml) Ud)  3 ml INH RQ6 [facility]


   Last Admin: 05/30/17 14:36 Dose:  3 ml


Amlodipine Besylate (Norvasc)  10 mg PO DAILY [facility]


   Last Admin: 05/30/17 11:49 Dose:  10 mg


Benzonatate (Tessalon Perles)  100 mg PO TID [facility]


   Last Admin: 05/30/17 15:07 Dose:  100 mg


Enoxaparin Sodium (Lovenox)  40 mg SC DAILY [facility]


   Last Admin: 05/30/17 11:49 Dose:  40 mg


Ferrous Sulfate (Feosol)  325 mg PO DAILY [facility]


   Last Admin: 05/30/17 11:49 Dose:  325 mg


Finasteride (Proscar)  5 mg PO DAILY [facility]


   Last Admin: 05/30/17 11:49 Dose:  5 mg


Home Med (Mirabegron [Myrbetriq])  1 tab PO DAILY [facility]


Piperacillin Sod/Tazobactam (Sod 3.375 gm/ Sodium Chloride)  100 mls @ 200 mls/

hr IVPB Q6H [facility]


   Last Admin: 05/30/17 13:30 Dose:  200 mls/hr


Vancomycin HCl 1 gm/ Sodium (Chloride)  250 mls @ 166.7 mls/hr IVPB Q24H [facility]


   Last Admin: 05/30/17 15:13 Dose:  166.7 mls/hr


Insulin Glargine (Lantus)  10 unit SC DAILY [facility]


   Last Admin: 05/30/17 11:54 Dose:  10 u


Insulin Human Regular (Novolin R)  0 unit SC ACHS [facility]


   PRN Reason: Protocol


   Last Admin: 05/30/17 12:35 Dose:  8 unit


Losartan Potassium (Cozaar)  100 mg PO DAILY [facility]


   Last Admin: 05/30/17 11:48 Dose:  100 mg


Methylprednisolone (Solu-Medrol)  40 mg IVP Q8H [facility]


   Last Admin: 05/30/17 08:43 Dose:  40 mg


Montelukast Sodium (Singulair)  10 mg PO HS [facility]


   Last Admin: 05/29/17 21:44 Dose:  10 mg


Pantoprazole Sodium (Protonix Ec Tab)  40 mg PO DAILY [facility]


   Last Admin: 05/30/17 11:49 Dose:  40 mg


Tamsulosin HCl (Flomax)  0.4 mg PO BID [facility]


   Last Admin: 05/30/17 11:48 Dose:  0.4 mg


Tiotropium Bromide (Spiriva)  18 mcg INH RQ24 [facility]


   Last Admin: 05/30/17 10:00 Dose:  18 mcg











- Labs


Labs: 


 





 05/30/17 06:46 





 05/30/17 06:46 











- Constitutional


Appears: No Acute Distress





- Head Exam


Head Exam: ATRAUMATIC, NORMOCEPHALIC





- Eye Exam


Eye Exam: Normal appearance





- ENT Exam


ENT Exam: Mucous Membranes Moist





- Respiratory Exam


Respiratory Exam: Rhonchi, Wheezes





- Cardiovascular Exam


Cardiovascular Exam: REGULAR RHYTHM





- GI/Abdominal Exam


GI & Abdominal Exam: Soft, Normal Bowel Sounds





- Extremities Exam


Extremities Exam: Full ROM, Normal Inspection





- Neurological Exam


Neurological Exam: Alert, Oriented x3





Assessment and Plan


(1) Pneumonia


Assessment & Plan: 


Continue antibiotics per infectious disease


Continue nebulizer treatment, steroids and Spiriva





Status: Acute   





(2) COPD exacerbation


Status: Acute   





(3) Diabetes


Status: Acute

## 2017-05-30 NOTE — CP.PCM.PN
Subjective





- Date & Time of Evaluation


Date of Evaluation: 05/30/17


Time of Evaluation: 08:00





- Subjective


Subjective: 





slow progress


no new cultures  cont rx





Objective





- Vital Signs/Intake and Output


Vital Signs (last 24 hours): 


 











Temp Pulse Resp BP Pulse Ox


 


 98 F   100 H  22   153/77 H  97 


 


 05/30/17 08:00  05/30/17 08:00  05/30/17 08:00  05/30/17 08:00  05/30/17 08:00








Intake and Output: 


 











 05/30/17 05/30/17





 06:59 18:59


 


Intake Total 340 


 


Balance 340 














- Medications


Medications: 


 Current Medications





Acetaminophen (Tylenol 325mg Tab)  650 mg PO Q6 PRN


   PRN Reason: Pain, Mild (1-3)


Acetylcysteine (Acetylcysteine 20%)  4 ml INH Q6H [facility]


Albuterol/Ipratropium (Duoneb 3 Mg/0.5 Mg (3 Ml) Ud)  3 ml INH RQ6 [facility]


   Last Admin: 05/30/17 09:16 Dose:  3 ml


Amlodipine Besylate (Norvasc)  10 mg PO DAILY [facility]


   Last Admin: 05/30/17 11:49 Dose:  10 mg


Benzonatate (Tessalon Perles)  100 mg PO TID [facility]


   Last Admin: 05/30/17 11:49 Dose:  100 mg


Enoxaparin Sodium (Lovenox)  40 mg SC DAILY [facility]


   Last Admin: 05/30/17 11:49 Dose:  40 mg


Ferrous Sulfate (Feosol)  325 mg PO DAILY [facility]


   Last Admin: 05/30/17 11:49 Dose:  325 mg


Finasteride (Proscar)  5 mg PO DAILY [facility]


   Last Admin: 05/30/17 11:49 Dose:  5 mg


Home Med (Mirabegron [Myrbetriq])  1 tab PO DAILY [facility]


Piperacillin Sod/Tazobactam (Sod 3.375 gm/ Sodium Chloride)  100 mls @ 200 mls/

hr IVPB Q6H [facility]


   Last Admin: 05/30/17 08:43 Dose:  200 mls/hr


Vancomycin HCl 1 gm/ Sodium (Chloride)  250 mls @ 166.7 mls/hr IVPB Q24H [facility]


   Last Admin: 05/29/17 14:57 Dose:  166.7 mls/hr


Insulin Glargine (Lantus)  10 unit SC DAILY [facility]


   Last Admin: 05/30/17 11:54 Dose:  10 u


Insulin Human Regular (Novolin R)  0 unit SC ACHS [facility]


   PRN Reason: Protocol


   Last Admin: 05/30/17 08:36 Dose:  6 unit


Losartan Potassium (Cozaar)  100 mg PO DAILY [facility]


   Last Admin: 05/30/17 11:48 Dose:  100 mg


Methylprednisolone (Solu-Medrol)  40 mg IVP Q8H [facility]


   Last Admin: 05/30/17 08:43 Dose:  40 mg


Montelukast Sodium (Singulair)  10 mg PO HS [facility]


   Last Admin: 05/29/17 21:44 Dose:  10 mg


Pantoprazole Sodium (Protonix Ec Tab)  40 mg PO DAILY [facility]


   Last Admin: 05/30/17 11:49 Dose:  40 mg


Tamsulosin HCl (Flomax)  0.4 mg PO BID [facility]


   Last Admin: 05/30/17 11:48 Dose:  0.4 mg


Tiotropium Bromide (Spiriva)  18 mcg INH RQ24 [facility]


   Last Admin: 05/30/17 10:00 Dose:  18 mcg











- Labs


Labs: 


 





 05/30/17 06:46 





 05/30/17 06:46 











Assessment and Plan


(1) BPH (benign prostatic hyperplasia)


Status: Acute   





(2) Diabetes


Status: Acute   





(3) HTN (hypertension)


Status: Acute   





(4) Pneumonia


Status: Acute   





(5) Asthma attack


Status: Acute   





(6) Asthma exacerbation


Status: Acute   





(7) COPD exacerbation


Status: Acute

## 2017-05-31 LAB
ALBUMIN/GLOB SERPL: 1.3 {RATIO} (ref 1–2.1)
ALP SERPL-CCNC: 45 U/L (ref 38–126)
ALT SERPL-CCNC: 37 U/L (ref 21–72)
AST SERPL-CCNC: 17 U/L (ref 17–59)
BASOPHILS # BLD AUTO: 0 K/UL (ref 0–0.2)
BASOPHILS NFR BLD: 0.1 % (ref 0–2)
BILIRUB SERPL-MCNC: 0.9 MG/DL (ref 0.2–1.3)
BUN SERPL-MCNC: 21 MG/DL (ref 9–20)
CALCIUM SERPL-MCNC: 7.9 MG/DL (ref 8.6–10.4)
CHLORIDE SERPL-SCNC: 96 MMOL/L (ref 98–107)
CO2 SERPL-SCNC: 26 MMOL/L (ref 22–30)
EOSINOPHIL # BLD AUTO: 0 K/UL (ref 0–0.7)
EOSINOPHIL NFR BLD: 0 % (ref 0–4)
ERYTHROCYTE [DISTWIDTH] IN BLOOD BY AUTOMATED COUNT: 20.3 % (ref 11.5–14.5)
GLOBULIN SER-MCNC: 2.3 GM/DL (ref 2.2–3.9)
GLUCOSE SERPL-MCNC: 418 MG/DL (ref 75–110)
HCT VFR BLD CALC: 40.3 % (ref 35–51)
LYMPHOCYTES # BLD AUTO: 0.2 K/UL (ref 1–4.3)
LYMPHOCYTES NFR BLD AUTO: 1.1 % (ref 20–40)
MAGNESIUM SERPL-MCNC: 2.2 MG/DL (ref 1.6–2.3)
MCH RBC QN AUTO: 24.2 PG (ref 27–31)
MCHC RBC AUTO-ENTMCNC: 32 G/DL (ref 33–37)
MCV RBC AUTO: 75.6 FL (ref 80–94)
MONOCYTES # BLD: 0.8 K/UL (ref 0–0.8)
MONOCYTES NFR BLD: 5.6 % (ref 0–10)
NEUTROPHILS NFR BLD AUTO: 92 % (ref 50–75)
NRBC BLD AUTO-RTO: 0 % (ref 0–2)
PHOSPHATE SERPL-MCNC: 2.2 MG/DL (ref 2.5–4.5)
PLATELET # BLD: 204 K/UL (ref 130–400)
PMV BLD AUTO: 8.2 FL (ref 7.2–11.7)
POTASSIUM SERPL-SCNC: 4.3 MMOL/L (ref 3.6–5.2)
PROT SERPL-MCNC: 5.4 G/DL (ref 6.3–8.3)
SODIUM SERPL-SCNC: 131 MMOL/L (ref 132–148)
TOTAL CELLS COUNTED BLD: 100
WBC # BLD AUTO: 15.3 K/UL (ref 4.8–10.8)

## 2017-05-31 RX ADMIN — PANTOPRAZOLE SODIUM SCH MG: 40 TABLET, DELAYED RELEASE ORAL at 10:33

## 2017-05-31 RX ADMIN — HUMAN INSULIN SCH UNIT: 100 INJECTION, SOLUTION SUBCUTANEOUS at 08:29

## 2017-05-31 RX ADMIN — METHYLPREDNISOLONE SODIUM SUCCINATE SCH MG: 40 INJECTION, POWDER, FOR SOLUTION INTRAMUSCULAR; INTRAVENOUS at 00:20

## 2017-05-31 RX ADMIN — SODIUM CHLORIDE SCH MLS/HR: 9 INJECTION, SOLUTION INTRAVENOUS at 02:35

## 2017-05-31 RX ADMIN — SODIUM CHLORIDE SCH MLS/HR: 9 INJECTION, SOLUTION INTRAVENOUS at 12:17

## 2017-05-31 RX ADMIN — INSULIN GLARGINE SCH U: 100 INJECTION, SOLUTION SUBCUTANEOUS at 11:00

## 2017-05-31 RX ADMIN — HUMAN INSULIN SCH UNIT: 100 INJECTION, SOLUTION SUBCUTANEOUS at 11:49

## 2017-05-31 RX ADMIN — METHYLPREDNISOLONE SODIUM SUCCINATE SCH MG: 40 INJECTION, POWDER, FOR SOLUTION INTRAMUSCULAR; INTRAVENOUS at 08:29

## 2017-05-31 RX ADMIN — IPRATROPIUM BROMIDE AND ALBUTEROL SULFATE SCH ML: .5; 3 SOLUTION RESPIRATORY (INHALATION) at 13:39

## 2017-05-31 RX ADMIN — ACETYLCYSTEINE SCH ML: 200 INHALANT RESPIRATORY (INHALATION) at 01:33

## 2017-05-31 RX ADMIN — IPRATROPIUM BROMIDE AND ALBUTEROL SULFATE SCH ML: .5; 3 SOLUTION RESPIRATORY (INHALATION) at 07:54

## 2017-05-31 RX ADMIN — ENOXAPARIN SODIUM SCH MG: 40 INJECTION SUBCUTANEOUS at 10:34

## 2017-05-31 RX ADMIN — TIOTROPIUM BROMIDE SCH MCG: 18 CAPSULE ORAL; RESPIRATORY (INHALATION) at 07:54

## 2017-05-31 RX ADMIN — ACETYLCYSTEINE SCH ML: 200 INHALANT RESPIRATORY (INHALATION) at 13:39

## 2017-05-31 RX ADMIN — ACETYLCYSTEINE SCH: 200 INHALANT RESPIRATORY (INHALATION) at 07:54

## 2017-05-31 RX ADMIN — IPRATROPIUM BROMIDE AND ALBUTEROL SULFATE SCH ML: .5; 3 SOLUTION RESPIRATORY (INHALATION) at 19:05

## 2017-05-31 RX ADMIN — HUMAN INSULIN SCH UNIT: 100 INJECTION, SOLUTION SUBCUTANEOUS at 21:23

## 2017-05-31 RX ADMIN — ACETYLCYSTEINE SCH: 200 INHALANT RESPIRATORY (INHALATION) at 19:05

## 2017-05-31 RX ADMIN — HUMAN INSULIN SCH UNIT: 100 INJECTION, SOLUTION SUBCUTANEOUS at 17:35

## 2017-05-31 RX ADMIN — SODIUM CHLORIDE SCH MLS/HR: 9 INJECTION, SOLUTION INTRAVENOUS at 17:33

## 2017-05-31 RX ADMIN — METHYLPREDNISOLONE SODIUM SUCCINATE SCH MG: 40 INJECTION, POWDER, FOR SOLUTION INTRAMUSCULAR; INTRAVENOUS at 16:30

## 2017-05-31 RX ADMIN — IPRATROPIUM BROMIDE AND ALBUTEROL SULFATE SCH ML: .5; 3 SOLUTION RESPIRATORY (INHALATION) at 01:33

## 2017-05-31 NOTE — CP.PCM.PN
<Eliel Wei - Last Filed: 05/31/17 14:29>





Subjective





- Date & Time of Evaluation


Date of Evaluation: 05/31/17


Time of Evaluation: 14:30





- Subjective


Subjective: 








Med progress note.








Attending: Dr. Menezes








Pt seen and examined at bedside. No acute distress. No events overnight. Pt has 

been able to walk around. No fevers, chills, vomiting, diarrhea.





Objective





- Vital Signs/Intake and Output


Vital Signs (last 24 hours): 


 











Temp Pulse Resp BP Pulse Ox


 


 97.5 F L  78   20   133/82   98 


 


 05/31/17 07:57  05/31/17 07:57  05/31/17 07:57  05/31/17 07:57  05/31/17 07:57








Intake and Output: 


 











 05/31/17 05/31/17





 06:59 18:59


 


Intake Total 400 


 


Balance 400 














- Medications


Medications: 


 Current Medications





Acetaminophen (Tylenol 325mg Tab)  650 mg PO Q6 PRN


   PRN Reason: Pain, Mild (1-3)


Acetylcysteine (Acetylcysteine 20%)  4 ml INH RQ6 [facility]


   Last Admin: 05/31/17 13:39 Dose:  4 ml


Albuterol/Ipratropium (Duoneb 3 Mg/0.5 Mg (3 Ml) Ud)  3 ml INH RQ6 [facility]


   Last Admin: 05/31/17 13:39 Dose:  3 ml


Amlodipine Besylate (Norvasc)  10 mg PO DAILY [facility]


   Last Admin: 05/31/17 10:34 Dose:  10 mg


Benzonatate (Tessalon Perles)  100 mg PO TID [facility]


   Last Admin: 05/31/17 14:22 Dose:  100 mg


Enoxaparin Sodium (Lovenox)  40 mg SC DAILY [facility]


   Last Admin: 05/31/17 10:34 Dose:  40 mg


Ferrous Sulfate (Feosol)  325 mg PO DAILY [facility]


   Last Admin: 05/31/17 10:33 Dose:  325 mg


Finasteride (Proscar)  5 mg PO DAILY [facility]


   Last Admin: 05/31/17 10:34 Dose:  5 mg


Home Med (Mirabegron [Myrbetriq])  1 tab PO DAILY [facility]


Vancomycin HCl 1 gm/ Sodium (Chloride)  250 mls @ 166.7 mls/hr IVPB Q24H [facility]


   Last Admin: 05/31/17 14:22 Dose:  166.7 mls/hr


Piperacillin Sod/Tazobactam (Sod 3.375 gm/ Sodium Chloride)  100 mls @ 200 mls/

hr IVPB Q6 [facility]


   Last Admin: 05/31/17 12:17 Dose:  200 mls/hr


Insulin Glargine (Lantus)  10 unit SC DAILY [facility]


   Last Admin: 05/31/17 11:00 Dose:  10 u


Insulin Human Regular (Novolin R)  0 unit SC ACHS [facility]


   PRN Reason: Protocol


   Last Admin: 05/31/17 11:49 Dose:  4 unit


Losartan Potassium (Cozaar)  100 mg PO DAILY [facility]


   Last Admin: 05/31/17 10:34 Dose:  100 mg


Methylprednisolone (Solu-Medrol)  20 mg IVP Q8H [facility]


   Last Admin: 05/31/17 08:29 Dose:  20 mg


Montelukast Sodium (Singulair)  10 mg PO HS [facility]


   Last Admin: 05/30/17 21:21 Dose:  10 mg


Pantoprazole Sodium (Protonix Ec Tab)  40 mg PO DAILY [facility]


   Last Admin: 05/31/17 10:33 Dose:  40 mg


Tamsulosin HCl (Flomax)  0.4 mg PO BID [facility]


   Last Admin: 05/31/17 10:33 Dose:  0.4 mg


Tiotropium Bromide (Spiriva)  18 mcg INH RQ24 [facility]


   Last Admin: 05/31/17 07:54 Dose:  18 mcg











- Labs


Labs: 


 





 05/31/17 06:24 





 05/31/17 06:24 











- Constitutional


Appears: Non-toxic, No Acute Distress





- Head Exam


Head Exam: ATRAUMATIC, NORMAL INSPECTION, NORMOCEPHALIC





- Eye Exam


Eye Exam: EOMI





- ENT Exam


ENT Exam: Mucous Membranes Moist





- Neck Exam


Neck Exam: Full ROM, Normal Inspection





- Respiratory Exam


Respiratory Exam: NORMAL BREATHING PATTERN.  absent: Respiratory Distress





- Cardiovascular Exam


Cardiovascular Exam: +S1, +S2





- GI/Abdominal Exam


GI & Abdominal Exam: Soft, Normal Bowel Sounds.  absent: Tenderness





- Back Exam


Back Exam: NORMAL INSPECTION





- Neurological Exam


Neurological Exam: Alert, Awake, Oriented x3





- Psychiatric Exam


Psychiatric exam: Normal Affect, Normal Mood





- Skin


Skin Exam: Dry, Intact, Normal Color, Warm





Assessment and Plan





- Assessment and Plan (Free Text)


Assessment: 











This is a 78 yo male with past medical hx of asthma/copd, htn, dm, gerd 

presenting with 


(1) Pneumonia


Assessment and Plan: 


Leukocytosis likely due to steroids, Afebrile 


CXR 5/27/17 shows right lower lobe infiltrate, unchanged compared to prior 

studies.


PET/CT scan done 5/24/17: consolidation on b/l upper lobe as well as RLL and 

RML. Low probability of neoplasm. 


Vanco IVPB 1 g daily DAY 5


Zosyn IVPB q 6 hrs DAY 5


Tessalon Pearls TID 


Blood culture negative x 72 hrs


f/u sputum cultures


f/u legionella and mycoplasma>>> both negative.


ID consult placed- Dr. Freed-help appreciated. 


Pulm consult placed-Dr. Gonsalez- help appreciated. 


  





(2) COPD exacerbation


Assessment and Plan: 


Vanco IVPB as above


Zosyn IVPB as above


Solumedrol 20 mg IVP Q8H


Singulair 10 mg PO HS


Pulm consult placed, Dr. Gonsalez, help appreciated. 


continue spiriva daily


continue mucomyst by nebulizer 








(3) Diabetes


Assessment and Plan: 


Accuchecks ACHS


ISS


Consistent Carb Diet


 





(4) BPH (benign prostatic hyperplasia)


Assessment and Plan: 


Proscar 5 mg PO daily 


Flomax 0.4 mg PO BID








(5) HTN (hypertension)


Assessment and Plan: 


Norvasc 10 mg PO daily


Cozaar 100 mg PO daily 


   





(6) Prophylactic measure


Assessment and Plan: 


Protonix 40 mg PO daily 


Lovenox 40 mg SC daily


will add SCDs


Lower ext dopplers negative








discussed with Dr. Menezes 





<Mihir Menezes - Last Filed: 05/31/17 16:48>





Objective





- Vital Signs/Intake and Output


Vital Signs (last 24 hours): 


 











Temp Pulse Resp BP Pulse Ox


 


 97.5 F L  72   20   123/82   95 


 


 05/31/17 07:57  05/31/17 14:02  05/31/17 07:57  05/31/17 14:02  05/31/17 14:02








Intake and Output: 


 











 05/31/17 05/31/17





 06:59 18:59


 


Intake Total 400 


 


Balance 400 














- Medications


Medications: 


 Current Medications





Acetaminophen (Tylenol 325mg Tab)  650 mg PO Q6 PRN


   PRN Reason: Pain, Mild (1-3)


Acetylcysteine (Acetylcysteine 20%)  4 ml INH RQ6 [facility]


   Last Admin: 05/31/17 13:39 Dose:  4 ml


Albuterol/Ipratropium (Duoneb 3 Mg/0.5 Mg (3 Ml) Ud)  3 ml INH RQ6 [facility]


   Last Admin: 05/31/17 13:39 Dose:  3 ml


Amlodipine Besylate (Norvasc)  10 mg PO DAILY [facility]


   Last Admin: 05/31/17 10:34 Dose:  10 mg


Benzonatate (Tessalon Perles)  100 mg PO TID [facility]


   Last Admin: 05/31/17 14:22 Dose:  100 mg


Enoxaparin Sodium (Lovenox)  40 mg SC DAILY [facility]


   Last Admin: 05/31/17 10:34 Dose:  40 mg


Ferrous Sulfate (Feosol)  325 mg PO DAILY [facility]


   Last Admin: 05/31/17 10:33 Dose:  325 mg


Finasteride (Proscar)  5 mg PO DAILY [facility]


   Last Admin: 05/31/17 10:34 Dose:  5 mg


Home Med (Mirabegron [Myrbetriq])  1 tab PO DAILY [facility]


Vancomycin HCl 1 gm/ Sodium (Chloride)  250 mls @ 166.7 mls/hr IVPB Q24H [facility]


   Last Admin: 05/31/17 14:22 Dose:  166.7 mls/hr


Piperacillin Sod/Tazobactam (Sod 3.375 gm/ Sodium Chloride)  100 mls @ 200 mls/

hr IVPB Q6 [facility]


   Last Admin: 05/31/17 12:17 Dose:  200 mls/hr


Insulin Glargine (Lantus)  10 unit SC DAILY [facility]


   Last Admin: 05/31/17 11:00 Dose:  10 u


[facility]  [facility]


   PRN Reason: Protocol


   Last Admin: 05/31/17 11:49 Dose:  4 unit


Losartan Potassium (Cozaar)  100 mg PO DAILY [facility]


   Last Admin: 05/31/17 10:34 Dose:  100 mg


Methylprednisolone (Solu-Medrol)  20 mg IVP Q8H [facility]


   Last Admin: 05/31/17 08:29 Dose:  20 mg


Montelukast Sodium (Singulair)  10 mg PO HS [facility]


   Last Admin: 05/30/17 21:21 Dose:  10 mg


Pantoprazole Sodium (Protonix Ec Tab)  40 mg PO DAILY [facility]


   Last Admin: 05/31/17 10:33 Dose:  40 mg


Tamsulosin HCl (Flomax)  0.4 mg PO BID [facility]


   Last Admin: 05/31/17 10:33 Dose:  0.4 mg


Tiotropium Bromide (Spiriva)  18 mcg INH RQ24 [facility]


   Last Admin: 05/31/17 07:54 Dose:  18 mcg











- Labs


Labs: 


 





 05/31/17 06:24 





 05/31/17 06:24 











Attending/Attestation





- Attestation


I have personally seen and examined this patient.: Yes


I have fully participated in the care of the patient.: Yes


I have reviewed all pertinent clinical information, including history, physical 

exam and plan: Yes


Notes (Text): 





05/31/17 16:32


Medical attending: Patient was seen and examined by me, agrees the above note 

by medical resident. The patient was seen with the medical resident..





Today the patient reported that his breathing was somewhat easier than before. 

He was observed walking in his room. As well as observed walking in the 

hallways as well.





His solumedrol was decreased yesterday


Mihir Menezes

## 2017-05-31 NOTE — CP.PCM.PN
Subjective





- Date & Time of Evaluation


Date of Evaluation: 05/31/17


Time of Evaluation: 10:20





- Subjective


Subjective: 





Patient seen and examined.


Still complaining of cough


Shortness of breath improving


Afebrile with no chest pain








Objective





- Vital Signs/Intake and Output


Vital Signs (last 24 hours): 


 











Temp Pulse Resp BP Pulse Ox


 


 97.5 F L  72   20   123/82   95 


 


 05/31/17 07:57  05/31/17 14:02  05/31/17 07:57  05/31/17 14:02  05/31/17 14:02








Intake and Output: 


 











 05/31/17 05/31/17





 06:59 18:59


 


Intake Total 400 


 


Balance 400 














- Medications


Medications: 


 Current Medications





Acetaminophen (Tylenol 325mg Tab)  650 mg PO Q6 PRN


   PRN Reason: Pain, Mild (1-3)


Acetylcysteine (Acetylcysteine 20%)  4 ml INH RQ6 [facility]


   Last Admin: 05/31/17 13:39 Dose:  4 ml


Albuterol/Ipratropium (Duoneb 3 Mg/0.5 Mg (3 Ml) Ud)  3 ml INH RQ6 [facility]


   Last Admin: 05/31/17 13:39 Dose:  3 ml


Amlodipine Besylate (Norvasc)  10 mg PO DAILY [facility]


   Last Admin: 05/31/17 10:34 Dose:  10 mg


Benzonatate (Tessalon Perles)  100 mg PO TID [facility]


   Last Admin: 05/31/17 14:22 Dose:  100 mg


Enoxaparin Sodium (Lovenox)  40 mg SC DAILY [facility]


   Last Admin: 05/31/17 10:34 Dose:  40 mg


Ferrous Sulfate (Feosol)  325 mg PO DAILY [facility]


   Last Admin: 05/31/17 10:33 Dose:  325 mg


Finasteride (Proscar)  5 mg PO DAILY [facility]


   Last Admin: 05/31/17 10:34 Dose:  5 mg


Home Med (Mirabegron [Myrbetriq])  1 tab PO DAILY [facility]


Vancomycin HCl 1 gm/ Sodium (Chloride)  250 mls @ 166.7 mls/hr IVPB Q24H [facility]


   Last Admin: 05/31/17 14:22 Dose:  166.7 mls/hr


Piperacillin Sod/Tazobactam (Sod 3.375 gm/ Sodium Chloride)  100 mls @ 200 mls/

hr IVPB Q6 [facility]


   Last Admin: 05/31/17 12:17 Dose:  200 mls/hr


Insulin Glargine (Lantus)  10 unit SC DAILY [facility]


   Last Admin: 05/31/17 11:00 Dose:  10 u


Insulin Human Regular (Novolin R)  [facility]


   PRN Reason: Protocol


   Last Admin: 05/31/17 11:49 Dose:  4 unit


Losartan Potassium (Cozaar)  100 mg PO DAILY [facility]


   Last Admin: 05/31/17 10:34 Dose:  100 mg


Methylprednisolone (Solu-Medrol)  20 mg IVP Q8H [facility]


   Last Admin: 05/31/17 08:29 Dose:  20 mg


Montelukast Sodium (Singulair)  10 mg PO HS [facility]


   Last Admin: 05/30/17 21:21 Dose:  10 mg


Pantoprazole Sodium (Protonix Ec Tab)  40 mg PO DAILY [facility]


   Last Admin: 05/31/17 10:33 Dose:  40 mg


Tamsulosin HCl (Flomax)  0.4 mg PO BID [facility]


   Last Admin: 05/31/17 10:33 Dose:  0.4 mg


Tiotropium Bromide (Spiriva)  18 mcg INH RQ24 [facility]


   Last Admin: 05/31/17 07:54 Dose:  18 mcg











- Labs


Labs: 


 





 05/31/17 06:24 





 05/31/17 06:24 











- Constitutional


Appears: No Acute Distress





- Head Exam


Head Exam: ATRAUMATIC, NORMOCEPHALIC





- Eye Exam


Eye Exam: Normal appearance





- ENT Exam


ENT Exam: Mucous Membranes Moist





- Neck Exam


Neck Exam: Normal Inspection





- Respiratory Exam


Respiratory Exam: Rhonchi, Wheezes





- Cardiovascular Exam


Cardiovascular Exam: REGULAR RHYTHM





- GI/Abdominal Exam


GI & Abdominal Exam: Soft, Normal Bowel Sounds





- Extremities Exam


Extremities Exam: Full ROM, Normal Inspection





Assessment and Plan


(1) Pneumonia


Assessment & Plan: 


Continue antibiotics as per infectious disease


Follow-up chest x-ray 


Status: Acute   





(2) COPD exacerbation


Assessment & Plan: 


Taper steroids


Antitussives and nebulizer treatment


Status: Acute   





(3) Diabetes


Status: Acute

## 2017-06-01 LAB
ALBUMIN/GLOB SERPL: 1.2 {RATIO} (ref 1–2.1)
ALP SERPL-CCNC: 55 U/L (ref 38–126)
ALT SERPL-CCNC: 58 U/L (ref 21–72)
AST SERPL-CCNC: 27 U/L (ref 17–59)
BASOPHILS # BLD AUTO: 0 K/UL (ref 0–0.2)
BASOPHILS NFR BLD: 0.2 % (ref 0–2)
BILIRUB SERPL-MCNC: 0.7 MG/DL (ref 0.2–1.3)
BUN SERPL-MCNC: 26 MG/DL (ref 9–20)
CALCIUM SERPL-MCNC: 7.9 MG/DL (ref 8.6–10.4)
CHLORIDE SERPL-SCNC: 96 MMOL/L (ref 98–107)
CO2 SERPL-SCNC: 29 MMOL/L (ref 22–30)
EOSINOPHIL # BLD AUTO: 0 K/UL (ref 0–0.7)
EOSINOPHIL NFR BLD: 0 % (ref 0–4)
ERYTHROCYTE [DISTWIDTH] IN BLOOD BY AUTOMATED COUNT: 20.1 % (ref 11.5–14.5)
GLOBULIN SER-MCNC: 2.8 GM/DL (ref 2.2–3.9)
GLUCOSE SERPL-MCNC: 373 MG/DL (ref 75–110)
HCT VFR BLD CALC: 41.9 % (ref 35–51)
LYMPHOCYTES # BLD AUTO: 0.2 K/UL (ref 1–4.3)
LYMPHOCYTES NFR BLD AUTO: 1.9 % (ref 20–40)
MAGNESIUM SERPL-MCNC: 2.5 MG/DL (ref 1.6–2.3)
MCH RBC QN AUTO: 24.5 PG (ref 27–31)
MCHC RBC AUTO-ENTMCNC: 32.2 G/DL (ref 33–37)
MCV RBC AUTO: 76 FL (ref 80–94)
MONOCYTES # BLD: 0.4 K/UL (ref 0–0.8)
MONOCYTES NFR BLD: 3.5 % (ref 0–10)
NEUTROPHILS NFR BLD AUTO: 91 % (ref 50–75)
NRBC BLD AUTO-RTO: 0 % (ref 0–2)
PHOSPHATE SERPL-MCNC: 2.3 MG/DL (ref 2.5–4.5)
PLATELET # BLD: 201 K/UL (ref 130–400)
PMV BLD AUTO: 8.1 FL (ref 7.2–11.7)
POTASSIUM SERPL-SCNC: 4.4 MMOL/L (ref 3.6–5.2)
PROT SERPL-MCNC: 6.1 G/DL (ref 6.3–8.3)
SODIUM SERPL-SCNC: 133 MMOL/L (ref 132–148)
TOTAL CELLS COUNTED BLD: 100
WBC # BLD AUTO: 11 K/UL (ref 4.8–10.8)

## 2017-06-01 RX ADMIN — IPRATROPIUM BROMIDE AND ALBUTEROL SULFATE SCH ML: .5; 3 SOLUTION RESPIRATORY (INHALATION) at 08:01

## 2017-06-01 RX ADMIN — TIOTROPIUM BROMIDE SCH MCG: 18 CAPSULE ORAL; RESPIRATORY (INHALATION) at 10:48

## 2017-06-01 RX ADMIN — METHYLPREDNISOLONE SODIUM SUCCINATE SCH MG: 40 INJECTION, POWDER, FOR SOLUTION INTRAMUSCULAR; INTRAVENOUS at 08:24

## 2017-06-01 RX ADMIN — SODIUM CHLORIDE SCH MLS/HR: 9 INJECTION, SOLUTION INTRAVENOUS at 05:30

## 2017-06-01 RX ADMIN — IPRATROPIUM BROMIDE AND ALBUTEROL SULFATE SCH ML: .5; 3 SOLUTION RESPIRATORY (INHALATION) at 13:49

## 2017-06-01 RX ADMIN — INSULIN GLARGINE SCH U: 100 INJECTION, SOLUTION SUBCUTANEOUS at 11:00

## 2017-06-01 RX ADMIN — HUMAN INSULIN SCH UNIT: 100 INJECTION, SOLUTION SUBCUTANEOUS at 17:30

## 2017-06-01 RX ADMIN — ACETYLCYSTEINE SCH: 200 INHALANT RESPIRATORY (INHALATION) at 08:01

## 2017-06-01 RX ADMIN — SODIUM CHLORIDE SCH MLS/HR: 9 INJECTION, SOLUTION INTRAVENOUS at 11:15

## 2017-06-01 RX ADMIN — SODIUM CHLORIDE SCH MLS/HR: 9 INJECTION, SOLUTION INTRAVENOUS at 17:38

## 2017-06-01 RX ADMIN — METHYLPREDNISOLONE SODIUM SUCCINATE SCH MG: 40 INJECTION, POWDER, FOR SOLUTION INTRAMUSCULAR; INTRAVENOUS at 17:30

## 2017-06-01 RX ADMIN — ACETYLCYSTEINE SCH: 200 INHALANT RESPIRATORY (INHALATION) at 01:16

## 2017-06-01 RX ADMIN — IPRATROPIUM BROMIDE AND ALBUTEROL SULFATE SCH ML: .5; 3 SOLUTION RESPIRATORY (INHALATION) at 01:17

## 2017-06-01 RX ADMIN — HUMAN INSULIN SCH UNIT: 100 INJECTION, SOLUTION SUBCUTANEOUS at 08:25

## 2017-06-01 RX ADMIN — ENOXAPARIN SODIUM SCH MG: 40 INJECTION SUBCUTANEOUS at 12:30

## 2017-06-01 RX ADMIN — IPRATROPIUM BROMIDE AND ALBUTEROL SULFATE SCH ML: .5; 3 SOLUTION RESPIRATORY (INHALATION) at 19:52

## 2017-06-01 RX ADMIN — PANTOPRAZOLE SODIUM SCH MG: 40 TABLET, DELAYED RELEASE ORAL at 11:15

## 2017-06-01 RX ADMIN — METHYLPREDNISOLONE SODIUM SUCCINATE SCH MG: 40 INJECTION, POWDER, FOR SOLUTION INTRAMUSCULAR; INTRAVENOUS at 00:43

## 2017-06-01 RX ADMIN — HUMAN INSULIN SCH UNIT: 100 INJECTION, SOLUTION SUBCUTANEOUS at 12:00

## 2017-06-01 RX ADMIN — SODIUM CHLORIDE SCH MLS/HR: 9 INJECTION, SOLUTION INTRAVENOUS at 00:40

## 2017-06-01 RX ADMIN — HUMAN INSULIN SCH UNIT: 100 INJECTION, SOLUTION SUBCUTANEOUS at 21:46

## 2017-06-01 NOTE — CP.PCM.PN
Subjective





- Date & Time of Evaluation


Date of Evaluation: 06/01/17


Time of Evaluation: 08:00





- Subjective


Subjective: 





patient seen and examined.


Still complaining of cough, wheezing and shortness of breath


Able to walk


Denies fever chills, denies chest pain





Objective





- Vital Signs/Intake and Output


Vital Signs (last 24 hours): 


 











Temp Pulse Resp BP Pulse Ox


 


 97.3 F L  90   20   137/82   97 


 


 06/01/17 07:51  06/01/17 07:51  06/01/17 07:51  06/01/17 07:51  06/01/17 07:51








Intake and Output: 


 











 06/01/17 06/01/17





 06:59 18:59


 


Intake Total 620 


 


Balance 620 














- Medications


Medications: 


 Current Medications





Acetaminophen (Tylenol 325mg Tab)  650 mg PO Q6 PRN


   PRN Reason: Pain, Mild (1-3)


Albuterol/Ipratropium (Duoneb 3 Mg/0.5 Mg (3 Ml) Ud)  3 ml INH RQ6 [facility]


   Last Admin: 06/01/17 08:01 Dose:  3 ml


Amlodipine Besylate (Norvasc)  10 mg PO DAILY [facility]


   Last Admin: 06/01/17 11:15 Dose:  10 mg


Benzonatate (Tessalon Perles)  100 mg PO TID [facility]


   Last Admin: 06/01/17 11:16 Dose:  100 mg


Enoxaparin Sodium (Lovenox)  40 mg SC DAILY [facility]


   Last Admin: 05/31/17 10:34 Dose:  40 mg


Ferrous Sulfate (Feosol)  325 mg PO DAILY [facility]


   Last Admin: 06/01/17 11:17 Dose:  325 mg


Finasteride (Proscar)  5 mg PO DAILY [facility]


   Last Admin: 06/01/17 11:15 Dose:  5 mg


Vancomycin HCl 1 gm/ Sodium (Chloride)  250 mls @ 166.7 mls/hr IVPB Q24H [facility]


   Last Admin: 05/31/17 14:22 Dose:  166.7 mls/hr


Piperacillin Sod/Tazobactam (Sod 3.375 gm/ Sodium Chloride)  100 mls @ 200 mls/

hr IVPB Q6 [facility]


   Last Admin: 06/01/17 11:15 Dose:  200 mls/hr


Insulin Glargine (Lantus)  10 unit SC DAILY [facility]


   Last Admin: 06/01/17 11:00 Dose:  10 u


Insulin Human Regular (Novolin R)  0 unit SC ACHS [facility]


   PRN Reason: Protocol


   Last Admin: 06/01/17 08:25 Dose:  6 unit


Losartan Potassium (Cozaar)  100 mg PO DAILY [facility]


   Last Admin: 06/01/17 11:17 Dose:  100 mg


Methylprednisolone (Solu-Medrol)  20 mg IVP Q8H [facility]


   Last Admin: 06/01/17 08:24 Dose:  20 mg


Montelukast Sodium (Singulair)  10 mg PO HS [facility]


   Last Admin: 05/31/17 21:19 Dose:  10 mg


Pantoprazole Sodium (Protonix Ec Tab)  40 mg PO DAILY [facility]


   Last Admin: 06/01/17 11:15 Dose:  40 mg


Tamsulosin HCl (Flomax)  0.4 mg PO BID [facility]


   Last Admin: 06/01/17 11:17 Dose:  0.4 mg


Tiotropium Bromide (Spiriva)  18 mcg INH RQ24 [facility]


   Last Admin: 06/01/17 10:48 Dose:  18 mcg











- Labs


Labs: 


 





 06/01/17 07:36 





 06/01/17 07:36 











- Constitutional


Appears: No Acute Distress





- Head Exam


Head Exam: ATRAUMATIC, NORMOCEPHALIC





- Eye Exam


Eye Exam: Normal appearance





- ENT Exam


ENT Exam: Mucous Membranes Moist





- Neck Exam


Neck Exam: Normal Inspection





- Respiratory Exam


Respiratory Exam: Rhonchi, Wheezes





- Cardiovascular Exam


Cardiovascular Exam: REGULAR RHYTHM





- GI/Abdominal Exam


GI & Abdominal Exam: Soft, Normal Bowel Sounds





Assessment and Plan


(1) Pneumonia


Assessment & Plan: 


repeat chest x-ray and continue IV antib


Status: Acute   





(2) COPD exacerbation


Assessment & Plan: 


continue nebulizer treatment and steroids


Discontinue Mucomyst


Status: Acute   





(3) Diabetes


Status: Acute

## 2017-06-01 NOTE — CP.PCM.PN
<Eliel Wei - Last Filed: 06/01/17 11:48>





Subjective





- Date & Time of Evaluation


Date of Evaluation: 06/01/17


Time of Evaluation: 11:45





- Subjective


Subjective: 











Med progress note.





Attending: Dr. Menezes








Pt seen and examined at bedside. No acute distress. No events overnight. Repeat 

CXR pending. Says breathing is better, still congested. No fevers, chills, 

vomiting, diarrhea.





Objective





- Vital Signs/Intake and Output


Vital Signs (last 24 hours): 


 











Temp Pulse Resp BP Pulse Ox


 


 97.3 F L  90   20   137/82   97 


 


 06/01/17 07:51  06/01/17 07:51  06/01/17 07:51  06/01/17 07:51  06/01/17 07:51








Intake and Output: 


 











 06/01/17 06/01/17





 06:59 18:59


 


Intake Total 620 


 


Balance 620 














- Medications


Medications: 


 Current Medications





Acetaminophen (Tylenol 325mg Tab)  650 mg PO Q6 PRN


   PRN Reason: Pain, Mild (1-3)


Albuterol/Ipratropium (Duoneb 3 Mg/0.5 Mg (3 Ml) Ud)  3 ml INH RQ6 [facility]


   Last Admin: 06/01/17 08:01 Dose:  3 ml


Amlodipine Besylate (Norvasc)  10 mg PO DAILY [facility]


   Last Admin: 06/01/17 11:15 Dose:  10 mg


Benzonatate (Tessalon Perles)  100 mg PO TID [facility]


   Last Admin: 06/01/17 11:16 Dose:  100 mg


Enoxaparin Sodium (Lovenox)  40 mg SC DAILY [facility]


   Last Admin: 05/31/17 10:34 Dose:  40 mg


Ferrous Sulfate (Feosol)  325 mg PO DAILY [facility]


   Last Admin: 06/01/17 11:17 Dose:  325 mg


Finasteride (Proscar)  5 mg PO DAILY [facility]


   Last Admin: 06/01/17 11:15 Dose:  5 mg


Vancomycin HCl 1 gm/ Sodium (Chloride)  250 mls @ 166.7 mls/hr IVPB Q24H [facility]


   Last Admin: 05/31/17 14:22 Dose:  166.7 mls/hr


Piperacillin Sod/Tazobactam (Sod 3.375 gm/ Sodium Chloride)  100 mls @ 200 mls/

hr IVPB Q6 [facility]


   Last Admin: 06/01/17 11:15 Dose:  200 mls/hr


Insulin Glargine (Lantus)  10 unit SC DAILY [facility]


   Last Admin: 06/01/17 11:00 Dose:  10 u


Insulin Human Regular (Novolin R)  0 unit SC ACHS [facility]


   PRN Reason: Protocol


   Last Admin: 06/01/17 08:25 Dose:  6 unit


Losartan Potassium (Cozaar)  100 mg PO DAILY [facility]


   Last Admin: 06/01/17 11:17 Dose:  100 mg


Methylprednisolone (Solu-Medrol)  20 mg IVP Q8H [facility]


   Last Admin: 06/01/17 08:24 Dose:  20 mg


Montelukast Sodium (Singulair)  10 mg PO HS [facility]


   Last Admin: 05/31/17 21:19 Dose:  10 mg


Pantoprazole Sodium (Protonix Ec Tab)  40 mg PO DAILY [facility]


   Last Admin: 06/01/17 11:15 Dose:  40 mg


Tamsulosin HCl (Flomax)  0.4 mg PO BID [facility]


   Last Admin: 06/01/17 11:17 Dose:  0.4 mg


Tiotropium Bromide (Spiriva)  18 mcg INH RQ24 [facility]


   Last Admin: 06/01/17 10:48 Dose:  18 mcg











- Labs


Labs: 


 





 06/01/17 07:36 





 06/01/17 07:36 











- Constitutional


Appears: Non-toxic, No Acute Distress





- Head Exam


Head Exam: ATRAUMATIC, NORMAL INSPECTION, NORMOCEPHALIC





- Eye Exam


Eye Exam: EOMI





- ENT Exam


ENT Exam: Mucous Membranes Moist





- Neck Exam


Neck Exam: Full ROM, Normal Inspection





- Respiratory Exam


Respiratory Exam: Decreased Breath Sounds.  absent: Respiratory Distress





- Cardiovascular Exam


Cardiovascular Exam: +S1, +S2





- GI/Abdominal Exam


GI & Abdominal Exam: Soft, Normal Bowel Sounds.  absent: Tenderness





- Extremities Exam


Extremities Exam: Full ROM, Normal Inspection





- Back Exam


Back Exam: NORMAL INSPECTION





- Neurological Exam


Neurological Exam: Alert, Awake, Oriented x3





- Psychiatric Exam


Psychiatric exam: Normal Affect, Normal Mood





- Skin


Skin Exam: Dry, Intact, Normal Color, Warm





Assessment and Plan





- Assessment and Plan (Free Text)


Assessment: 











This is a 76 yo male with past medical hx of asthma/copd, htn, dm, gerd 

presenting with 


(1) Pneumonia


Assessment and Plan: 


Leukocytosis likely due to steroids, Afebrile 


CXR 5/27/17 shows right lower lobe infiltrate, unchanged compared to prior 

studies.


Will repeat cxr today


PET/CT scan done 5/24/17: consolidation on b/l upper lobe as well as RLL and 

RML. Low probability of neoplasm. 


Vanco IVPB 1 g daily DAY 6


Zosyn IVPB q 6 hrs DAY 6


Tessalon Pearls TID 


Blood culture negative x 4 days


f/u sputum cultures>>> pos E coli


f/u legionella and mycoplasma>>> both negative.


ID consult placed- Dr. Freed-help appreciated. 


Pulm consult placed-Dr. Gonsalez- help appreciated. 


  





(2) COPD exacerbation


Assessment and Plan: 


Vanco IVPB as above


Zosyn IVPB as above


Solumedrol 20 mg IVP Q8H


Singulair 10 mg PO HS


Pulm consult placed, Dr. Gonsalez, help appreciated. 


continue spiriva daily


continue mucomyst by nebulizer >> will discontinue 








(3) Diabetes


Assessment and Plan: 


Accuchecks ACHS


ISS


Consistent Carb Diet


 





(4) BPH (benign prostatic hyperplasia)


Assessment and Plan: 


Proscar 5 mg PO daily 


Flomax 0.4 mg PO BID








(5) HTN (hypertension)


Assessment and Plan: 


Norvasc 10 mg PO daily


Cozaar 100 mg PO daily 


   





(6) Prophylactic measure


Assessment and Plan: 


Protonix 40 mg PO daily 


Lovenox 40 mg SC daily


will add SCDs


Lower ext dopplers negative








discussed with Dr. Menezes 








<Mihir Menezes - Last Filed: 06/02/17 07:39>





Objective





- Vital Signs/Intake and Output


Vital Signs (last 24 hours): 


 











Temp Pulse Resp BP Pulse Ox


 


 98.1 F   91 H  20   138/79   96 


 


 06/02/17 00:00  06/02/17 00:00  06/02/17 00:00  06/02/17 00:00  06/02/17 00:00








Intake and Output: 


 











 06/02/17 06/02/17





 06:59 18:59


 


Intake Total 1340 


 


Balance 1340 














- Medications


Medications: 


 Current Medications





Acetaminophen (Tylenol 325mg Tab)  650 mg PO Q6 PRN


   PRN Reason: Pain, Mild (1-3)


Amlodipine Besylate (Norvasc)  10 mg PO DAILY [facility]


   Last Admin: 06/01/17 11:15 Dose:  10 mg


Benzonatate (Tessalon Perles)  100 mg PO TID [facility]


   Last Admin: 06/01/17 17:41 Dose:  100 mg


Enoxaparin Sodium (Lovenox)  40 mg SC DAILY [facility]


   Last Admin: 06/01/17 12:30 Dose:  40 mg


Ferrous Sulfate (Feosol)  325 mg PO DAILY [facility]


   Last Admin: 06/01/17 11:17 Dose:  325 mg


Finasteride (Proscar)  5 mg PO DAILY [facility]


   Last Admin: 06/01/17 11:15 Dose:  5 mg


Vancomycin HCl 1 gm/ Sodium (Chloride)  250 mls @ 166.7 mls/hr IVPB Q24H [facility]


   Last Admin: 06/01/17 13:39 Dose:  166.7 mls/hr


Piperacillin Sod/Tazobactam (Sod 3.375 gm/ Sodium Chloride)  100 mls @ 200 mls/

hr IVPB Q6 [facility]


   Last Admin: 06/02/17 06:38 Dose:  200 mls/hr


Imipenem/Cilastatin Sodium 500 (mg/ Sodium Chloride)  100 mls @ 100 mls/hr IVPB 

Q8 [facility]


   Last Admin: 06/02/17 05:23 Dose:  100 mls/hr


Insulin Glargine (Lantus)  10 unit SC DAILY [facility]


   Last Admin: 06/01/17 11:00 Dose:  10 u


[facility]  [facility]


   PRN Reason: Protocol


   Last Admin: 06/01/17 21:46 Dose:  2 unit


Losartan Potassium (Cozaar)  100 mg PO DAILY [facility]


   Last Admin: 06/01/17 11:17 Dose:  100 mg


Methylprednisolone (Solu-Medrol)  20 mg IVP Q8H [facility]


   Last Admin: 06/02/17 00:02 Dose:  20 mg


Montelukast Sodium (Singulair)  10 mg PO HS [facility]


   Last Admin: 06/01/17 21:46 Dose:  10 mg


Pantoprazole Sodium (Protonix Ec Tab)  40 mg PO DAILY [facility]


   Last Admin: 06/01/17 11:15 Dose:  40 mg


Tamsulosin HCl (Flomax)  0.4 mg PO BID [facility]


   Last Admin: 06/01/17 17:49 Dose:  0.4 mg


Tiotropium Bromide (Spiriva)  18 mcg INH RQ24 [facility]


   Last Admin: 06/01/17 10:48 Dose:  18 mcg











- Labs


Labs: 


 





 06/01/17 07:36 





 06/01/17 07:36 











Attending/Attestation





- Attestation


I have personally seen and examined this patient.: Yes


I have fully participated in the care of the patient.: Yes


I have reviewed all pertinent clinical information, including history, physical 

exam and plan: Yes


Notes (Text): 





06/02/17 07:38


Medical attending: Patient was seen and examined by me, agree with the above 

note by medical resident. The patient was walking around in the hallway. He 

still has a lot of heavy coughing no later on in the afternoon sputum culture 

returned and he is actually Escherichia coli positive ESBL positive. 





The report shows that it was sensitive to Zosyn that we are giving but the 

report does not show an MADISON for the Zosyn. 





There is an MADISON that is sensitive to Primaxin for which we have switched the 

patient to





So at this time the patient was moved to isolation precautions.


Regarding continue to monitor blood work in respiratory status.





Thank you very much,


Mihir Menezes

## 2017-06-02 LAB
ALBUMIN/GLOB SERPL: 1.2 {RATIO} (ref 1–2.1)
ALP SERPL-CCNC: 53 U/L (ref 38–126)
ALT SERPL-CCNC: 72 U/L (ref 21–72)
AST SERPL-CCNC: 29 U/L (ref 17–59)
BASOPHILS # BLD AUTO: 0.1 K/UL (ref 0–0.2)
BASOPHILS NFR BLD: 0.5 % (ref 0–2)
BILIRUB SERPL-MCNC: 0.7 MG/DL (ref 0.2–1.3)
BUN SERPL-MCNC: 23 MG/DL (ref 9–20)
CALCIUM SERPL-MCNC: 7.9 MG/DL (ref 8.6–10.4)
CHLORIDE SERPL-SCNC: 97 MMOL/L (ref 98–107)
CO2 SERPL-SCNC: 26 MMOL/L (ref 22–30)
EOSINOPHIL # BLD AUTO: 0 K/UL (ref 0–0.7)
EOSINOPHIL NFR BLD: 0 % (ref 0–4)
ERYTHROCYTE [DISTWIDTH] IN BLOOD BY AUTOMATED COUNT: 19.9 % (ref 11.5–14.5)
GLOBULIN SER-MCNC: 2.6 GM/DL (ref 2.2–3.9)
GLUCOSE SERPL-MCNC: 299 MG/DL (ref 75–110)
HCT VFR BLD CALC: 41.3 % (ref 35–51)
LYMPHOCYTES # BLD AUTO: 0.2 K/UL (ref 1–4.3)
LYMPHOCYTES NFR BLD AUTO: 2 % (ref 20–40)
MAGNESIUM SERPL-MCNC: 2.5 MG/DL (ref 1.6–2.3)
MCH RBC QN AUTO: 24.5 PG (ref 27–31)
MCHC RBC AUTO-ENTMCNC: 32.3 G/DL (ref 33–37)
MCV RBC AUTO: 75.9 FL (ref 80–94)
MONOCYTES # BLD: 0.5 K/UL (ref 0–0.8)
MONOCYTES NFR BLD: 4.3 % (ref 0–10)
NEUTROPHILS NFR BLD AUTO: 94 % (ref 50–75)
NRBC BLD AUTO-RTO: 0 % (ref 0–2)
PHOSPHATE SERPL-MCNC: 2.5 MG/DL (ref 2.5–4.5)
PLATELET # BLD: 183 K/UL (ref 130–400)
PMV BLD AUTO: 8 FL (ref 7.2–11.7)
POTASSIUM SERPL-SCNC: 4.4 MMOL/L (ref 3.6–5.2)
PROT SERPL-MCNC: 5.8 G/DL (ref 6.3–8.3)
SODIUM SERPL-SCNC: 132 MMOL/L (ref 132–148)
TOTAL CELLS COUNTED BLD: 100
WBC # BLD AUTO: 11.6 K/UL (ref 4.8–10.8)

## 2017-06-02 RX ADMIN — METHYLPREDNISOLONE SODIUM SUCCINATE SCH MG: 40 INJECTION, POWDER, FOR SOLUTION INTRAMUSCULAR; INTRAVENOUS at 00:02

## 2017-06-02 RX ADMIN — HUMAN INSULIN SCH: 100 INJECTION, SOLUTION SUBCUTANEOUS at 21:34

## 2017-06-02 RX ADMIN — METHYLPREDNISOLONE SODIUM SUCCINATE SCH MG: 40 INJECTION, POWDER, FOR SOLUTION INTRAMUSCULAR; INTRAVENOUS at 16:30

## 2017-06-02 RX ADMIN — HUMAN INSULIN SCH UNIT: 100 INJECTION, SOLUTION SUBCUTANEOUS at 08:41

## 2017-06-02 RX ADMIN — INSULIN GLARGINE SCH U: 100 INJECTION, SOLUTION SUBCUTANEOUS at 10:23

## 2017-06-02 RX ADMIN — ENOXAPARIN SODIUM SCH MG: 40 INJECTION SUBCUTANEOUS at 10:20

## 2017-06-02 RX ADMIN — METHYLPREDNISOLONE SODIUM SUCCINATE SCH MG: 40 INJECTION, POWDER, FOR SOLUTION INTRAMUSCULAR; INTRAVENOUS at 08:44

## 2017-06-02 RX ADMIN — PANTOPRAZOLE SODIUM SCH MG: 40 TABLET, DELAYED RELEASE ORAL at 10:20

## 2017-06-02 RX ADMIN — HUMAN INSULIN SCH UNIT: 100 INJECTION, SOLUTION SUBCUTANEOUS at 17:48

## 2017-06-02 RX ADMIN — TIOTROPIUM BROMIDE SCH MCG: 18 CAPSULE ORAL; RESPIRATORY (INHALATION) at 10:31

## 2017-06-02 RX ADMIN — SODIUM CHLORIDE SCH MLS/HR: 9 INJECTION, SOLUTION INTRAVENOUS at 00:00

## 2017-06-02 RX ADMIN — SODIUM CHLORIDE SCH MLS/HR: 9 INJECTION, SOLUTION INTRAVENOUS at 11:47

## 2017-06-02 RX ADMIN — HUMAN INSULIN SCH UNIT: 100 INJECTION, SOLUTION SUBCUTANEOUS at 12:07

## 2017-06-02 RX ADMIN — SODIUM CHLORIDE SCH MLS/HR: 9 INJECTION, SOLUTION INTRAVENOUS at 06:38

## 2017-06-02 NOTE — CP.PCM.PN
Subjective





- Date & Time of Evaluation


Date of Evaluation: 06/02/17


Time of Evaluation: 08:00





- Subjective


Subjective: 





Still complaining of cough and shortness of breath


Being treated for ESBL in the sputum


consider afb smears if not done


may need CT chest / FOB


for PICC 





Objective





- Vital Signs/Intake and Output


Vital Signs (last 24 hours): 


 











Temp Pulse Resp BP Pulse Ox


 


 98.1 F   85   21   128/72   95 


 


 06/02/17 15:00  06/02/17 15:00  06/02/17 15:00  06/02/17 15:00  06/02/17 15:00








Intake and Output: 


 











 06/02/17 06/02/17





 06:59 18:59


 


Intake Total 1340 930


 


Balance 1340 930














- Medications


Medications: 


 Current Medications





Acetaminophen (Tylenol 325mg Tab)  650 mg PO Q6 PRN


   PRN Reason: Pain, Mild (1-3)


Amlodipine Besylate (Norvasc)  10 mg PO DAILY [facility]


   Last Admin: 06/02/17 10:20 Dose:  10 mg


Benzonatate (Tessalon Perles)  100 mg PO TID [facility]


   Last Admin: 06/02/17 14:24 Dose:  100 mg


Enoxaparin Sodium (Lovenox)  40 mg SC DAILY [facility]


   Last Admin: 06/02/17 10:20 Dose:  40 mg


Ferrous Sulfate (Feosol)  325 mg PO DAILY [facility]


   Last Admin: 06/02/17 10:20 Dose:  325 mg


Finasteride (Proscar)  5 mg PO DAILY [facility]


   Last Admin: 06/02/17 10:21 Dose:  5 mg


Vancomycin HCl 1 gm/ Sodium (Chloride)  250 mls @ 166.7 mls/hr IVPB Q24H [facility]


   Last Admin: 06/02/17 15:38 Dose:  166.7 mls/hr


Imipenem/Cilastatin Sodium 500 (mg/ Sodium Chloride)  100 mls @ 100 mls/hr IVPB 

Q8 [facility]


   Last Admin: 06/02/17 14:25 Dose:  100 mls/hr


Insulin Glargine (Lantus)  10 unit SC DAILY [facility]


   Last Admin: 06/02/17 10:23 Dose:  10 u


Insulin Human Regular (Novolin R)  0 unit SC ACHS [facility]


   PRN Reason: Protocol


   Last Admin: 06/02/17 12:07 Dose:  6 unit


Losartan Potassium (Cozaar)  100 mg PO DAILY [facility]


   Last Admin: 06/02/17 10:20 Dose:  100 mg


Methylprednisolone (Solu-Medrol)  20 mg IVP Q8H [facility]


   Last Admin: 06/02/17 08:44 Dose:  20 mg


Montelukast Sodium (Singulair)  10 mg PO HS [facility]


   Last Admin: 06/01/17 21:46 Dose:  10 mg


Pantoprazole Sodium (Protonix Ec Tab)  40 mg PO DAILY [facility]


   Last Admin: 06/02/17 10:20 Dose:  40 mg


Tamsulosin HCl (Flomax)  0.4 mg PO BID [facility]


   Last Admin: 06/02/17 10:20 Dose:  0.4 mg


Tiotropium Bromide (Spiriva)  18 mcg INH RQ24 [facility]


   Last Admin: 06/02/17 10:31 Dose:  18 mcg











- Labs


Labs: 


 





 06/02/17 07:23 





 06/02/17 07:23 











- Constitutional


Appears: Non-toxic, Chronically Ill





- Head Exam


Head Exam: NORMOCEPHALIC





- Eye Exam


Eye Exam: PERRL.  absent: Scleral icterus





- ENT Exam


ENT Exam: Mucous Membranes Dry





- Neck Exam


Neck Exam: absent: Lymphadenopathy





- Respiratory Exam


Respiratory Exam: Decreased Breath Sounds, Rhonchi





- Cardiovascular Exam


Cardiovascular Exam: Tachycardia, REGULAR RHYTHM, +S1, +S2





- GI/Abdominal Exam


GI & Abdominal Exam: Distended, Soft





Assessment and Plan


(1) BPH (benign prostatic hyperplasia)


Status: Acute   





(2) Diabetes


Status: Acute   





(3) HTN (hypertension)


Status: Acute   





(4) Pneumonia


Status: Acute   





(5) Asthma attack


Status: Acute   





(6) Asthma exacerbation


Status: Acute   





(7) COPD exacerbation


Status: Acute   





(8) ESBL (extended spectrum beta-lactamase) producing bacteria infection


Status: Acute   





(9) ESBL (extended spectrum beta-lactamase) producing bacteria infection


Status: Acute

## 2017-06-02 NOTE — CP.PCM.PN
Subjective





- Date & Time of Evaluation


Date of Evaluation: 06/02/17


Time of Evaluation: 12:35





- Subjective


Subjective: 





patient seen and examined.


Still complaining of cough and shortness of breath


Being treated for ESBL in the sputum





Objective





- Vital Signs/Intake and Output


Vital Signs (last 24 hours): 


 











Temp Pulse Resp BP Pulse Ox


 


 98.1 F   85   21   128/72   95 


 


 06/02/17 15:00  06/02/17 15:00  06/02/17 15:00  06/02/17 15:00  06/02/17 15:00








Intake and Output: 


 











 06/02/17 06/02/17





 06:59 18:59


 


Intake Total 1340 930


 


Balance 1340 930














- Medications


Medications: 


 Current Medications





Acetaminophen (Tylenol 325mg Tab)  650 mg PO Q6 PRN


   PRN Reason: Pain, Mild (1-3)


Amlodipine Besylate (Norvasc)  10 mg PO DAILY [facility]


   Last Admin: 06/02/17 10:20 Dose:  10 mg


Benzonatate (Tessalon Perles)  100 mg PO TID [facility]


   Last Admin: 06/02/17 14:24 Dose:  100 mg


Enoxaparin Sodium (Lovenox)  40 mg SC DAILY [facility]


   Last Admin: 06/02/17 10:20 Dose:  40 mg


Ferrous Sulfate (Feosol)  325 mg PO DAILY [facility]


   Last Admin: 06/02/17 10:20 Dose:  325 mg


Finasteride (Proscar)  5 mg PO DAILY [facility]


   Last Admin: 06/02/17 10:21 Dose:  5 mg


Vancomycin HCl 1 gm/ Sodium (Chloride)  250 mls @ 166.7 mls/hr IVPB Q24H [facility]


   Last Admin: 06/02/17 15:38 Dose:  166.7 mls/hr


Piperacillin Sod/Tazobactam (Sod 3.375 gm/ Sodium Chloride)  100 mls @ 200 mls/

hr IVPB Q6 [facility]


   Last Admin: 06/02/17 11:47 Dose:  200 mls/hr


Imipenem/Cilastatin Sodium 500 (mg/ Sodium Chloride)  100 mls @ 100 mls/hr IVPB 

Q8 [facility]


   Last Admin: 06/02/17 14:25 Dose:  100 mls/hr


Insulin Glargine (Lantus)  10 unit SC DAILY [facility]


   Last Admin: 06/02/17 10:23 Dose:  10 u


Insulin Human Regular (Novolin R)  0 unit SC ACHS [facility]


   PRN Reason: Protocol


   Last Admin: 06/02/17 12:07 Dose:  6 unit


Losartan Potassium (Cozaar)  100 mg PO DAILY [facility]


   Last Admin: 06/02/17 10:20 Dose:  100 mg


Methylprednisolone (Solu-Medrol)  20 mg IVP Q8H [facility]


   Last Admin: 06/02/17 08:44 Dose:  20 mg


Montelukast Sodium (Singulair)  10 mg PO HS [facility]


   Last Admin: 06/01/17 21:46 Dose:  10 mg


Pantoprazole Sodium (Protonix Ec Tab)  40 mg PO DAILY [facility]


   Last Admin: 06/02/17 10:20 Dose:  40 mg


Tamsulosin HCl (Flomax)  0.4 mg PO BID [facility]


   Last Admin: 06/02/17 10:20 Dose:  0.4 mg


Tiotropium Bromide (Spiriva)  18 mcg INH RQ24 [facility]


   Last Admin: 06/02/17 10:31 Dose:  18 mcg











- Labs


Labs: 


 





 06/02/17 07:23 





 06/02/17 07:23 











- Head Exam


Head Exam: ATRAUMATIC, NORMOCEPHALIC





- Eye Exam


Eye Exam: EOMI





- ENT Exam


ENT Exam: Mucous Membranes Moist





- Neck Exam


Neck Exam: Normal Inspection





- Respiratory Exam


Respiratory Exam: Rales, Rhonchi, Wheezes





- Cardiovascular Exam


Cardiovascular Exam: REGULAR RHYTHM





- GI/Abdominal Exam


GI & Abdominal Exam: Soft, Normal Bowel Sounds





Assessment and Plan


(1) Pneumonia


Assessment & Plan: 


started on Primaxin for ESBL  in the sputum


Continue nebulizer treatment


Consider PICC line


Status: Acute   





(2) COPD exacerbation


Status: Acute   





(3) Diabetes


Status: Acute

## 2017-06-02 NOTE — CP.PCM.PN
<Eliel Wei - Last Filed: 06/02/17 11:37>





Subjective





- Date & Time of Evaluation


Date of Evaluation: 06/02/17


Time of Evaluation: 11:30





- Subjective


Subjective: 











Med progress note.








Attending: Dr. Menezes








Pt seen and examined at bedside. No acute distress. No events overnight. ESBL 

in sputum, added primaxin. Will f/u with Dr. Gonsalez. Denies fevers, chills, 

vomiting, diarrhea. Reports some congestion.





Objective





- Vital Signs/Intake and Output


Vital Signs (last 24 hours): 


 











Temp Pulse Resp BP Pulse Ox


 


 98.3 F   90   20   158/81 H  96 


 


 06/02/17 07:45  06/02/17 07:45  06/02/17 07:45  06/02/17 07:45  06/02/17 07:45








Intake and Output: 


 











 06/02/17 06/02/17





 06:59 18:59


 


Intake Total 1340 


 


Balance 1340 














- Medications


Medications: 


 Current Medications





Acetaminophen (Tylenol 325mg Tab)  650 mg PO Q6 PRN


   PRN Reason: Pain, Mild (1-3)


Amlodipine Besylate (Norvasc)  10 mg PO DAILY [facility]


   Last Admin: 06/02/17 10:20 Dose:  10 mg


Benzonatate (Tessalon Perles)  100 mg PO TID [facility]


   Last Admin: 06/02/17 10:21 Dose:  100 mg


Enoxaparin Sodium (Lovenox)  40 mg SC DAILY [facility]


   Last Admin: 06/02/17 10:20 Dose:  40 mg


Ferrous Sulfate (Feosol)  325 mg PO DAILY [facility]


   Last Admin: 06/02/17 10:20 Dose:  325 mg


Finasteride (Proscar)  5 mg PO DAILY [facility]


   Last Admin: 06/02/17 10:21 Dose:  5 mg


Vancomycin HCl 1 gm/ Sodium (Chloride)  250 mls @ 166.7 mls/hr IVPB Q24H [facility]


   Last Admin: 06/01/17 13:39 Dose:  166.7 mls/hr


Piperacillin Sod/Tazobactam (Sod 3.375 gm/ Sodium Chloride)  100 mls @ 200 mls/

hr IVPB Q6 [facility]


   Last Admin: 06/02/17 06:38 Dose:  200 mls/hr


Imipenem/Cilastatin Sodium 500 (mg/ Sodium Chloride)  100 mls @ 100 mls/hr IVPB 

Q8 [facility]


   Last Admin: 06/02/17 05:23 Dose:  100 mls/hr


Insulin Glargine (Lantus)  10 unit SC DAILY [facility]


   Last Admin: 06/02/17 10:23 Dose:  10 u


Insulin Human Regular (Novolin R)  0 unit SC ACHS [facility]


   PRN Reason: Protocol


   Last Admin: 06/02/17 08:41 Dose:  6 unit


Losartan Potassium (Cozaar)  100 mg PO DAILY [facility]


   Last Admin: 06/02/17 10:20 Dose:  100 mg


Methylprednisolone (Solu-Medrol)  20 mg IVP Q8H [facility]


   Last Admin: 06/02/17 08:44 Dose:  20 mg


Montelukast Sodium (Singulair)  10 mg PO HS [facility]


   Last Admin: 06/01/17 21:46 Dose:  10 mg


Pantoprazole Sodium (Protonix Ec Tab)  40 mg PO DAILY [facility]


   Last Admin: 06/02/17 10:20 Dose:  40 mg


Tamsulosin HCl (Flomax)  0.4 mg PO BID [facility]


   Last Admin: 06/02/17 10:20 Dose:  0.4 mg


Tiotropium Bromide (Spiriva)  18 mcg INH RQ24 [facility]


   Last Admin: 06/02/17 10:31 Dose:  18 mcg











- Labs


Labs: 


 





 06/02/17 07:23 





 06/02/17 07:23 











- Constitutional


Appears: Non-toxic, No Acute Distress





- Head Exam


Head Exam: ATRAUMATIC, NORMAL INSPECTION, NORMOCEPHALIC





- Eye Exam


Eye Exam: EOMI





- ENT Exam


ENT Exam: Mucous Membranes Moist





- Neck Exam


Neck Exam: Full ROM, Normal Inspection





- Respiratory Exam


Respiratory Exam: Rhonchi.  absent: Respiratory Distress





- Cardiovascular Exam


Cardiovascular Exam: +S1, +S2





- GI/Abdominal Exam


GI & Abdominal Exam: Soft, Normal Bowel Sounds.  absent: Tenderness





- Extremities Exam


Extremities Exam: Full ROM, Normal Inspection





- Neurological Exam


Neurological Exam: Alert, Awake, Oriented x3





- Psychiatric Exam


Psychiatric exam: Normal Affect, Normal Mood





- Skin


Skin Exam: Dry, Intact, Normal Color, Warm





Assessment and Plan





- Assessment and Plan (Free Text)


Assessment: 











This is a 76 yo male with past medical hx of asthma/copd, htn, dm, gerd 

presenting with 


(1) Pneumonia


Assessment and Plan: 


Leukocytosis likely due to steroids, Afebrile 


CXR 5/27/17 shows right lower lobe infiltrate, unchanged compared to prior 

studies.


Repeat CXR shows biapical pleural thickening, left upper lobe opacity, 

consistent with pna (please see full report)


PET/CT scan done 5/24/17: consolidation on b/l upper lobe as well as RLL and 

RML. Low probability of neoplasm. 


Vanco IVPB 1 g daily DAY 7


Zosyn IVPB q 6 hrs DAY 7


Primaxin Day 1


Sputum positive for E coli ESBL


Tessalon Pearls TID 


Blood culture negative x 5days


f/u sputum cultures>>> pos E coli


f/u legionella and mycoplasma>>> both negative.


ID consult placed- Dr. Freed-help appreciated. 


Pulm consult placed-Dr. Gonsalez- help appreciated. 


  





(2) COPD exacerbation


Assessment and Plan: 


Vanco IVPB as above


Zosyn IVPB as above


Primaxin IVPB as above


Solumedrol 20 mg IVP Q8H


Singulair 10 mg PO HS


Pulm consult placed, Dr. Gonsalez, help appreciated. 


continue spiriva daily


continue mucomyst by nebulizer >> will discontinue 








(3) Diabetes


Assessment and Plan: 


Accuchecks ACHS


ISS


Consistent Carb Diet


 





(4) BPH (benign prostatic hyperplasia)


Assessment and Plan: 


Proscar 5 mg PO daily 


Flomax 0.4 mg PO BID








(5) HTN (hypertension)


Assessment and Plan: 


Norvasc 10 mg PO daily


Cozaar 100 mg PO daily 


   





(6) Prophylactic measure


Assessment and Plan: 


Protonix 40 mg PO daily 


Lovenox 40 mg SC daily


will add SCDs


Lower ext dopplers negative








discussed with Dr. Menezes 





<Mihir Menezes - Last Filed: 06/03/17 12:31>





Objective





- Vital Signs/Intake and Output


Vital Signs (last 24 hours): 


 











Temp Pulse Resp BP Pulse Ox


 


 98.3 F   81   20   150/88   98 


 


 06/03/17 00:00  06/03/17 00:00  06/03/17 00:00  06/03/17 00:00  06/03/17 00:00








Intake and Output: 


 











 06/03/17 06/03/17





 06:59 18:59


 


Intake Total  340


 


Balance  340














- Medications


Medications: 


 Current Medications





Acetaminophen (Tylenol 325mg Tab)  650 mg PO Q6 PRN


   PRN Reason: Pain, Mild (1-3)


Amlodipine Besylate (Norvasc)  10 mg PO DAILY [facility]


   Last Admin: 06/03/17 10:56 Dose:  10 mg


Benzonatate (Tessalon Perles)  100 mg PO TID [facility]


   Last Admin: 06/03/17 10:56 Dose:  100 mg


Enoxaparin Sodium (Lovenox)  40 mg SC DAILY [facility]


   Last Admin: 06/03/17 11:55 Dose:  40 mg


Ferrous Sulfate (Feosol)  325 mg PO DAILY [facility]


   Last Admin: 06/03/17 10:56 Dose:  325 mg


Finasteride (Proscar)  5 mg PO DAILY [facility]


   Last Admin: 06/03/17 10:56 Dose:  5 mg


Vancomycin HCl 1 gm/ Sodium (Chloride)  250 mls @ 166.7 mls/hr IVPB Q24H [facility]


   Last Admin: 06/02/17 15:38 Dose:  166.7 mls/hr


Imipenem/Cilastatin Sodium 500 (mg/ Sodium Chloride)  100 mls @ 100 mls/hr IVPB 

Q8 [facility]


   Last Admin: 06/03/17 05:42 Dose:  100 mls/hr


Insulin Glargine (Lantus)  10 unit SC DAILY [facility]


   Last Admin: 06/03/17 10:57 Dose:  10 u


Insulin Human Regular (Novolin R)  0 unit SC ACHS [facility]


   PRN Reason: Protocol


   Last Admin: 06/03/17 08:30 Dose:  3 unit


Losartan Potassium (Cozaar)  100 mg PO DAILY [facility]


   Last Admin: 06/03/17 10:57 Dose:  100 mg


Methylprednisolone (Solu-Medrol)  20 mg IVP Q8H [facility]


   Last Admin: 06/03/17 08:53 Dose:  20 mg


Montelukast Sodium (Singulair)  10 mg PO HS [facility]


   Last Admin: 06/02/17 21:34 Dose:  10 mg


Pantoprazole Sodium (Protonix Ec Tab)  40 mg PO DAILY [facility]


   Last Admin: 06/03/17 10:56 Dose:  40 mg


Tamsulosin HCl (Flomax)  0.4 mg PO BID [facility]


   Last Admin: 06/03/17 10:56 Dose:  0.4 mg


Tiotropium Bromide (Spiriva)  18 mcg INH RQ24 [facility]


   Last Admin: 06/03/17 08:20 Dose:  18 mcg











- Labs


Labs: 


 





 06/03/17 07:48 





 06/03/17 07:48 











Attending/Attestation





- Attestation


I have personally seen and examined this patient.: Yes


I have fully participated in the care of the patient.: Yes


I have reviewed all pertinent clinical information, including history, physical 

exam and plan: Yes


Notes (Text): 





06/03/17 12:23


Medical attending: Patient was seen and examined by me. Agree with the above 

note by the resident.


He is now changed over to IV primaxin after the sputum culture returned and 

showed there was ESBL ecoli


He continues to ambulate as much as possible.


Continued on IV solumedrol

## 2017-06-03 VITALS — RESPIRATION RATE: 20 BRPM

## 2017-06-03 LAB
ALBUMIN/GLOB SERPL: 1.4 {RATIO} (ref 1–2.1)
ALP SERPL-CCNC: 48 U/L (ref 38–126)
ALT SERPL-CCNC: 82 U/L (ref 21–72)
AST SERPL-CCNC: 22 U/L (ref 17–59)
BASOPHILS # BLD AUTO: 0.1 K/UL (ref 0–0.2)
BASOPHILS NFR BLD: 0.6 % (ref 0–2)
BILIRUB SERPL-MCNC: 0.7 MG/DL (ref 0.2–1.3)
BUN SERPL-MCNC: 31 MG/DL (ref 9–20)
CALCIUM SERPL-MCNC: 7.9 MG/DL (ref 8.6–10.4)
CHLORIDE SERPL-SCNC: 96 MMOL/L (ref 98–107)
CO2 SERPL-SCNC: 28 MMOL/L (ref 22–30)
EOSINOPHIL # BLD AUTO: 0 K/UL (ref 0–0.7)
EOSINOPHIL NFR BLD: 0 % (ref 0–4)
ERYTHROCYTE [DISTWIDTH] IN BLOOD BY AUTOMATED COUNT: 20.4 % (ref 11.5–14.5)
GLOBULIN SER-MCNC: 2.4 GM/DL (ref 2.2–3.9)
GLUCOSE SERPL-MCNC: 245 MG/DL (ref 75–110)
HCT VFR BLD CALC: 42.8 % (ref 35–51)
LYMPHOCYTES # BLD AUTO: 0.3 K/UL (ref 1–4.3)
LYMPHOCYTES NFR BLD AUTO: 2.7 % (ref 20–40)
MAGNESIUM SERPL-MCNC: 2.5 MG/DL (ref 1.6–2.3)
MCH RBC QN AUTO: 24.5 PG (ref 27–31)
MCHC RBC AUTO-ENTMCNC: 32.3 G/DL (ref 33–37)
MCV RBC AUTO: 75.8 FL (ref 80–94)
METAMYELOCYTES NFR BLD: 2 % (ref 0–0)
MONOCYTES # BLD: 0.4 K/UL (ref 0–0.8)
MONOCYTES NFR BLD: 3.5 % (ref 0–10)
NEUTROPHILS NFR BLD AUTO: 88 % (ref 50–75)
NRBC BLD AUTO-RTO: 0 % (ref 0–2)
PHOSPHATE SERPL-MCNC: 2.7 MG/DL (ref 2.5–4.5)
PLATELET # BLD: 182 K/UL (ref 130–400)
PMV BLD AUTO: 8.1 FL (ref 7.2–11.7)
POTASSIUM SERPL-SCNC: 4.4 MMOL/L (ref 3.6–5.2)
PROT SERPL-MCNC: 5.7 G/DL (ref 6.3–8.3)
SODIUM SERPL-SCNC: 131 MMOL/L (ref 132–148)
TOTAL CELLS COUNTED BLD: 100
WBC # BLD AUTO: 11.6 K/UL (ref 4.8–10.8)

## 2017-06-03 RX ADMIN — ENOXAPARIN SODIUM SCH MG: 40 INJECTION SUBCUTANEOUS at 11:55

## 2017-06-03 RX ADMIN — HUMAN INSULIN SCH: 100 INJECTION, SOLUTION SUBCUTANEOUS at 21:43

## 2017-06-03 RX ADMIN — HUMAN INSULIN SCH UNIT: 100 INJECTION, SOLUTION SUBCUTANEOUS at 12:58

## 2017-06-03 RX ADMIN — METHYLPREDNISOLONE SODIUM SUCCINATE SCH MG: 40 INJECTION, POWDER, FOR SOLUTION INTRAMUSCULAR; INTRAVENOUS at 21:39

## 2017-06-03 RX ADMIN — METHYLPREDNISOLONE SODIUM SUCCINATE SCH MG: 40 INJECTION, POWDER, FOR SOLUTION INTRAMUSCULAR; INTRAVENOUS at 00:24

## 2017-06-03 RX ADMIN — METHYLPREDNISOLONE SODIUM SUCCINATE SCH MG: 40 INJECTION, POWDER, FOR SOLUTION INTRAMUSCULAR; INTRAVENOUS at 08:53

## 2017-06-03 RX ADMIN — HUMAN INSULIN SCH UNIT: 100 INJECTION, SOLUTION SUBCUTANEOUS at 08:30

## 2017-06-03 RX ADMIN — PANTOPRAZOLE SODIUM SCH MG: 40 TABLET, DELAYED RELEASE ORAL at 10:56

## 2017-06-03 RX ADMIN — HUMAN INSULIN SCH UNIT: 100 INJECTION, SOLUTION SUBCUTANEOUS at 16:30

## 2017-06-03 RX ADMIN — TIOTROPIUM BROMIDE SCH MCG: 18 CAPSULE ORAL; RESPIRATORY (INHALATION) at 08:20

## 2017-06-03 RX ADMIN — INSULIN GLARGINE SCH U: 100 INJECTION, SOLUTION SUBCUTANEOUS at 10:57

## 2017-06-03 NOTE — CP.PCM.PN
Subjective





- Date & Time of Evaluation


Date of Evaluation: 06/03/17


Time of Evaluation: 16:00





- Subjective


Subjective: 





patient seen and examined.


Cough, shortness of breath is improving


Denies fever chills, denies chest pain








Objective





- Vital Signs/Intake and Output


Vital Signs (last 24 hours): 


 











Temp Pulse Resp BP Pulse Ox


 


 97.6 F   84   20   133/81   96 


 


 06/03/17 15:00  06/03/17 15:00  06/03/17 15:00  06/03/17 15:00  06/03/17 15:00








Intake and Output: 


 











 06/03/17 06/03/17





 06:59 18:59


 


Intake Total  1170


 


Balance  1170














- Medications


Medications: 


 Current Medications





Acetaminophen (Tylenol 325mg Tab)  650 mg PO Q6 PRN


   PRN Reason: Pain, Mild (1-3)


Amlodipine Besylate (Norvasc)  10 mg PO DAILY [facility]


   Last Admin: 06/03/17 10:56 Dose:  10 mg


Benzonatate (Tessalon Perles)  100 mg PO TID [facility]


   Last Admin: 06/03/17 17:29 Dose:  100 mg


Enoxaparin Sodium (Lovenox)  40 mg SC DAILY [facility]


   Last Admin: 06/03/17 11:55 Dose:  40 mg


Ferrous Sulfate (Feosol)  325 mg PO DAILY [facility]


   Last Admin: 06/03/17 10:56 Dose:  325 mg


Finasteride (Proscar)  5 mg PO DAILY [facility]


   Last Admin: 06/03/17 10:56 Dose:  5 mg


Imipenem/Cilastatin Sodium 500 (mg/ Sodium Chloride)  100 mls @ 100 mls/hr IVPB 

Q8 [facility]


   Last Admin: 06/03/17 14:27 Dose:  100 mls/hr


Insulin Glargine (Lantus)  10 unit SC DAILY [facility]


   Last Admin: 06/03/17 10:57 Dose:  10 u


Insulin Human Regular (Novolin R)  0 unit SC ACHS [facility]


   PRN Reason: Protocol


   Last Admin: 06/03/17 16:30 Dose:  4 unit


Losartan Potassium (Cozaar)  100 mg PO DAILY [facility]


   Last Admin: 06/03/17 10:57 Dose:  100 mg


Methylprednisolone (Solu-Medrol)  20 mg IVP Q12 [facility]


Montelukast Sodium (Singulair)  10 mg PO HS [facility]


   Last Admin: 06/02/17 21:34 Dose:  10 mg


Pantoprazole Sodium (Protonix Ec Tab)  40 mg PO DAILY [facility]


   Last Admin: 06/03/17 10:56 Dose:  40 mg


Tamsulosin HCl (Flomax)  0.4 mg PO BID [facility]


   Last Admin: 06/03/17 17:29 Dose:  0.4 mg


Tiotropium Bromide (Spiriva)  18 mcg INH RQ24 [facility]


   Last Admin: 06/03/17 08:20 Dose:  18 mcg











- Labs


Labs: 


 





 06/03/17 07:48 





 06/03/17 07:48 











- Head Exam


Head Exam: ATRAUMATIC, NORMOCEPHALIC





- Eye Exam


Eye Exam: Normal appearance





- ENT Exam


ENT Exam: Mucous Membranes Moist





- Neck Exam


Neck Exam: Full ROM, Normal Inspection





- Respiratory Exam


Respiratory Exam: Rhonchi





- Cardiovascular Exam


Cardiovascular Exam: REGULAR RHYTHM





- GI/Abdominal Exam


GI & Abdominal Exam: Soft, Normal Bowel Sounds





Assessment and Plan


(1) Pneumonia


Assessment & Plan: 


Continue antibiotics for ESBL


Continue nebulizer treatment and taper  steroids


Status: Acute   





(2) COPD exacerbation


Status: Acute   





(3) Diabetes


Status: Acute

## 2017-06-03 NOTE — CP.PCM.PN
Subjective





- Date & Time of Evaluation


Date of Evaluation: 06/03/17


Time of Evaluation: 01:40





- Subjective


Subjective: 











Pt seen and examined at bedside. No acute distress.  Denies fevers, chills, 

chest pain, SOB, abd pain, vomiting, diarrhea, numbness or tingling in the 

extremities. No acute complaints. 








Objective





- Vital Signs/Intake and Output


Vital Signs (last 24 hours): 


 











Temp Pulse Resp BP Pulse Ox


 


 98.3 F   81   20   150/88   98 


 


 06/03/17 00:00  06/03/17 00:00  06/03/17 00:00  06/03/17 00:00  06/03/17 00:00








Intake and Output: 


 











 06/02/17 06/03/17





 18:59 06:59


 


Intake Total 930 


 


Balance 930 














- Medications


Medications: 


 Current Medications





Acetaminophen (Tylenol 325mg Tab)  650 mg PO Q6 PRN


   PRN Reason: Pain, Mild (1-3)


Amlodipine Besylate (Norvasc)  10 mg PO DAILY [facility]


   Last Admin: 06/02/17 10:20 Dose:  10 mg


Benzonatate (Tessalon Perles)  100 mg PO TID [facility]


   Last Admin: 06/02/17 18:13 Dose:  100 mg


Enoxaparin Sodium (Lovenox)  40 mg SC DAILY [facility]


   Last Admin: 06/02/17 10:20 Dose:  40 mg


Ferrous Sulfate (Feosol)  325 mg PO DAILY [facility]


   Last Admin: 06/02/17 10:20 Dose:  325 mg


Finasteride (Proscar)  5 mg PO DAILY [facility]


   Last Admin: 06/02/17 10:21 Dose:  5 mg


Vancomycin HCl 1 gm/ Sodium (Chloride)  250 mls @ 166.7 mls/hr IVPB Q24H [facility]


   Last Admin: 06/02/17 15:38 Dose:  166.7 mls/hr


Imipenem/Cilastatin Sodium 500 (mg/ Sodium Chloride)  100 mls @ 100 mls/hr IVPB 

Q8 [facility]


   Last Admin: 06/02/17 21:33 Dose:  100 mls/hr


Insulin Glargine (Lantus)  10 unit SC DAILY [facility]


   Last Admin: 06/02/17 10:23 Dose:  10 u


Insulin Human Regular (Novolin R)  0 unit SC ACHS [facility]


   PRN Reason: Protocol


   Last Admin: 06/02/17 21:34 Dose:  Not Given


Losartan Potassium (Cozaar)  100 mg PO DAILY [facility]


   Last Admin: 06/02/17 10:20 Dose:  100 mg


Methylprednisolone (Solu-Medrol)  20 mg IVP Q8H [facility]


   Last Admin: 06/03/17 00:24 Dose:  20 mg


Montelukast Sodium (Singulair)  10 mg PO HS [facility]


   Last Admin: 06/02/17 21:34 Dose:  10 mg


Pantoprazole Sodium (Protonix Ec Tab)  40 mg PO DAILY [facility]


   Last Admin: 06/02/17 10:20 Dose:  40 mg


Tamsulosin HCl (Flomax)  0.4 mg PO BID [facility]


   Last Admin: 06/02/17 18:12 Dose:  0.4 mg


Tiotropium Bromide (Spiriva)  18 mcg INH RQ24 [facility]


   Last Admin: 06/02/17 10:31 Dose:  18 mcg











- Labs


Labs: 


 





 06/02/17 07:23 





 06/02/17 07:23 











- Constitutional


Appears: Non-toxic, No Acute Distress





- Head Exam


Head Exam: NORMAL INSPECTION





- Eye Exam


Eye Exam: Normal appearance, PERRL





- ENT Exam


ENT Exam: Mucous Membranes Moist





- Respiratory Exam


Respiratory Exam: Clear to Ausculation Bilateral, Rales, NORMAL BREATHING 

PATTERN.  absent: Respiratory Distress





- Cardiovascular Exam


Cardiovascular Exam: REGULAR RHYTHM, +S1, +S2





- GI/Abdominal Exam


GI & Abdominal Exam: Soft, Normal Bowel Sounds.  absent: Distended, Firm, 

Guarding, Tenderness





- Extremities Exam


Extremities Exam: Normal Inspection





- Back Exam


Back Exam: NORMAL INSPECTION.  absent: CVA tenderness (L), CVA tenderness (R), 

paraspinal tenderness





- Neurological Exam


Neurological Exam: Alert, Awake, Oriented x3





- Psychiatric Exam


Psychiatric exam: Normal Affect, Normal Mood





- Skin


Skin Exam: Dry, Intact, Normal Color, Warm





Assessment and Plan





- Assessment and Plan (Free Text)


Assessment: 





This is a 78 yo male with past medical hx of asthma/copd, htn, dm, gerd 

presenting with 








(1) Pneumonia


Assessment and Plan: 


Leukocytosis likely due to steroids, Afebrile 


CXR 5/27/17 shows right lower lobe infiltrate, unchanged compared to prior 

studies.


Repeat CXR shows biapical pleural thickening, left upper lobe opacity, 

consistent with pna (please see full report)


PET/CT scan done 5/24/17: consolidation on b/l upper lobe as well as RLL and 

RML. Low probability of neoplasm. 


Vanco IVPB 1 g daily DAY 8


Zosyn IVPB q 6 hrs DAY 8 - discontinue


Primaxin Day 3


Sputum positive for E coli ESBL


Tessalon Pearls TID 


Blood culture negative x 5 days


sputum cultures>>> pos E coli


legionella and mycoplasma>>> both negative.


ID consult placed- Dr. Freed-help appreciated. 


Pulm consult placed-Dr. Gonsalez- help appreciated. 


  





(2) COPD exacerbation


Assessment and Plan: 


Solumedrol 20 mg IVP Q8H


Singulair 10 mg PO HS


Spirive 18 mcg INH daily


Pulm consult placed, Dr. Gonsalez, help appreciated. 


continue spiriva daily





(3) Diabetes


Assessment and Plan: 


Accuchecks ACHS


ISS


Consistent Carb Diet


Lantus 10 U SC daily


 


(4) BPH (benign prostatic hyperplasia)


Assessment and Plan: 


Proscar 5 mg PO daily 


Flomax 0.4 mg PO BID





(5) HTN (hypertension)


Assessment and Plan: 


Norvasc 10 mg PO daily


Cozaar 100 mg PO daily 


   





(6) Prophylactic measure


Assessment and Plan: 


Protonix 40 mg PO daily 


Lovenox 40 mg SC daily


SCDs - Lower ext dopplers negative

## 2017-06-04 LAB
ALBUMIN/GLOB SERPL: 1.3 {RATIO} (ref 1–2.1)
ALP SERPL-CCNC: 46 U/L (ref 38–126)
ALT SERPL-CCNC: 69 U/L (ref 21–72)
AST SERPL-CCNC: 14 U/L (ref 17–59)
BASOPHILS # BLD AUTO: 0.1 K/UL (ref 0–0.2)
BASOPHILS NFR BLD: 1 % (ref 0–2)
BILIRUB SERPL-MCNC: 0.6 MG/DL (ref 0.2–1.3)
BUN SERPL-MCNC: 30 MG/DL (ref 9–20)
CALCIUM SERPL-MCNC: 7.8 MG/DL (ref 8.6–10.4)
CHLORIDE SERPL-SCNC: 96 MMOL/L (ref 98–107)
CO2 SERPL-SCNC: 30 MMOL/L (ref 22–30)
EOSINOPHIL # BLD AUTO: 0 K/UL (ref 0–0.7)
EOSINOPHIL NFR BLD: 0 % (ref 0–4)
ERYTHROCYTE [DISTWIDTH] IN BLOOD BY AUTOMATED COUNT: 20.4 % (ref 11.5–14.5)
GLOBULIN SER-MCNC: 2.4 GM/DL (ref 2.2–3.9)
GLUCOSE SERPL-MCNC: 283 MG/DL (ref 75–110)
HCT VFR BLD CALC: 43 % (ref 35–51)
LG PLATELETS BLD QL SMEAR: PRESENT
LYMPHOCYTES # BLD AUTO: 0.3 K/UL (ref 1–4.3)
LYMPHOCYTES NFR BLD AUTO: 2.8 % (ref 20–40)
MCH RBC QN AUTO: 25.1 PG (ref 27–31)
MCHC RBC AUTO-ENTMCNC: 33.3 G/DL (ref 33–37)
MCV RBC AUTO: 75.3 FL (ref 80–94)
MONOCYTES # BLD: 0.8 K/UL (ref 0–0.8)
MONOCYTES NFR BLD: 7.8 % (ref 0–10)
NEUTROPHILS NFR BLD AUTO: 85 % (ref 50–75)
NRBC BLD AUTO-RTO: 0.1 % (ref 0–2)
PLATELET # BLD: 183 K/UL (ref 130–400)
PMV BLD AUTO: 8.2 FL (ref 7.2–11.7)
POTASSIUM SERPL-SCNC: 4.2 MMOL/L (ref 3.6–5.2)
PROT SERPL-MCNC: 5.5 G/DL (ref 6.3–8.3)
SODIUM SERPL-SCNC: 132 MMOL/L (ref 132–148)
TOTAL CELLS COUNTED BLD: 100
WBC # BLD AUTO: 10.1 K/UL (ref 4.8–10.8)

## 2017-06-04 RX ADMIN — HUMAN INSULIN SCH UNIT: 100 INJECTION, SOLUTION SUBCUTANEOUS at 13:20

## 2017-06-04 RX ADMIN — HUMAN INSULIN SCH UNIT: 100 INJECTION, SOLUTION SUBCUTANEOUS at 17:30

## 2017-06-04 RX ADMIN — ENOXAPARIN SODIUM SCH MG: 40 INJECTION SUBCUTANEOUS at 09:38

## 2017-06-04 RX ADMIN — HUMAN INSULIN SCH UNIT: 100 INJECTION, SOLUTION SUBCUTANEOUS at 08:19

## 2017-06-04 RX ADMIN — METHYLPREDNISOLONE SODIUM SUCCINATE SCH MG: 40 INJECTION, POWDER, FOR SOLUTION INTRAMUSCULAR; INTRAVENOUS at 09:37

## 2017-06-04 RX ADMIN — NYSTATIN SCH ML: 100000 SUSPENSION ORAL at 19:00

## 2017-06-04 RX ADMIN — METHYLPREDNISOLONE SODIUM SUCCINATE SCH MG: 40 INJECTION, POWDER, FOR SOLUTION INTRAMUSCULAR; INTRAVENOUS at 22:53

## 2017-06-04 RX ADMIN — PANTOPRAZOLE SODIUM SCH MG: 40 TABLET, DELAYED RELEASE ORAL at 09:38

## 2017-06-04 RX ADMIN — INSULIN GLARGINE SCH U: 100 INJECTION, SOLUTION SUBCUTANEOUS at 09:39

## 2017-06-04 RX ADMIN — HUMAN INSULIN SCH: 100 INJECTION, SOLUTION SUBCUTANEOUS at 22:52

## 2017-06-04 RX ADMIN — NYSTATIN SCH ML: 100000 SUSPENSION ORAL at 22:52

## 2017-06-04 NOTE — CP.PCM.PN
<Kenyatta Proctor - Last Filed: 06/04/17 02:12>





Subjective





- Date & Time of Evaluation


Date of Evaluation: 06/04/17


Time of Evaluation: 01:30





- Subjective


Subjective: 





Pt seen and examined at bedside. No acute distress. Admits to coughing. Was 

getting a breathing treatment during the exams which he said really help with 

his breathing. Denies fevers, chills, chest pain, SOB, abd pain, vomiting, 

diarrhea, numbness or tingling in the extremities. No acute complaints. 








Objective





- Vital Signs/Intake and Output


Vital Signs (last 24 hours): 


 











Temp Pulse Resp BP Pulse Ox


 


 97.9 F   93 H  20   135/74   96 


 


 06/04/17 00:00  06/04/17 00:00  06/04/17 00:00  06/04/17 00:00  06/04/17 00:00








Intake and Output: 


 











 06/03/17 06/04/17





 18:59 06:59


 


Intake Total 1170 


 


Balance 1170 














- Medications


Medications: 


 Current Medications





Acetaminophen (Tylenol 325mg Tab)  650 mg PO Q6 PRN


   PRN Reason: Pain, Mild (1-3)


Amlodipine Besylate (Norvasc)  10 mg PO DAILY [facility]


   Last Admin: 06/03/17 10:56 Dose:  10 mg


Benzonatate (Tessalon Perles)  100 mg PO TID [facility]


   Last Admin: 06/03/17 17:29 Dose:  100 mg


Enoxaparin Sodium (Lovenox)  40 mg SC DAILY [facility]


   Last Admin: 06/03/17 11:55 Dose:  40 mg


Ferrous Sulfate (Feosol)  325 mg PO DAILY [facility]


   Last Admin: 06/03/17 10:56 Dose:  325 mg


Finasteride (Proscar)  5 mg PO DAILY [facility]


   Last Admin: 06/03/17 10:56 Dose:  5 mg


Imipenem/Cilastatin Sodium 500 (mg/ Sodium Chloride)  100 mls @ 100 mls/hr IVPB 

Q8 [facility]


   Last Admin: 06/03/17 21:36 Dose:  100 mls/hr


Insulin Glargine (Lantus)  10 unit SC DAILY [facility]


   Last Admin: 06/03/17 10:57 Dose:  10 u


Insulin Human Regular (Novolin R)  0 unit SC ACHS [facility]


   PRN Reason: Protocol


   Last Admin: 06/03/17 21:43 Dose:  Not Given


Losartan Potassium (Cozaar)  100 mg PO DAILY [facility]


   Last Admin: 06/03/17 10:57 Dose:  100 mg


Methylprednisolone (Solu-Medrol)  20 mg IVP Q12 [facility]


   Last Admin: 06/03/17 21:39 Dose:  20 mg


Montelukast Sodium (Singulair)  10 mg PO HS [facility]


   Last Admin: 06/03/17 21:40 Dose:  10 mg


Pantoprazole Sodium (Protonix Ec Tab)  40 mg PO DAILY [facility]


   Last Admin: 06/03/17 10:56 Dose:  40 mg


Tamsulosin HCl (Flomax)  0.4 mg PO BID [facility]


   Last Admin: 06/03/17 17:29 Dose:  0.4 mg


Tiotropium Bromide (Spiriva)  18 mcg INH RQ24 [facility]


   Last Admin: 06/03/17 08:20 Dose:  18 mcg











- Labs


Labs: 


 





 06/03/17 07:48 





 06/03/17 07:48 











- Constitutional


Appears: Non-toxic, No Acute Distress





- Head Exam


Head Exam: ATRAUMATIC, NORMAL INSPECTION





- Eye Exam


Eye Exam: EOMI





- ENT Exam


ENT Exam: Mucous Membranes Moist





- Respiratory Exam


Respiratory Exam: Decreased Breath Sounds, Clear to Ausculation Bilateral, 

NORMAL BREATHING PATTERN.  absent: Rales, Rhonchi, Wheezes, Respiratory Distress





- Cardiovascular Exam


Cardiovascular Exam: REGULAR RHYTHM, +S1, +S2





- GI/Abdominal Exam


GI & Abdominal Exam: Soft, Normal Bowel Sounds.  absent: Distended, Guarding, 

Tenderness





- Extremities Exam


Extremities Exam: Normal Inspection





- Back Exam


Back Exam: NORMAL INSPECTION





- Neurological Exam


Neurological Exam: Alert, Awake, CN II-XII Intact, Oriented x3





- Psychiatric Exam


Psychiatric exam: Normal Affect, Normal Mood





- Skin


Skin Exam: Dry, Intact, Normal Color, Warm





Assessment and Plan





- Assessment and Plan (Free Text)


Assessment: 





This is a 78 yo male with past medical hx of asthma/copd, htn, dm, gerd 

presenting with 








 Pneumonia


Assessment and Plan: 


Leukocytosis likely due to steroids, Afebrile 


CXR 5/27/17 shows right lower lobe infiltrate, unchanged compared to prior 

studies.


Repeat CXR shows biapical pleural thickening, left upper lobe opacity, 

consistent with pna (please see full report)


PET/CT scan done 5/24/17: consolidation on b/l upper lobe as well as RLL and 

RML. Low probability of neoplasm. 


Primaxin IVPB Q8 - started 6/1


Zosyn IVPB q 6 hrs DAY 8 - discontinued


Primaxin Day 3


Sputum positive for E coli ESBL


Tessalon Pearls TID 


Blood culture negative


sputum cultures>>> pos E coli


legionella and mycoplasma>>> both negative.


ID consult placed- Dr. Freed-help appreciated. 


Pulm consult placed-Dr. Gonsalez- help appreciated. 


  





 COPD exacerbation


Assessment and Plan: 


Solumedrol 20 mg IVP BID - starting to decrease


Singulair 10 mg PO HS


Spiriva 18 mcg INH daily


Pulm consult placed, Dr. Gonsalez, help appreciated. 


continue spiriva daily





 ESBL + UTI


Assessment and Plan: 


Primaxin IVPB Q8 - started 6/1


Urine culture 5/30 - ESBL + 





Diabetes


Assessment and Plan: 


Accuchecks ACHS


ISS


Consistent Carb Diet


Lantus 10 U SC daily


 


BPH (benign prostatic hyperplasia)


Assessment and Plan: 


Proscar 5 mg PO daily 


Flomax 0.4 mg PO BID





HTN (hypertension)


Assessment and Plan: 


Norvasc 10 mg PO daily


Cozaar 100 mg PO daily 


   





Prophylactic measure


Assessment and Plan: 


Protonix 40 mg PO daily 


Lovenox 40 mg SC daily


SCDs - Lower ext dopplers negative





<Menezes,Peter H - Last Filed: 06/04/17 10:21>





Objective





- Vital Signs/Intake and Output


Vital Signs (last 24 hours): 


 











Temp Pulse Resp BP Pulse Ox


 


 97.9 F   93 H  20   135/74   96 


 


 06/04/17 00:00  06/04/17 00:00  06/04/17 00:00  06/04/17 00:00  06/04/17 00:00








Intake and Output: 


 











 06/04/17 06/04/17





 06:59 18:59


 


Intake Total 220 


 


Balance 220 














- Medications


Medications: 


 Current Medications





Acetaminophen (Tylenol 325mg Tab)  650 mg PO Q6 PRN


   PRN Reason: Pain, Mild (1-3)


Amlodipine Besylate (Norvasc)  10 mg PO DAILY [facility]


   Last Admin: 06/04/17 09:38 Dose:  10 mg


Benzonatate (Tessalon Perles)  100 mg PO TID [facility]


   Last Admin: 06/04/17 09:37 Dose:  100 mg


Enoxaparin Sodium (Lovenox)  40 mg SC DAILY [facility]


   Last Admin: 06/04/17 09:38 Dose:  40 mg


Ferrous Sulfate (Feosol)  325 mg PO DAILY [facility]


   Last Admin: 06/04/17 09:38 Dose:  325 mg


Finasteride (Proscar)  5 mg PO DAILY [facility]


   Last Admin: 06/04/17 09:38 Dose:  5 mg


Imipenem/Cilastatin Sodium 500 (mg/ Sodium Chloride)  100 mls @ 100 mls/hr IVPB 

Q8 [facility]


   Last Admin: 06/04/17 05:37 Dose:  100 mls/hr


Insulin Glargine (Lantus)  10 unit SC DAILY [facility]


   Last Admin: 06/04/17 09:39 Dose:  10 u


Insulin Human Regular (Novolin R)  0 unit SC ACHS [facility]


   PRN Reason: Protocol


   Last Admin: 06/04/17 08:19 Dose:  3 unit


Losartan Potassium (Cozaar)  100 mg PO DAILY [facility]


   Last Admin: 06/04/17 09:38 Dose:  100 mg


Methylprednisolone (Solu-Medrol)  20 mg IVP Q12 [facility]


   Last Admin: 06/04/17 09:37 Dose:  20 mg


Montelukast Sodium (Singulair)  10 mg PO HS [facility]


   Last Admin: 06/03/17 21:40 Dose:  10 mg


Pantoprazole Sodium (Protonix Ec Tab)  40 mg PO DAILY [facility]


   Last Admin: 06/04/17 09:38 Dose:  40 mg


Tamsulosin HCl (Flomax)  0.4 mg PO BID [facility]


   Last Admin: 06/04/17 09:38 Dose:  0.4 mg


Tiotropium Bromide (Spiriva)  18 mcg INH RQ24 [facility]


   Last Admin: 06/03/17 08:20 Dose:  18 mcg











- Labs


Labs: 


 





 06/04/17 06:50 





 06/04/17 06:50 











Attending/Attestation





- Attestation


I have personally seen and examined this patient.: Yes


I have fully participated in the care of the patient.: Yes


I have reviewed all pertinent clinical information, including history, physical 

exam and plan: Yes


Notes (Text): 





06/04/17 10:19


Medical attending: Patient was seen and examined by me. 


Agree with the above note by the resident. 


The patient was again walking on his own without assistance. 


He still has a cough though it is not as before.


Currently on IV Primaxin and moved to isolation room. 


The ESBL + E coli sensitive to Primaxin








thank you


Mihir Menezes

## 2017-06-04 NOTE — CP.PCM.PN
Subjective





- Date & Time of Evaluation


Date of Evaluation: 06/04/17


Time of Evaluation: 09:00





- Subjective


Subjective: 





Being treated for ESBL in the sputum


less cough


less sob





Objective





- Vital Signs/Intake and Output


Vital Signs (last 24 hours): 


 











Temp Pulse Resp BP Pulse Ox


 


 97.9 F   93 H  20   135/74   96 


 


 06/04/17 00:00  06/04/17 00:00  06/04/17 00:00  06/04/17 00:00  06/04/17 00:00








Intake and Output: 


 











 06/04/17 06/04/17





 06:59 18:59


 


Intake Total 220 


 


Balance 220 














- Medications


Medications: 


 Current Medications





Acetaminophen (Tylenol 325mg Tab)  650 mg PO Q6 PRN


   PRN Reason: Pain, Mild (1-3)


Amlodipine Besylate (Norvasc)  10 mg PO DAILY [facility]


   Last Admin: 06/04/17 09:38 Dose:  10 mg


Benzonatate (Tessalon Perles)  100 mg PO TID [facility]


   Last Admin: 06/04/17 13:20 Dose:  100 mg


Ferrous Sulfate (Feosol)  325 mg PO DAILY [facility]


   Last Admin: 06/04/17 09:38 Dose:  325 mg


Finasteride (Proscar)  5 mg PO DAILY [facility]


   Last Admin: 06/04/17 09:38 Dose:  5 mg


Imipenem/Cilastatin Sodium 500 (mg/ Sodium Chloride)  100 mls @ 100 mls/hr IVPB 

Q8 [facility]


   Last Admin: 06/04/17 13:20 Dose:  100 mls/hr


Insulin Glargine (Lantus)  10 unit SC DAILY [facility]


   Last Admin: 06/04/17 09:39 Dose:  10 u


Insulin Human Regular (Novolin R)  0 unit SC ACHS [facility]


   PRN Reason: Protocol


   Last Admin: 06/04/17 13:20 Dose:  2 unit


Losartan Potassium (Cozaar)  100 mg PO DAILY [facility]


   Last Admin: 06/04/17 09:38 Dose:  100 mg


Methylprednisolone (Solu-Medrol)  20 mg IVP Q12 [facility]


   Last Admin: 06/04/17 09:37 Dose:  20 mg


Montelukast Sodium (Singulair)  10 mg PO HS [facility]


   Last Admin: 06/03/17 21:40 Dose:  10 mg


Nystatin (Nystatin Oral Susp)  5 ml PO QID [facility]


Pantoprazole Sodium (Protonix Ec Tab)  40 mg PO DAILY [facility]


   Last Admin: 06/04/17 09:38 Dose:  40 mg


Tamsulosin HCl (Flomax)  0.4 mg PO BID [facility]


   Last Admin: 06/04/17 09:38 Dose:  0.4 mg


Tiotropium Bromide (Spiriva)  18 mcg INH RQ24 [facility]


   Last Admin: 06/03/17 08:20 Dose:  18 mcg











- Labs


Labs: 


 





 06/04/17 06:50 





 06/04/17 06:50 











- Constitutional


Appears: Non-toxic, Chronically Ill





- Head Exam


Head Exam: NORMOCEPHALIC





- Eye Exam


Eye Exam: absent: Nystagmus, Scleral icterus





- ENT Exam


ENT Exam: Mucous Membranes Dry





- Neck Exam


Neck Exam: absent: Lymphadenopathy





- Respiratory Exam


Respiratory Exam: Decreased Breath Sounds, Rhonchi





- Cardiovascular Exam


Cardiovascular Exam: REGULAR RHYTHM, +S1, +S2





- GI/Abdominal Exam


GI & Abdominal Exam: Distended, Soft.  absent: Tenderness





- Rectal Exam


Rectal Exam: Deferred





-  Exam


 Exam: NORMAL INSPECTION





- Extremities Exam


Extremities Exam: absent: Calf Tenderness, Pedal Edema





- Back Exam


Back Exam: absent: CVA tenderness (L), CVA tenderness (R)





- Neurological Exam


Neurological Exam: Alert, Awake, Oriented x3





- Psychiatric Exam


Psychiatric exam: Normal Mood





Assessment and Plan


(1) BPH (benign prostatic hyperplasia)


Status: Acute   





(2) Diabetes


Status: Acute   





(3) HTN (hypertension)


Status: Acute   





(4) Pneumonia


Status: Acute   





(5) Asthma attack


Status: Acute   





(6) Asthma exacerbation


Status: Acute   





(7) COPD exacerbation


Status: Acute   





(8) ESBL (extended spectrum beta-lactamase) producing bacteria infection


Status: Acute   





(9) ESBL (extended spectrum beta-lactamase) producing bacteria infection


Status: Acute

## 2017-06-05 VITALS
OXYGEN SATURATION: 95 % | HEART RATE: 92 BPM | SYSTOLIC BLOOD PRESSURE: 133 MMHG | DIASTOLIC BLOOD PRESSURE: 86 MMHG | TEMPERATURE: 98.2 F

## 2017-06-05 LAB
ALBUMIN/GLOB SERPL: 1.2 {RATIO} (ref 1–2.1)
ALP SERPL-CCNC: 58 U/L (ref 38–126)
ALT SERPL-CCNC: 56 U/L (ref 21–72)
AST SERPL-CCNC: 14 U/L (ref 17–59)
BASOPHILS # BLD AUTO: 0 K/UL (ref 0–0.2)
BASOPHILS NFR BLD: 0.3 % (ref 0–2)
BILIRUB SERPL-MCNC: 0.6 MG/DL (ref 0.2–1.3)
BUN SERPL-MCNC: 35 MG/DL (ref 9–20)
CALCIUM SERPL-MCNC: 8.3 MG/DL (ref 8.6–10.4)
CHLORIDE SERPL-SCNC: 92 MMOL/L (ref 98–107)
CO2 SERPL-SCNC: 27 MMOL/L (ref 22–30)
EOSINOPHIL # BLD AUTO: 0 K/UL (ref 0–0.7)
EOSINOPHIL NFR BLD: 0 % (ref 0–4)
ERYTHROCYTE [DISTWIDTH] IN BLOOD BY AUTOMATED COUNT: 20.5 % (ref 11.5–14.5)
GLOBULIN SER-MCNC: 2.5 GM/DL (ref 2.2–3.9)
GLUCOSE SERPL-MCNC: 514 MG/DL (ref 75–110)
HCT VFR BLD CALC: 45.1 % (ref 35–51)
LYMPHOCYTES # BLD AUTO: 0.2 K/UL (ref 1–4.3)
LYMPHOCYTES NFR BLD AUTO: 2.4 % (ref 20–40)
MCH RBC QN AUTO: 24.7 PG (ref 27–31)
MCHC RBC AUTO-ENTMCNC: 32.4 G/DL (ref 33–37)
MCV RBC AUTO: 76.5 FL (ref 80–94)
MONOCYTES # BLD: 0.5 K/UL (ref 0–0.8)
MONOCYTES NFR BLD: 4.8 % (ref 0–10)
NEUTROPHILS NFR BLD AUTO: 94 % (ref 50–75)
NRBC BLD AUTO-RTO: 0.1 % (ref 0–2)
PLATELET # BLD: 169 K/UL (ref 130–400)
PMV BLD AUTO: 9.3 FL (ref 7.2–11.7)
POTASSIUM SERPL-SCNC: 4.7 MMOL/L (ref 3.6–5.2)
PROT SERPL-MCNC: 5.4 G/DL (ref 6.3–8.3)
SODIUM SERPL-SCNC: 128 MMOL/L (ref 132–148)
TOTAL CELLS COUNTED BLD: 100
WBC # BLD AUTO: 9.9 K/UL (ref 4.8–10.8)

## 2017-06-05 RX ADMIN — INSULIN GLARGINE SCH U: 100 INJECTION, SOLUTION SUBCUTANEOUS at 09:24

## 2017-06-05 RX ADMIN — HUMAN INSULIN SCH UNIT: 100 INJECTION, SOLUTION SUBCUTANEOUS at 17:14

## 2017-06-05 RX ADMIN — METHYLPREDNISOLONE SODIUM SUCCINATE SCH MG: 40 INJECTION, POWDER, FOR SOLUTION INTRAMUSCULAR; INTRAVENOUS at 09:27

## 2017-06-05 RX ADMIN — NYSTATIN SCH ML: 100000 SUSPENSION ORAL at 14:12

## 2017-06-05 RX ADMIN — NYSTATIN SCH ML: 100000 SUSPENSION ORAL at 09:27

## 2017-06-05 RX ADMIN — NYSTATIN SCH ML: 100000 SUSPENSION ORAL at 17:15

## 2017-06-05 RX ADMIN — PANTOPRAZOLE SODIUM SCH MG: 40 TABLET, DELAYED RELEASE ORAL at 09:26

## 2017-06-05 RX ADMIN — HUMAN INSULIN SCH UNIT: 100 INJECTION, SOLUTION SUBCUTANEOUS at 12:30

## 2017-06-05 RX ADMIN — TIOTROPIUM BROMIDE SCH MCG: 18 CAPSULE ORAL; RESPIRATORY (INHALATION) at 10:48

## 2017-06-05 RX ADMIN — HUMAN INSULIN SCH: 100 INJECTION, SOLUTION SUBCUTANEOUS at 09:08

## 2017-06-05 RX ADMIN — HUMAN INSULIN SCH UNIT: 100 INJECTION, SOLUTION SUBCUTANEOUS at 08:30

## 2017-06-05 NOTE — CP.PCM.PN
Subjective





- Date & Time of Evaluation


Date of Evaluation: 06/05/17


Time of Evaluation: 07:00





- Subjective


Subjective: 





Patient seen and examined.


Clinical impression much improved


Much less cough, wheezing and shortness of breath


Afebrile and ambulatory





Objective





- Vital Signs/Intake and Output


Vital Signs (last 24 hours): 


 











Temp Pulse Resp BP Pulse Ox


 


 97.8 F   75   20   130/83   96 


 


 06/05/17 07:00  06/05/17 07:00  06/05/17 07:00  06/05/17 07:00  06/05/17 07:00








Intake and Output: 


 











 06/05/17 06/05/17





 06:59 18:59


 


Intake Total 400 


 


Balance 400 














- Medications


Medications: 


 Current Medications





Acetaminophen (Tylenol 325mg Tab)  650 mg PO Q6 PRN


   PRN Reason: Pain, Mild (1-3)


Amlodipine Besylate (Norvasc)  10 mg PO DAILY [facility]


   Last Admin: 06/05/17 09:26 Dose:  10 mg


Benzonatate (Tessalon Perles)  100 mg PO TID [facility]


   Last Admin: 06/05/17 09:33 Dose:  100 mg


Ferrous Sulfate (Feosol)  325 mg PO DAILY [facility]


   Last Admin: 06/05/17 09:27 Dose:  325 mg


Finasteride (Proscar)  5 mg PO DAILY [facility]


   Last Admin: 06/05/17 09:33 Dose:  5 mg


Imipenem/Cilastatin Sodium 500 (mg/ Sodium Chloride)  100 mls @ 100 mls/hr IVPB 

Q8 [facility]


   Last Admin: 06/05/17 05:48 Dose:  100 mls/hr


Insulin Glargine (Lantus)  10 unit SC DAILY [facility]


   Last Admin: 06/05/17 09:24 Dose:  10 u


Insulin Human Regular (Novolin R)  0 unit SC ACHS [facility]


   PRN Reason: Protocol


   Last Admin: 06/05/17 08:30 Dose:  6 unit


Losartan Potassium (Cozaar)  100 mg PO DAILY [facility]


   Last Admin: 06/05/17 09:26 Dose:  100 mg


Methylprednisolone (Solu-Medrol)  20 mg IVP Q12 [facility]


   Last Admin: 06/05/17 09:27 Dose:  20 mg


Montelukast Sodium (Singulair)  10 mg PO HS [facility]


   Last Admin: 06/04/17 22:52 Dose:  10 mg


Nystatin (Nystatin Oral Susp)  5 ml PO QID [facility]


   Last Admin: 06/05/17 09:27 Dose:  5 ml


Pantoprazole Sodium (Protonix Ec Tab)  40 mg PO DAILY [facility]


   Last Admin: 06/05/17 09:26 Dose:  40 mg


Tamsulosin HCl (Flomax)  0.4 mg PO BID [facility]


   Last Admin: 06/05/17 09:26 Dose:  0.4 mg


Tiotropium Bromide (Spiriva)  18 mcg INH RQ24 [facility]


   Last Admin: 06/05/17 10:48 Dose:  18 mcg











- Labs


Labs: 


 





 06/05/17 06:15 





 06/05/17 06:15 











- Constitutional


Appears: No Acute Distress





- Head Exam


Head Exam: ATRAUMATIC, NORMOCEPHALIC





- Eye Exam


Eye Exam: Normal appearance





- ENT Exam


ENT Exam: Mucous Membranes Moist





- Respiratory Exam


Respiratory Exam: Rhonchi





- Cardiovascular Exam


Cardiovascular Exam: REGULAR RHYTHM





- GI/Abdominal Exam


GI & Abdominal Exam: Soft, Normal Bowel Sounds





- Extremities Exam


Extremities Exam: Normal Inspection





- Neurological Exam


Neurological Exam: Alert, Oriented x3





Assessment and Plan


(1) Pneumonia


Assessment & Plan: 


clinically improving


Unlikely tuberculosis


Continue antibiotics for ESBL


Continue nebulizer treatment and taper steroids


Status: Acute   





(2) COPD exacerbation


Status: Acute   





(3) Diabetes


Status: Acute

## 2017-06-05 NOTE — CP.PCM.DIS
<HiteshKenyatta - Last Filed: 06/05/17 14:50>





Provider





- Provider


Date of Admission: 


05/27/17 13:37





Attending physician: 


Trent Brooks MD





Primary care physician: 





Dr. Mcdonnell


Consults: 





Dr. Freed - ID


Dr. Gonsalez - pulelizabeth


Time Spent in preparation of Discharge (in minutes): 35





Diagnosis





- Discharge Diagnosis


(1) ESBL (extended spectrum beta-lactamase) producing bacteria infection


Status: Acute   


Comment: Needs Primaxin for 2 more weeks. PICC in place   





Hospital Course





- Lab Results


Lab Results: 


 Micro Results





06/03/17 03:00   Sputum   Gram Stain - Final


06/03/17 03:00   Sputum   Sputum Culture - Preliminary


                            Stenotrophomonas Maltophilia


                            Yeast Species


05/27/17 14:30   Blood-Venous   Blood Culture - Final


                            NO GROWTH AFTER 5 DAYS


05/27/17 14:30   Blood-Venous   Gram Stain - Final


                            TEST NOT PERFORMED


05/27/17 14:00   Blood-Venous   Blood Culture - Final


                            NO GROWTH AFTER 5 DAYS


05/27/17 14:00   Blood-Venous   Gram Stain - Final


                            TEST NOT PERFORMED


05/30/17 15:11   Sputum   Gram Stain - Final


05/30/17 15:11   Sputum   Sputum Culture - Final


                            Escherichia Coli





 Most Recent Lab Values











WBC  9.9 K/uL (4.8-10.8)   06/05/17  06:15    


 


RBC  5.90 Mil/uL (4.40-5.90)   06/05/17  06:15    


 


Hgb  14.6 g/dL (12.0-18.0)   06/05/17  06:15    


 


Hct  45.1 % (35.0-51.0)   06/05/17  06:15    


 


MCV  76.5 fL (80.0-94.0)  L  06/05/17  06:15    


 


MCH  24.7 pg (27.0-31.0)  L  06/05/17  06:15    


 


MCHC  32.4 g/dL (33.0-37.0)  L  06/05/17  06:15    


 


RDW  20.5 % (11.5-14.5)  H  06/05/17  06:15    


 


Plt Count  169 K/uL (130-400)   06/05/17  06:15    


 


MPV  9.3 fL (7.2-11.7)   06/05/17  06:15    


 


Neut % (Auto)  92.5 % (50.0-75.0)  H  06/05/17  06:15    


 


Lymph % (Auto)  2.4 % (20.0-40.0)  L  06/05/17  06:15    


 


Mono % (Auto)  4.8 % (0.0-10.0)   06/05/17  06:15    


 


Eos % (Auto)  0.0 % (0.0-4.0)   06/05/17  06:15    


 


Baso % (Auto)  0.3 % (0.0-2.0)   06/05/17  06:15    


 


Neut #  9.2 K/uL (1.8-7.0)  H  06/05/17  06:15    


 


Lymph #  0.2 K/uL (1.0-4.3)  L  06/05/17  06:15    


 


Mono #  0.5 K/uL (0.0-0.8)   06/05/17  06:15    


 


Eos #  0.0 K/uL (0.0-0.7)   06/05/17  06:15    


 


Baso #  0.0 K/uL (0.0-0.2)   06/05/17  06:15    


 


Neutrophils % (Manual)  94 % (50-75)  H  06/05/17  06:15    


 


Band Neutrophils %  2 % (0-2)   06/05/17  06:15    


 


Lymphocytes % (Manual)  2 % (20-40)  L  06/05/17  06:15    


 


Reactive Lymphs %  2 % (0-0)  H  05/27/17  12:49    


 


Monocytes % (Manual)  2 % (0-10)   06/05/17  06:15    


 


Metamyelocytes %  2 % (0-0)  H  06/03/17  07:48    


 


Toxic Granulation  Present   05/29/17  06:11    


 


Platelet Estimate  Normal  (NORMAL)   06/05/17  06:15    


 


Large Platelets  Present   06/04/17  06:50    


 


RBC Morphology  Normal   06/05/17  06:15    


 


Polychromasia  Slight   05/29/17  06:11    


 


Hypochromasia (manual)  Slight   05/29/17  06:11    


 


Poikilocytosis (manual  Slight   05/31/17  06:24    


 


Anisocytosis (manual)  Moderate   06/04/17  06:50    


 


Microcytosis (manual)  Slight   05/29/17  06:11    


 


Target Cells  Slight   05/28/17  11:44    


 


Tear Drop Cells  Slight   05/30/17  06:46    


 


Ovalocytes  Slight   06/03/17  07:48    


 


Arrowsmith Cells  Slight   05/31/17  06:24    


 


Sodium  128 mmol/L (132-148)  L  06/05/17  06:15    


 


Potassium  4.7 mmol/L (3.6-5.2)   06/05/17  06:15    


 


Chloride  92 mmol/L ()  L  06/05/17  06:15    


 


Carbon Dioxide  27 mmol/L (22-30)   06/05/17  06:15    


 


Anion Gap  14  (10-20)   06/05/17  06:15    


 


BUN  35 mg/dL (9-20)  H  06/05/17  06:15    


 


Creatinine  0.8 MG/DL (0.8-1.5)   06/05/17  06:15    


 


Est GFR ( Amer)  > 60   06/05/17  06:15    


 


Est GFR (Non-Af Amer)  > 60   06/05/17  06:15    


 


POC Glucose (mg/dL)  317 mg/dL ()  H  06/05/17  13:13    


 


Random Glucose  514 mg/dL ()  H* D 06/05/17  06:15    


 


Hemoglobin A1c  7.8 % (4.2-6.5)  H  05/29/17  18:03    


 


Calcium  8.3 mg/dl (8.6-10.4)  L  06/05/17  06:15    


 


Phosphorus  2.7 mg/dL (2.5-4.5)   06/03/17  07:48    


 


Magnesium  2.5 mg/dL (1.6-2.3)  H  06/03/17  07:48    


 


Total Bilirubin  0.6 mg/dL (0.2-1.3)   06/05/17  06:15    


 


AST  14 U/L (17-59)  L  06/05/17  06:15    


 


ALT  56 U/L (21-72)   06/05/17  06:15    


 


Alkaline Phosphatase  58 U/L ()   06/05/17  06:15    


 


Total Protein  5.4 g/dL (6.3-8.3)  L  06/05/17  06:15    


 


Albumin  3.0 g/dL (3.5-5.0)  L  06/05/17  06:15    


 


Globulin  2.5 gm/dL (2.2-3.9)   06/05/17  06:15    


 


Albumin/Globulin Ratio  1.2  (1.0-2.1)   06/05/17  06:15    


 


Vancomycin Trough  6.1 ug/mL (5.0-10.0)   05/29/17  14:25    


 


Ur L.pneumophila Ag  Negative  (NEGATIVE)   05/28/17  Unknown


 


Mycoplasma pneumon IgM  Negative  (NEGATIVE)   05/28/17  11:44    














- Hospital Course


Hospital Course: 











PMHx: asthma/COPD, hypertension, diabetes, GERD 


SHx: none


Medications: patient does not know them. 


Family Hx: none


NKDA


Social Hx: denies tobacco, alcohol or illicit drug use. 


PMD:  Dr. Lee 








On admission: 78 yearold male with history of asthma/COPD, hypertension, 

diabetes, GERD presents to the ED with 2 week history of cough. He denies SOB 

at this time. Patient with recent admission on 5/16/16 and discharged 3 days 

ago on 5/24/17 under Dr. Gonsalez's service. Patient was treated for pneumonia and 

COPD exacerbation at the time. Denies ever being intubated. Admits to 

compliance with medications. Admits cough was productive for the first 2 weeks 

of cough (while hospitalized) and now cough is dry. He was unable to sleep last 

night secondary to cough. Denies chest pain, SOB, fevers, chills, nausea, 

vomiting, diarrhea, constipation. Patient's PMD is Dr. Lee whom he saw 3 

days ago. 








During Hospital Stay: CXR 5/27/17 showed right lower lobe infiltrate, unchanged 

compared to prior studies. Repeat CXR showed biapical pleural thickening, left 

upper lobe opacity, consistent with pna (please see full report). PET/CT scan 

done 5/24/17 showed consolidation on b/l upper lobe as well as RLL and RML. Low 

probability of neoplasm. Patient was given Zosyn and Vanc but then the Sputum 

culture showed ESBL + pneumonia. He was started on Primaxin. Dr. Abdiaziz TOLLIVER was 

consulted. legionella and mycoplasma were both negative. Blood cultures were 

negative.


Dr. Gonsalez pulmonology was consulted for COPD. Solumedrol 20 mg IVP BID, 

Singulair 10 mg PO HS, Spiriva 18 mcg INH daily were given. His sugars were 

controlled with Lantus 10 U SC daily and Insulin sliding scale for coverage. He 

was given Proscar 5 mg PO daily  and Flomax 0.4 mg PO BID for BPH. Patient's 

blood pressure was controlled with Norvasc 10 mg daily and Cozaar 100 mg daily.








[facility]. He is to 

recieve Primaxin 500mg IVPB every 8 hours for 2 more weeks (to end 6/19) for 

ESBL + pneumonia. He had a PICC line placed today.  He is to follow up with his 

primary care doctor and Dr. Gonsalez, pulmnology. He is to continue all other 

current medications (as listed below). Patient is to return to the Ed if 

symptoms return. All instructions explained to the patient and he agrees.





Primaxin 500mg IVPB every 8 hours 


Solumedrol 20 mg IVP BID - starting to decrease


Singulair 10 mg PO HS


Spiriva 18 mcg INH daily


Lantus 10 U SC daily


Insulin Sliding Scale


Proscar 5 mg PO daily 


Flomax 0.4 mg PO BID


Norvasc 10 mg PO daily


Cozaar 100 mg PO daily 


Protonix 40 mg PO daily 


Lovenox 40 mg SC daily


Tessalon Pearls TID 


Tylenol prn


Ferrous Sulfate 325 mg PO daily


Nystatin oral duglas





Discharge Exam





- Head Exam


Head Exam: ATRAUMATIC, NORMOCEPHALIC





- Eye Exam


Eye Exam: EOMI, Normal appearance


Pupil Exam: NORMAL ACCOMODATION





- Respiratory Exam


Respiratory Exam: Decreased Breath Sounds, Clear to PA & Lateral, NORMAL 

BREATHING PATTERN, UNREMARKABLE





- Cardiovascular Exam


Cardiovascular Exam: REGULAR RHYTHM, +S1, +S2





- GI/Abdominal Exam


GI & Abdominal Exam: Normal Bowel Sounds, Soft.  absent: Diminished Bowel Sounds

, Distended, Firm, Guarding, Tenderness





- Extremities Exam


Extremities exam: calf tenderness





- Back Exam


Back exam: NORMAL INSPECTION.  absent: CVA tenderness (L), CVA tenderness (R), 

paraspinal tenderness





- Neurological Exam


Neurological exam: Alert, CN II-XII Intact, Oriented x3, Reflexes Normal





- Psychiatric Exam


Psychiatric exam: Normal Affect, Normal Mood





- Skin


Skin Exam: Dry, Normal Color, Warm





Discharge Plan





- Follow Up Plan


Condition: STABLE


Disposition: REHAB FACILITY/REHAB UNIT


Instructions:  Pneumonia (DC), Pneumonia (GEN)


Additional Instructions: 


Patient stable [facility]. He is to 

recieve Primaxin 500mg IVPB every 8 hours for 2 more weeks (to end 6/19) for 

ESBL + pneumonia. He had a PICC line placed today.  He is to follow up with his 

primary care doctor and Dr. Gonsalez, pulmnology. He is to continue all other 

current medications (as listed below). Patient is to return to the Ed if 

symptoms return. All instructions explained to the patient and he agrees.





Primaxin 500mg IVPB every 8 hours 


Solumedrol 20 mg IVP BID - starting to decrease


Singulair 10 mg PO HS


Spiriva 18 mcg INH daily


Lantus 10 U SC daily


Insulin Sliding Scale


Proscar 5 mg PO daily 


Flomax 0.4 mg PO BID


Norvasc 10 mg PO daily


Cozaar 100 mg PO daily 


Protonix 40 mg PO daily 


Lovenox 40 mg SC daily


Tessalon Pearls TID 


Tylenol prn


Ferrous Sulfate 325 mg PO daily


Nystatin oral duglas


Referrals: 


Abhay Freed MD [Staff Provider] - 


Raza Gonsalez MD [Staff Provider] - 





<Trent Brooks - Last Filed: 06/05/17 17:24>





Provider





- Provider


Date of Admission: 


05/27/17 13:37





Attending physician: 


Trent Brooks MD








Hospital Course





- Lab Results


Lab Results: 


 Micro Results





06/03/17 03:00   Sputum   Gram Stain - Final


06/03/17 03:00   Sputum   Sputum Culture - Preliminary


                            Stenotrophomonas Maltophilia


                            Yeast Species


05/27/17 14:30   Blood-Venous   Blood Culture - Final


                            NO GROWTH AFTER 5 DAYS


05/27/17 14:30   Blood-Venous   Gram Stain - Final


                            TEST NOT PERFORMED


05/27/17 14:00   Blood-Venous   Blood Culture - Final


                            NO GROWTH AFTER 5 DAYS


05/27/17 14:00   Blood-Venous   Gram Stain - Final


                            TEST NOT PERFORMED


05/30/17 15:11   Sputum   Gram Stain - Final


05/30/17 15:11   Sputum   Sputum Culture - Final


                            Escherichia Coli





 Most Recent Lab Values











WBC  9.9 K/uL (4.8-10.8)   06/05/17  06:15    


 


RBC  5.90 Mil/uL (4.40-5.90)   06/05/17  06:15    


 


Hgb  14.6 g/dL (12.0-18.0)   06/05/17  06:15    


 


Hct  45.1 % (35.0-51.0)   06/05/17  06:15    


 


MCV  76.5 fL (80.0-94.0)  L  06/05/17  06:15    


 


MCH  24.7 pg (27.0-31.0)  L  06/05/17  06:15    


 


MCHC  32.4 g/dL (33.0-37.0)  L  06/05/17  06:15    


 


RDW  20.5 % (11.5-14.5)  H  06/05/17  06:15    


 


Plt Count  169 K/uL (130-400)   06/05/17  06:15    


 


MPV  9.3 fL (7.2-11.7)   06/05/17  06:15    


 


Neut % (Auto)  92.5 % (50.0-75.0)  H  06/05/17  06:15    


 


Lymph % (Auto)  2.4 % (20.0-40.0)  L  06/05/17  06:15    


 


Mono % (Auto)  4.8 % (0.0-10.0)   06/05/17  06:15    


 


Eos % (Auto)  0.0 % (0.0-4.0)   06/05/17  06:15    


 


Baso % (Auto)  0.3 % (0.0-2.0)   06/05/17  06:15    


 


Neut #  9.2 K/uL (1.8-7.0)  H  06/05/17  06:15    


 


Lymph #  0.2 K/uL (1.0-4.3)  L  06/05/17  06:15    


 


Mono #  0.5 K/uL (0.0-0.8)   06/05/17  06:15    


 


Eos #  0.0 K/uL (0.0-0.7)   06/05/17  06:15    


 


Baso #  0.0 K/uL (0.0-0.2)   06/05/17  06:15    


 


Neutrophils % (Manual)  94 % (50-75)  H  06/05/17  06:15    


 


Band Neutrophils %  2 % (0-2)   06/05/17  06:15    


 


Lymphocytes % (Manual)  2 % (20-40)  L  06/05/17  06:15    


 


Reactive Lymphs %  2 % (0-0)  H  05/27/17  12:49    


 


Monocytes % (Manual)  2 % (0-10)   06/05/17  06:15    


 


Metamyelocytes %  2 % (0-0)  H  06/03/17  07:48    


 


Toxic Granulation  Present   05/29/17  06:11    


 


Platelet Estimate  Normal  (NORMAL)   06/05/17  06:15    


 


Large Platelets  Present   06/04/17  06:50    


 


RBC Morphology  Normal   06/05/17  06:15    


 


Polychromasia  Slight   05/29/17  06:11    


 


Hypochromasia (manual)  Slight   05/29/17  06:11    


 


Poikilocytosis (manual  Slight   05/31/17  06:24    


 


Anisocytosis (manual)  Moderate   06/04/17  06:50    


 


Microcytosis (manual)  Slight   05/29/17  06:11    


 


Target Cells  Slight   05/28/17  11:44    


 


Tear Drop Cells  Slight   05/30/17  06:46    


 


Ovalocytes  Slight   06/03/17  07:48    


 


Arrowsmith Cells  Slight   05/31/17  06:24    


 


Sodium  128 mmol/L (132-148)  L  06/05/17  06:15    


 


Potassium  4.7 mmol/L (3.6-5.2)   06/05/17  06:15    


 


Chloride  92 mmol/L ()  L  06/05/17  06:15    


 


Carbon Dioxide  27 mmol/L (22-30)   06/05/17  06:15    


 


Anion Gap  14  (10-20)   06/05/17  06:15    


 


BUN  35 mg/dL (9-20)  H  06/05/17  06:15    


 


Creatinine  0.8 MG/DL (0.8-1.5)   06/05/17  06:15    


 


Est GFR ( Amer)  > 60   06/05/17  06:15    


 


Est GFR (Non-Af Amer)  > 60   06/05/17  06:15    


 


POC Glucose (mg/dL)  343 mg/dL ()  H  06/05/17  15:59    


 


Random Glucose  514 mg/dL ()  H* D 06/05/17  06:15    


 


Hemoglobin A1c  7.8 % (4.2-6.5)  H  05/29/17  18:03    


 


Calcium  8.3 mg/dl (8.6-10.4)  L  06/05/17  06:15    


 


Phosphorus  2.7 mg/dL (2.5-4.5)   06/03/17  07:48    


 


Magnesium  2.5 mg/dL (1.6-2.3)  H  06/03/17  07:48    


 


Total Bilirubin  0.6 mg/dL (0.2-1.3)   06/05/17  06:15    


 


AST  14 U/L (17-59)  L  06/05/17  06:15    


 


ALT  56 U/L (21-72)   06/05/17  06:15    


 


Alkaline Phosphatase  58 U/L ()   06/05/17  06:15    


 


Total Protein  5.4 g/dL (6.3-8.3)  L  06/05/17  06:15    


 


Albumin  3.0 g/dL (3.5-5.0)  L  06/05/17  06:15    


 


Globulin  2.5 gm/dL (2.2-3.9)   06/05/17  06:15    


 


Albumin/Globulin Ratio  1.2  (1.0-2.1)   06/05/17  06:15    


 


Vancomycin Trough  6.1 ug/mL (5.0-10.0)   05/29/17  14:25    


 


Ur L.pneumophila Ag  Negative  (NEGATIVE)   05/28/17  Unknown


 


Mycoplasma pneumon IgM  Negative  (NEGATIVE)   05/28/17  11:44    














Attending/Attestation





- Attestation


I have personally seen and examined this patient.: Yes


I have fully participated in the care of the patient.: Yes


I have reviewed all pertinent clinical information, including history, physical 

exam and plan: Yes


Notes (Text): 





06/05/17 17:23


Patient was seen and examined at bedside with the resident


Patient states that cough is improving


We will discharge the patient to subacute rehabilitation center for completion 

of 2 weeks of antibiotics.


PICC line is in place


I discussed the discharge plan with the patient and the family and they 

verbalized understanding


I agree with the above discharge note by the resident.

## 2017-07-20 ENCOUNTER — HOSPITAL ENCOUNTER (EMERGENCY)
Dept: HOSPITAL 31 - C.ER | Age: 77
Discharge: HOME | End: 2017-07-20
Payer: MEDICARE

## 2017-07-20 VITALS
HEART RATE: 70 BPM | TEMPERATURE: 97.9 F | RESPIRATION RATE: 18 BRPM | DIASTOLIC BLOOD PRESSURE: 88 MMHG | SYSTOLIC BLOOD PRESSURE: 139 MMHG

## 2017-07-20 VITALS — OXYGEN SATURATION: 95 %

## 2017-07-20 VITALS — BODY MASS INDEX: 26.8 KG/M2

## 2017-07-20 DIAGNOSIS — R51: ICD-10-CM

## 2017-07-20 DIAGNOSIS — I63.9: Primary | ICD-10-CM

## 2017-07-20 LAB
ALBUMIN SERPL-MCNC: 3.6 G/DL (ref 3.5–5)
ALBUMIN/GLOB SERPL: 1.2 {RATIO} (ref 1–2.1)
ALT SERPL-CCNC: 29 U/L (ref 21–72)
AST SERPL-CCNC: 22 U/L (ref 17–59)
BASOPHILS # BLD AUTO: 0.1 K/UL (ref 0–0.2)
BASOPHILS NFR BLD: 0.6 % (ref 0–2)
BUN SERPL-MCNC: 16 MG/DL (ref 9–20)
CALCIUM SERPL-MCNC: 8.5 MG/DL (ref 8.6–10.4)
EOSINOPHIL # BLD AUTO: 0.2 K/UL (ref 0–0.7)
EOSINOPHIL NFR BLD: 1.9 % (ref 0–4)
ERYTHROCYTE [DISTWIDTH] IN BLOOD BY AUTOMATED COUNT: 20 % (ref 11.5–14.5)
GFR NON-AFRICAN AMERICAN: > 60
HGB BLD-MCNC: 13.5 G/DL (ref 12–18)
LYMPHOCYTES # BLD AUTO: 1.8 K/UL (ref 1–4.3)
LYMPHOCYTES NFR BLD AUTO: 22.6 % (ref 20–40)
MCH RBC QN AUTO: 26 PG (ref 27–31)
MCHC RBC AUTO-ENTMCNC: 32 G/DL (ref 33–37)
MCV RBC AUTO: 81.2 FL (ref 80–94)
MONOCYTES # BLD: 0.9 K/UL (ref 0–0.8)
MONOCYTES NFR BLD: 11 % (ref 0–10)
NEUTROPHILS # BLD: 5.2 K/UL (ref 1.8–7)
NEUTROPHILS NFR BLD AUTO: 63.9 % (ref 50–75)
NRBC BLD AUTO-RTO: 0.1 % (ref 0–2)
PLATELET # BLD: 190 K/UL (ref 130–400)
PMV BLD AUTO: 8.3 FL (ref 7.2–11.7)
RBC # BLD AUTO: 5.21 MIL/UL (ref 4.4–5.9)
WBC # BLD AUTO: 8.1 K/UL (ref 4.8–10.8)

## 2017-07-20 PROCEDURE — 99285 EMERGENCY DEPT VISIT HI MDM: CPT

## 2017-07-20 PROCEDURE — 80053 COMPREHEN METABOLIC PANEL: CPT

## 2017-07-20 PROCEDURE — 96374 THER/PROPH/DIAG INJ IV PUSH: CPT

## 2017-07-20 PROCEDURE — 96375 TX/PRO/DX INJ NEW DRUG ADDON: CPT

## 2017-07-20 PROCEDURE — 85025 COMPLETE CBC W/AUTO DIFF WBC: CPT

## 2017-07-20 PROCEDURE — 70450 CT HEAD/BRAIN W/O DYE: CPT

## 2017-07-20 NOTE — C.PDOC
History Of Present Illness





<Isaias Bowen - Last Filed: 07/20/17 20:07>





<Mihir Mehta Jr. - Last Filed: 07/24/17 15:13>


Patient is a 78 y/o male, with history of asthma, diabetes, and HTN, presents 

to the ED for evaluation of constant headache that lasts from 1am to 1pm for 

the last 5 days. Patient admits to having headache at this time, and is 6/10 in 

severity. Notes that when his headache is worse, it is 9/10 in severity. 

Patient states his headache fluctuates in intensity. Notes taking 2 OTC Tylenol 

with no improvement. No change with change in head position. Denies any PMHx of 

migraine, or family history of migraine. Denies having similar symptoms in the 

past. Otherwise, denies any shortness of breath, chest pain, vision change, 

dizziness, extremity weakness/numbness, or fever. 


No PMD (Mihir Mehta Jr.)





<Isaias Bowen - Last Filed: 07/20/17 20:07>


History Per: Patient


History/Exam Limitations: no limitations


Onset/Duration Of Symptoms: Days (5)


Current Symptoms Are (Timing): Still Present


Quality: "Pain"


Preceeding Symptoms: None.  denies: Visual Disturbances, Known Migraine Symptoms


Associated Symptoms: denies: Photophobia, Nausea, Vomiting, Extremity Weakness


Recent travel outside of the [place]: No


Additional History Per: Patient





<Mihir Mehta Jr. - Last Filed: 07/24/17 15:13>


Time Seen by Provider: 07/20/17 18:09


Chief Complaint (Nursing): Headache





Past Medical History


Reviewed: Historical Data, Nursing Documentation, Vital Signs





- Medical History


PMH: Arthritis (B/L KNEE JT), Asthma, COPD (ASTHMA), Gastritis, HTN


   Denies: Chronic Kidney Disease


Surgical History: Endoscopy (2013)


Family History: States: No Known Family Hx





- Social History


Hx Tobacco Use: No


Hx Alcohol Use: No


Hx Substance Use: No





- Immunization History


Hx Tetanus Toxoid Vaccination: No


Hx Influenza Vaccination: No


Hx Pneumococcal Vaccination: No





<Mihir Mehta Jr. - Last Filed: 07/24/17 15:13>


Vital Signs: 


 Last Vital Signs











Temp  97.9 F   07/20/17 20:01


 


Pulse  70   07/20/17 20:01


 


Resp  18   07/20/17 20:01


 


BP  139/88   07/20/17 20:01


 


Pulse Ox  95   07/20/17 20:01














- [facility] Procedures








EXCISION OF UPPER ESOPHAGUS, ENDO, DIAGN (04/27/16)


FLUOROSCOPY OF MULT COR ART USING L OSM CONTRAST (05/16/17)


FLUOROSCOPY OF RIGHT AND LEFT HEART USING L OSM CONTRAST (05/16/17)


INSERTION OF INFUSION DEV INTO SUP VENA CAVA, PERC APPROACH (05/27/17)


MEASURE CARDIAC SAMPL & PRESSURE, BILATERAL, PERC (05/16/17)


ULTRASONOGRAPHY OF SUPERIOR VENA CAVA, GUIDANCE (05/27/17)











Review Of Systems


Except As Marked, All Systems Reviewed And Found Negative.


Constitutional: Negative for: Fever, Chills


Cardiovascular: Negative for: Chest Pain, Palpitations, Light Headedness


Respiratory: Negative for: Shortness of Breath


Gastrointestinal: Negative for: Nausea, Vomiting


Skin: Negative for: Rash


Neurological: Positive for: Headache.  Negative for: Weakness, Numbness, 

Dizziness





<Mihir Mehta Jr. - Last Filed: 07/24/17 15:13>





Physical Exam





- Physical Exam


Appears: Non-toxic, No Acute Distress


Skin: Normal Color, Warm, Dry


Head: Atraumatic, Normacephalic


Eye(s): bilateral: Normal Inspection, EOMI


Ear(s): Bilateral: Normal


Nose: Normal


Oral Mucosa: Moist


Lips: Normal Appearing


Teeth: Normal Dentition


Gingiva: Normal Appearing


Throat: Normal


Neck: Normal, Normal ROM, Supple


Lymphatic: Deferred


Chest: Symmetrical


Cardiovascular: Rhythm Regular, No Murmur


Respiratory: Normal Breath Sounds, No Accessory Muscle Use, No Rales, No Rhonchi

, No Wheezing


Gastrointestinal/Abdominal: Normal Exam, Soft, No Tenderness


Rectal: Deferred


Male Genital: Normal Inspection


Extremity: Normal ROM, No Deformity


Extremity: Bilateral: Atraumatic, Normal ROM


Neurological/Psych: Oriented x3, Normal Speech, Normal Cognition, Normal 

Cranial Nerves, No Cerebellar Signs, Other (no focal deficits)





<Mihir Mehta Jr. - Last Filed: 07/24/17 15:13>





ED Course And Treatment





- Laboratory Results


Result Diagrams: 


 07/20/17 19:00





 07/20/17 19:00


Pulse Ox Interpretation: Normal


Reevaluation Time: 20:08


Reassessment Condition: Improved





<Isaias Bowen - Last Filed: 07/20/17 20:07>





- Laboratory Results


Result Diagrams: 


 07/20/17 19:00





 07/20/17 19:00


O2 Sat by Pulse Oximetry: 95 (on RA)


Pulse Ox Interpretation: Normal





<Mihir Mehta Jr. - Last Filed: 07/24/17 15:13>





Medical Decision Making





<Isaias Bowen - Last Filed: 07/20/17 20:07>





<Mihir Mehta Jr. - Last Filed: 07/24/17 15:13>


Medical Decision Making: 





Initial Impression:  Cephalgia





Initial Plan:


* Head CT


* Blood work


* Reglan


* Toradol


* Tylenol


* IV fluids


* Reassess and disposition





Progress note:


On re-eval, patient is resting comfortably, alert and oriented. Neurologically 

intact. Notes improvement of headache.  (Mihir Mehta Jr.)





Disposition


Counseled Patient/Family Regarding: Studies Performed, Diagnosis, Need For 

Followup





<Isaias Bowen - Last Filed: 07/20/17 20:07>





- Disposition


Disposition Time: 19:02





<Mihir Mehta Jr. - Last Filed: 07/24/17 15:13>





- Disposition


Referrals: 


[facility] [Outside]


[facility] [Outside]


Iban Moss MD [Staff Provider] - 


Disposition: HOME/ ROUTINE


Condition: FAIR


Additional Instructions: 


Please return if symptoms recur


Instructions:  Acute Headache (DC)





- Clinical Impression


Clinical Impression: 


 Cephalgia, Lacunar infarction








<Isaias Bowen - Last Filed: 07/20/17 20:07>





- Scribe Statement


The provider has reviewed the documentation as recorded by the Scribe





<Mihir Mehta Jr. - Last Filed: 07/24/17 15:13>





- Scribe Statement





Jaylen [address]





All medical record entries made by the Scribe were at my direction and 

personally dictated by me. I have reviewed the chart and agree that the record 

accurately reflects my personal performance of the history, physical exam, 

medical decision making, and the department course for this patient. I have 

also personally directed, reviewed, and agree with the discharge instructions 

and disposition. (Mihir Mehta Jr.)





Physician Patient Turnover


Patient Signed Over To: Isaias Bowen


Handoff Comments: Follow up CT scan and lab results





<Mihir Mehta Jr. - Last Filed: 07/24/17 15:13>

## 2017-07-20 NOTE — CT
EXAM:

  CT Head Without Intravenous Contrast



CLINICAL HISTORY:

  77 years old, male; Pain; Headache; Headache not specified



TECHNIQUE:

  Axial computed tomography images of the head/brain without intravenous 

contrast.  This CT exam was performed using one or more of the following dose 

reduction techniques:  automated exposure control, adjustment of the mA and/or 

kV according to patient size, and/or use of iterative reconstruction technique.

  Coronal and sagittal reformatted images were created and reviewed.



EXAM DATE/TIME:

  Exam ordered 7/20/2017 6:14 PM



COMPARISON:

  No relevant prior studies available.



FINDINGS:

  Brain:  A 4 mm low density focus in the right cerebellar hemisphere suggests 

chronic lacunar infarct  No hemorrhage.  No significant white matter disease.  

No edema.

  Ventricles:  Unremarkable.  No ventriculomegaly.

  Bones/joints:  Unremarkable.  No acute fracture.

  Soft tissues:  Unremarkable.

  Sinuses: Mild mucosal thickening is noted in the ethmoid air cells. No acute 

sinusitis.

  Mastoid air cells:  Unremarkable as visualized.  No mastoid effusion.



IMPRESSION:     

1. 4 mm chronic lacunar infarct in the right cerebellar hemisphere



2. Mucosal thickening in the ethmoid air cells.

## 2017-12-05 ENCOUNTER — HOSPITAL ENCOUNTER (EMERGENCY)
Dept: HOSPITAL 31 - C.ER | Age: 77
LOS: 1 days | Discharge: HOME | End: 2017-12-06
Payer: MEDICARE

## 2017-12-05 VITALS — RESPIRATION RATE: 18 BRPM | OXYGEN SATURATION: 99 %

## 2017-12-05 VITALS — BODY MASS INDEX: 26.8 KG/M2

## 2017-12-05 DIAGNOSIS — M54.2: Primary | ICD-10-CM

## 2017-12-05 PROCEDURE — 96372 THER/PROPH/DIAG INJ SC/IM: CPT

## 2017-12-05 PROCEDURE — 99283 EMERGENCY DEPT VISIT LOW MDM: CPT

## 2017-12-05 PROCEDURE — 72125 CT NECK SPINE W/O DYE: CPT

## 2017-12-06 VITALS — DIASTOLIC BLOOD PRESSURE: 84 MMHG | TEMPERATURE: 97.9 F | SYSTOLIC BLOOD PRESSURE: 142 MMHG | HEART RATE: 70 BPM

## 2017-12-06 NOTE — C.PDOC
History Of Present Illness


77 year old male presents to the ED for evaluation of right-sided neck pain 

that radiates to right shoulder for the past 2 days. Patient reports history of 

similar symptoms in the past. Patient states he recently returned from [place] 

2 days ago and has been experiencing symptoms since then. Patient is requesting 

an injection for the pain and denies fever, chills, chest pain, palpitations, 

shortness of breath, extremity numbness/weakness or trauma. 


Time Seen by Provider: 12/05/17 23:08


Chief Complaint (Nursing): Pain, Chronic


History Per: Patient


History/Exam Limitations: no limitations


Onset/Duration Of Symptoms: Days (2)


Current Symptoms Are (Timing): Still Present


Additional History Per: Patient





Past Medical History


Reviewed: Historical Data, Nursing Documentation, Vital Signs


Vital Signs: 


 Last Vital Signs











Temp  97.9 F   12/06/17 01:20


 


Pulse  70   12/06/17 01:20


 


Resp  18   12/06/17 01:20


 


BP  142/84   12/06/17 01:20


 


Pulse Ox  99   12/06/17 06:04














- Medical History


PMH: Arthritis (B/L KNEE JT), Asthma, COPD (ASTHMA), Gastritis, HTN


   Denies: Chronic Kidney Disease


Surgical History: Endoscopy (2013)





- [facility] Procedures








EXCISION OF UPPER ESOPHAGUS, ENDO, DIAGN (04/27/16)


FLUOROSCOPY OF MULT COR ART USING L OSM CONTRAST (05/16/17)


FLUOROSCOPY OF RIGHT AND LEFT HEART USING L OSM CONTRAST (05/16/17)


INSERTION OF INFUSION DEV INTO SUP VENA CAVA, PERC APPROACH (05/27/17)


MEASURE CARDIAC SAMPL & PRESSURE, BILATERAL, PERC (05/16/17)


ULTRASONOGRAPHY OF SUPERIOR VENA CAVA, GUIDANCE (05/27/17)








Family History: States: Unknown Family Hx





- Social History


Hx Tobacco Use: No


Hx Alcohol Use: No


Hx Substance Use: No





- Immunization History


Hx Tetanus Toxoid Vaccination: No


Hx Influenza Vaccination: No


Hx Pneumococcal Vaccination: No





Review Of Systems


Cardiovascular: Negative for: Chest Pain, Palpitations


Respiratory: Negative for: Shortness of Breath


Musculoskeletal: Positive for: Neck Pain (right-sided ), Shoulder Pain (right )


Neurological: Negative for: Weakness, Numbness





Physical Exam





- Physical Exam


Appears: Non-toxic, No Acute Distress


Skin: Normal Color, Warm, Dry


Head: Atraumatic, Normacephalic


Eye(s): bilateral: Normal Inspection


Oral Mucosa: Moist


Neck: Decreased ROM (limited, secondary to pain with lateral motions ), No 

Midline Cervical Tenderness, Paracervical Tenderness (right-sided ), No Step 

Off Deformity, Supple


Chest: Symmetrical, No Deformity


Cardiovascular: Rhythm Regular, No Murmur


Respiratory: Normal Breath Sounds, No Rales, No Rhonchi, No Wheezing


Back: No Decreased ROM, Other (tenderness to right subscapular area )


Extremity: Normal ROM (right shoulder ), No Tenderness, Capillary Refill (less 

than 2 seconds ), No Deformity, No Swelling


Neurological/Psych: Oriented x3, Normal Speech, Normal Cognition


Gait: Steady





ED Course And Treatment


O2 Sat by Pulse Oximetry: 99 (on RA)


Pulse Ox Interpretation: Normal





- CT Scan/US


  ** CT Cervical Spine 


Other Rad Studies (CT/US): Interpreted By Me, Read By Radiologist, Radiology 

Report Reviewed


CT/US Interpretation: EXAM:  CT Cervical Spine Without Intravenous Contrast.  

CLINICAL HISTORY:  77 years old, male; Pain; Neck pain and other: Right shoulder

; Additional info: Moderate pain, no.  trauma.  TECHNIQUE:  Axial computed 

tomography images of the cervical spine without intravenous contrast. All CT 

scans.  at this facility use one or more dose reduction techniques, viz.: 

automated exposure control; ma/kV.  adjustment per patient size (including 

targeted exams where dose is matched to indication; i.e. head);.  or iterative 

reconstruction technique.  Coronal and sagittal reformatted images were created 

and reviewed.  COMPARISON:  No relevant prior studies available.  FINDINGS:  

Limitations: Motion artifact - mild.  Vertebrae: No acute fracture. Mild 

degenerative anterolisthesis of C3 on C4. Facet osteoarthrosis.  within mid 

cervical spine.  Discs/spinal canal/neural foramina: Early degenerative disc 

disease at C5-C6 level. Moderate.  degenerative disc disease at C6-C7 level. No 

significant central canal stenosis. Neuroforaminal.  narrowing with mid and 

lower cervical spine.  Soft tissues: Unremarkable.  Lymph nodes: Calcified 

mediastinal lymph nodes.  Lung apices: 2.8 x 1.4 x 2.5 cm spiculated opacity 

medial RIGHT upper lobe. 4.6 x 2.0 x 4.8 cm.  spiculated opacity medial LEFT 

upper lobe. Patchy opacity superior segment LEFT lower lobe.IMPRESSION:  1. No 

fracture.  2. If neck pain persists, consider MRI for further evaluation.  3. 

Pulmonary opacities, indeterminate. DDX: Scarring, malignancy. Followup is 

recommended.  4. Incidental/non-acute findings are described above.


Progress Note: CT Cervical Spine ordered.  Flexeril PO and Morphine IM 

administered.  On reassessment, patient is resting comfortably, showing no 

signs of distress and reports complete resolution of symptoms. Patient is 

stable for discharge and is advised to follow up with his PMD within 1-2 days 

for further evaluation and/or return to the ED if symptoms return or worsen.


Reassessment Condition: Improved





Disposition


Counseled Patient/Family Regarding: Diagnosis, Rx Given





- Disposition


Referrals: 


Julissa Lee MD [Primary Care Provider] - 


Disposition: HOME/ ROUTINE


Disposition Time: 00:55


Condition: STABLE


Additional Instructions: 


Take medications as directed





Follow up with PMD





Apply warm compress to area





Return to ER if worse


Prescriptions: 


Cyclobenzaprine [Cyclobenzaprine HCl] 10 mg PO HS #7 tab


Tramadol HCl [Ultram] 50 mg PO Q6H #15 tab


Instructions:  Cervical Sprain (ED)


Forms:  [facility] (English)





- Clinical Impression


Clinical Impression: 


 Neck pain on right side








- PA / NP / Resident Statement


MD/DO has reviewed & agrees with the documentation as recorded.





- Scribe Statement


The provider has reviewed the documentation as recorded by the Scribe ([address])








All medical record entries made by the Scribe were at my direction and 

personally dictated by me. I have reviewed the chart and agree that the record 

accurately reflects my personal performance of the history, physical exam, 

medical decision making, and the department course for this patient. I have 

also personally directed, reviewed, and agree with the discharge instructions 

and disposition.

## 2017-12-06 NOTE — CT
EXAM:

  CT Cervical Spine Without Intravenous Contrast



CLINICAL HISTORY:

  77 years old, male; Pain; Neck pain and other: Right shoulder; Additional 

info: Moderate pain, no trauma



TECHNIQUE:

  Axial computed tomography images of the cervical spine without intravenous 

contrast.  All CT scans at this facility use one or more dose reduction 

techniques, viz.: automated exposure control; ma/kV adjustment per patient size 

(including targeted exams where dose is matched to indication; i.e. head); or 

iterative reconstruction technique.

  Coronal and sagittal reformatted images were created and reviewed.



COMPARISON:

  No relevant prior studies available.



FINDINGS:

  Limitations:  Motion artifact - mild.

  Vertebrae:  No acute fracture.  Mild degenerative anterolisthesis of C3 on 

C4.  Facet osteoarthrosis within mid cervical spine.

  Discs/spinal canal/neural foramina:  Early degenerative disc disease at C5-C6 

level.  Moderate degenerative disc disease at C6-C7 level.  No significant 

central canal stenosis.  Neuroforaminal narrowing with mid and lower cervical 

spine.

  Soft tissues:  Unremarkable.

  Lymph nodes:  Calcified mediastinal lymph nodes.

  Lung apices:  2.8 x 1.4 x 2.5 cm spiculated opacity medial RIGHT upper lobe.  

4.6 x 2.0 x 4.8 cm spiculated opacity medial LEFT upper lobe.  Patchy opacity 

superior segment LEFT lower lobe.



IMPRESSION:     

1.  No fracture.

2.  If neck pain persists, consider MRI for further evaluation.  

3.  Pulmonary opacities, indeterminate.  DDX: Scarring, malignancy.  Followup 

is recommended.

4.  Incidental/non-acute findings are described above.

## 2018-06-17 ENCOUNTER — HOSPITAL ENCOUNTER (INPATIENT)
Dept: HOSPITAL 31 - C.ER | Age: 78
LOS: 3 days | Discharge: HOME | DRG: 190 | End: 2018-06-20
Attending: INTERNAL MEDICINE | Admitting: INTERNAL MEDICINE
Payer: MEDICARE

## 2018-06-17 VITALS — BODY MASS INDEX: 26.8 KG/M2

## 2018-06-17 DIAGNOSIS — R91.8: ICD-10-CM

## 2018-06-17 DIAGNOSIS — J44.0: ICD-10-CM

## 2018-06-17 DIAGNOSIS — E10.9: ICD-10-CM

## 2018-06-17 DIAGNOSIS — Z87.891: ICD-10-CM

## 2018-06-17 DIAGNOSIS — J44.1: Primary | ICD-10-CM

## 2018-06-17 DIAGNOSIS — I10: ICD-10-CM

## 2018-06-17 DIAGNOSIS — K21.9: ICD-10-CM

## 2018-06-17 DIAGNOSIS — J18.9: ICD-10-CM

## 2018-06-17 LAB
ALBUMIN SERPL-MCNC: 4 G/DL (ref 3.5–5)
ALBUMIN/GLOB SERPL: 1.4 {RATIO} (ref 1–2.1)
ALT SERPL-CCNC: 30 U/L (ref 21–72)
AST SERPL-CCNC: 28 U/L (ref 17–59)
BASOPHILS # BLD AUTO: 0.1 K/UL (ref 0–0.2)
BASOPHILS NFR BLD: 0.6 % (ref 0–2)
BNP SERPL-MCNC: 143 PG/ML (ref 0–900)
BUN SERPL-MCNC: 20 MG/DL (ref 9–20)
CALCIUM SERPL-MCNC: 9.1 MG/DL (ref 8.6–10.4)
CK MB SERPL-MCNC: 1.5 NG/ML (ref 0–3.38)
EOSINOPHIL # BLD AUTO: 0.1 K/UL (ref 0–0.7)
EOSINOPHIL NFR BLD: 0.7 % (ref 0–4)
ERYTHROCYTE [DISTWIDTH] IN BLOOD BY AUTOMATED COUNT: 17.6 % (ref 11.5–14.5)
GFR NON-AFRICAN AMERICAN: > 60
HGB BLD-MCNC: 13.6 G/DL (ref 12–18)
LYMPHOCYTES # BLD AUTO: 1.6 K/UL (ref 1–4.3)
LYMPHOCYTES NFR BLD AUTO: 19.2 % (ref 20–40)
MCH RBC QN AUTO: 25.1 PG (ref 27–31)
MCHC RBC AUTO-ENTMCNC: 32.7 G/DL (ref 33–37)
MCV RBC AUTO: 76.8 FL (ref 80–94)
MONOCYTES # BLD: 0.9 K/UL (ref 0–0.8)
MONOCYTES NFR BLD: 10.1 % (ref 0–10)
NEUTROPHILS # BLD: 5.9 K/UL (ref 1.8–7)
NEUTROPHILS NFR BLD AUTO: 69.4 % (ref 50–75)
NRBC BLD AUTO-RTO: 0 % (ref 0–2)
PLATELET # BLD: 192 K/UL (ref 130–400)
PMV BLD AUTO: 9.3 FL (ref 7.2–11.7)
RBC # BLD AUTO: 5.43 MIL/UL (ref 4.4–5.9)
WBC # BLD AUTO: 8.5 K/UL (ref 4.8–10.8)

## 2018-06-17 RX ADMIN — METHYLPREDNISOLONE SODIUM SUCCINATE SCH MG: 40 INJECTION, POWDER, FOR SOLUTION INTRAMUSCULAR; INTRAVENOUS at 22:09

## 2018-06-17 NOTE — RAD
PROCEDURE:  CHEST RADIOGRAPH, 1 VIEW



HISTORY:

SOB



COMPARISON:

6/26/2017



FINDINGS:



LUNGS:

Clear.



PLEURA:

No pneumothorax or pleural fluid seen.



CARDIOVASCULAR:

Cardiomegaly. No congestive change.



OSSEOUS STRUCTURES:

No significant abnormalities.



VISUALIZED UPPER ABDOMEN:

Normal.



OTHER FINDINGS:

None. 



IMPRESSION:

No active disease.

## 2018-06-17 NOTE — CT
PROCEDURE:  CT Chest with contrast (Pulmonary Angiogram)



HISTORY:

sob, possible lung mass, r/o PE



COMPARISON:

5/16/2017 



TECHNIQUE:

Axial computed tomography images were obtained of the chest in the 

pulmonary arterial phase of enhancement. Coronal and sagittal 

reformatted images were created and reviewed.



Intravenous contrast dose: 100 mL Visipaque 320



Radiation dose:



Total exam DLP = 437.67 mGy-cm.



This CT exam was performed using one or more of the following dose 

reduction techniques: Automated exposure control, adjustment of the 

mA and/or kV according to patient size, and/or use of iterative 

reconstruction technique.



FINDINGS:



PULMONARY ARTERIES:

Unremarkable. No pulmonary embolism. 



AORTA:

No acute findings. No thoracic aortic aneurysm. 



LUNGS:

There is persistent soft tissue density likely reflecting neoplasm 

along the medial pleural surface of the left upper lobe extending to 

the apex as well as along the medial pleural surface of the right 

upper lobe.  This soft tissue density is contiguous with the hilar 

lymphadenopathy bilaterally. There is spiculation surrounding this 

bilateral upper lobe soft tissue density. On prior examination there 

was tumor extending along the right major fissure in the superior 

segment right lower lobe.  This has largely resolved.  There is 

persistent multi nodular opacity in the superior segment left lower 

lobe, unchanged. Fine nodular opacities in the apical posterior 

segment of the left upper lobe have largely resolved. There is 

improvement in the extensive pleural-based density along the right 

lateral pericardial pleural surface when compared to prior 

examination. There is some persistent pleural-based density, however. 

There is an ill-defined right lower lobe opacity on prior examination 

which has largely resolved. 



PLEURAL SPACES:

No pleural effusion. 



HEART:

Unremarkable. No cardiomegaly. No significant pericardial effusion. 



LYMPH NODES:

There is mediastinal and bilateral hilar lymphadenopathy. No gross 

interval change in extent when compared to the prior examination.



BONES, CHEST WALL:

Unremarkable. No fracture or destructive lesion 



OTHER FINDINGS:

1.6 cm rounded low-density mass immediately abutting the right 

lateral aspect of the distal thoracic esophagus. In retrospect this 

is seen on prior CT examination of 5/16/2017.  This may represent a 

paraesophageal lymph node. This could also represent a small 

esophageal diverticulum. 



IMPRESSION:

No evidence of pulmonary embolism. There has been some interval 

improvement in the extent of neoplastic disease in both lungs as 

compared to prior examination of 5/16/2017.  Persistent mediastinal 

and bilateral hilar lymphadenopathy. Small rounded density adjacent 

to right lateral aspect distal thoracic esophagus likely representing 

either paraesophageal lymph node or esophageal diverticulum.

## 2018-06-17 NOTE — C.PDOC
History Of Present Illness


78 year old male, with PMHx of asthma, diabetes, HTN, is brought to ED via EMS 

for evaluation of shortness of breath for the last 3 days. Pt was given 125mg 

of Solu-Medrol and 2 nebulizer treatments in field. Pt admits to having non-

productive cough. Otherwise, denies chest pain, palpitations, or fever.





Time Seen by Provider: 18 11:16


Chief Complaint (Nursing): Shortness Of Breath


History Per: Patient, EMS


History/Exam Limitations: no limitations


Onset/Duration Of Symptoms: Days (3)


Current Symptoms Are (Timing): Still Present


Exacerbating Factor(s): Coughing


Associated Symptoms: denies: Heart Racing, Leg/Calf Pain, Ankle/Leg Swelling


Additional History Per: Patient





Past Medical History


Reviewed: Historical Data, Nursing Documentation, Vital Signs


Vital Signs: 


 Last Vital Signs











Temp  97.8 F   18 11:20


 


Pulse  100 H  18 16:21


 


Resp  19   18 16:21


 


BP  159/81 H  18 16:21


 


Pulse Ox  98   18 17:17














- Medical History


PMH: Arthritis (B/L KNEE JT), Asthma, COPD (ASTHMA), Gastritis, HTN


   Denies: Chronic Kidney Disease


Surgical History: Endoscopy ()





- [facility] Procedures








EXCISION OF UPPER ESOPHAGUS, ENDO, DIAGN (16)


FLUOROSCOPY OF MULT COR ART USING L OSM CONTRAST (17)


FLUOROSCOPY OF RIGHT AND LEFT HEART USING L OSM CONTRAST (17)


INSERTION OF INFUSION DEV INTO SUP VENA CAVA, PERC APPROACH (17)


MEASURE CARDIAC SAMPL & PRESSURE, BILATERAL, PERC (17)


ULTRASONOGRAPHY OF SUPERIOR VENA CAVA, GUIDANCE (17)








Family History: States: Unknown Family Hx





- Social History


Hx Tobacco Use: No


Hx Alcohol Use: No


Hx Substance Use: No





- Immunization History


Hx Tetanus Toxoid Vaccination: No


Hx Influenza Vaccination: No


Hx Pneumococcal Vaccination: No





Review Of Systems


Except As Marked, All Systems Reviewed And Found Negative.


Constitutional: Negative for: Fever, Chills


Cardiovascular: Negative for: Chest Pain, Palpitations


Respiratory: Positive for: Cough, Shortness of Breath


Gastrointestinal: Negative for: Nausea, Vomiting, Abdominal Pain


Neurological: Negative for: Headache, Dizziness





Physical Exam





- Physical Exam


Appears: Non-toxic, No Acute Distress (comfortable)


Skin: Normal Color, Warm, Dry


Head: Normacephalic


Eye(s): bilateral: Normal Inspection


Oral Mucosa: Moist


Neck: Normal ROM, Supple


Cardiovascular: Rhythm Regular (tachycardic)


Respiratory: No Accessory Muscle Use, No Rales, No Rhonchi, No Wheezing, Other (

scattered coarse breath sounds bilaterally; speaking in full sentences)


Gastrointestinal/Abdominal: Soft, No Tenderness


Extremity: Normal ROM, No Pedal Edema


Neurological/Psych: Oriented x3, Normal Speech





ED Course And Treatment





- Laboratory Results


Result Diagrams: 


 18 11:57





 18 11:57


ECG: Interpreted By Me, Viewed By Me


ECG Rhythm: Sinus Tachycardia, R BBB


Interpretation Of ECG: Occasional PACs. Left axis deviation. RBBB. No acute ST/

T wave changes.


Rate From EC (bpm)


O2 Sat by Pulse Oximetry: 98 (RA)


Pulse Ox Interpretation: Normal





- Radiology


CXR: Interpreted by Me


CXR Interpretation: Yes: Other (bilateral lung masses?)





- Other Rad


  ** CXR


X-Ray: Viewed By Me, Read By Radiologist


Interpretation: Accession No. : [id number].  Patient Name / ID : JUNIOR BEJARANO  / [id number].  Exam Date : 2018 12:04:11 ( Approved ).  Study 

Comment :  Sex / Age : M  / 78Y.  Creator : Mihir Jackson MD.  Dictator 

: Mihir Jackson MD.   :  Approver : Mihir Jackson MD.  

Approver2 :  Report Date : 2018 13:00:18.  My Comment :  *****************

******************************************************************.  PROCEDURE:

  CHEST RADIOGRAPH, 1 VIEW.  HISTORY:  SOB.  COMPARISON:  2017.  FINDINGS:

  LUNGS:  Clear.  PLEURA:  No pneumothorax or pleural fluid seen.  

CARDIOVASCULAR:  Cardiomegaly. No congestive change.  OSSEOUS STRUCTURES:  No 

significant abnormalities.  VISUALIZED UPPER ABDOMEN:  Normal.  OTHER FINDINGS:

  None.  IMPRESSION:  No active disease.





- CT Scan/US


  ** Angio chest CT


Other Rad Studies (CT/US): Read By Radiologist, Radiology Report Reviewed


CT/US Interpretation: Accession No. : [id number].  Patient Name / ID : 

JUNIOR BEJARANO  / [id number].  Exam Date : 2018 14:08:03 ( Approved ).  

Study Comment :  Sex / Age : M  / 78Y.  Creator : Mihir Jackson MD.  

Dictator : Mihir Jackson MD.   :  Approver : Mihir Jackson MD.  Approver2 :  Report Date : 2018 15:35:16.  My Comment :  ******

*****************************************************************************.  

PROCEDURE:  CT Chest with contrast (Pulmonary Angiogram).  HISTORY:  sob, 

possible lung mass, r/o PE.  COMPARISON:  2017.  TECHNIQUE:  Axial 

computed tomography images were obtained of the chest in the pulmonary arterial 

phase of enhancement. Coronal and sagittal reformatted images were created and 

reviewed.  Intravenous contrast dose: 100 mL Visipaque 320.  Radiation dose:  

Total exam DLP = 437.67 mGy-cm.  This CT exam was performed using one or more 

of the following dose reduction techniques: Automated exposure control, 

adjustment of the mA and/or kV according to patient size, and/or use of 

iterative reconstruction technique.  FINDINGS:  PULMONARY ARTERIES:  

Unremarkable. No pulmonary embolism.  AORTA:  No acute findings. No thoracic 

aortic aneurysm.  LUNGS:  There is persistent soft tissue density likely 

reflecting neoplasm along the medial pleural surface of the left upper lobe 

extending to the apex as well as along the medial pleural surface of the right 

upper lobe.  This soft tissue density is contiguous with the hilar 

lymphadenopathy bilaterally. There is spiculation surrounding this bilateral 

upper lobe soft tissue density. On prior examination there was tumor extending 

along the right major fissure in the superior segment right lower lobe.  This 

has largely resolved.  There is persistent multi nodular opacity in the 

superior segment left lower lobe, unchanged. Fine nodular opacities in the 

apical posterior segment of the left upper lobe have largely resolved. There is 

improvement in the extensive pleural-based density along the right lateral 

pericardial pleural surface when compared to prior examination. There is some 

persistent pleural-based density, however. There is an ill-defined right lower 

lobe opacity on prior examination which has largely resolved.  PLEURAL SPACES:  

No pleural effusion.  HEART:  Unremarkable. No cardiomegaly. No significant 

pericardial effusion.  LYMPH NODES:  There is mediastinal and bilateral hilar 

lymphadenopathy. No gross interval change in extent when compared to the prior 

examination.  BONES, CHEST WALL:  Unremarkable. No fracture or destructive 

lesion.  OTHER FINDINGS:  1.6 cm rounded low-density mass immediately abutting 

the right lateral aspect of the distal thoracic esophagus. In retrospect this 

is seen on prior CT examination of 2017.  This may represent a 

paraesophageal lymph node. This could also represent a small esophageal 

diverticulum.  IMPRESSION:  No evidence of pulmonary embolism. There has been 

some interval improvement in the extent of neoplastic disease in both lungs as 

compared to prior examination of 2017.  Persistent mediastinal and 

bilateral hilar lymphadenopathy. Small rounded density adjacent to right 

lateral aspect distal thoracic esophagus likely representing either 

paraesophageal lymph node or esophageal diverticulum.


Progress Note: Blood work, CXR, EKG, Angio chest CT was ordered and reviewed. 

Pt was given Albuterol treatment.  Dr. Hensley currently on blue list, requests 

admission be under Dr. PHILLIP Gonsalez for now.  Dr. Gonsalez notified as well.





Disposition





- Disposition


Forms:  [facility] (English)





- Scribe Statement


The provider has reviewed the documentation as recorded by the [address]





All medical record entries made by the Mal[address] were at my direction and 

personally dictated by me. I have reviewed the chart and agree that the record 

accurately reflects my personal performance of the history, physical exam, 

medical decision making, and the department course for this patient. I have 

also personally directed, reviewed, and agree with the discharge instructions 

and disposition.

## 2018-06-17 NOTE — CP.PCM.HP
History of Present Illness





- History of Present Illness


History of Present Illness: 





Chief complaint: shortness of breath





78 year old male with history of COPD, hypertension, diabetes, GERD presents to 

the ED with 3 days history of cough and shortness of breath. Denies ever being 

intubated. Admits to compliance with medications. patient states cough is dry 

and associated with increasing shortness of breath.CAT scan of the chest done 

consistent with lung nodules.  Patient had PET scan done outpatient consistent 

with inflammation/pneumonia.





PMHx: COPD, hypertension, diabetes, GERD 


SHx: none


Medications: patient does not know them. 


Family Hx: none


NKDA


Social Hx: Former smoker, alcohol or illicit drug use. 








Review of Systems





- Review of Systems


All systems: reviewed and no additional remarkable complaints except (shortness 

of breath and cough)





Past Patient History





- Infectious Disease


Hx of Infectious Diseases: None





- Past Medical History & Family History


Past Medical History?: Yes





- Past Social History


Smoking Status: Former Smoker





- CARDIAC


Hx Hypertension: Yes





- PULMONARY


Hx Asthma: Yes


Hx Chronic Obstructive Pulmonary Disease (COPD): Yes (ASTHMA)





- NEUROLOGICAL


Hx Neurological Disorder: No





- HEENT


Hx HEENT Problems: No





- RENAL


Hx Chronic Kidney Disease: No





- ENDOCRINE/METABOLIC


Hx Endocrine Disorders: Yes


Hx Diabetes Mellitus Type 1: Yes





- HEMATOLOGICAL/ONCOLOGICAL


Hx Blood Disorders: No





- INTEGUMENTARY


Hx Dermatological Problems: Yes


Other/Comment: SKIN ASTHMA





- MUSCULOSKELETAL/RHEUMATOLOGICAL


Hx Arthritis: Yes (B/L KNEE JT)


Hx Falls: No





- GASTROINTESTINAL


Hx Gastritis: Yes





- GENITOURINARY/GYNECOLOGICAL


Hx Genitourinary Disorders: Yes


Hx Prostate Problems: Yes





- PSYCHIATRIC


Hx Psychophysiologic Disorder: No


Hx Substance Use: No





- SURGICAL HISTORY


Hx Surgeries: No





- ANESTHESIA


Hx Anesthesia: No


Hx Anesthesia Reactions: No


Hx Malignant Hyperthermia: No





Meds


Allergies/Adverse Reactions: 


 Allergies











Allergy/AdvReac Type Severity Reaction Status Date / Time


 


seasonal allergies Allergy Mild CONGESTION Uncoded 07/20/17 17:22














Results





- Vital Signs


Recent Vital Signs: 





 Last Vital Signs











Temp  98.1 F   06/17/18 18:35


 


Pulse  104 H  06/17/18 20:00


 


Resp  18   06/17/18 19:07


 


BP  147/92 H  06/17/18 18:35


 


Pulse Ox  97   06/17/18 19:07














- Labs


Result Diagrams: 


 06/17/18 11:57





 06/17/18 11:57


Labs: 





 Laboratory Results - last 24 hr











  06/17/18 06/17/18 06/17/18





  11:57 11:57 12:31


 


WBC  8.5  


 


RBC  5.43  


 


Hgb  13.6  


 


Hct  41.7  


 


MCV  76.8 L D  


 


MCH  25.1 L  


 


MCHC  32.7 L  


 


RDW  17.6 H  


 


Plt Count  192  


 


MPV  9.3  


 


Neut % (Auto)  69.4  


 


Lymph % (Auto)  19.2 L  


 


Mono % (Auto)  10.1 H  


 


Eos % (Auto)  0.7  


 


Baso % (Auto)  0.6  


 


Neut # (Auto)  5.9  


 


Lymph # (Auto)  1.6  


 


Mono # (Auto)  0.9 H  


 


Eos # (Auto)  0.1  


 


Baso # (Auto)  0.1  


 


Sodium   139 


 


Potassium   4.2 


 


Chloride   98 


 


Carbon Dioxide   29 


 


Anion Gap   16 


 


BUN   20 


 


Creatinine   0.8 


 


Est GFR ( Amer)   > 60 


 


Est GFR (Non-Af Amer)   > 60 


 


POC Glucose (mg/dL)    260 H


 


Random Glucose   192 H 


 


Calcium   9.1 


 


Total Bilirubin   0.8 


 


AST   28 


 


ALT   30 


 


Alkaline Phosphatase   48 


 


Total Creatine Kinase   49 L 


 


CK-MB (Mass)   1.50 


 


Troponin I   < 0.0120 


 


NT-Pro-B Natriuret Pep   143 


 


Total Protein   6.9 


 


Albumin   4.0 


 


Globulin   2.9 


 


Albumin/Globulin Ratio   1.4 














Assessment & Plan


(1) COPD exacerbation


Status: Acute   


Comment: continue IV steroids.  Continue nebulizer treatm.  IV antibiotics.  

Culture and sensitivity   





(2) Lung nodule, multiple


Status: Acute   


Comment: CT of the chestshowed decreased in size of lung nodule

## 2018-06-18 VITALS — RESPIRATION RATE: 20 BRPM

## 2018-06-18 RX ADMIN — METHYLPREDNISOLONE SODIUM SUCCINATE SCH MG: 40 INJECTION, POWDER, FOR SOLUTION INTRAMUSCULAR; INTRAVENOUS at 06:07

## 2018-06-18 RX ADMIN — CEFTRIAXONE SCH MLS/HR: 1 INJECTION, SOLUTION INTRAVENOUS at 09:19

## 2018-06-18 RX ADMIN — METHYLPREDNISOLONE SODIUM SUCCINATE SCH MG: 40 INJECTION, POWDER, FOR SOLUTION INTRAMUSCULAR; INTRAVENOUS at 13:17

## 2018-06-18 RX ADMIN — IPRATROPIUM BROMIDE AND ALBUTEROL SULFATE SCH ML: .5; 3 SOLUTION RESPIRATORY (INHALATION) at 13:11

## 2018-06-18 RX ADMIN — METHYLPREDNISOLONE SODIUM SUCCINATE SCH MG: 40 INJECTION, POWDER, FOR SOLUTION INTRAMUSCULAR; INTRAVENOUS at 21:15

## 2018-06-18 RX ADMIN — IPRATROPIUM BROMIDE AND ALBUTEROL SULFATE SCH ML: .5; 3 SOLUTION RESPIRATORY (INHALATION) at 19:34

## 2018-06-18 RX ADMIN — IPRATROPIUM BROMIDE AND ALBUTEROL SULFATE SCH ML: .5; 3 SOLUTION RESPIRATORY (INHALATION) at 07:30

## 2018-06-18 RX ADMIN — ENOXAPARIN SODIUM SCH MG: 40 INJECTION SUBCUTANEOUS at 09:18

## 2018-06-18 RX ADMIN — IPRATROPIUM BROMIDE AND ALBUTEROL SULFATE SCH ML: .5; 3 SOLUTION RESPIRATORY (INHALATION) at 02:06

## 2018-06-18 NOTE — CARD
--------------- APPROVED REPORT --------------





EKG Measurement

Heart [id number]

HI [id number]

[id number]

[id number]

[id number]



<Conclusion>

Sinus tachycardia with premature atrial complexes

Left axis deviation

Right bundle branch block

Abnormal ECG

## 2018-06-18 NOTE — CP.PCM.PN
Subjective





- Date & Time of Evaluation


Date of Evaluation: 06/18/18


Time of Evaluation: 17:40





- Subjective


Subjective: 





patient seen and examined


Still complaining of shortness of breath


Afebrile


No chest pain


Being treated for COPD exacerbation


Patient has history of ESBL  in the sputum





Objective





- Vital Signs/Intake and Output


Vital Signs (last 24 hours): 


 











Temp Pulse Resp BP Pulse Ox


 


 98 F   48 L  20   128/73   97 


 


 06/18/18 15:47  06/18/18 15:47  06/18/18 15:47  06/18/18 15:47  06/18/18 15:47











- Medications


Medications: 


 Current Medications





Albuterol/Ipratropium (Duoneb 3 Mg/0.5 Mg (3 Ml) Ud)  3 ml INH RQ6 [facility]


   Last Admin: 06/18/18 13:11 Dose:  3 ml


Enoxaparin Sodium (Lovenox)  40 mg SC DAILY [facility]


   Last Admin: 06/18/18 09:18 Dose:  40 mg


Finasteride (Proscar)  5 mg PO DAILY [facility]


   Last Admin: 06/18/18 09:15 Dose:  5 mg


Azithromycin 500 mg/ Sodium (Chloride)  250 mls @ 250 mls/hr IVPB DAILY [facility]


   PRN Reason: Protocol


   Last Admin: 06/18/18 10:13 Dose:  250 mls/hr


Ceftriaxone Sodium (Rocephin Iv 1 Gm Duplex)  50 mls @ 100 mls/hr IVPB DAILY [facility]


   PRN Reason: Protocol


   Last Admin: 06/18/18 09:19 Dose:  100 mls/hr


Losartan Potassium (Cozaar)  100 mg PO DAILY [facility]


   Last Admin: 06/18/18 09:17 Dose:  100 mg


Metformin HCl (Glucophage)  1,000 mg PO BID [facility]


   Last Admin: 06/18/18 17:28 Dose:  1,000 mg


Methylprednisolone (Solu-Medrol)  40 mg IVP Q8 [facility]


   Last Admin: 06/18/18 13:17 Dose:  40 mg


Pantoprazole Sodium (Protonix Inj)  40 mg IVP DAILY [facility]


   Last Admin: 06/18/18 09:18 Dose:  40 mg


Tamsulosin HCl (Flomax)  0.4 mg PO BID [facility]


   Last Admin: 06/18/18 17:29 Dose:  0.4 mg











- Labs


Labs: 


 





 06/17/18 11:57 





 06/17/18 11:57 











Assessment and Plan


(1) COPD exacerbation


Status: Acute   





(2) Lung nodule, multiple


Status: Acute

## 2018-06-18 NOTE — CP.PCM.PN
Subjective





- Date & Time of Evaluation


Date of Evaluation: 06/18/18





Objective





- Vital Signs/Intake and Output


Vital Signs (last 24 hours): 


 











Temp Pulse Resp BP Pulse Ox


 


 98 F   108 H  20   128/73   97 


 


 06/18/18 15:47  06/18/18 22:37  06/18/18 15:47  06/18/18 15:47  06/18/18 15:47











- Medications


Medications: 


 Current Medications





Albuterol/Ipratropium (Duoneb 3 Mg/0.5 Mg (3 Ml) Ud)  3 ml INH RQ6 [facility]


   Last Admin: 06/18/18 19:34 Dose:  3 ml


Enoxaparin Sodium (Lovenox)  40 mg SC DAILY [facility]


   Last Admin: 06/18/18 09:18 Dose:  40 mg


Finasteride (Proscar)  5 mg PO DAILY [facility]


   Last Admin: 06/18/18 09:15 Dose:  5 mg


Azithromycin 500 mg/ Sodium (Chloride)  250 mls @ 250 mls/hr IVPB DAILY [facility]


   PRN Reason: Protocol


   Last Admin: 06/18/18 10:13 Dose:  250 mls/hr


Ceftriaxone Sodium (Rocephin Iv 1 Gm Duplex)  50 mls @ 100 mls/hr IVPB DAILY [facility]


   PRN Reason: Protocol


   Last Admin: 06/18/18 09:19 Dose:  100 mls/hr


Losartan Potassium (Cozaar)  100 mg PO DAILY [facility]


   Last Admin: 06/18/18 09:17 Dose:  100 mg


Metformin HCl (Glucophage)  1,000 mg PO BID [facility]


   Last Admin: 06/18/18 17:28 Dose:  1,000 mg


Methylprednisolone (Solu-Medrol)  40 mg IVP Q8 [facility]


   Last Admin: 06/18/18 21:15 Dose:  40 mg


Pantoprazole Sodium (Protonix Inj)  40 mg IVP DAILY [facility]


   Last Admin: 06/18/18 09:18 Dose:  40 mg


Tamsulosin HCl (Flomax)  0.4 mg PO BID [facility]


   Last Admin: 06/18/18 17:29 Dose:  0.4 mg











- Labs


Labs: 


 





 06/17/18 11:57 





 06/17/18 11:57

## 2018-06-19 RX ADMIN — IPRATROPIUM BROMIDE AND ALBUTEROL SULFATE SCH ML: .5; 3 SOLUTION RESPIRATORY (INHALATION) at 04:52

## 2018-06-19 RX ADMIN — ENOXAPARIN SODIUM SCH MG: 40 INJECTION SUBCUTANEOUS at 09:27

## 2018-06-19 RX ADMIN — METHYLPREDNISOLONE SODIUM SUCCINATE SCH MG: 40 INJECTION, POWDER, FOR SOLUTION INTRAMUSCULAR; INTRAVENOUS at 21:42

## 2018-06-19 RX ADMIN — IPRATROPIUM BROMIDE AND ALBUTEROL SULFATE SCH ML: .5; 3 SOLUTION RESPIRATORY (INHALATION) at 07:44

## 2018-06-19 RX ADMIN — METHYLPREDNISOLONE SODIUM SUCCINATE SCH MG: 40 INJECTION, POWDER, FOR SOLUTION INTRAMUSCULAR; INTRAVENOUS at 06:35

## 2018-06-19 RX ADMIN — METHYLPREDNISOLONE SODIUM SUCCINATE SCH MG: 40 INJECTION, POWDER, FOR SOLUTION INTRAMUSCULAR; INTRAVENOUS at 13:14

## 2018-06-19 RX ADMIN — IPRATROPIUM BROMIDE AND ALBUTEROL SULFATE SCH ML: .5; 3 SOLUTION RESPIRATORY (INHALATION) at 13:48

## 2018-06-19 RX ADMIN — IPRATROPIUM BROMIDE AND ALBUTEROL SULFATE SCH ML: .5; 3 SOLUTION RESPIRATORY (INHALATION) at 19:18

## 2018-06-19 RX ADMIN — CEFTRIAXONE SCH MLS/HR: 1 INJECTION, SOLUTION INTRAVENOUS at 09:26

## 2018-06-19 NOTE — CP.PCM.PN
Subjective





- Date & Time of Evaluation


Date of Evaluation: 06/19/18


Time of Evaluation: 13:20





- Subjective


Subjective: 





patient seen and examined


Breathing and cough much improved


Afebrile


No chest pain





Objective





- Vital Signs/Intake and Output


Vital Signs (last 24 hours): 


 











Temp Pulse Resp BP Pulse Ox


 


 97.5 F L  90   20   155/80 H  98 


 


 06/19/18 15:40  06/19/18 15:40  06/19/18 15:40  06/19/18 15:40  06/19/18 15:40











- Medications


Medications: 


 Current Medications





Albuterol/Ipratropium (Duoneb 3 Mg/0.5 Mg (3 Ml) Ud)  3 ml INH RQ6 [facility]


   Last Admin: 06/19/18 13:48 Dose:  3 ml


Enoxaparin Sodium (Lovenox)  40 mg SC DAILY [facility]


   Last Admin: 06/19/18 09:27 Dose:  40 mg


Finasteride (Proscar)  5 mg PO DAILY [facility]


   Last Admin: 06/19/18 09:25 Dose:  5 mg


Azithromycin 500 mg/ Sodium (Chloride)  250 mls @ 250 mls/hr IVPB DAILY [facility]


   PRN Reason: Protocol


   Last Admin: 06/19/18 10:17 Dose:  250 mls/hr


Ceftriaxone Sodium (Rocephin Iv 1 Gm Duplex)  50 mls @ 100 mls/hr IVPB DAILY [facility]


   PRN Reason: Protocol


   Last Admin: 06/19/18 09:26 Dose:  100 mls/hr


Losartan Potassium (Cozaar)  100 mg PO DAILY [facility]


   Last Admin: 06/19/18 09:25 Dose:  100 mg


Metformin HCl (Glucophage)  1,000 mg PO BID [facility]


   Last Admin: 06/19/18 09:25 Dose:  1,000 mg


Methylprednisolone (Solu-Medrol)  40 mg IVP Q8 [facility]


   Last Admin: 06/19/18 13:14 Dose:  40 mg


Pantoprazole Sodium (Protonix Inj)  40 mg IVP DAILY [facility]


   Last Admin: 06/19/18 09:25 Dose:  40 mg


Tamsulosin HCl (Flomax)  0.4 mg PO BID [facility]


   Last Admin: 06/19/18 09:25 Dose:  0.4 mg











- Labs


Labs: 


 





 06/17/18 11:57 





 06/17/18 11:57 











- Head Exam


Head Exam: ATRAUMATIC, NORMOCEPHALIC





- ENT Exam


ENT Exam: Mucous Membranes Moist





- Neck Exam


Neck Exam: Normal Inspection





- Respiratory Exam


Respiratory Exam: Decreased Breath Sounds





Assessment and Plan


(1) COPD exacerbation


Assessment & Plan: 


switch to by mouth prednisone


Continue nebulizer treatment


Continue antibiotics


Okay to discharge home in a.m.


Status: Acute   





(2) Lung nodule, multiple


Status: Acute

## 2018-06-20 VITALS — HEART RATE: 94 BPM

## 2018-06-20 VITALS — DIASTOLIC BLOOD PRESSURE: 54 MMHG | TEMPERATURE: 97.5 F | OXYGEN SATURATION: 98 % | SYSTOLIC BLOOD PRESSURE: 81 MMHG

## 2018-06-20 LAB
ANISOCYTOSIS BLD QL SMEAR: SLIGHT
BASOPHILS # BLD AUTO: 0 K/UL (ref 0–0.2)
BASOPHILS NFR BLD: 0.2 % (ref 0–2)
BUN SERPL-MCNC: 31 MG/DL (ref 9–20)
CALCIUM SERPL-MCNC: 9.1 MG/DL (ref 8.6–10.4)
EOSINOPHIL # BLD AUTO: 0 K/UL (ref 0–0.7)
EOSINOPHIL NFR BLD: 0 % (ref 0–4)
ERYTHROCYTE [DISTWIDTH] IN BLOOD BY AUTOMATED COUNT: 17.3 % (ref 11.5–14.5)
GFR NON-AFRICAN AMERICAN: > 60
HGB BLD-MCNC: 12.8 G/DL (ref 12–18)
LG PLATELETS BLD QL SMEAR: PRESENT
LYMPHOCYTE: 4 % (ref 20–40)
LYMPHOCYTES # BLD AUTO: 0.4 K/UL (ref 1–4.3)
LYMPHOCYTES NFR BLD AUTO: 3.3 % (ref 20–40)
MCH RBC QN AUTO: 24.6 PG (ref 27–31)
MCHC RBC AUTO-ENTMCNC: 31.7 G/DL (ref 33–37)
MCV RBC AUTO: 77.6 FL (ref 80–94)
MONOCYTE: 6 % (ref 0–10)
MONOCYTES # BLD: 0.7 K/UL (ref 0–0.8)
MONOCYTES NFR BLD: 6.3 % (ref 0–10)
NEUTROPHILS # BLD: 10.1 K/UL (ref 1.8–7)
NEUTROPHILS NFR BLD AUTO: 88 % (ref 50–75)
NEUTROPHILS NFR BLD AUTO: 90.2 % (ref 50–75)
NEUTS BAND NFR BLD: 2 % (ref 0–2)
NRBC BLD AUTO-RTO: 0.1 % (ref 0–2)
OVALOCYTES BLD QL SMEAR: SLIGHT
PLATELET # BLD EST: NORMAL 10*3/UL
PLATELET # BLD: 201 K/UL (ref 130–400)
PMV BLD AUTO: 9.6 FL (ref 7.2–11.7)
RBC # BLD AUTO: 5.2 MIL/UL (ref 4.4–5.9)
TOTAL CELLS COUNTED BLD: 100
WBC # BLD AUTO: 11.2 K/UL (ref 4.8–10.8)

## 2018-06-20 RX ADMIN — CEFTRIAXONE SCH MLS/HR: 1 INJECTION, SOLUTION INTRAVENOUS at 09:25

## 2018-06-20 RX ADMIN — IPRATROPIUM BROMIDE AND ALBUTEROL SULFATE SCH ML: .5; 3 SOLUTION RESPIRATORY (INHALATION) at 01:10

## 2018-06-20 RX ADMIN — IPRATROPIUM BROMIDE AND ALBUTEROL SULFATE SCH ML: .5; 3 SOLUTION RESPIRATORY (INHALATION) at 13:47

## 2018-06-20 RX ADMIN — IPRATROPIUM BROMIDE AND ALBUTEROL SULFATE SCH ML: .5; 3 SOLUTION RESPIRATORY (INHALATION) at 07:35

## 2018-06-20 RX ADMIN — ENOXAPARIN SODIUM SCH MG: 40 INJECTION SUBCUTANEOUS at 09:24

## 2018-06-20 NOTE — CP.PCM.PN
Subjective





- Date & Time of Evaluation


Date of Evaluation: 06/20/18


Time of Evaluation: 11:00





- Subjective


Subjective: 





Patient seen today , sates sob and cough improved, denies any fever, chills, 

abdominal pain 


oob ambulating without sob 


a febrile 





Objective





- Vital Signs/Intake and Output


Vital Signs (last 24 hours): 


 











Temp Pulse Resp BP Pulse Ox


 


 97.5 F L  94 H  20   81/54 L  98 


 


 06/20/18 07:00  06/20/18 12:01  06/20/18 07:00  06/20/18 07:00  06/20/18 07:00











- Medications


Medications: 


 Current Medications





Albuterol/Ipratropium (Duoneb 3 Mg/0.5 Mg (3 Ml) Ud)  3 ml INH RQ6 [facility]


   Last Admin: 06/20/18 07:35 Dose:  3 ml


Enoxaparin Sodium (Lovenox)  40 mg SC DAILY [facility]


   Last Admin: 06/20/18 09:24 Dose:  40 mg


Finasteride (Proscar)  5 mg PO DAILY [facility]


   Last Admin: 06/20/18 09:24 Dose:  5 mg


Guaifenesin (Mucinex La)  600 mg PO BID [facility]


   Last Admin: 06/20/18 09:24 Dose:  600 mg


Azithromycin 500 mg/ Sodium (Chloride)  250 mls @ 250 mls/hr IVPB DAILY [facility]


   PRN Reason: Protocol


   Last Admin: 06/20/18 09:36 Dose:  250 mls/hr


Ceftriaxone Sodium (Rocephin Iv 1 Gm Duplex)  50 mls @ 100 mls/hr IVPB DAILY [facility]


   PRN Reason: Protocol


   Last Admin: 06/20/18 09:25 Dose:  100 mls/hr


Losartan Potassium (Cozaar)  100 mg PO DAILY [facility]


   Last Admin: 06/20/18 09:24 Dose:  100 mg


Metformin HCl (Glucophage)  1,000 mg PO BID [facility]


   Last Admin: 06/20/18 09:24 Dose:  1,000 mg


Methylprednisolone (Solu-Medrol)  60 mg IVP DAILY [facility]


   Last Admin: 06/20/18 09:25 Dose:  60 mg


Pantoprazole Sodium (Protonix Inj)  40 mg IVP DAILY [facility]


   Last Admin: 06/20/18 09:24 Dose:  40 mg


Tamsulosin HCl (Flomax)  0.4 mg PO BID [facility]


   Last Admin: 06/20/18 09:24 Dose:  0.4 mg


Tiotropium Bromide (Spiriva)  18 mcg INH RQ24 [facility]


   Last Admin: 06/20/18 08:14 Dose:  Not Given











- Labs


Labs: 


 





 06/20/18 07:06 





 06/20/18 07:06 











- Constitutional


Appears: Well, No Acute Distress





- Respiratory Exam


Respiratory Exam: Rhonchi, NORMAL BREATHING PATTERN





- Cardiovascular Exam


Cardiovascular Exam: REGULAR RHYTHM, +S1, +S2





- Neurological Exam


Neurological Exam: Alert, Awake, Oriented x3





Assessment and Plan





- Assessment and Plan (Free Text)


Assessment: 





A/P 


78 year old male, with PMHx of COPD , diabetes, HTN  admitted with sob / exc. 

COPD 


patient clinically improved with steroids and antibiotics 


Seen by Dr. Gonsalez, cleared for discharge home today from pulmonary standpoint  

with taper dose of prednison and zithromax 


D/W Dr. Hensley, stable for discharge home today 


Discharge plsn discussed with patient and family memeber at bed side

## 2018-06-20 NOTE — CP.PCM.PN
Subjective





- Date & Time of Evaluation


Date of Evaluation: 06/19/18


Time of Evaluation: 18:00





- Subjective


Subjective: 





patient seen and examined


Breathing and cough much improved


Afebrile


No chest pain








Objective





- Vital Signs/Intake and Output


Vital Signs (last 24 hours): 


 











Temp Pulse Resp BP Pulse Ox


 


 97.8 F   91 H  20   156/85 H  96 


 


 06/19/18 23:20  06/19/18 23:20  06/19/18 23:20  06/19/18 23:20  06/19/18 23:20











- Medications


Medications: 


 Current Medications





Albuterol/Ipratropium (Duoneb 3 Mg/0.5 Mg (3 Ml) Ud)  3 ml INH RQ6 [facility]


   Last Admin: 06/20/18 07:35 Dose:  3 ml


Enoxaparin Sodium (Lovenox)  40 mg SC DAILY [facility]


   Last Admin: 06/19/18 09:27 Dose:  40 mg


Finasteride (Proscar)  5 mg PO DAILY [facility]


   Last Admin: 06/19/18 09:25 Dose:  5 mg


Guaifenesin (Mucinex La)  600 mg PO BID [facility]


Azithromycin 500 mg/ Sodium (Chloride)  250 mls @ 250 mls/hr IVPB DAILY [facility]


   PRN Reason: Protocol


   Last Admin: 06/19/18 10:17 Dose:  250 mls/hr


Ceftriaxone Sodium (Rocephin Iv 1 Gm Duplex)  50 mls @ 100 mls/hr IVPB DAILY [facility]


   PRN Reason: Protocol


   Last Admin: 06/19/18 09:26 Dose:  100 mls/hr


Losartan Potassium (Cozaar)  100 mg PO DAILY [facility]


   Last Admin: 06/19/18 09:25 Dose:  100 mg


Metformin HCl (Glucophage)  1,000 mg PO BID [facility]


   Last Admin: 06/19/18 17:23 Dose:  1,000 mg


Methylprednisolone (Solu-Medrol)  60 mg IVP DAILY [facility]


Pantoprazole Sodium (Protonix Inj)  40 mg IVP DAILY [facility]


   Last Admin: 06/19/18 09:25 Dose:  40 mg


Tamsulosin HCl (Flomax)  0.4 mg PO BID [facility]


   Last Admin: 06/19/18 17:23 Dose:  0.4 mg


Tiotropium Bromide (Spiriva)  18 mcg INH RQ24 [facility]











- Labs


Labs: 


 





 06/20/18 07:06 





 06/17/18 11:57

## 2018-06-20 NOTE — CP.PCM.PN
Subjective





- Date & Time of Evaluation


Date of Evaluation: 06/18/18


Time of Evaluation: 17:00





- Subjective


Subjective: 








Chief complaint: shortness of breath





78 year old male with history of COPD, hypertension, diabetes, GERD presents to 

the ED with 3 days history of cough and shortness of breath. Denies ever being 

intubated. Admits to compliance with medications. patient states cough is dry 

and associated with increasing shortness of breath.CAT scan of the chest done 

consistent with lung nodules.  Patient had PET scan done outpatient consistent 

with inflammation/pneumonia.





PMHx: COPD, hypertension, diabetes, GERD 


SHx: none


Medications: patient does not know them. 


Family Hx: none


NKDA


Social Hx: Former smoker, alcohol or illicit drug use. 








 Review of Systems 





- Review of Systems


All systems: reviewed and no additional remarkable complaints except (shortness 

of breath and cough)








Objective





- Vital Signs/Intake and Output


Vital Signs (last 24 hours): 


 











Temp Pulse Resp BP Pulse Ox


 


 97.8 F   91 H  20   156/85 H  96 


 


 06/19/18 23:20  06/19/18 23:20  06/19/18 23:20  06/19/18 23:20  06/19/18 23:20











- Medications


Medications: 


 Current Medications





Albuterol/Ipratropium (Duoneb 3 Mg/0.5 Mg (3 Ml) Ud)  3 ml [facility]


   Last Admin: 06/20/18 07:35 Dose:  3 ml


Enoxaparin Sodium (Lovenox)  40 mg SC [facility]ILY [facility]


   Last Admin: 06/19/18 09:27 Dose:  40 mg


Finasteride (Proscar)  5 mg PO DAILY [facility]


   Last Admin: 06/19/18 09:25 Dose:  5 mg


Guaifenesin (Mucinex La)  600 mg PO BID [facility]


Azithromycin 500 mg/ Sodium (Chloride)  250 mls @ 250 mls/hr IVPB [facility]ILY [facility]


   PRN Reason: Protocol


   Last Admin: 06/19/18 10:17 Dose:  250 mls/hr


Ceftriaxone Sodium (Rocephin Iv 1 Gm Duplex)  50 mls @ 100 mls/hr IVPB [facility]ILY [facility]


   PRN Reason: Protocol


   Last Admin: 06/19/18 09:26 Dose:  100 mls/hr


Losartan Potassium (Cozaar)  100 mg PO DAILY [facility]


   Last Admin: 06/19/18 09:25 Dose:  100 mg


Metformin HCl (Glucophage)  1,000 mg PO BID [facility]


   Last Admin: 06/19/18 17:23 Dose:  1,000 mg


Methylprednisolone (Solu-Medrol)  60 mg IVP DAILY [facility]


Pantoprazole Sodium (Protonix Inj)  40 mg IVP DAILY [facility]


   Last Admin: 06/19/18 09:25 Dose:  40 mg


Tamsulosin HCl (Flomax)  0.4 mg PO BID [facility]


   Last Admin: 06/19/18 17:23 Dose:  0.4 mg


Tiotropium Bromide (Spiriva)  18 mcg INH RQ24 [facility]











- Labs


Labs: 


 





 06/20/18 07:06 





 06/17/18 11:57

## 2018-06-20 NOTE — CP.PCM.DIS
Provider





- Provider


Date of Admission: 


06/17/18 16:14





Attending physician: 


Tae Hensley MD





Time Spent in preparation of Discharge (in minutes): 45





Hospital Course





- Lab Results


Lab Results: 


 Most Recent Lab Values











WBC  11.2 K/uL (4.8-10.8)  H  06/20/18  07:06    


 


RBC  5.20 Mil/uL (4.40-5.90)   06/20/18  07:06    


 


Hgb  12.8 g/dL (12.0-18.0)   06/20/18  07:06    


 


Hct  40.3 % (35.0-51.0)   06/20/18  07:06    


 


MCV  77.6 fL (80.0-94.0)  L  06/20/18  07:06    


 


MCH  24.6 pg (27.0-31.0)  L  06/20/18  07:06    


 


MCHC  31.7 g/dL (33.0-37.0)  L  06/20/18  07:06    


 


RDW  17.3 % (11.5-14.5)  H  06/20/18  07:06    


 


Plt Count  201 K/uL (130-400)   06/20/18  07:06    


 


MPV  9.6 fL (7.2-11.7)   06/20/18  07:06    


 


Neut % (Auto)  90.2 % (50.0-75.0)  H  06/20/18  07:06    


 


Lymph % (Auto)  3.3 % (20.0-40.0)  L  06/20/18  07:06    


 


Mono % (Auto)  6.3 % (0.0-10.0)   06/20/18  07:06    


 


Eos % (Auto)  0.0 % (0.0-4.0)   06/20/18  07:06    


 


Baso % (Auto)  0.2 % (0.0-2.0)   06/20/18  07:06    


 


Neut # (Auto)  10.1 K/uL (1.8-7.0)  H  06/20/18  07:06    


 


Lymph # (Auto)  0.4 K/uL (1.0-4.3)  L  06/20/18  07:06    


 


Mono # (Auto)  0.7 K/uL (0.0-0.8)   06/20/18  07:06    


 


Eos # (Auto)  0.0 K/uL (0.0-0.7)   06/20/18  07:06    


 


Baso # (Auto)  0.0 K/uL (0.0-0.2)   06/20/18  07:06    


 


Neutrophils % (Manual)  88 % (50-75)  H  06/20/18  07:06    


 


Band Neutrophils %  2 % (0-2)   06/20/18  07:06    


 


Lymphocytes % (Manual)  4 % (20-40)  L  06/20/18  07:06    


 


Monocytes % (Manual)  6 % (0-10)   06/20/18  07:06    


 


Platelet Estimate  Normal  (NORMAL)   06/20/18  07:06    


 


Large Platelets  Present   06/20/18  07:06    


 


Anisocytosis (manual)  Slight   06/20/18  07:06    


 


Ovalocytes  Slight   06/20/18  07:06    


 


Sodium  138 mmol/L (132-148)   06/20/18  07:06    


 


Potassium  4.6 mmol/L (3.6-5.2)   06/20/18  07:06    


 


Chloride  100 mmol/L ()   06/20/18  07:06    


 


Carbon Dioxide  27 mmol/L (22-30)   06/20/18  07:06    


 


Anion Gap  15  (10-20)   06/20/18  07:06    


 


BUN  31 mg/dL (9-20)  H  06/20/18  07:06    


 


Creatinine  0.8 mg/dL (0.8-1.5)   06/20/18  07:06    


 


Est GFR ( Amer)  > 60   06/20/18  07:06    


 


Est GFR (Non-Af Amer)  > 60   06/20/18  07:06    


 


POC Glucose (mg/dL)  342 mg/dL ()  H  06/20/18  11:02    


 


Random Glucose  207 mg/dL ()  H  06/20/18  07:06    


 


Calcium  9.1 mg/dl (8.6-10.4)   06/20/18  07:06    


 


Total Bilirubin  0.8 mg/dL (0.2-1.3)   06/17/18  11:57    


 


AST  28 U/L (17-59)   06/17/18  11:57    


 


ALT  30 U/L (21-72)   06/17/18  11:57    


 


Alkaline Phosphatase  48 U/L ()   06/17/18  11:57    


 


Total Creatine Kinase  49 U/L ()  L  06/17/18  11:57    


 


CK-MB (Mass)  1.50 ng/mL (0.0-3.38)   06/17/18  11:57    


 


Troponin I  < 0.0120 ng/mL (0.00-0.120)   06/17/18  11:57    


 


NT-Pro-B Natriuret Pep  143 pg/mL (0-900)   06/17/18  11:57    


 


Total Protein  6.9 g/dL (6.3-8.3)   06/17/18  11:57    


 


Albumin  4.0 g/dL (3.5-5.0)   06/17/18  11:57    


 


Globulin  2.9 gm/dL (2.2-3.9)   06/17/18  11:57    


 


Albumin/Globulin Ratio  1.4  (1.0-2.1)   06/17/18  11:57    














- Hospital Course


Hospital Course: 





Patient seen today , sates sob and cough improved, denies any fever, chills, 

abdominal pain 


oob ambulating without sob 


a febrile 


A/P 


78 year old male, with PMHx of COPD , diabetes, HTN  admitted with sob / exc. 

COPD 


patient clinically improved with steroids and antibiotics 


Seen by me and Dr. Gonsalez, cleared for discharge home today from pulmonary 

standpoint  with taper dose of prednison and zithromax 


stable for discharge home today 


Discharge plsn discussed with patient and family memeber at bed side 








Discharge Exam





- Head Exam


Head Exam: ATRAUMATIC, NORMOCEPHALIC





Discharge Plan





- Discharge Medications


Prescriptions: 


Umeclidinium Brm/Vilanterol Tr [Anoro Ellipta 62.5-25 Mcg INH] 1 each IH DAILY #

2 blst.w.dev


Arformoterol [Brovana] 1 julio IH Q12 30 Days  neb


Guaifenesin [Mucinex] 600 mg PO BID #20 tab.er.12h


predniSONE [Prednisone] 40 mg PO DAILY #30 tab


Albuterol HFA [Ventolin HFA 90 mcg/actuation (8 g)] 0.09 mg IH Q6 #1 puff


Azithromycin [Zithromax] 500 mg PO DAILY #5 tablet





- Follow Up Plan


Condition: GOOD


Disposition: HOME/ ROUTINE


Instructions:  Arformoterol, Heart Healthy Diet, DASH Diet, Azithromycin (

Systemic), Asthma, Adult (DC), High Blood Pressure (DC), Carbohydrate Counting 

Diet, Diabetes Diet , Exacerbation of COPD (DC), Albuterol, Guaifenesin, 

Prednisone, Umeclidinium


Additional Instructions: 


Please follow up with Dr. Rosa Costa office Monday 


Please bring all medication list to Dr. Lee office  for your next 

appointment 


Please follow up with Dr. Gonsalez office in 2 weeks 


continue medication as per med. rec. 


Referrals: 


Raza Gonsalez MD [Staff Provider] -

## 2018-08-16 ENCOUNTER — HOSPITAL ENCOUNTER (INPATIENT)
Dept: HOSPITAL 31 - C.ER | Age: 78
LOS: 4 days | Discharge: HOME | DRG: 192 | End: 2018-08-20
Attending: INTERNAL MEDICINE | Admitting: INTERNAL MEDICINE
Payer: MEDICARE

## 2018-08-16 VITALS — BODY MASS INDEX: 24.7 KG/M2

## 2018-08-16 DIAGNOSIS — I12.9: ICD-10-CM

## 2018-08-16 DIAGNOSIS — Z87.891: ICD-10-CM

## 2018-08-16 DIAGNOSIS — E78.5: ICD-10-CM

## 2018-08-16 DIAGNOSIS — N18.9: ICD-10-CM

## 2018-08-16 DIAGNOSIS — J44.1: Primary | ICD-10-CM

## 2018-08-16 DIAGNOSIS — Z79.4: ICD-10-CM

## 2018-08-16 DIAGNOSIS — E10.22: ICD-10-CM

## 2018-08-16 DIAGNOSIS — Z99.81: ICD-10-CM

## 2018-08-16 LAB
ALBUMIN SERPL-MCNC: 3.9 G/DL (ref 3.5–5)
ALBUMIN/GLOB SERPL: 1.5 {RATIO} (ref 1–2.1)
ALT SERPL-CCNC: 28 U/L (ref 21–72)
AST SERPL-CCNC: 18 U/L (ref 17–59)
BASOPHILS # BLD AUTO: 0.1 K/UL (ref 0–0.2)
BASOPHILS NFR BLD: 0.7 % (ref 0–2)
BUN SERPL-MCNC: 17 MG/DL (ref 9–20)
CALCIUM SERPL-MCNC: 9.8 MG/DL (ref 8.6–10.4)
EOSINOPHIL # BLD AUTO: 0 K/UL (ref 0–0.7)
EOSINOPHIL NFR BLD: 0.4 % (ref 0–4)
ERYTHROCYTE [DISTWIDTH] IN BLOOD BY AUTOMATED COUNT: 18.2 % (ref 11.5–14.5)
GFR NON-AFRICAN AMERICAN: > 60
HGB BLD-MCNC: 13.7 G/DL (ref 12–18)
LYMPHOCYTE: 8 % (ref 20–40)
LYMPHOCYTES # BLD AUTO: 0.8 K/UL (ref 1–4.3)
LYMPHOCYTES NFR BLD AUTO: 8 % (ref 20–40)
MCH RBC QN AUTO: 25.2 PG (ref 27–31)
MCHC RBC AUTO-ENTMCNC: 32.3 G/DL (ref 33–37)
MCV RBC AUTO: 78 FL (ref 80–94)
MONOCYTE: 8 % (ref 0–10)
MONOCYTES # BLD: 0.8 K/UL (ref 0–0.8)
MONOCYTES NFR BLD: 7.6 % (ref 0–10)
NEUTROPHILS # BLD: 8.5 K/UL (ref 1.8–7)
NEUTROPHILS NFR BLD AUTO: 83.3 % (ref 50–75)
NEUTROPHILS NFR BLD AUTO: 84 % (ref 50–75)
NRBC BLD AUTO-RTO: 0.1 % (ref 0–2)
PLATELET # BLD EST: NORMAL 10*3/UL
PLATELET # BLD: 200 K/UL (ref 130–400)
PMV BLD AUTO: 9.1 FL (ref 7.2–11.7)
RBC # BLD AUTO: 5.42 MIL/UL (ref 4.4–5.9)
TOTAL CELLS COUNTED BLD: 100
WBC # BLD AUTO: 10.2 K/UL (ref 4.8–10.8)

## 2018-08-16 RX ADMIN — GUAIFENESIN SCH MG: 600 TABLET, EXTENDED RELEASE ORAL at 20:00

## 2018-08-16 RX ADMIN — IPRATROPIUM BROMIDE AND ALBUTEROL SULFATE SCH ML: .5; 3 SOLUTION RESPIRATORY (INHALATION) at 21:12

## 2018-08-16 NOTE — C.PDOC
History Of Present Illness


77 y/o male, w/PMhx of asthma and COPD, presents to the ER complaining of 

shortness of breath which has been present for the past 3 days. Patient states 

that he has been on 97% Home O2 for the past 3 weeks. Patient reports that he 

was referred to the ER by his PCP. Denies having chest pain, fever, and chills.


Time Seen by Provider: 18 15:30


Chief Complaint (Nursing): Shortness Of Breath


History Per: Patient


History/Exam Limitations: no limitations


Onset/Duration Of Symptoms: Days


Current Symptoms Are (Timing): Still Present


Severity: Moderate





Past Medical History


Reviewed: Historical Data, Nursing Documentation, Vital Signs


Vital Signs: 


 Last Vital Signs











Temp  97.7 F   18 15:32


 


Pulse  107 H  18 15:32


 


Resp  30 H  18 15:32


 


BP  165/84 H  18 15:32


 


Pulse Ox  99   18 15:32














- Medical History


PMH: Arthritis (B/L KNEE JT), Asthma, COPD (ASTHMA), Gastritis, HTN


   Denies: Chronic Kidney Disease


Surgical History: Endoscopy ()





- [facility] Procedures








EXCISION OF UPPER ESOPHAGUS, ENDO, DIAGN (16)


FLUOROSCOPY OF MULT COR ART USING L OSM CONTRAST (17)


FLUOROSCOPY OF RIGHT AND LEFT HEART USING L OSM CONTRAST (17)


INSERTION OF INFUSION DEV INTO SUP VENA CAVA, PERC APPROACH (17)


MEASURE CARDIAC SAMPL & PRESSURE, BILATERAL, PERC (17)


ULTRASONOGRAPHY OF SUPERIOR VENA CAVA, GUIDANCE (17)








Family History: States: No Known Family Hx





- Social History


Hx Tobacco Use: No


Hx Alcohol Use: No


Hx Substance Use: No





- Immunization History


Hx Tetanus Toxoid Vaccination: No


Hx Influenza Vaccination: No


Hx Pneumococcal Vaccination: No





Review Of Systems


Except As Marked, All Systems Reviewed And Found Negative.


Constitutional: Negative for: Fever, Chills


Cardiovascular: Negative for: Chest Pain


Respiratory: Positive for: Shortness of Breath





Physical Exam





- Physical Exam


Appears: Non-toxic, No Acute Distress


Skin: Normal Color, Warm, Dry


Head: Atraumatic, Normacephalic


Eye(s): bilateral: Normal Inspection


Cardiovascular: Rhythm Regular


Respiratory: No Rales, No Wheezing, Other (tachypnea, no edema)


Neurological/Psych: Oriented x3, Normal Speech





ED Course And Treatment





- Laboratory Results


Result Diagrams: 


 18 16:19





 18 16:19


ECG: Interpreted By Me, Viewed By Me


ECG Rhythm: Sinus Rhythm


Interpretation Of ECG: NSR with PVC and RBBB


Rate From EC





- Radiology


CXR: Interpreted by Me, Viewed By Me


CXR Interpretation: Yes: No Acute Disease (unchanged from 18)





Progress





- Re-Evaluation


Re-evaluation Note: 





18 17:28


PERSIST TACHYPNEA BUT IMPROVED FROM PRIOR





D/W DR PABON C/F PMD WILL ADMIT





- Data Reviewed


Data Reviewed: Lab, Diagnostic imaging, EKG, Old records (negative PE- 2018

)





Medical Decision Making


Medical Decision Making: 


Plan:


--Labs


--CXR


--Nebulizer





Disposition


Counseled Patient/Family Regarding: Studies Performed, Diagnosis





- Disposition


Disposition: HOSPITALIZED


Disposition Time: 17:28


Condition: STABLE





- POA


Present On Arrival: Poor Glycemic Control





- Clinical Impression


Clinical Impression: 


 COPD exacerbation








- Scribe Statement


The provider has reviewed the documentation as recorded by the Scribe


Sridhar Estrada


Provider Attestation: 





All medical record entries made by the Scribe were at my direction and 

personally dictated by me. I have reviewed the chart and agree that the record 

accurately reflects my personal performance of the history, physical exam, 

medical decision making, and the department course for this patient. I have 

also personally directed, reviewed, and agree with the discharge instructions 

and disposition.





Decision To Admit





- Pt Status Changed To:


Hospital Disposition Of: Observation





- .


Bed Request Type: Regular


Admitting Physician: Tae Pabon


Patient Diagnosis: 


 COPD exacerbation

## 2018-08-16 NOTE — RAD
Date of service: 



08/16/2018



PROCEDURE:  CHEST RADIOGRAPH, 1 VIEW



HISTORY:

SOB



COMPARISON:

06/17/2018.



FINDINGS:



LUNGS:

Clear.



PLEURA:

No pneumothorax or pleural fluid seen.



CARDIOVASCULAR:

Cardiomegaly.  No evidence of acute, significant cardiovascular 

disease. 



OSSEOUS STRUCTURES:

No significant abnormalities.



VISUALIZED UPPER ABDOMEN:

Normal.



OTHER FINDINGS:

None. 



IMPRESSION:

No active disease.No significant interval change compared to the 

prior examination(s).



___________________________________________________________



Concordant results with the preliminary interpretation rendered by 

the emergency department physician

procedure.

## 2018-08-16 NOTE — CP.PCM.HP
Past Patient History





- Infectious Disease


Hx of Infectious Diseases: None





- Past Medical History & Family History


Past Medical History?: Yes





- Past Social History


Smoking Status: Former Smoker





- CARDIAC


Hx Hypertension: Yes





- PULMONARY


Hx Asthma: Yes


Hx Chronic Obstructive Pulmonary Disease (COPD): Yes (ASTHMA)





- NEUROLOGICAL


Hx Neurological Disorder: No





- HEENT


Hx HEENT Problems: No





- RENAL


Hx Chronic Kidney Disease: No





- ENDOCRINE/METABOLIC


Hx Endocrine Disorders: Yes


Hx Diabetes Mellitus Type 1: Yes





- HEMATOLOGICAL/ONCOLOGICAL


Hx Blood Disorders: No





- INTEGUMENTARY


Hx Dermatological Problems: Yes


Other/Comment: SKIN ASTHMA





- MUSCULOSKELETAL/RHEUMATOLOGICAL


Hx Arthritis: Yes (B/L KNEE JT)





- GASTROINTESTINAL


Hx Gastritis: Yes





- GENITOURINARY/GYNECOLOGICAL


Hx Genitourinary Disorders: Yes


Hx Prostate Problems: Yes





- PSYCHIATRIC


Hx Substance Use: No





- SURGICAL HISTORY


Hx Surgeries: No





- ANESTHESIA


Hx Anesthesia: No


Hx Anesthesia Reactions: No


Hx Malignant Hyperthermia: No





Meds


Allergies/Adverse Reactions: 


 Allergies











Allergy/AdvReac Type Severity Reaction Status Date / Time


 


seasonal allergies Allergy Mild CONGESTION Uncoded 08/16/18 15:26














Results





- Vital Signs


Recent Vital Signs: 





 Last Vital Signs











Temp  98.1 F   08/16/18 19:20


 


Pulse  84   08/16/18 21:19


 


Resp  20   08/16/18 19:20


 


BP  163/84 H  08/16/18 19:20


 


Pulse Ox  95   08/16/18 20:23














- Labs


Result Diagrams: 


 08/16/18 16:19





 08/16/18 16:19


Labs: 





 Laboratory Results - last 24 hr











  08/16/18 08/16/18 08/16/18





  16:19 16:19 20:57


 


WBC  10.2  


 


RBC  5.42  


 


Hgb  13.7  


 


Hct  42.2  


 


MCV  78.0 L  


 


MCH  25.2 L  


 


MCHC  32.3 L  


 


RDW  18.2 H  


 


Plt Count  200  


 


MPV  9.1  


 


Neut % (Auto)  83.3 H  


 


Lymph % (Auto)  8.0 L  


 


Mono % (Auto)  7.6  


 


Eos % (Auto)  0.4  


 


Baso % (Auto)  0.7  


 


Neut # (Auto)  8.5 H  


 


Lymph # (Auto)  0.8 L  


 


Mono # (Auto)  0.8  


 


Eos # (Auto)  0.0  


 


Baso # (Auto)  0.1  


 


Neutrophils % (Manual)  84 H  


 


Lymphocytes % (Manual)  8 L  


 


Monocytes % (Manual)  8  


 


Platelet Estimate  Normal  


 


Sodium   139 


 


Potassium   4.9 


 


Chloride   101 


 


Carbon Dioxide   26 


 


Anion Gap   17 


 


BUN   17 


 


Creatinine   0.8 


 


Est GFR ( Amer)   > 60 


 


Est GFR (Non-Af Amer)   > 60 


 


POC Glucose (mg/dL)    281 H


 


Random Glucose   204 H 


 


Calcium   9.8 


 


Total Bilirubin   0.5 


 


AST   18 


 


ALT   28 


 


Alkaline Phosphatase   45 


 


Total Protein   6.5 


 


Albumin   3.9 


 


Globulin   2.5 


 


Albumin/Globulin Ratio   1.5

## 2018-08-17 RX ADMIN — HUMAN INSULIN SCH: 100 INJECTION, SOLUTION SUBCUTANEOUS at 22:19

## 2018-08-17 RX ADMIN — PURIFIED WATER SCH LOZ: 99.05 LIQUID OPHTHALMIC at 21:22

## 2018-08-17 RX ADMIN — IPRATROPIUM BROMIDE AND ALBUTEROL SULFATE SCH ML: .5; 3 SOLUTION RESPIRATORY (INHALATION) at 15:59

## 2018-08-17 RX ADMIN — PURIFIED WATER SCH LOZ: 99.05 LIQUID OPHTHALMIC at 18:19

## 2018-08-17 RX ADMIN — HUMAN INSULIN SCH UNIT: 100 INJECTION, SOLUTION SUBCUTANEOUS at 16:19

## 2018-08-17 RX ADMIN — GUAIFENESIN SCH MG: 600 TABLET, EXTENDED RELEASE ORAL at 09:47

## 2018-08-17 RX ADMIN — IPRATROPIUM BROMIDE AND ALBUTEROL SULFATE SCH ML: .5; 3 SOLUTION RESPIRATORY (INHALATION) at 19:37

## 2018-08-17 RX ADMIN — IPRATROPIUM BROMIDE AND ALBUTEROL SULFATE SCH ML: .5; 3 SOLUTION RESPIRATORY (INHALATION) at 08:40

## 2018-08-17 RX ADMIN — PURIFIED WATER SCH LOZ: 99.05 LIQUID OPHTHALMIC at 09:46

## 2018-08-17 RX ADMIN — PURIFIED WATER SCH LOZ: 99.05 LIQUID OPHTHALMIC at 14:09

## 2018-08-17 RX ADMIN — HUMAN INSULIN SCH UNIT: 100 INJECTION, SOLUTION SUBCUTANEOUS at 07:53

## 2018-08-17 RX ADMIN — PANTOPRAZOLE SODIUM SCH MG: 40 TABLET, DELAYED RELEASE ORAL at 09:48

## 2018-08-17 RX ADMIN — ENOXAPARIN SODIUM SCH MG: 40 INJECTION SUBCUTANEOUS at 09:47

## 2018-08-17 RX ADMIN — GUAIFENESIN SCH MG: 600 TABLET, EXTENDED RELEASE ORAL at 18:20

## 2018-08-17 RX ADMIN — IPRATROPIUM BROMIDE AND ALBUTEROL SULFATE SCH: .5; 3 SOLUTION RESPIRATORY (INHALATION) at 03:02

## 2018-08-17 RX ADMIN — HUMAN INSULIN SCH UNIT: 100 INJECTION, SOLUTION SUBCUTANEOUS at 11:51

## 2018-08-17 NOTE — CP.PCM.PN
Subjective





- Date & Time of Evaluation


Date of Evaluation: 08/17/18


Time of Evaluation: 11:00





- Subjective


Subjective: 





Patient seen today , c/o, sob with minimal exertion, cough , denies any chest 

pain, headache, dizziness, palpitations 


a febrile 


oxygen via nasal canula in use , spo2 96% 


BIPAP used overnight 





Objective





- Vital Signs/Intake and Output


Vital Signs (last 24 hours): 


 











Temp Pulse Resp BP Pulse Ox


 


 98.4 F   98 H  20   165/95 H  96 


 


 08/17/18 07:00  08/17/18 07:00  08/17/18 07:00  08/17/18 07:00  08/17/18 07:00











- Medications


Medications: 


 Current Medications





Albuterol/Ipratropium (Duoneb 3 Mg/0.5 Mg (3 Ml) Ud)  3 ml INH RQ4 [facility]


Benzocaine/Menthol (Cepacol Sore Throat)  1 sabina MT QID [facility]


   Last Admin: 08/17/18 09:46 Dose:  1 sabina


Enoxaparin Sodium (Lovenox)  40 mg SC DAILY [facility]


   Last Admin: 08/17/18 09:47 Dose:  40 mg


Finasteride (Proscar)  5 mg PO DAILY [facility]


   Last Admin: 08/17/18 09:46 Dose:  5 mg


Guaifenesin (Mucinex La)  600 mg PO BID [facility]


   Last Admin: 08/17/18 09:47 Dose:  600 mg


Azithromycin 500 mg/ Sodium (Chloride)  250 mls @ 250 mls/hr IVPB Q24H [facility]


   PRN Reason: Protocol


Ceftriaxone Sodium 1 gm/ (Sodium Chloride)  100 mls @ 100 mls/hr IVPB Q24H [facility]


   PRN Reason: Protocol


   Last Admin: 08/17/18 11:18 Dose:  100 mls/hr


Insulin Human Regular (Novolin R)  0 unit SC ACHS [facility]


   PRN Reason: Protocol


   Last Admin: 08/17/18 11:51 Dose:  2 unit


Losartan Potassium (Cozaar)  100 mg PO DAILY [facility]


   Last Admin: 08/17/18 10:31 Dose:  100 mg


Metformin HCl (Glucophage)  1,000 mg PO BID [facility]


   Last Admin: 08/17/18 09:47 Dose:  1,000 mg


Methylprednisolone (Solu-Medrol)  60 mg IV Q8 [facility]


   Last Admin: 08/17/18 10:47 Dose:  Not Given


Montelukast Sodium (Singulair)  10 mg PO HS [facility]


   Last Admin: 08/16/18 21:45 Dose:  10 mg


Pantoprazole Sodium (Protonix Ec Tab)  40 mg PO DAILY [facility]


   Last Admin: 08/17/18 09:48 Dose:  40 mg


Tamsulosin HCl (Flomax)  0.4 mg PO BID [facility]


   Last Admin: 08/17/18 09:48 Dose:  0.4 mg


Tiotropium Bromide (Spiriva)  18 mcg INH RQ24 [facility]











- Labs


Labs: 


 





 08/16/18 16:19 





 08/16/18 16:19 











- Constitutional


Appears: Non-toxic (mod. resp. distress )





- Respiratory Exam


Respiratory Exam: Decreased Breath Sounds, Wheezes





- Cardiovascular Exam


Cardiovascular Exam: REGULAR RHYTHM, +S1, +S2





- Neurological Exam


Neurological Exam: Alert, Awake, Oriented x3





Assessment and Plan


(1) COPD exacerbation


Status: Acute   





- Assessment and Plan (Free Text)


Assessment: 





 


79 y/o male, w/PMhx of asthma and COPD,  on home o2 presents to the ER 

complaining of shortness of breath which has been present for the past 3 days


and admitted with exc. COPD 


Assessment/Plan 


Exc. COPD 


DR. Gonsalez on pulm. consult 


BIPAP PRN 


solumedrol 60 q 8hrs 


Duoneb  q 4hrs 


azithromycin and rocephin IV 


Patient require inpatient hospital admission for exc. COPD

## 2018-08-17 NOTE — CP.PCM.PN
Objective





- Vital Signs/Intake and Output


Vital Signs (last 24 hours): 


 











Temp Pulse Resp BP Pulse Ox


 


 98 F   93 H  20   138/84   97 


 


 08/17/18 15:44  08/17/18 15:44  08/17/18 15:44  08/17/18 15:44  08/17/18 15:44








Intake and Output: 


 











 08/17/18 08/18/18





 18:59 06:59


 


Intake Total 350 480


 


Balance 350 480














- Medications


Medications: 


 Current Medications





Albuterol/Ipratropium (Duoneb 3 Mg/0.5 Mg (3 Ml) Ud)  3 ml INH RQ4 [facility]


   Last Admin: 08/17/18 19:37 Dose:  3 ml


Benzocaine/Menthol (Cepacol Sore Throat)  1 sabina MT QID [facility]


   Last Admin: 08/17/18 21:22 Dose:  1 sabina


Enoxaparin Sodium (Lovenox)  40 mg SC DAILY [facility]


   Last Admin: 08/17/18 09:47 Dose:  40 mg


Finasteride (Proscar)  5 mg PO DAILY [facility]


   Last Admin: 08/17/18 09:46 Dose:  5 mg


Guaifenesin (Mucinex La)  600 mg PO BID [facility]


   Last Admin: 08/17/18 18:20 Dose:  600 mg


Azithromycin 500 mg/ Sodium (Chloride)  250 mls @ 250 mls/hr IVPB Q24H [facility]


   PRN Reason: Protocol


   Last Admin: 08/17/18 12:36 Dose:  250 mls/hr


Ceftriaxone Sodium 1 gm/ (Sodium Chloride)  100 mls @ 100 mls/hr IVPB Q24H [facility]


   PRN Reason: Protocol


   Last Admin: 08/17/18 11:18 Dose:  100 mls/hr


Insulin Human Regular (Novolin R)  0 unit SC ACHS [facility]


   PRN Reason: Protocol


   Last Admin: 08/17/18 22:19 Dose:  Not Given


Losartan Potassium (Cozaar)  100 mg PO DAILY [facility]


   Last Admin: 08/17/18 10:31 Dose:  100 mg


Metformin HCl (Glucophage)  1,000 mg PO BID [facility]


   Last Admin: 08/17/18 18:20 Dose:  1,000 mg


Methylprednisolone (Solu-Medrol)  60 mg IV Q8 [facility]


   Last Admin: 08/17/18 21:21 Dose:  60 mg


Montelukast Sodium (Singulair)  10 mg PO HS [facility]


   Last Admin: 08/17/18 21:22 Dose:  10 mg


Pantoprazole Sodium (Protonix Ec Tab)  40 mg PO DAILY [facility]


   Last Admin: 08/17/18 09:48 Dose:  40 mg


Tamsulosin HCl (Flomax)  0.4 mg PO BID [facility]


   Last Admin: 08/17/18 18:20 Dose:  0.4 mg


Tiotropium Bromide (Spiriva)  18 mcg INH RQ24 [facility]











- Labs


Labs: 


 





 08/16/18 16:19 





 08/16/18 16:19

## 2018-08-17 NOTE — CP.PCM.CON
History of Present Illness





- History of Present Illness


History of Present Illness: 





reason for consultation: shortness of breath





78-year-old male with history of COPD who presented to emergency room with 

worsening shortness of breath for the past 3 days. Patient denies, denies fever 

chills, denies chest pain. Patient states that he is compliant with his 

medicines








Review of Systems





- Review of Systems


All systems: reviewed and no additional remarkable complaints except (shortness 

of breath)





Past Patient History





- Infectious Disease


Hx of Infectious Diseases: None





- Past Medical History & Family History


Past Medical History?: Yes





- Past Social History


Smoking Status: Former Smoker





- CARDIAC


Hx Hypertension: Yes





- PULMONARY


Hx Asthma: Yes


Hx Chronic Obstructive Pulmonary Disease (COPD): Yes (ASTHMA)





- NEUROLOGICAL


Hx Neurological Disorder: No





- HEENT


Hx HEENT Problems: No





- RENAL


Hx Chronic Kidney Disease: No





- ENDOCRINE/METABOLIC


Hx Endocrine Disorders: Yes


Hx Diabetes Mellitus Type 1: Yes





- HEMATOLOGICAL/ONCOLOGICAL


Hx Blood Disorders: No





- INTEGUMENTARY


Hx Dermatological Problems: Yes


Other/Comment: SKIN ASTHMA





- MUSCULOSKELETAL/RHEUMATOLOGICAL


Hx Arthritis: Yes (B/L KNEE JT)





- GASTROINTESTINAL


Hx Gastritis: Yes





- GENITOURINARY/GYNECOLOGICAL


Hx Genitourinary Disorders: Yes


Hx Prostate Problems: Yes





- PSYCHIATRIC


Hx Substance Use: No





- SURGICAL HISTORY


Hx Surgeries: No





- ANESTHESIA


Hx Anesthesia: No


Hx Anesthesia Reactions: No


Hx Malignant Hyperthermia: No





Meds


Allergies/Adverse Reactions: 


 Allergies











Allergy/AdvReac Type Severity Reaction Status Date / Time


 


seasonal allergies Allergy Mild CONGESTION Uncoded 08/16/18 15:26














- Medications


Medications: 


 Current Medications





Albuterol/Ipratropium (Duoneb 3 Mg/0.5 Mg (3 Ml) Ud)  3 ml INH RQ4 [facility]


Benzocaine/Menthol (Cepacol Sore Throat)  1 sabina MT QID [facility]


   Last Admin: 08/17/18 09:46 Dose:  1 sabina


Enoxaparin Sodium (Lovenox)  40 mg SC DAILY [facility]


   Last Admin: 08/17/18 09:47 Dose:  40 mg


Finasteride (Proscar)  5 mg PO DAILY [facility]


   Last Admin: 08/17/18 09:46 Dose:  5 mg


Guaifenesin (Mucinex La)  600 mg PO BID [facility]


   Last Admin: 08/17/18 09:47 Dose:  600 mg


Azithromycin 500 mg/ Sodium (Chloride)  250 mls @ 250 mls/hr IVPB Q24H [facility]


   PRN Reason: Protocol


Ceftriaxone Sodium 1 gm/ (Sodium Chloride)  100 mls @ 100 mls/hr IVPB Q24H [facility]


   PRN Reason: Protocol


   Last Admin: 08/17/18 11:18 Dose:  100 mls/hr


Insulin Human Regular (Novolin R)  0 unit SC ACHS [facility]


   PRN Reason: Protocol


   Last Admin: 08/17/18 11:51 Dose:  2 unit


Losartan Potassium (Cozaar)  100 mg PO DAILY [facility]


   Last Admin: 08/17/18 10:31 Dose:  100 mg


Metformin HCl (Glucophage)  1,000 mg PO BID [facility]


   Last Admin: 08/17/18 09:47 Dose:  1,000 mg


Methylprednisolone (Solu-Medrol)  60 mg IV Q8 [facility]


   Last Admin: 08/17/18 10:47 Dose:  Not Given


Montelukast Sodium (Singulair)  10 mg PO HS [facility]


   Last Admin: 08/16/18 21:45 Dose:  10 mg


Pantoprazole Sodium (Protonix Ec Tab)  40 mg PO DAILY [facility]


   Last Admin: 08/17/18 09:48 Dose:  40 mg


Tamsulosin HCl (Flomax)  0.4 mg PO BID [facility]


   Last Admin: 08/17/18 09:48 Dose:  0.4 mg


Tiotropium Bromide (Spiriva)  18 mcg INH RQ24 [facility]











Physical Exam





- Head Exam


Head Exam: ATRAUMATIC, NORMOCEPHALIC





- ENT Exam


ENT Exam: Mucous Membranes Moist





- Neck Exam


Neck exam: Positive for: Normal Inspection





- Respiratory Exam


Respiratory Exam: Decreased Breath Sounds





- Cardiovascular Exam


Cardiovascular Exam: REGULAR RHYTHM





- GI/Abdominal Exam


GI & Abdominal Exam: Normal Bowel Sounds, Soft





Results





- Vital Signs


Recent Vital Signs: 


 Last Vital Signs











Temp  98.4 F   08/17/18 07:00


 


Pulse  98 H  08/17/18 07:00


 


Resp  20   08/17/18 07:00


 


BP  165/95 H  08/17/18 07:00


 


Pulse Ox  96   08/17/18 07:00














- Labs


Result Diagrams: 


 08/16/18 16:19





 08/16/18 16:19


Labs: 


 Laboratory Results - last 24 hr











  08/16/18 08/16/18 08/16/18





  16:19 16:19 20:57


 


WBC  10.2  


 


RBC  5.42  


 


Hgb  13.7  


 


Hct  42.2  


 


MCV  78.0 L  


 


MCH  25.2 L  


 


MCHC  32.3 L  


 


RDW  18.2 H  


 


Plt Count  200  


 


MPV  9.1  


 


Neut % (Auto)  83.3 H  


 


Lymph % (Auto)  8.0 L  


 


Mono % (Auto)  7.6  


 


Eos % (Auto)  0.4  


 


Baso % (Auto)  0.7  


 


Neut # (Auto)  8.5 H  


 


Lymph # (Auto)  0.8 L  


 


Mono # (Auto)  0.8  


 


Eos # (Auto)  0.0  


 


Baso # (Auto)  0.1  


 


Neutrophils % (Manual)  84 H  


 


Lymphocytes % (Manual)  8 L  


 


Monocytes % (Manual)  8  


 


Platelet Estimate  Normal  


 


Sodium   139 


 


Potassium   4.9 


 


Chloride   101 


 


Carbon Dioxide   26 


 


Anion Gap   17 


 


BUN   17 


 


Creatinine   0.8 


 


Est GFR ( Amer)   > 60 


 


Est GFR (Non-Af Amer)   > 60 


 


POC Glucose (mg/dL)    281 H


 


Random Glucose   204 H 


 


Calcium   9.8 


 


Total Bilirubin   0.5 


 


AST   18 


 


ALT   28 


 


Alkaline Phosphatase   45 


 


Total Protein   6.5 


 


Albumin   3.9 


 


Globulin   2.5 


 


Albumin/Globulin Ratio   1.5 














  08/17/18 08/17/18





  07:15 11:18


 


WBC  


 


RBC  


 


Hgb  


 


Hct  


 


MCV  


 


MCH  


 


MCHC  


 


RDW  


 


Plt Count  


 


MPV  


 


Neut % (Auto)  


 


Lymph % (Auto)  


 


Mono % (Auto)  


 


Eos % (Auto)  


 


Baso % (Auto)  


 


Neut # (Auto)  


 


Lymph # (Auto)  


 


Mono # (Auto)  


 


Eos # (Auto)  


 


Baso # (Auto)  


 


Neutrophils % (Manual)  


 


Lymphocytes % (Manual)  


 


Monocytes % (Manual)  


 


Platelet Estimate  


 


Sodium  


 


Potassium  


 


Chloride  


 


Carbon Dioxide  


 


Anion Gap  


 


BUN  


 


Creatinine  


 


Est GFR ( Amer)  


 


Est GFR (Non-Af Amer)  


 


POC Glucose (mg/dL)  254 H  194 H


 


Random Glucose  


 


Calcium  


 


Total Bilirubin  


 


AST  


 


ALT  


 


Alkaline Phosphatase  


 


Total Protein  


 


Albumin  


 


Globulin  


 


Albumin/Globulin Ratio  














Assessment & Plan


(1) COPD exacerbation


Status: Acute   


Comment: IV steroids.  Nebulizer treatment.  BiPAP if needed.  spiriva

## 2018-08-17 NOTE — PN
Copied To:  Tae Hensley MD

Attending MD:  Tae Hensley MD



DATE:  08/16/2018



CHIEF COMPLAINT:  Shortness of breath for four days.



SUBJECTIVE:  This is a 78-year-old [place] male with a history of COPD,

hypertension, diabetes.  He is on home oxygen therapy.  For the last four

days, he has been cough, congestion, shortness of breath, and dyspnea on

exertion.  He denies any orthopnea or PND.  He denies any pleuritic chest

pain.  He denies any nausea, vomiting, or diarrhea.  He denies any

polyuria, polydipsia, or polyphagia.  Denies any hematuria or pyuria.  He

has irritation in the throat.  The patient attempted to treat himself with

home oxygen, nebulizer, prednisone.  He did not improve.  He has PCP and

the patient was referred to emergency room.  He denies any history of

hematemesis, melena, or hematochezia.  He denies any abdominal pain,

nausea, or vomiting.  He denies any history of dysuria, hematuria, or

pyuria.



PAST MEDICAL HISTORY:  COPD, hypertension, diabetes, CKD.



SOCIAL HISTORY:  Nonsmoker, non-EtOH abuser.



CURRENT MEDICATIONS:  At home, he takes Spiriva, Flomax, omeprazole,

Singulair, Glucophage, Cozaar, Mucinex, Proscar, Brovana, prednisone,

Anoro, Myrbetriq, Zithromax, Ventolin.



PHYSICAL EXAMINATION:

GENERAL:  Elderly male, in mild-to-moderate respiratory distress.

VITAL SIGNS:  Blood pressure 162/84, pulse 88, respiratory rate 22,

temperature 98.1.

SKIN:  No bruises.  No purpura.  No petechiae.

HEENT:  Atraumatic, normocephalic.  Pupils are equally reactive to light

and accommodation.

NECK:  Supple.  Using accessory muscles of respiration.  No JVD.  No lymph

node.  No thyromegaly.  No carotid bruit.

CHEST WALL:  Bilateral symmetrical expansion.  _____ no deformity.  No

gynecomastia.

CARDIOVASCULAR SYSTEM:  S1, S2 are regular.  _____ localized.

LUNGS:  Bilaterally decreased air entry.  Positive inspiratory and

expiratory rhonchi.

ABDOMEN:  Soft, nontender.  Bowel sounds are positive.

RECTAL:  Enlarged prostate.

EXTREMITIES:  No clubbing, cyanosis, or edema.

CENTRAL NERVOUS SYSTEM:  Awake, alert, oriented x3.  Cranial nerves II-XII

are normal.  Power 5/5 x4.  Plantars are downgoing.



ASSESSMENT:

1.  Acute exacerbation of chronic obstructive pulmonary disease.

2.  Hypertension.

3.  Diabetes.

4.  Allergic rhinitis.



PLAN:  Admit.  Detailed orders written.  Seen and examined.





__________________________________________

Tae Hensley MD





DD:  08/16/2018 23:51:12

DT:  08/17/2018 0:58:43

Job # [id number]

## 2018-08-18 LAB
ANISOCYTOSIS BLD QL SMEAR: SLIGHT
BASOPHILS # BLD AUTO: 0 K/UL (ref 0–0.2)
BASOPHILS NFR BLD: 0 % (ref 0–2)
BUN SERPL-MCNC: 29 MG/DL (ref 9–20)
CALCIUM SERPL-MCNC: 9.4 MG/DL (ref 8.6–10.4)
EOSINOPHIL # BLD AUTO: 0 K/UL (ref 0–0.7)
EOSINOPHIL NFR BLD: 0 % (ref 0–4)
ERYTHROCYTE [DISTWIDTH] IN BLOOD BY AUTOMATED COUNT: 17.6 % (ref 11.5–14.5)
GFR NON-AFRICAN AMERICAN: > 60
HGB BLD-MCNC: 13.1 G/DL (ref 12–18)
HYPOCHROMIC: SLIGHT
LYMPHOCYTE: 2 % (ref 20–40)
LYMPHOCYTES # BLD AUTO: 0.4 K/UL (ref 1–4.3)
LYMPHOCYTES NFR BLD AUTO: 3.2 % (ref 20–40)
MCH RBC QN AUTO: 24.9 PG (ref 27–31)
MCHC RBC AUTO-ENTMCNC: 31.9 G/DL (ref 33–37)
MCV RBC AUTO: 77.9 FL (ref 80–94)
MONOCYTE: 3 % (ref 0–10)
MONOCYTES # BLD: 0.6 K/UL (ref 0–0.8)
MONOCYTES NFR BLD: 4 % (ref 0–10)
NEUTROPHILS # BLD: 13.1 K/UL (ref 1.8–7)
NEUTROPHILS NFR BLD AUTO: 92.8 % (ref 50–75)
NEUTROPHILS NFR BLD AUTO: 94 % (ref 50–75)
NEUTS BAND NFR BLD: 1 % (ref 0–2)
NRBC BLD AUTO-RTO: 0 % (ref 0–2)
OVALOCYTES BLD QL SMEAR: SLIGHT
PLATELET # BLD EST: NORMAL 10*3/UL
PLATELET # BLD: 222 K/UL (ref 130–400)
PMV BLD AUTO: 9 FL (ref 7.2–11.7)
POLYCHROMIC: SLIGHT
RBC # BLD AUTO: 5.27 MIL/UL (ref 4.4–5.9)
TOTAL CELLS COUNTED BLD: 100
WBC # BLD AUTO: 14.1 K/UL (ref 4.8–10.8)

## 2018-08-18 RX ADMIN — IPRATROPIUM BROMIDE AND ALBUTEROL SULFATE SCH: .5; 3 SOLUTION RESPIRATORY (INHALATION) at 00:08

## 2018-08-18 RX ADMIN — PURIFIED WATER SCH LOZ: 99.05 LIQUID OPHTHALMIC at 09:51

## 2018-08-18 RX ADMIN — IPRATROPIUM BROMIDE AND ALBUTEROL SULFATE SCH ML: .5; 3 SOLUTION RESPIRATORY (INHALATION) at 08:38

## 2018-08-18 RX ADMIN — HUMAN INSULIN SCH UNIT: 100 INJECTION, SOLUTION SUBCUTANEOUS at 22:06

## 2018-08-18 RX ADMIN — PANTOPRAZOLE SODIUM SCH MG: 40 TABLET, DELAYED RELEASE ORAL at 09:49

## 2018-08-18 RX ADMIN — GUAIFENESIN SCH MG: 600 TABLET, EXTENDED RELEASE ORAL at 17:59

## 2018-08-18 RX ADMIN — PURIFIED WATER SCH LOZ: 99.05 LIQUID OPHTHALMIC at 17:58

## 2018-08-18 RX ADMIN — PURIFIED WATER SCH LOZ: 99.05 LIQUID OPHTHALMIC at 22:05

## 2018-08-18 RX ADMIN — ENOXAPARIN SODIUM SCH MG: 40 INJECTION SUBCUTANEOUS at 09:48

## 2018-08-18 RX ADMIN — IPRATROPIUM BROMIDE AND ALBUTEROL SULFATE SCH ML: .5; 3 SOLUTION RESPIRATORY (INHALATION) at 16:25

## 2018-08-18 RX ADMIN — HUMAN INSULIN SCH UNIT: 100 INJECTION, SOLUTION SUBCUTANEOUS at 16:30

## 2018-08-18 RX ADMIN — GUAIFENESIN SCH MG: 600 TABLET, EXTENDED RELEASE ORAL at 09:47

## 2018-08-18 RX ADMIN — HUMAN INSULIN SCH UNIT: 100 INJECTION, SOLUTION SUBCUTANEOUS at 11:55

## 2018-08-18 RX ADMIN — IPRATROPIUM BROMIDE AND ALBUTEROL SULFATE SCH: .5; 3 SOLUTION RESPIRATORY (INHALATION) at 04:17

## 2018-08-18 RX ADMIN — HUMAN INSULIN SCH UNIT: 100 INJECTION, SOLUTION SUBCUTANEOUS at 07:49

## 2018-08-18 RX ADMIN — IPRATROPIUM BROMIDE AND ALBUTEROL SULFATE SCH ML: .5; 3 SOLUTION RESPIRATORY (INHALATION) at 20:47

## 2018-08-18 RX ADMIN — PURIFIED WATER SCH LOZ: 99.05 LIQUID OPHTHALMIC at 14:07

## 2018-08-18 RX ADMIN — IPRATROPIUM BROMIDE AND ALBUTEROL SULFATE SCH ML: .5; 3 SOLUTION RESPIRATORY (INHALATION) at 13:25

## 2018-08-18 RX ADMIN — HUMAN INSULIN SCH UNIT: 100 INJECTION, SOLUTION SUBCUTANEOUS at 07:55

## 2018-08-18 RX ADMIN — HUMAN INSULIN SCH UNIT: 100 INJECTION, SOLUTION SUBCUTANEOUS at 07:53

## 2018-08-18 NOTE — CP.PCM.PN
Objective





- Vital Signs/Intake and Output


Vital Signs (last 24 hours): 


 











Temp Pulse Resp BP Pulse Ox


 


 97.7 F   103 H  20   153/83 H  95 


 


 08/18/18 16:00  08/18/18 16:00  08/18/18 16:00  08/18/18 16:00  08/18/18 16:01








Intake and Output: 


 











 08/18/18 08/19/18





 18:59 06:59


 


Intake Total 650 


 


Output Total 350 


 


Balance 300 














- Medications


Medications: 


 Current Medications





Albuterol/Ipratropium (Duoneb 3 Mg/0.5 Mg (3 Ml) Ud)  3 ml INH RQ4 [facility]


   Last Admin: 08/18/18 20:47 Dose:  3 ml


Benzocaine/Menthol (Cepacol Sore Throat)  1 sabina MT QID [facility]


   Last Admin: 08/18/18 17:58 Dose:  1 sabina


Enoxaparin Sodium (Lovenox)  40 mg SC DAILY [facility]


   Last Admin: 08/18/18 09:48 Dose:  40 mg


Finasteride (Proscar)  5 mg PO DAILY [facility]


   Last Admin: 08/18/18 09:48 Dose:  5 mg


Guaifenesin (Mucinex La)  600 mg PO BID [facility]


   Last Admin: 08/18/18 17:59 Dose:  600 mg


Azithromycin 500 mg/ Sodium (Chloride)  250 mls @ 250 mls/hr IVPB Q24H [facility]


   PRN Reason: Protocol


   Last Admin: 08/18/18 12:56 Dose:  250 mls/hr


Ceftriaxone Sodium 1 gm/ (Sodium Chloride)  100 mls @ 100 mls/hr IVPB Q24H [facility]


   PRN Reason: Protocol


   Last Admin: 08/18/18 11:25 Dose:  100 mls/hr


Insulin Human Regular (Novolin R)  0 unit SC ACHS [facility]


   PRN Reason: Protocol


   Last Admin: 08/18/18 16:30 Dose:  4 unit


Losartan Potassium (Cozaar)  100 mg PO DAILY [facility]


   Last Admin: 08/18/18 09:47 Dose:  100 mg


Metformin HCl (Glucophage)  1,000 mg PO BID [facility]


   Last Admin: 08/18/18 17:58 Dose:  1,000 mg


Montelukast Sodium (Singulair)  10 mg PO HS [facility]


   Last Admin: 08/17/18 21:22 Dose:  10 mg


Pantoprazole Sodium (Protonix Ec Tab)  40 mg PO DAILY [facility]


   Last Admin: 08/18/18 09:49 Dose:  40 mg


Prednisone (Prednisone Tab)  30 mg PO DAILY [facility]


Tamsulosin HCl (Flomax)  0.4 mg PO BID [facility]


   Last Admin: 08/18/18 17:58 Dose:  0.4 mg


Tiotropium Bromide (Spiriva)  18 mcg INH RQ24 [facility]











- Labs


Labs: 


 





 08/18/18 08:20 





 08/18/18 08:20

## 2018-08-18 NOTE — PN
Copied To:  Tae Hensley MD

Attending MD:  Tae Hensley MD



DATE:  08/17/2018



SUBJECTIVE:  The patient is coughing, wheezing.  He is less short of

breath.  No distress.  His throat is better.  No pain.  No soreness.



PHYSICAL EXAMINATION:

VITAL SIGNS:  Blood pressure 140/80, pulse 72, respiratory rate 16,

temperature 99.

LUNGS:  Decreased air entry.

CARDIOVASCULAR SYSTEM:  S1, S2 are regular.

ABDOMEN:  Soft.



ASSESSMENT:

1.  Exacerbation of chronic obstructive pulmonary disease.

2.  Tracheobronchitis.

3.  Diabetes.

4.  Hypertension.



PLAN:  Continue nebulizer treatment.  Monitor the patient.





__________________________________________

Tae Hensley MD





DD:  08/17/2018 23:33:09

DT:  08/18/2018 2:09:41

Job # [id number]

## 2018-08-18 NOTE — CP.PCM.PN
<Raza Gonsalez - Last Filed: 08/18/18 20:44>





Subjective





- Date & Time of Evaluation


Date of Evaluation: 08/18/18


Time of Evaluation: 16:00





- Subjective


Subjective: 





the patient seen and examined


Sitting comfortably in no acute distress


Breathing much improved


Afebrile


No chest pain


Switch to p.o. prednisone


Nebulizer treatment


Stable from pulmonary standpoint





Objective





- Vital Signs/Intake and Output


Vital Signs (last 24 hours): 


 











Temp Pulse Resp BP Pulse Ox


 


 97.7 F   103 H  20   153/83 H  95 


 


 08/18/18 16:00  08/18/18 16:00  08/18/18 16:00  08/18/18 16:00  08/18/18 16:01








Intake and Output: 


 











 08/18/18 08/19/18





 18:59 06:59


 


Intake Total 650 


 


Output Total 350 


 


Balance 300 














- Medications


Medications: 


 Current Medications





Albuterol/Ipratropium (Duoneb 3 Mg/0.5 Mg (3 Ml) Ud)  3 ml INH RQ4 [facility]


   Last Admin: 08/18/18 16:25 Dose:  3 ml


Benzocaine/Menthol (Cepacol Sore Throat)  1 sabina MT QID [facility]


   Last Admin: 08/18/18 17:58 Dose:  1 sabina


Enoxaparin Sodium (Lovenox)  40 mg SC DAILY [facility]


   Last Admin: 08/18/18 09:48 Dose:  40 mg


Finasteride (Proscar)  5 mg PO DAILY [facility]


   Last Admin: 08/18/18 09:48 Dose:  5 mg


Guaifenesin (Mucinex La)  600 mg PO BID [facility]


   Last Admin: 08/18/18 17:59 Dose:  600 mg


Azithromycin 500 mg/ Sodium (Chloride)  250 mls @ 250 mls/hr IVPB Q24H [facility]


   PRN Reason: Protocol


   Last Admin: 08/18/18 12:56 Dose:  250 mls/hr


Ceftriaxone Sodium 1 gm/ (Sodium Chloride)  100 mls @ 100 mls/hr IVPB Q24H [facility]


   PRN Reason: Protocol


   Last Admin: 08/18/18 11:25 Dose:  100 mls/hr


Insulin Human Regular (Novolin R)  0 unit SC ACHS [facility]


   PRN Reason: Protocol


   Last Admin: 08/18/18 16:30 Dose:  4 unit


Losartan Potassium (Cozaar)  100 mg PO DAILY [facility]


   Last Admin: 08/18/18 09:47 Dose:  100 mg


Metformin HCl (Glucophage)  1,000 mg PO BID [facility]


   Last Admin: 08/18/18 17:58 Dose:  1,000 mg


Montelukast Sodium (Singulair)  10 mg PO HS [facility]


   Last Admin: 08/17/18 21:22 Dose:  10 mg


Pantoprazole Sodium (Protonix Ec Tab)  40 mg PO DAILY [facility]


   Last Admin: 08/18/18 09:49 Dose:  40 mg


Prednisone (Prednisone Tab)  30 mg PO DAILY [facility]


Tamsulosin HCl (Flomax)  0.4 mg PO BID [facility]


   Last Admin: 08/18/18 17:58 Dose:  0.4 mg


Tiotropium Bromide (Spiriva)  18 mcg INH RQ24 [facility]











- Labs


Labs: 


 





 08/18/18 08:20 





 08/18/18 08:20 











Assessment and Plan


(1) COPD exacerbation


Status: Acute   





<Deonte Montiel - Last Filed: 08/19/18 22:08>





Objective





- Vital Signs/Intake and Output


Vital Signs (last 24 hours): 


 











Temp Pulse Resp BP Pulse Ox


 


 98.8 F   96 H  24   154/95 H  96 


 


 08/19/18 15:24  08/19/18 15:24  08/19/18 15:24  08/19/18 15:24  08/19/18 15:24











- Medications


Medications: 


 Current Medications





Albuterol/Ipratropium (Duoneb 3 Mg/0.5 Mg (3 Ml) Ud)  3 ml INH RQ4 [facility]


   Last Admin: 08/19/18 20:13 Dose:  3 ml


Benzocaine/Menthol (Cepacol Sore Throat)  1 sabina MT QID [facility]


   Last Admin: 08/19/18 21:01 Dose:  1 sabina


Enoxaparin Sodium (Lovenox)  40 mg SC DAILY [facility]


   Last Admin: 08/19/18 09:30 Dose:  40 mg


Finasteride (Proscar)  5 mg PO DAILY [facility]


   Last Admin: 08/19/18 09:19 Dose:  5 mg


Guaifenesin (Mucinex La)  600 mg PO BID [facility]


   Last Admin: 08/19/18 17:31 Dose:  600 mg


Azithromycin 500 mg/ Sodium (Chloride)  250 mls @ 250 mls/hr IVPB Q24H [facility]


   PRN Reason: Protocol


   Last Admin: 08/19/18 11:21 Dose:  250 mls/hr


Ceftriaxone Sodium 1 gm/ (Sodium Chloride)  100 mls @ 100 mls/hr IVPB Q24H [facility]


   PRN Reason: Protocol


   Last Admin: 08/19/18 10:19 Dose:  100 mls/hr


Insulin Human Regular (Novolin R)  0 unit SC ACHS [facility]


   PRN Reason: Protocol


   Last Admin: 08/19/18 21:01 Dose:  Not Given


Losartan Potassium (Cozaar)  100 mg PO DAILY [facility]


   Last Admin: 08/19/18 09:19 Dose:  100 mg


Metformin HCl (Glucophage)  1,000 mg PO BID [facility]


   Last Admin: 08/19/18 17:30 Dose:  1,000 mg


Montelukast Sodium (Singulair)  10 mg PO HS [facility]


   Last Admin: 08/19/18 21:01 Dose:  10 mg


Pantoprazole Sodium (Protonix Ec Tab)  40 mg PO DAILY [facility]


   Last Admin: 08/19/18 09:19 Dose:  40 mg


Prednisone (Prednisone Tab)  30 mg PO DAILY [facility]


   Last Admin: 08/19/18 09:19 Dose:  30 mg


Tamsulosin HCl (Flomax)  0.4 mg PO BID [facility]


   Last Admin: 08/19/18 17:30 Dose:  0.4 mg


Tiotropium Bromide (Spiriva)  18 mcg INH RQ24 [facility]


   Last Admin: 08/19/18 08:37 Dose:  18 mcg











- Labs


Labs: 


 





 08/19/18 09:05 





 08/19/18 08:58 











Assessment and Plan


(1) COPD exacerbation


Status: Acute   





(2) Asthma exacerbation


Status: Acute

## 2018-08-19 VITALS — RESPIRATION RATE: 20 BRPM

## 2018-08-19 LAB
ALBUMIN SERPL-MCNC: 3.7 G/DL (ref 3.5–5)
ALBUMIN/GLOB SERPL: 1.4 {RATIO} (ref 1–2.1)
ALT SERPL-CCNC: 26 U/L (ref 21–72)
ANISOCYTOSIS BLD QL SMEAR: SLIGHT
AST SERPL-CCNC: 19 U/L (ref 17–59)
BASOPHILS # BLD AUTO: 0.1 K/UL (ref 0–0.2)
BASOPHILS NFR BLD: 0.4 % (ref 0–2)
BUN SERPL-MCNC: 27 MG/DL (ref 9–20)
CALCIUM SERPL-MCNC: 9.1 MG/DL (ref 8.6–10.4)
EOSINOPHIL # BLD AUTO: 0 K/UL (ref 0–0.7)
EOSINOPHIL NFR BLD: 0.3 % (ref 0–4)
EOSINOPHIL NFR BLD: 1 % (ref 0–4)
ERYTHROCYTE [DISTWIDTH] IN BLOOD BY AUTOMATED COUNT: 17.8 % (ref 11.5–14.5)
GFR NON-AFRICAN AMERICAN: > 60
HGB BLD-MCNC: 13.3 G/DL (ref 12–18)
HYPOCHROMIC: SLIGHT
LG PLATELETS BLD QL SMEAR: PRESENT
LYMPHOCYTE: 8 % (ref 20–40)
LYMPHOCYTES # BLD AUTO: 0.7 K/UL (ref 1–4.3)
LYMPHOCYTES NFR BLD AUTO: 4.6 % (ref 20–40)
MCH RBC QN AUTO: 25.3 PG (ref 27–31)
MCHC RBC AUTO-ENTMCNC: 32.2 G/DL (ref 33–37)
MCV RBC AUTO: 78.5 FL (ref 80–94)
MONOCYTE: 7 % (ref 0–10)
MONOCYTES # BLD: 1.2 K/UL (ref 0–0.8)
MONOCYTES NFR BLD: 8.2 % (ref 0–10)
NEUTROPHILS # BLD: 12.3 K/UL (ref 1.8–7)
NEUTROPHILS NFR BLD AUTO: 82 % (ref 50–75)
NEUTROPHILS NFR BLD AUTO: 86.5 % (ref 50–75)
NEUTS BAND NFR BLD: 2 % (ref 0–2)
NRBC BLD AUTO-RTO: 0.1 % (ref 0–2)
PLATELET # BLD EST: NORMAL 10*3/UL
PLATELET # BLD: 165 K/UL (ref 130–400)
PMV BLD AUTO: 9.3 FL (ref 7.2–11.7)
POLYCHROMIC: SLIGHT
RBC # BLD AUTO: 5.25 MIL/UL (ref 4.4–5.9)
TOTAL CELLS COUNTED BLD: 100
TOXIC GRANULES BLD QL SMEAR: PRESENT
WBC # BLD AUTO: 14.2 K/UL (ref 4.8–10.8)

## 2018-08-19 RX ADMIN — IPRATROPIUM BROMIDE AND ALBUTEROL SULFATE SCH ML: .5; 3 SOLUTION RESPIRATORY (INHALATION) at 16:50

## 2018-08-19 RX ADMIN — IPRATROPIUM BROMIDE AND ALBUTEROL SULFATE SCH: .5; 3 SOLUTION RESPIRATORY (INHALATION) at 04:16

## 2018-08-19 RX ADMIN — IPRATROPIUM BROMIDE AND ALBUTEROL SULFATE SCH ML: .5; 3 SOLUTION RESPIRATORY (INHALATION) at 11:22

## 2018-08-19 RX ADMIN — TIOTROPIUM BROMIDE SCH MCG: 18 CAPSULE ORAL; RESPIRATORY (INHALATION) at 08:37

## 2018-08-19 RX ADMIN — GUAIFENESIN SCH MG: 600 TABLET, EXTENDED RELEASE ORAL at 17:31

## 2018-08-19 RX ADMIN — PURIFIED WATER SCH LOZ: 99.05 LIQUID OPHTHALMIC at 13:26

## 2018-08-19 RX ADMIN — IPRATROPIUM BROMIDE AND ALBUTEROL SULFATE SCH: .5; 3 SOLUTION RESPIRATORY (INHALATION) at 00:31

## 2018-08-19 RX ADMIN — PURIFIED WATER SCH LOZ: 99.05 LIQUID OPHTHALMIC at 21:01

## 2018-08-19 RX ADMIN — HUMAN INSULIN SCH: 100 INJECTION, SOLUTION SUBCUTANEOUS at 21:01

## 2018-08-19 RX ADMIN — HUMAN INSULIN SCH UNIT: 100 INJECTION, SOLUTION SUBCUTANEOUS at 07:58

## 2018-08-19 RX ADMIN — ENOXAPARIN SODIUM SCH MG: 40 INJECTION SUBCUTANEOUS at 09:30

## 2018-08-19 RX ADMIN — PURIFIED WATER SCH LOZ: 99.05 LIQUID OPHTHALMIC at 09:20

## 2018-08-19 RX ADMIN — IPRATROPIUM BROMIDE AND ALBUTEROL SULFATE SCH ML: .5; 3 SOLUTION RESPIRATORY (INHALATION) at 08:37

## 2018-08-19 RX ADMIN — HUMAN INSULIN SCH UNIT: 100 INJECTION, SOLUTION SUBCUTANEOUS at 17:30

## 2018-08-19 RX ADMIN — PANTOPRAZOLE SODIUM SCH MG: 40 TABLET, DELAYED RELEASE ORAL at 09:19

## 2018-08-19 RX ADMIN — IPRATROPIUM BROMIDE AND ALBUTEROL SULFATE SCH ML: .5; 3 SOLUTION RESPIRATORY (INHALATION) at 20:13

## 2018-08-19 RX ADMIN — GUAIFENESIN SCH MG: 600 TABLET, EXTENDED RELEASE ORAL at 09:19

## 2018-08-19 RX ADMIN — HUMAN INSULIN SCH UNIT: 100 INJECTION, SOLUTION SUBCUTANEOUS at 11:30

## 2018-08-19 RX ADMIN — PURIFIED WATER SCH LOZ: 99.05 LIQUID OPHTHALMIC at 17:29

## 2018-08-19 NOTE — DS
Copied To:  Tae Hensley MD

Attending MD: Tae Hensley MD



HISTORY OF PRESENT ILLNESS:  The patient is feeling less short of breath,

less pain in the _____.  No fever.  No nausea or vomiting.  He is

tolerating diet well.



PHYSICAL EXAMINATION:

VITAL SIGNS:  Blood pressure 153/83, pulse 103, respiratory rate 20,

temperature 97.7.

LUNGS:  Clear air entry.

CARDIOVASCULAR SYSTEM:  S1, S2 regular.

ABDOMEN:  Soft.



ASSESSMENT:

1.  Acute exacerbation of chronic obstructive pulmonary disease/asthma.

2.  Diabetes.

3.  Hypertension.



PLAN:  Taper steroids.  Switch to oral steroids.





__________________________________________

Tae Hensley MD





DD:  08/18/2018 22:40:53

DT:  08/19/2018 1:45:30

Job # [id number]

## 2018-08-19 NOTE — CP.PCM.PN
Subjective





- Date & Time of Evaluation


Date of Evaluation: 08/19/18


Time of Evaluation: 20:24





- Subjective


Subjective: 





Pulmonary follow up, Covering Dr Gonsalez


The patient was Seen/interviewed and examined by me at the bedside,


Medical records reviewed and Management issues were discussed and formulated 

with the house staff.


Events reviewed





Alert and oriented. 


espiratory status improved


Breathing unlabored, on 3L NV O2 sat 100%.


Comfortable, NAD


Denies any chest pain, Fever/chills or Palpitations


Less shortness of breath, wheezing 


Afebrile























Objective





- Vital Signs/Intake and Output


Vital Signs (last 24 hours): 


 











Temp Pulse Resp BP Pulse Ox


 


 98.8 F   96 H  24   154/95 H  96 


 


 08/19/18 15:24  08/19/18 15:24  08/19/18 15:24  08/19/18 15:24  08/19/18 15:24











- Medications


Medications: 


 Current Medications





Albuterol/Ipratropium (Duoneb 3 Mg/0.5 Mg (3 Ml) Ud)  3 ml INH RQ4 [facility]


   Last Admin: 08/19/18 20:13 Dose:  3 ml


Benzocaine/Menthol (Cepacol Sore Throat)  1 sabina MT QID [facility]


   Last Admin: 08/19/18 17:29 Dose:  1 sabina


Enoxaparin Sodium (Lovenox)  40 mg SC DAILY [facility]


   Last Admin: 08/19/18 09:30 Dose:  40 mg


Finasteride (Proscar)  5 mg PO DAILY [facility]


   Last Admin: 08/19/18 09:19 Dose:  5 mg


Guaifenesin (Mucinex La)  600 mg PO BID [facility]


   Last Admin: 08/19/18 17:31 Dose:  600 mg


Azithromycin 500 mg/ Sodium (Chloride)  250 mls @ 250 mls/hr IVPB Q24H [facility]


   PRN Reason: Protocol


   Last Admin: 08/19/18 11:21 Dose:  250 mls/hr


Ceftriaxone Sodium 1 gm/ (Sodium Chloride)  100 mls @ 100 mls/hr IVPB Q24H [facility]


   PRN Reason: Protocol


   Last Admin: 08/19/18 10:19 Dose:  100 mls/hr


Insulin Human Regular (Novolin R)  0 unit SC ACHS [facility]


   PRN Reason: Protocol


   Last Admin: 08/19/18 17:30 Dose:  4 unit


Losartan Potassium (Cozaar)  100 mg PO DAILY [facility]


   Last Admin: 08/19/18 09:19 Dose:  100 mg


Metformin HCl (Glucophage)  1,000 mg PO BID [facility]


   Last Admin: 08/19/18 17:30 Dose:  1,000 mg


Montelukast Sodium (Singulair)  10 mg PO HS [facility]


   Last Admin: 08/18/18 22:06 Dose:  10 mg


Pantoprazole Sodium (Protonix Ec Tab)  40 mg PO DAILY [facility]


   Last Admin: 08/19/18 09:19 Dose:  40 mg


Prednisone (Prednisone Tab)  30 mg PO DAILY [facility]


   Last Admin: 08/19/18 09:19 Dose:  30 mg


Tamsulosin HCl (Flomax)  0.4 mg PO BID [facility]


   Last Admin: 08/19/18 17:30 Dose:  0.4 mg


Tiotropium Bromide (Spiriva)  18 mcg INH RQ24 [facility]


   Last Admin: 08/19/18 08:37 Dose:  18 mcg











- Labs


Labs: 


 





 08/19/18 09:05 





 08/19/18 08:58 











- Constitutional


Appears: Well, Non-toxic, No Acute Distress





- Head Exam


Head Exam: ATRAUMATIC, NORMAL INSPECTION





- Eye Exam


Eye Exam: EOMI, Normal appearance


Pupil Exam: NORMAL ACCOMODATION, PERRL





- Neck Exam


Neck Exam: Full ROM, Normal Inspection.  absent: Lymphadenopathy, Meningismus, 

Tenderness





- Respiratory Exam


Respiratory Exam: Decreased Breath Sounds, NORMAL BREATHING PATTERN.  absent: 

Accessory Muscle Use, Chest Wall Tenderness, Rales, Wheezes





- Cardiovascular Exam


Cardiovascular Exam: REGULAR RHYTHM, RRR, +S1, +S2.  absent: JVD





- GI/Abdominal Exam


GI & Abdominal Exam: Soft, Normal Bowel Sounds.  absent: Distended, Firm, 

Guarding, Rigid, Tenderness, Diminished Bowel Sounds





- Back Exam


Back Exam: absent: CVA tenderness (L), CVA tenderness (R)





- Neurological Exam


Neurological Exam: Alert, Awake, CN II-XII Intact, Oriented x3.  absent: Altered





Assessment and Plan


(1) COPD exacerbation


Status: Acute   





(2) Asthma exacerbation


Status: Acute   





(3) HTN (hypertension)


Status: Acute   





- Assessment and Plan (Free Text)


Assessment: 











Clinically and Resp improving, Continue current managements


Continue Albuterol/Ipratropium INH RQ4 [facility]


Continue Tapering PO Prednisone


Continue Guaifenesin,  Tiotropium and Singulair 10 mg PO HS 


May discontinue Ceftriaxone, if continue improving  


Azithromycin, switch to PO

## 2018-08-19 NOTE — CP.PCM.PN
Objective





- Vital Signs/Intake and Output


Vital Signs (last 24 hours): 


 











Temp Pulse Resp BP Pulse Ox


 


 98.8 F   96 H  24   154/95 H  96 


 


 08/19/18 15:24  08/19/18 15:24  08/19/18 15:24  08/19/18 15:24  08/19/18 15:24











- Medications


Medications: 


 Current Medications





Albuterol/Ipratropium (Duoneb 3 Mg/0.5 Mg (3 Ml) Ud)  3 ml INH RQ4 [facility]


   Last Admin: 08/19/18 20:13 Dose:  3 ml


Benzocaine/Menthol (Cepacol Sore Throat)  1 sabina MT QID [facility]


   Last Admin: 08/19/18 17:29 Dose:  1 sabina


Enoxaparin Sodium (Lovenox)  40 mg SC DAILY [facility]


   Last Admin: 08/19/18 09:30 Dose:  40 mg


Finasteride (Proscar)  5 mg PO DAILY [facility]


   Last Admin: 08/19/18 09:19 Dose:  5 mg


Guaifenesin (Mucinex La)  600 mg PO BID [facility]


   Last Admin: 08/19/18 17:31 Dose:  600 mg


Azithromycin 500 mg/ Sodium (Chloride)  250 mls @ 250 mls/hr IVPB Q24H [facility]


   PRN Reason: Protocol


   Last Admin: 08/19/18 11:21 Dose:  250 mls/hr


Ceftriaxone Sodium 1 gm/ (Sodium Chloride)  100 mls @ 100 mls/hr IVPB Q24H [facility]


   PRN Reason: Protocol


   Last Admin: 08/19/18 10:19 Dose:  100 mls/hr


Insulin Human Regular (Novolin R)  0 unit SC ACHS [facility]


   PRN Reason: Protocol


   Last Admin: 08/19/18 17:30 Dose:  4 unit


Losartan Potassium (Cozaar)  100 mg PO DAILY [facility]


   Last Admin: 08/19/18 09:19 Dose:  100 mg


Metformin HCl (Glucophage)  1,000 mg PO BID [facility]


   Last Admin: 08/19/18 17:30 Dose:  1,000 mg


Montelukast Sodium (Singulair)  10 mg PO HS [facility]


   Last Admin: 08/18/18 22:06 Dose:  10 mg


Pantoprazole Sodium (Protonix Ec Tab)  40 mg PO DAILY [facility]


   Last Admin: 08/19/18 09:19 Dose:  40 mg


Prednisone (Prednisone Tab)  30 mg PO DAILY [facility]


   Last Admin: 08/19/18 09:19 Dose:  30 mg


Tamsulosin HCl (Flomax)  0.4 mg PO BID [facility]


   Last Admin: 08/19/18 17:30 Dose:  0.4 mg


Tiotropium Bromide (Spiriva)  18 mcg INH RQ24 [facility]


   Last Admin: 08/19/18 08:37 Dose:  18 mcg











- Labs


Labs: 


 





 08/19/18 09:05 





 08/19/18 08:58

## 2018-08-20 VITALS
HEART RATE: 95 BPM | TEMPERATURE: 97.3 F | SYSTOLIC BLOOD PRESSURE: 150 MMHG | OXYGEN SATURATION: 95 % | DIASTOLIC BLOOD PRESSURE: 79 MMHG

## 2018-08-20 RX ADMIN — ENOXAPARIN SODIUM SCH MG: 40 INJECTION SUBCUTANEOUS at 09:01

## 2018-08-20 RX ADMIN — PURIFIED WATER SCH LOZ: 99.05 LIQUID OPHTHALMIC at 09:03

## 2018-08-20 RX ADMIN — TIOTROPIUM BROMIDE SCH: 18 CAPSULE ORAL; RESPIRATORY (INHALATION) at 09:15

## 2018-08-20 RX ADMIN — PURIFIED WATER SCH LOZ: 99.05 LIQUID OPHTHALMIC at 13:13

## 2018-08-20 RX ADMIN — IPRATROPIUM BROMIDE AND ALBUTEROL SULFATE SCH ML: .5; 3 SOLUTION RESPIRATORY (INHALATION) at 15:59

## 2018-08-20 RX ADMIN — IPRATROPIUM BROMIDE AND ALBUTEROL SULFATE SCH ML: .5; 3 SOLUTION RESPIRATORY (INHALATION) at 09:11

## 2018-08-20 RX ADMIN — GUAIFENESIN SCH MG: 600 TABLET, EXTENDED RELEASE ORAL at 09:03

## 2018-08-20 RX ADMIN — HUMAN INSULIN SCH UNIT: 100 INJECTION, SOLUTION SUBCUTANEOUS at 12:13

## 2018-08-20 RX ADMIN — PANTOPRAZOLE SODIUM SCH MG: 40 TABLET, DELAYED RELEASE ORAL at 09:02

## 2018-08-20 RX ADMIN — HUMAN INSULIN SCH: 100 INJECTION, SOLUTION SUBCUTANEOUS at 08:03

## 2018-08-20 RX ADMIN — IPRATROPIUM BROMIDE AND ALBUTEROL SULFATE SCH ML: .5; 3 SOLUTION RESPIRATORY (INHALATION) at 19:39

## 2018-08-20 RX ADMIN — HUMAN INSULIN SCH UNIT: 100 INJECTION, SOLUTION SUBCUTANEOUS at 17:00

## 2018-08-20 RX ADMIN — IPRATROPIUM BROMIDE AND ALBUTEROL SULFATE SCH: .5; 3 SOLUTION RESPIRATORY (INHALATION) at 15:00

## 2018-08-20 RX ADMIN — PURIFIED WATER SCH LOZ: 99.05 LIQUID OPHTHALMIC at 17:24

## 2018-08-20 RX ADMIN — GUAIFENESIN SCH MG: 600 TABLET, EXTENDED RELEASE ORAL at 17:23

## 2018-08-20 RX ADMIN — IPRATROPIUM BROMIDE AND ALBUTEROL SULFATE SCH ML: .5; 3 SOLUTION RESPIRATORY (INHALATION) at 01:06

## 2018-08-20 NOTE — CT
Date of service: 8/20/18 



CT chest without IV contrast 



Indication: COPD, rule out pneumonia 



Technique: 



Contiguous axial images were obtained through the chest without 

intravenous contrast enhancement. Sagittal and coronal 

reconstructions were generated and reviewed. 



This CT exam was performed using 1 or more of the following dose 

reduction techniques: Automated exposure control, adjustment of the 

MAA and/or kV according to patient size, and/or use of iterative 

reconstruction technique. 







Radiation dose (DLP):  415.11 MGy-cm. 



Comparison: CTA chest performed 6/17/18, chest x-ray performed 

8/16/18 



Findings: 



Examination markedly limited by motion artifact.



Visualized portions of the inferior thyroid gland appear 

unremarkable. Cardiomegaly. 



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 

re-identified within the left upper lobe extending to the apex along 

the medial pleural surface.  A similar finding is noted at all within 

the right upper lobe along the medial pleural surface. Right hilar 

lymphadenopathy is noted bilaterally, right greater than left. 

Persistent multi nodular opacity is evident within the left lower 

lobe superior segment. Possible atelectasis versus neoplasm along the 

right lateral heart border. No pleural effusion. No pneumothorax. 



Along the right lateral aspect of distal thoracic esophagus is a 1.6 

cm rounded low-density mass, possibly paraesophageal lymph node 

versus small esophageal diverticulum. Esophageal reflux and fluid 

residing within the esophagus. Numerous calcifications noted within 

the upper abdomen possibly calcified lymph nodes. 9 mm right 

paraesophageal lymph node. 







Kyphosis.  Multilevel degenerative changes. 



Impression: 



Grossly stable appearing bilateral pleural based and scattered soft 

tissue densities favored to reflect neoplasm as above. Adenopathy. 

Small rounded density adjacent to the right lateral aspect of the 

distal thoracic esophagus, possibly paraesophageal lymph node or 

esophageal diverticulum. 



Large amount of fluid within the esophagus may be related to reflux. 



Additional findings as above.

## 2018-08-20 NOTE — CP.PCM.PN
Subjective





- Date & Time of Evaluation


Date of Evaluation: 08/20/18


Time of Evaluation: 09:40





- Subjective


Subjective: 





patient seen and examined


Still complaining of dyspnea on  minimal exertion and even at rest


Denies cough, denies fever chills, denies chest pain





Objective





- Vital Signs/Intake and Output


Vital Signs (last 24 hours): 


 











Temp Pulse Resp BP Pulse Ox


 


 97.7 F   68   20   164/89 H  96 


 


 08/20/18 08:33  08/20/18 08:33  08/20/18 08:33  08/20/18 08:33  08/20/18 08:33








Intake and Output: 


 











 08/20/18 08/20/18





 06:59 18:59


 


Intake Total  660


 


Balance  660














- Medications


Medications: 


 Current Medications





Albuterol/Ipratropium (Duoneb 3 Mg/0.5 Mg (3 Ml) Ud)  3 ml INH RQ4 [facility]


   Last Admin: 08/20/18 09:11 Dose:  3 ml


Benzocaine/Menthol (Cepacol Sore Throat)  1 sabina MT QID [facility]


   Last Admin: 08/20/18 13:13 Dose:  1 sabina


Enoxaparin Sodium (Lovenox)  40 mg SC DAILY [facility]


   Last Admin: 08/20/18 09:01 Dose:  40 mg


Finasteride (Proscar)  5 mg PO DAILY [facility]


   Last Admin: 08/20/18 09:02 Dose:  5 mg


Guaifenesin (Mucinex La)  600 mg PO BID [facility]


   Last Admin: 08/20/18 09:03 Dose:  600 mg


Azithromycin 500 mg/ Sodium (Chloride)  250 mls @ 250 mls/hr IVPB Q24H [facility]


   PRN Reason: Protocol


   Last Admin: 08/20/18 12:48 Dose:  250 mls/hr


Ceftriaxone Sodium 1 gm/ (Sodium Chloride)  100 mls @ 100 mls/hr IVPB Q24H [facility]


   PRN Reason: Protocol


   Last Admin: 08/20/18 11:26 Dose:  100 mls/hr


Insulin Human Regular (Novolin R)  0 unit SC ACHS [facility]


   PRN Reason: Protocol


   Last Admin: 08/20/18 12:13 Dose:  4 unit


Losartan Potassium (Cozaar)  100 mg PO DAILY [facility]


   Last Admin: 08/20/18 10:23 Dose:  100 mg


Metformin HCl (Glucophage)  1,000 mg PO BID [facility]


   Last Admin: 08/20/18 09:01 Dose:  1,000 mg


Montelukast Sodium (Singulair)  10 mg PO HS [facility]


   Last Admin: 08/19/18 21:01 Dose:  10 mg


Pantoprazole Sodium (Protonix Ec Tab)  40 mg PO DAILY [facility]


   Last Admin: 08/20/18 09:02 Dose:  40 mg


Prednisone (Prednisone Tab)  30 mg PO DAILY [facility]


   Last Admin: 08/20/18 09:02 Dose:  30 mg


Tamsulosin HCl (Flomax)  0.4 mg PO BID [facility]


   Last Admin: 08/20/18 09:02 Dose:  0.4 mg


Tiotropium Bromide (Spiriva)  18 mcg INH RQ24 [facility]


   Last Admin: 08/19/18 08:37 Dose:  18 mcg











- Labs


Labs: 


 





 08/19/18 09:05 





 08/19/18 08:58 











- Head Exam


Head Exam: ATRAUMATIC, NORMOCEPHALIC





- ENT Exam


ENT Exam: Mucous Membranes Moist





- Neck Exam


Neck Exam: Normal Inspection





- Respiratory Exam


Respiratory Exam: Decreased Breath Sounds





- Cardiovascular Exam


Cardiovascular Exam: REGULAR RHYTHM





- GI/Abdominal Exam


GI & Abdominal Exam: Soft, Normal Bowel Sounds





- Extremities Exam


Extremities Exam: Normal Inspection





Assessment and Plan


(1) COPD exacerbation


Assessment & Plan: 


continue prednisone


Nebulizer treatment


on IV antibiotics


CT scan off chest with contrast


Lasix one dose


Status: Acute

## 2018-08-20 NOTE — PN
Copied To:  Tae Hensley MD

Attending MD:  Tae Hensley MD



DATE:  08/19/2018



SUBJECTIVE:  The patient is less short of breath, less cough, less

wheezing.



PHYSICAL EXAMINATION:

VITAL SIGNS:  /95, pulse 96, respiratory rate 24, temperature 98.8.

LUNGS:  Decreased air entry.  Positive scattered rhonchi.

CVS:  S1, S2 regular.

ABDOMEN:  Soft.  Nontender.



ASSESSMENT:

1.  Exacerbation of chronic obstructive pulmonary disease/asthma.

2.  Type 2 diabetes.

3.  Hypertension.



PLAN:  Medical management, Accu-Chek sliding scale, monitor the patient.





__________________________________________

Tae Hensley MD



DD:  08/19/2018 20:57:54

DT:  08/19/2018 23:18:31

Job # [id number]

## 2018-08-21 NOTE — CP.PCM.DIS
Provider





- Provider


Date of Admission: 


08/17/18 11:16





Attending physician: 


Tae Hensley MD








Hospital Course





- Lab Results


Lab Results: 


 Most Recent Lab Values











WBC  14.2 K/uL (4.8-10.8)  H  08/19/18  09:05    


 


RBC  5.25 Mil/uL (4.40-5.90)   08/19/18  09:05    


 


Hgb  13.3 g/dL (12.0-18.0)   08/19/18  09:05    


 


Hct  41.2 % (35.0-51.0)   08/19/18  09:05    


 


MCV  78.5 fL (80.0-94.0)  L  08/19/18  09:05    


 


MCH  25.3 pg (27.0-31.0)  L  08/19/18  09:05    


 


MCHC  32.2 g/dL (33.0-37.0)  L  08/19/18  09:05    


 


RDW  17.8 % (11.5-14.5)  H  08/19/18  09:05    


 


Plt Count  165 K/uL (130-400)   08/19/18  09:05    


 


MPV  9.3 fL (7.2-11.7)   08/19/18  09:05    


 


Neut % (Auto)  86.5 % (50.0-75.0)  H  08/19/18  09:05    


 


Lymph % (Auto)  4.6 % (20.0-40.0)  L  08/19/18  09:05    


 


Mono % (Auto)  8.2 % (0.0-10.0)   08/19/18  09:05    


 


Eos % (Auto)  0.3 % (0.0-4.0)   08/19/18  09:05    


 


Baso % (Auto)  0.4 % (0.0-2.0)   08/19/18  09:05    


 


Neut # (Auto)  12.3 K/uL (1.8-7.0)  H  08/19/18  09:05    


 


Lymph # (Auto)  0.7 K/uL (1.0-4.3)  L  08/19/18  09:05    


 


Mono # (Auto)  1.2 K/uL (0.0-0.8)  H  08/19/18  09:05    


 


Eos # (Auto)  0.0 K/uL (0.0-0.7)   08/19/18  09:05    


 


Baso # (Auto)  0.1 K/uL (0.0-0.2)   08/19/18  09:05    


 


Neutrophils % (Manual)  82 % (50-75)  H  08/19/18  09:05    


 


Band Neutrophils %  2 % (0-2)   08/19/18  09:05    


 


Lymphocytes % (Manual)  8 % (20-40)  L  08/19/18  09:05    


 


Monocytes % (Manual)  7 % (0-10)   08/19/18  09:05    


 


Eosinophils % (Manual)  1 % (0-4)   08/19/18  09:05    


 


Toxic Granulation  Present   08/19/18  09:05    


 


Platelet Estimate  Normal  (NORMAL)   08/19/18  09:05    


 


Large Platelets  Present   08/19/18  09:05    


 


Polychromasia  Slight   08/19/18  09:05    


 


Hypochromasia (manual)  Slight   08/19/18  09:05    


 


Anisocytosis (manual)  Slight   08/19/18  09:05    


 


Ovalocytes  Slight   08/18/18  08:20    


 


Sodium  138 mmol/L (132-148)   08/19/18  08:58    


 


Potassium  4.6 mmol/L (3.6-5.2)   08/19/18  08:58    


 


Chloride  98 mmol/L ()   08/19/18  08:58    


 


Carbon Dioxide  28 mmol/L (22-30)   08/19/18  08:58    


 


Anion Gap  16  (10-20)   08/19/18  08:58    


 


BUN  27 mg/dL (9-20)  H  08/19/18  08:58    


 


Creatinine  0.8 mg/dL (0.8-1.5)   08/19/18  08:58    


 


Est GFR ( Amer)  > 60   08/19/18  08:58    


 


Est GFR (Non-Af Amer)  > 60   08/19/18  08:58    


 


POC Glucose (mg/dL)  267 mg/dL ()  H  08/20/18  16:00    


 


Random Glucose  225 mg/dL ()  H  08/19/18  08:58    


 


Calcium  9.1 mg/dl (8.6-10.4)   08/19/18  08:58    


 


Total Bilirubin  0.4 mg/dL (0.2-1.3)   08/19/18  08:58    


 


AST  19 U/L (17-59)   08/19/18  08:58    


 


ALT  26 U/L (21-72)   08/19/18  08:58    


 


Alkaline Phosphatase  47 U/L ()   08/19/18  08:58    


 


Total Protein  6.2 g/dL (6.3-8.3)  L  08/19/18  08:58    


 


Albumin  3.7 g/dL (3.5-5.0)   08/19/18  08:58    


 


Globulin  2.5 gm/dL (2.2-3.9)   08/19/18  08:58    


 


Albumin/Globulin Ratio  1.4  (1.0-2.1)   08/19/18  08:58    














Discharge Exam





- Head Exam


Head Exam: ATRAUMATIC, NORMOCEPHALIC





Discharge Plan





- Follow Up Plan


Condition: STABLE


Disposition: HOME/ ROUTINE


Instructions:  Exacerbation of COPD (DC)


Referrals: 


Tae Hensley MD [Staff Provider] -

## 2018-08-22 NOTE — DS
Copied To:  Tae Hensley MD

Attending MD:  Tae Hensley MD



ADMISSION DIAGNOSIS:  Exacerbation of chronic obstructive pulmonary

disease.



DISCHARGE DIAGNOSES:  Acute exacerbation of chronic obstructive pulmonary

disease, type 2 diabetes, hypertension, allergic rhinitis.



HISTORY OF PRESENT ILLNESS:  This is a 78-year-old [place] male with

history of type 2 diabetes, hypertension, hyperlipidemia, chronic

obstructive pulmonary disease, who came in because of fever, cough,

congestion, shortness of breath, and wheezing.  The patient was admitted to

the floor, started on Solu-Medrol, oxygen nebulizer treatment, Advair,

Spiriva, and the patient started feeling better.  He felt short of breath. 

His peak flow improved.  His cough, congestion, shortness of breath

improved, and he was discharged with outpatient followup.



PHYSICAL EXAMINATION:

VITAL SIGNS:  Blood pressure 150/79, pulse 95.



LABORATORY DATA:  The patient's WBC 14.2, hemoglobin 13.3, hematocrit 41.2,

platelets 165.  Sodium 138, potassium 4.6, chloride 98, bicarb 28, BUN 27,

creatinine 0.8.



ASSESSMENT AND PLAN:  The patient is feeling better.  Condition is stable

upon discharge.





__________________________________________

Tae Hensley MD



DD:  08/21/2018 0:40:49

DT:  08/21/2018 3:51:54

Job # [id number]

## 2018-12-31 ENCOUNTER — HOSPITAL ENCOUNTER (INPATIENT)
Dept: HOSPITAL 31 - C.ER | Age: 78
LOS: 13 days | DRG: 207 | End: 2019-01-13
Attending: INTERNAL MEDICINE | Admitting: INTERNAL MEDICINE
Payer: MEDICARE

## 2018-12-31 VITALS — BODY MASS INDEX: 24.7 KG/M2

## 2018-12-31 DIAGNOSIS — D69.6: ICD-10-CM

## 2018-12-31 DIAGNOSIS — J45.901: ICD-10-CM

## 2018-12-31 DIAGNOSIS — R57.9: ICD-10-CM

## 2018-12-31 DIAGNOSIS — Z79.4: ICD-10-CM

## 2018-12-31 DIAGNOSIS — K92.1: ICD-10-CM

## 2018-12-31 DIAGNOSIS — E87.4: ICD-10-CM

## 2018-12-31 DIAGNOSIS — I46.9: ICD-10-CM

## 2018-12-31 DIAGNOSIS — I10: ICD-10-CM

## 2018-12-31 DIAGNOSIS — E09.649: ICD-10-CM

## 2018-12-31 DIAGNOSIS — I27.20: ICD-10-CM

## 2018-12-31 DIAGNOSIS — N40.0: ICD-10-CM

## 2018-12-31 DIAGNOSIS — K21.9: ICD-10-CM

## 2018-12-31 DIAGNOSIS — J96.00: ICD-10-CM

## 2018-12-31 DIAGNOSIS — J44.0: ICD-10-CM

## 2018-12-31 DIAGNOSIS — J18.9: ICD-10-CM

## 2018-12-31 DIAGNOSIS — J98.11: ICD-10-CM

## 2018-12-31 DIAGNOSIS — J44.1: Primary | ICD-10-CM

## 2018-12-31 DIAGNOSIS — D50.0: ICD-10-CM

## 2018-12-31 DIAGNOSIS — G47.30: ICD-10-CM

## 2018-12-31 DIAGNOSIS — M17.10: ICD-10-CM

## 2018-12-31 DIAGNOSIS — G47.00: ICD-10-CM

## 2018-12-31 DIAGNOSIS — D62: ICD-10-CM

## 2018-12-31 DIAGNOSIS — Z87.891: ICD-10-CM

## 2018-12-31 DIAGNOSIS — Z99.81: ICD-10-CM

## 2018-12-31 DIAGNOSIS — E78.5: ICD-10-CM

## 2018-12-31 DIAGNOSIS — E86.0: ICD-10-CM

## 2018-12-31 DIAGNOSIS — I48.91: ICD-10-CM

## 2018-12-31 LAB
ALBUMIN SERPL-MCNC: 4.1 G/DL (ref 3.5–5)
ALBUMIN/GLOB SERPL: 1.6 {RATIO} (ref 1–2.1)
ALT SERPL-CCNC: 43 U/L (ref 21–72)
ANISOCYTOSIS BLD QL SMEAR: SLIGHT
AST SERPL-CCNC: 33 U/L (ref 17–59)
BASOPHILS # BLD AUTO: 0 K/UL (ref 0–0.2)
BASOPHILS NFR BLD: 0.3 % (ref 0–2)
BNP SERPL-MCNC: 356 PG/ML (ref 0–900)
BUN SERPL-MCNC: 24 MG/DL (ref 9–20)
CALCIUM SERPL-MCNC: 8.9 MG/DL (ref 8.6–10.4)
CK MB SERPL-MCNC: 2.73 NG/ML (ref 0–3.38)
EOSINOPHIL # BLD AUTO: 0 K/UL (ref 0–0.7)
EOSINOPHIL NFR BLD: 0 % (ref 0–4)
ERYTHROCYTE [DISTWIDTH] IN BLOOD BY AUTOMATED COUNT: 17.4 % (ref 11.5–14.5)
GFR NON-AFRICAN AMERICAN: > 60
HGB BLD-MCNC: 13.7 G/DL (ref 12–18)
LYMPHOCYTE: 2 % (ref 20–40)
LYMPHOCYTES # BLD AUTO: 0.4 K/UL (ref 1–4.3)
LYMPHOCYTES NFR BLD AUTO: 3.5 % (ref 20–40)
MCH RBC QN AUTO: 24.6 PG (ref 27–31)
MCHC RBC AUTO-ENTMCNC: 31.6 G/DL (ref 33–37)
MCV RBC AUTO: 78 FL (ref 80–94)
MICROCYTES BLD QL SMEAR: SLIGHT
MONOCYTE: 3 % (ref 0–10)
MONOCYTES # BLD: 0.6 K/UL (ref 0–0.8)
MONOCYTES NFR BLD: 5.9 % (ref 0–10)
NEUTROPHILS # BLD: 9.5 K/UL (ref 1.8–7)
NEUTROPHILS NFR BLD AUTO: 90.3 % (ref 50–75)
NEUTROPHILS NFR BLD AUTO: 94 % (ref 50–75)
NEUTS BAND NFR BLD: 1 % (ref 0–2)
NRBC BLD AUTO-RTO: 0.1 % (ref 0–2)
OVALOCYTES BLD QL SMEAR: SLIGHT
PLATELET # BLD EST: NORMAL 10*3/UL
PLATELET # BLD: 197 K/UL (ref 130–400)
PMV BLD AUTO: 9.1 FL (ref 7.2–11.7)
RBC # BLD AUTO: 5.55 MIL/UL (ref 4.4–5.9)
TOTAL CELLS COUNTED BLD: 100
WBC # BLD AUTO: 10.5 K/UL (ref 4.8–10.8)

## 2018-12-31 RX ADMIN — IPRATROPIUM BROMIDE AND ALBUTEROL SULFATE SCH: .5; 3 SOLUTION RESPIRATORY (INHALATION) at 12:00

## 2018-12-31 RX ADMIN — PANTOPRAZOLE SODIUM SCH MG: 40 TABLET, DELAYED RELEASE ORAL at 09:48

## 2018-12-31 RX ADMIN — ENOXAPARIN SODIUM SCH MG: 40 INJECTION SUBCUTANEOUS at 09:48

## 2018-12-31 RX ADMIN — INSULIN ASPART SCH: 100 INJECTION, SOLUTION INTRAVENOUS; SUBCUTANEOUS at 22:00

## 2018-12-31 RX ADMIN — GUAIFENESIN SCH MG: 600 TABLET, EXTENDED RELEASE ORAL at 09:51

## 2018-12-31 RX ADMIN — IPRATROPIUM BROMIDE AND ALBUTEROL SULFATE SCH ML: .5; 3 SOLUTION RESPIRATORY (INHALATION) at 10:52

## 2018-12-31 RX ADMIN — IPRATROPIUM BROMIDE AND ALBUTEROL SULFATE SCH ML: .5; 3 SOLUTION RESPIRATORY (INHALATION) at 23:39

## 2018-12-31 RX ADMIN — INSULIN ASPART SCH UNITS: 100 INJECTION, SOLUTION INTRAVENOUS; SUBCUTANEOUS at 12:24

## 2018-12-31 RX ADMIN — METHYLPREDNISOLONE SODIUM SUCCINATE SCH MG: 40 INJECTION, POWDER, FOR SOLUTION INTRAMUSCULAR; INTRAVENOUS at 09:51

## 2018-12-31 RX ADMIN — GUAIFENESIN SCH MG: 600 TABLET, EXTENDED RELEASE ORAL at 18:12

## 2018-12-31 RX ADMIN — METHYLPREDNISOLONE SODIUM SUCCINATE SCH MG: 40 INJECTION, POWDER, FOR SOLUTION INTRAMUSCULAR; INTRAVENOUS at 21:14

## 2018-12-31 RX ADMIN — IPRATROPIUM BROMIDE AND ALBUTEROL SULFATE SCH ML: .5; 3 SOLUTION RESPIRATORY (INHALATION) at 19:43

## 2018-12-31 RX ADMIN — INSULIN ASPART SCH UNITS: 100 INJECTION, SOLUTION INTRAVENOUS; SUBCUTANEOUS at 08:10

## 2018-12-31 RX ADMIN — IPRATROPIUM BROMIDE AND ALBUTEROL SULFATE SCH ML: .5; 3 SOLUTION RESPIRATORY (INHALATION) at 16:12

## 2018-12-31 RX ADMIN — TIOTROPIUM BROMIDE SCH: 18 CAPSULE ORAL; RESPIRATORY (INHALATION) at 10:52

## 2018-12-31 RX ADMIN — INSULIN ASPART SCH UNITS: 100 INJECTION, SOLUTION INTRAVENOUS; SUBCUTANEOUS at 18:12

## 2018-12-31 NOTE — C.PDOC
History Of Present Illness





Patient presents to ED c/o SOB, wheezing, productive cough with white sputum for

the past two days.  He denies chest pain, palpitations, fever, abdominal pain, 

nausea/vomiting.





PMHx: COPD, gastritis, HTN, arthritis 


Time Seen by Provider: 12/31/18 04:06


Chief Complaint (Nursing): Shortness Of Breath


History Per: Patient


History/Exam Limitations: no limitations


Onset/Duration Of Symptoms: Days (2)


Current Symptoms Are (Timing): Still Present


Current Respiratory Medications: See Home Med List


Severity: Moderate





Past Medical History


Reviewed: Historical Data, Nursing Documentation, Vital Signs


Vital Signs: 





                                Last Vital Signs











Temp  98 F   12/31/18 04:27


 


Pulse  65   12/31/18 04:27


 


Resp  21   12/31/18 04:27


 


BP  162/86 H  12/31/18 04:27


 


Pulse Ox  97   12/31/18 04:27














- Medical History


PMH: Arthritis (B/L KNEE JT), Asthma, COPD (ASTHMA), Gastritis, HTN


Surgical History: Endoscopy (2013)





- [facility] Procedures











EXCISION OF UPPER ESOPHAGUS, ENDO, DIAGN (04/27/16)


FLUOROSCOPY OF MULT COR ART USING L OSM CONTRAST (05/16/17)


FLUOROSCOPY OF RIGHT AND LEFT HEART USING L OSM CONTRAST (05/16/17)


INSERTION OF INFUSION DEV INTO SUP VENA CAVA, PERC APPROACH (05/27/17)


MEASURE CARDIAC SAMPL & PRESSURE, BILATERAL, PERC (05/16/17)


ULTRASONOGRAPHY OF SUPERIOR VENA CAVA, GUIDANCE (05/27/17)








Family History: States: No Known Family Hx





- Social History


Hx Tobacco Use: No


Hx Alcohol Use: No


Hx Substance Use: No





- Immunization History


Hx Tetanus Toxoid Vaccination: No


Hx Influenza Vaccination: No


Hx Pneumococcal Vaccination: No





Review Of Systems


Constitutional: Negative for: Fever, Chills


Cardiovascular: Negative for: Chest Pain, Palpitations


Respiratory: Positive for: Cough, Shortness of Breath, Wheezing


Gastrointestinal: Negative for: Nausea, Vomiting, Abdominal Pain, Diarrhea


Skin: Negative for: Rash


Neurological: Negative for: Headache, Dizziness





Physical Exam





- Physical Exam


Appears: Well, Non-toxic, In Acute Distress (in mild distress), Other (speaking 

in full sentences)


Eye(s): bilateral: Normal Inspection


Oral Mucosa: Moist


Cardiovascular: Rhythm Regular


Respiratory: No Accessory Muscle Use, No Rales, No Rhonchi, Wheezing (diffuse 

expiratory wheezing B/L )


Gastrointestinal/Abdominal: Normal Exam, Bowel Sounds, Soft, No Tenderness


Extremity: Normal ROM, No Pedal Edema, No Calf Tenderness


Pulses: Left Dorsalis Pedis: Normal, Right Dorsalis Pedis: Normal


Neurological/Psych: Oriented x3





ED Course And Treatment





- Laboratory Results


Result Diagrams: 


                                 01/08/19 06:10





                                 01/08/19 06:10


ECG: Interpreted By Me, Viewed By Me (sinus rhythm 63 bpm, left axis deviation, 

RBBB, no acute ST changes )


ECG Interpretation: No Changes From Prior, Abnormal


O2 Sat by Pulse Oximetry: 97 (RA)


Pulse Ox Interpretation: Normal





- Radiology


CXR: Interpreted by Me, Viewed By Me (left sided effusion, no infiltrates )


Progress Note: Blood work, EKG, CXR ordered and reviewed.  Patient given IV 

Solumedrol, albuterol neb treatments.





- Physician Consult Information


Physician Contacted: Tae Hensley


Outcome Of Conversation: Discussed patient with PMD, he agrees with obs tele for

COPD exacerbation, dyspnea.





Disposition





- Disposition


Disposition: HOSPITALIZED


Disposition Time: 06:07


Condition: STABLE





- Clinical Impression


Clinical Impression: 


 COPD exacerbation, Dyspnea








Decision To Admit





- Pt Status Changed To:


Hospital Disposition Of: Observation





- .


Bed Request Type: Telemetry


Admitting Physician: Tae Hensley


Patient Diagnosis: 


 COPD exacerbation, Dyspnea

## 2018-12-31 NOTE — CP.PCM.HP
Past Patient History





- Infectious Disease


Hx of Infectious Diseases: None





- Past Medical History & Family History


Past Medical History?: Yes





- Past Social History


Smoking Status: Former Smoker





- CARDIAC


Hx Hypertension: Yes





- PULMONARY


Hx Asthma: Yes


Hx Chronic Obstructive Pulmonary Disease (COPD): Yes (ASTHMA)





- NEUROLOGICAL


Hx Neurological Disorder: No





- HEENT


Hx HEENT Problems: No





- RENAL


Hx Chronic Kidney Disease: No





- ENDOCRINE/METABOLIC


Hx Endocrine Disorders: Yes


Hx Diabetes Mellitus Type 1: Yes





- HEMATOLOGICAL/ONCOLOGICAL


Hx Blood Disorders: No





- INTEGUMENTARY


Hx Dermatological Problems: Yes


Other/Comment: SKIN ASTHMA





- MUSCULOSKELETAL/RHEUMATOLOGICAL


Hx Arthritis: Yes (B/L KNEE JT)





- GASTROINTESTINAL


Hx Gastritis: Yes





- GENITOURINARY/GYNECOLOGICAL


Hx Genitourinary Disorders: Yes


Hx Prostate Problems: Yes





- PSYCHIATRIC


Hx Substance Use: No





- SURGICAL HISTORY


Hx Surgeries: No





- ANESTHESIA


Hx Anesthesia: No


Hx Anesthesia Reactions: No


Hx Malignant Hyperthermia: No





Meds


Allergies/Adverse Reactions: 


                                    Allergies











Allergy/AdvReac Type Severity Reaction Status Date / Time


 


seasonal allergies Allergy Mild CONGESTION Uncoded 12/31/18 04:13














Results





- Vital Signs


Recent Vital Signs: 





                                Last Vital Signs











Temp  97.9 F   12/31/18 17:00


 


Pulse  91 H  12/31/18 17:00


 


Resp  18   12/31/18 17:00


 


BP  139/70   12/31/18 17:00


 


Pulse Ox  95   12/31/18 17:00














- Labs


Result Diagrams: 


                                 12/31/18 04:48





                                 12/31/18 04:48


Labs: 





                         Laboratory Results - last 24 hr











  12/31/18 12/31/18 12/31/18





  04:48 04:48 07:11


 


WBC  10.5  


 


RBC  5.55  


 


Hgb  13.7  


 


Hct  43.2  


 


MCV  78.0 L  


 


MCH  24.6 L  


 


MCHC  31.6 L  


 


RDW  17.4 H  


 


Plt Count  197  D  


 


MPV  9.1  


 


Neut % (Auto)  90.3 H  


 


Lymph % (Auto)  3.5 L  


 


Mono % (Auto)  5.9  


 


Eos % (Auto)  0.0  


 


Baso % (Auto)  0.3  


 


Neut # (Auto)  9.5 H  


 


Lymph # (Auto)  0.4 L  


 


Mono # (Auto)  0.6  


 


Eos # (Auto)  0.0  


 


Baso # (Auto)  0.0  


 


Neutrophils % (Manual)  94 H  


 


Band Neutrophils %  1  


 


Lymphocytes % (Manual)  2 L  


 


Monocytes % (Manual)  3  


 


Platelet Estimate  Normal  


 


Anisocytosis (manual)  Slight  


 


Microcytosis (manual)  Slight  


 


Ovalocytes  Slight  


 


Sodium   137 


 


Potassium   5.1 


 


Chloride   100 


 


Carbon Dioxide   27 


 


Anion Gap   16 


 


BUN   24 H 


 


Creatinine   0.7 L 


 


Est GFR ( Amer)   > 60 


 


Est GFR (Non-Af Amer)   > 60 


 


POC Glucose (mg/dL)    282 H


 


Random Glucose   346 H 


 


Calcium   8.9 


 


Total Bilirubin   0.5 


 


AST   33 


 


ALT   43 


 


Alkaline Phosphatase   48 


 


Total Creatine Kinase   40 L 


 


CK-MB (Mass)   2.73 


 


Troponin I   < 0.0120 


 


NT-Pro-B Natriuret Pep   356 


 


Total Protein   6.6 


 


Albumin   4.1 


 


Globulin   2.5 


 


Albumin/Globulin Ratio   1.6 














  12/31/18 12/31/18 12/31/18





  12:03 16:53 21:45


 


WBC   


 


RBC   


 


Hgb   


 


Hct   


 


MCV   


 


MCH   


 


MCHC   


 


RDW   


 


Plt Count   


 


MPV   


 


Neut % (Auto)   


 


Lymph % (Auto)   


 


Mono % (Auto)   


 


Eos % (Auto)   


 


Baso % (Auto)   


 


Neut # (Auto)   


 


Lymph # (Auto)   


 


Mono # (Auto)   


 


Eos # (Auto)   


 


Baso # (Auto)   


 


Neutrophils % (Manual)   


 


Band Neutrophils %   


 


Lymphocytes % (Manual)   


 


Monocytes % (Manual)   


 


Platelet Estimate   


 


Anisocytosis (manual)   


 


Microcytosis (manual)   


 


Ovalocytes   


 


Sodium   


 


Potassium   


 


Chloride   


 


Carbon Dioxide   


 


Anion Gap   


 


BUN   


 


Creatinine   


 


Est GFR ( Amer)   


 


Est GFR (Non-Af Amer)   


 


POC Glucose (mg/dL)  314 H  327 H  244 H


 


Random Glucose   


 


Calcium   


 


Total Bilirubin   


 


AST   


 


ALT   


 


Alkaline Phosphatase   


 


Total Creatine Kinase   


 


CK-MB (Mass)   


 


Troponin I   


 


NT-Pro-B Natriuret Pep   


 


Total Protein   


 


Albumin   


 


Globulin   


 


Albumin/Globulin Ratio

## 2018-12-31 NOTE — RAD
HISTORY:

 SOB 



COMPARISON:

Chest x-ray performed 9/10/18, CT chest without contrast performed 

8/20/18 



TECHNIQUE:

Chest, one view.



FINDINGS:

The patient's chin obscures evaluation of the lung apices.



LUNGS:

Left-sided pleural effusion and/or consolidation.  Right basilar 

atelectasis.  Mild pulmonary venous congestion.  No definite 

pneumothorax. 



Pulmonary nodules and pleural-based soft tissue densities 

demonstrated on CT of the chest without contrast performed 8/20/18 

are not well visualized by chest x-ray. 



Please note that chest x-ray has limited sensitivity for the 

detection of pulmonary masses.



CARDIOVASCULAR:

Enlargement of the cardiomediastinal silhouette.  No atherosclerotic 

calcifications appreciated. 



OSSEOUS STRUCTURES:

Degenerative changes.



VISUALIZED UPPER ABDOMEN:

Unremarkable.



OTHER FINDINGS:

None.



IMPRESSION:

Left-sided pleural effusion and/or consolidation.  Right basilar 

atelectasis.  Mild pulmonary venous congestion. No definite 

pneumothorax. 



Pulmonary nodules and pleural-based soft tissue densities 

demonstrated on CT of the chest without contrast performed 8/20/18 

are not well visualized by chest x-ray. 



Enlarged cardiomediastinal silhouette, similar prior study.

## 2018-12-31 NOTE — CP.PCM.CON
History of Present Illness





- History of Present Illness


History of Present Illness: 





Reason for pulmonology consult- SOB


78 year old male with PMH copd, asthma, sleep apnea, HTN, DM, GERD, arthritis of

BL knees presented to ED with 2 days SOB, wheezing, and cough.  Patient received

albuterol nebulizer and solumedrol in ED.  SOB improved after treatment.  Denied

fever, chills, chest pain, hemoptysis, nausea, vomiting.





Medications- Prednisone 5mg daily, Anoro Ellipta 62.5-25mcg 1 IH daily, Spiriva 

18mcg daily, Singulari 10mg daily, Ventolin, Arformoterol 1IH daily, mucinex 

600mg BID, Flomax 0.4mg BID, omeprazole 40mg, Mirabepron 20mg, metformin 1000mg 

BID, losartan 100mg


Allergies- NKDA, seasonal allergies


PMH- as above


PSH- endoscopy


Social- returned from 3month stay in [facility] 3 days ago, felt well until re

turned to [place].  On home O2.  Former smoker, no alcohol or drug use














Review of Systems





- Review of Systems


All systems: reviewed and no additional remarkable complaints except (Shortness 

of breath and cough)





Past Patient History





- Infectious Disease


Hx of Infectious Diseases: None





- Past Medical History & Family History


Past Medical History?: Yes





- Past Social History


Smoking Status: Former Smoker





- CARDIAC


Hx Hypertension: Yes





- PULMONARY


Hx Asthma: Yes


Hx Chronic Obstructive Pulmonary Disease (COPD): Yes (ASTHMA)





- NEUROLOGICAL


Hx Neurological Disorder: No





- HEENT


Hx HEENT Problems: No





- RENAL


Hx Chronic Kidney Disease: No





- ENDOCRINE/METABOLIC


Hx Endocrine Disorders: Yes


Hx Diabetes Mellitus Type 1: Yes





- HEMATOLOGICAL/ONCOLOGICAL


Hx Blood Disorders: No





- INTEGUMENTARY


Hx Dermatological Problems: Yes


Other/Comment: SKIN ASTHMA





- MUSCULOSKELETAL/RHEUMATOLOGICAL


Hx Arthritis: Yes (B/L KNEE JT)





- GASTROINTESTINAL


Hx Gastritis: Yes





- GENITOURINARY/GYNECOLOGICAL


Hx Genitourinary Disorders: Yes


Hx Prostate Problems: Yes





- PSYCHIATRIC


Hx Substance Use: No





- SURGICAL HISTORY


Hx Surgeries: No





- ANESTHESIA


Hx Anesthesia: No


Hx Anesthesia Reactions: No


Hx Malignant Hyperthermia: No





Meds


Allergies/Adverse Reactions: 


                                    Allergies











Allergy/AdvReac Type Severity Reaction Status Date / Time


 


seasonal allergies Allergy Mild CONGESTION Uncoded 12/31/18 04:13














- Medications


Medications: 


                               Current Medications





Albuterol/Ipratropium (Duoneb 3 Mg/0.5 Mg (3 Ml) Ud)  3 ml INH RQ4 [facility]


   Last Admin: 12/31/18 10:52 Dose:  3 ml


Dextrose (Dextrose 50% Inj)  0 ml IV STAT PRN; Protocol


   PRN Reason: Hypoglycemia Protocol


Dextrose (Glutose 15)  0 gm PO ONCE PRN; Protocol


   PRN Reason: Hypoglycemia Protocol


Enoxaparin Sodium (Lovenox)  40 mg SC DAILY [facility]


   Last Admin: 12/31/18 09:48 Dose:  40 mg


Finasteride (Proscar)  5 mg PO DAILY [facility]


   Last Admin: 12/31/18 09:48 Dose:  5 mg


Glucagon (Glucagen Diagnostic Kit)  0 mg IM STAT PRN; Protocol


   PRN Reason: Hypoglycemia Protocol


Guaifenesin (Mucinex La)  600 mg PO BID [facility]


   Last Admin: 12/31/18 09:51 Dose:  600 mg


Home Med (Mirabegron [Myrbetriq])  50 mg PO DAILY [facility]


Dextrose (Dextrose 5% In Water 1000 Ml)  1,000 mls @ 0 mls/hr IV .Q0M PRN; 

Protocol


   PRN Reason: Hypoglycemia Protocol


Azithromycin 500 mg/ Sodium (Chloride)  250 mls @ 250 mls/hr IVPB Q24H [facility]; Prot

ocol


   Last Admin: 12/31/18 13:24 Dose:  250 mls/hr


Ceftriaxone Sodium 1 gm/ (Sodium Chloride)  100 mls @ 100 mls/hr IVPB DAILY [facility];

 Protocol


   Last Admin: 12/31/18 12:24 Dose:  100 mls/hr


Insulin Aspart (Novolog)  0 unit SC ACHS [facility]; Protocol


   Last Admin: 12/31/18 12:24 Dose:  4 units


Losartan Potassium (Cozaar)  100 mg PO DAILY [facility]


   Last Admin: 12/31/18 09:48 Dose:  100 mg


Metformin HCl (Glucophage)  1,000 mg PO BIDCC [facility]


   Last Admin: 12/31/18 08:10 Dose:  1,000 mg


Methylprednisolone (Solu-Medrol)  40 mg IVP Q8 [facility]


   Last Admin: 12/31/18 09:51 Dose:  40 mg


Montelukast Sodium (Singulair)  10 mg PO HS [facility]


Pantoprazole Sodium (Protonix Ec Tab)  40 mg PO DAILY [facility]


   Last Admin: 12/31/18 09:48 Dose:  40 mg


Tamsulosin HCl (Flomax)  0.4 mg PO BID [facility]


   Last Admin: 12/31/18 09:48 Dose:  0.4 mg


Tiotropium Bromide (Spiriva)  18 mcg INH RQ24 [facility]


   Last Admin: 12/31/18 10:52 Dose:  Not Given











Physical Exam





- Head Exam


Head Exam: ATRAUMATIC, NORMOCEPHALIC





- ENT Exam


ENT Exam: Mucous Membranes Moist





- Neck Exam


Neck exam: Positive for: Normal Inspection





- Respiratory Exam


Respiratory Exam: Rhonchi, Wheezes





- Cardiovascular Exam


Cardiovascular Exam: REGULAR RHYTHM





- GI/Abdominal Exam


GI & Abdominal Exam: Normal Bowel Sounds





Results





- Vital Signs


Recent Vital Signs: 


                                Last Vital Signs











Temp  97.6 F   12/31/18 08:02


 


Pulse  81   12/31/18 10:52


 


Resp  22   12/31/18 08:02


 


BP  178/96 H  12/31/18 08:02


 


Pulse Ox  96   12/31/18 08:02














- Labs


Result Diagrams: 


                                 12/31/18 04:48





                                 12/31/18 04:48


Labs: 


                         Laboratory Results - last 24 hr











  12/31/18 12/31/18 12/31/18





  04:48 04:48 07:11


 


WBC  10.5  


 


RBC  5.55  


 


Hgb  13.7  


 


Hct  43.2  


 


MCV  78.0 L  


 


MCH  24.6 L  


 


MCHC  31.6 L  


 


RDW  17.4 H  


 


Plt Count  197  D  


 


MPV  9.1  


 


Neut % (Auto)  90.3 H  


 


Lymph % (Auto)  3.5 L  


 


Mono % (Auto)  5.9  


 


Eos % (Auto)  0.0  


 


Baso % (Auto)  0.3  


 


Neut # (Auto)  9.5 H  


 


Lymph # (Auto)  0.4 L  


 


Mono # (Auto)  0.6  


 


Eos # (Auto)  0.0  


 


Baso # (Auto)  0.0  


 


Neutrophils % (Manual)  94 H  


 


Band Neutrophils %  1  


 


Lymphocytes % (Manual)  2 L  


 


Monocytes % (Manual)  3  


 


Platelet Estimate  Normal  


 


Anisocytosis (manual)  Slight  


 


Microcytosis (manual)  Slight  


 


Ovalocytes  Slight  


 


Sodium   137 


 


Potassium   5.1 


 


Chloride   100 


 


Carbon Dioxide   27 


 


Anion Gap   16 


 


BUN   24 H 


 


Creatinine   0.7 L 


 


Est GFR ( Amer)   > 60 


 


Est GFR (Non-Af Amer)   > 60 


 


POC Glucose (mg/dL)    282 H


 


Random Glucose   346 H 


 


Calcium   8.9 


 


Total Bilirubin   0.5 


 


AST   33 


 


ALT   43 


 


Alkaline Phosphatase   48 


 


Total Creatine Kinase   40 L 


 


CK-MB (Mass)   2.73 


 


Troponin I   < 0.0120 


 


NT-Pro-B Natriuret Pep   356 


 


Total Protein   6.6 


 


Albumin   4.1 


 


Globulin   2.5 


 


Albumin/Globulin Ratio   1.6 














  12/31/18





  12:03


 


WBC 


 


RBC 


 


Hgb 


 


Hct 


 


MCV 


 


MCH 


 


MCHC 


 


RDW 


 


Plt Count 


 


MPV 


 


Neut % (Auto) 


 


Lymph % (Auto) 


 


Mono % (Auto) 


 


Eos % (Auto) 


 


Baso % (Auto) 


 


Neut # (Auto) 


 


Lymph # (Auto) 


 


Mono # (Auto) 


 


Eos # (Auto) 


 


Baso # (Auto) 


 


Neutrophils % (Manual) 


 


Band Neutrophils % 


 


Lymphocytes % (Manual) 


 


Monocytes % (Manual) 


 


Platelet Estimate 


 


Anisocytosis (manual) 


 


Microcytosis (manual) 


 


Ovalocytes 


 


Sodium 


 


Potassium 


 


Chloride 


 


Carbon Dioxide 


 


Anion Gap 


 


BUN 


 


Creatinine 


 


Est GFR ( Amer) 


 


Est GFR (Non-Af Amer) 


 


POC Glucose (mg/dL)  314 H


 


Random Glucose 


 


Calcium 


 


Total Bilirubin 


 


AST 


 


ALT 


 


Alkaline Phosphatase 


 


Total Creatine Kinase 


 


CK-MB (Mass) 


 


Troponin I 


 


NT-Pro-B Natriuret Pep 


 


Total Protein 


 


Albumin 


 


Globulin 


 


Albumin/Globulin Ratio 














Assessment & Plan


(1) COPD exacerbation


Status: Acute   


Comment: WBC 10.5, possible pneumonia on chest x-ray.  12/31 CXR- Left sided 

pleural effusion and/or consolidation, right basilar atelectasis, no definite 

pneumothorax, mild pulmonary venous congestion, pulmonary nodules and pleural 

soft tissue densities from 8/20/18 CT scan are not well visualized.  12/31 EKG- 

NSR, 63 BPM, left axis deviation, RBBB, no acute ST changes.  Continue 

nebulizer.  Continue solumedrol.  Continue antibiotics- currently on 

Azithromycin and Ceftriaxone

## 2019-01-01 LAB
ARTERIAL BLOOD GAS O2 SAT: 97.1 % (ref 95–98)
ARTERIAL BLOOD GAS PCO2: 38 MM/HG (ref 35–45)
ARTERIAL BLOOD GAS TCO2: 26.4 MMOL/L (ref 22–28)
ARTERIAL PATENCY WRIST A: (no result)
BASE EXCESS BLDV CALC-SCNC: 2.1 MMOL/L (ref 0–2)
HCO3 BLDA-SCNC: 25.8 MMOL/L (ref 21–28)
INHALED O2 CONCENTRATION: 100 %
PCO2 BLDV: 52 MMHG (ref 40–60)
PH BLDA: 7.43 [PH] (ref 7.35–7.45)
PH BLDV: 7.35 [PH] (ref 7.32–7.43)
PO2 BLDA: 409 MM/HG (ref 80–100)
VENOUS BLOOD GAS PO2: 42 MM/HG (ref 30–55)

## 2019-01-01 RX ADMIN — IPRATROPIUM BROMIDE AND ALBUTEROL SULFATE SCH ML: .5; 3 SOLUTION RESPIRATORY (INHALATION) at 09:28

## 2019-01-01 RX ADMIN — ENOXAPARIN SODIUM SCH MG: 40 INJECTION SUBCUTANEOUS at 09:27

## 2019-01-01 RX ADMIN — GUAIFENESIN SCH MG: 600 TABLET, EXTENDED RELEASE ORAL at 18:21

## 2019-01-01 RX ADMIN — INSULIN ASPART SCH UNITS: 100 INJECTION, SOLUTION INTRAVENOUS; SUBCUTANEOUS at 12:09

## 2019-01-01 RX ADMIN — INSULIN ASPART SCH: 100 INJECTION, SOLUTION INTRAVENOUS; SUBCUTANEOUS at 22:00

## 2019-01-01 RX ADMIN — INSULIN ASPART SCH UNITS: 100 INJECTION, SOLUTION INTRAVENOUS; SUBCUTANEOUS at 18:21

## 2019-01-01 RX ADMIN — IPRATROPIUM BROMIDE AND ALBUTEROL SULFATE SCH ML: .5; 3 SOLUTION RESPIRATORY (INHALATION) at 11:26

## 2019-01-01 RX ADMIN — IPRATROPIUM BROMIDE AND ALBUTEROL SULFATE SCH ML: .5; 3 SOLUTION RESPIRATORY (INHALATION) at 16:21

## 2019-01-01 RX ADMIN — METHYLPREDNISOLONE SODIUM SUCCINATE SCH MG: 40 INJECTION, POWDER, FOR SOLUTION INTRAMUSCULAR; INTRAVENOUS at 21:53

## 2019-01-01 RX ADMIN — IPRATROPIUM BROMIDE AND ALBUTEROL SULFATE SCH ML: .5; 3 SOLUTION RESPIRATORY (INHALATION) at 21:40

## 2019-01-01 RX ADMIN — IPRATROPIUM BROMIDE AND ALBUTEROL SULFATE SCH ML: .5; 3 SOLUTION RESPIRATORY (INHALATION) at 03:12

## 2019-01-01 RX ADMIN — METHYLPREDNISOLONE SODIUM SUCCINATE SCH MG: 40 INJECTION, POWDER, FOR SOLUTION INTRAMUSCULAR; INTRAVENOUS at 05:33

## 2019-01-01 RX ADMIN — GUAIFENESIN SCH MG: 600 TABLET, EXTENDED RELEASE ORAL at 09:27

## 2019-01-01 RX ADMIN — INSULIN ASPART SCH UNITS: 100 INJECTION, SOLUTION INTRAVENOUS; SUBCUTANEOUS at 08:37

## 2019-01-01 RX ADMIN — METHYLPREDNISOLONE SODIUM SUCCINATE SCH MG: 40 INJECTION, POWDER, FOR SOLUTION INTRAMUSCULAR; INTRAVENOUS at 14:03

## 2019-01-01 RX ADMIN — TIOTROPIUM BROMIDE SCH MCG: 18 CAPSULE ORAL; RESPIRATORY (INHALATION) at 09:28

## 2019-01-01 RX ADMIN — PANTOPRAZOLE SODIUM SCH MG: 40 TABLET, DELAYED RELEASE ORAL at 09:27

## 2019-01-01 NOTE — RAD
HISTORY:

 SOB 



COMPARISON:

Chest x-ray performed 12/31/18 



TECHNIQUE:

Chest, one view.



FINDINGS:





LUNGS:

Small bilateral pleural effusions.  Right basilar 

atelectasis/infiltrate.  Streaky opacities involving medial bilateral 

upper lobes consistent with pleural base soft tissue/atelectasis 

demonstrated on CT performed 8/20/18.  Medial right upper lobe 

atelectasis or consolidation.  No definite pneumothorax. 



CARDIOVASCULAR:

Enlargement of the cardiomediastinal silhouette. 



OSSEOUS STRUCTURES:

Degenerative changes.



VISUALIZED UPPER ABDOMEN:

Unremarkable.



OTHER FINDINGS:

None.



IMPRESSION:

Small bilateral pleural effusions.  Right basilar 

atelectasis/infiltrate.  Medial right upper lobe atelectasis or 

consolidation. Streaky opacities involving medial bilateral upper 

lobes consistent with pleural based soft tissue/atelectasis 

demonstrated on CT performed 8/20/18.  



Enlarged cardiomediastinal silhouette.

## 2019-01-01 NOTE — PCM.RRT
RRT Nurses Assessment





- Situation


Date: 01/01/19


Time RRT was called: 01:02


RRT Responder Arrival Time:: 01:02


RRT Location::  Med/Surg


Room Number: 670-B


RRT Reason for Call: Respiratory Distress


RRT Called By: RN





- Respiratory


RRT Delivery Method: Non Rebreather @%


Received Nebulizer Treatments: Yes


Was the Patient Intubated?: No


Was the Patient Placed on a Ventilator?: No





- Ventilator Settings


SAO2 %: 97





- Medication


Medications Administered During RRT: lasix 40mg ivp





- Diagnostic Test Ordered


Chest X-Ray: Yes





- Stat Labs Ordered


RRT Stat Labs Ordered: ABG





- Vital Signs


Vital Signs: 


/83


O2 94%








- Morse Coma Scale


Coma Scale Eye Opening: Spontaneous


Coma Scale Motor: Obeys Commands Movement


Coma Scale Verbal: Oriented


Coma Scale Total: 15





- Recommendations


5) RRT Level of Care Recommendations: Remain in current setting


Notifications: Attending Physician





I.Reason for RRT





- A) Acute Change in Patient:


(Select all that apply): Staff member or family is worried about patient


Subjective: 





RRT called by nurse for respiratory distress.  Patient was having difficulty 

breathing just prior to event and was given duonebs and lasix 20 ivp.  Pt placed

on non-rebreather with improvement.  Then pt got up to use restroom and HR 

increased to 150 and pt was having great difficulty breathing, although 

saturating at 95%.  Patient was given another lasix 40mg ivp, CXR was ordered 

and repeat lactate.  ICU consulted





- Respiratory


Oxygen Delivery Method: Face Mask @%, Non Rebreather @%





- Constitutional


Appears: In Acute Distress





- Head


Head Exam: ATRAUMATIC, NORMAL INSPECTION, NORMOCEPHALIC





- Eyes


Eye Exam: EOMI, Normal appearance





- Respiratory Exam


Respiratory Exam: Accessory Muscle Use, Rhonchi, Wheezes, Respiratory Distress. 

absent: NORMAL BREATHING PATTERN (tachypneic)





- Cardiovascular Exam


Cardiovascular Exam: Tachycardia





- GI/Abdominal Exam


GI & Abdominal Exam: Soft.  absent: Tenderness





- Neurological Exam


Neurological Exam: Alert, Oriented x3





- Extremities Exam


Extremities Exam: Normal Inspection.  absent: Pedal Edema





Plan





- Assessment of Findings&Treatment Plan





79 year old male with RRT called for respiratory distress


-f/u repeat lactate, initial of 4.9


-pt given lasix 20mg + 40mg ivp


-f/u CXR


-ICU consult


-attending notified





Case discussed with Dr. Manning


-Leonor Palmer, PGY-1

## 2019-01-01 NOTE — CP.PCM.PN
Subjective





- Subjective


Subjective: 





dictated   





Objective





- Vital Signs/Intake and Output


Vital Signs (last 24 hours): 


                                        











Temp Pulse Resp BP Pulse Ox


 


 97.9 F   87   22   149/88   98 


 


 01/01/19 15:00  01/01/19 15:00  01/01/19 15:00  01/01/19 15:00  01/01/19 15:00








Intake and Output: 


                                        











 01/01/19 01/02/19





 18:59 06:59


 


Intake Total 240 


 


Output Total 800 


 


Balance -560 














- Medications


Medications: 


                               Current Medications





Albuterol/Ipratropium (Duoneb 3 Mg/0.5 Mg (3 Ml) Ud)  3 ml INH RQ4 [facility]


   Last Admin: 01/01/19 21:40 Dose:  3 ml


Dextrose (Dextrose 50% Inj)  0 ml IV STAT PRN; Protocol


   PRN Reason: Hypoglycemia Protocol


Dextrose (Glutose 15)  0 gm PO ONCE PRN; Protocol


   PRN Reason: Hypoglycemia Protocol


Enoxaparin Sodium (Lovenox)  40 mg SC DAILY [facility]


   Last Admin: 01/01/19 09:27 Dose:  40 mg


Finasteride (Proscar)  5 mg PO DAILY [facility]


   Last Admin: 01/01/19 09:27 Dose:  5 mg


Glucagon (Glucagen Diagnostic Kit)  0 mg IM STAT PRN; Protocol


   PRN Reason: Hypoglycemia Protocol


Guaifenesin (Mucinex La)  600 mg PO BID [facility]


   Last Admin: 01/01/19 18:21 Dose:  600 mg


Home Med (Mirabegron [Myrbetriq])  50 mg PO DAILY [facility]


Dextrose (Dextrose 5% In Water 1000 Ml)  1,000 mls @ 0 mls/hr IV .Q0M PRN; 

Protocol


   PRN Reason: Hypoglycemia Protocol


Azithromycin 500 mg/ Sodium (Chloride)  250 mls @ 250 mls/hr IVPB Q24H [facility]; 

Protocol


   Last Admin: 01/01/19 12:08 Dose:  250 mls/hr


Ceftriaxone Sodium 1 gm/ (Sodium Chloride)  100 mls @ 100 mls/hr IVPB DAILY [facility];

 Protocol


   Last Admin: 01/01/19 10:18 Dose:  100 mls/hr


Insulin Aspart (Novolog)  0 unit SC ACHS [facility]; Protocol


   Last Admin: 01/01/19 18:21 Dose:  4 units


Losartan Potassium (Cozaar)  100 mg PO DAILY [facility]


   Last Admin: 01/01/19 09:27 Dose:  100 mg


Methylprednisolone (Solu-Medrol)  40 mg IVP Q8 [facility]


   Last Admin: 01/01/19 21:53 Dose:  40 mg


Montelukast Sodium (Singulair)  10 mg PO HS [facility]


   Last Admin: 01/01/19 21:52 Dose:  10 mg


Pantoprazole Sodium (Protonix Ec Tab)  40 mg PO DAILY [facility]


   Last Admin: 01/01/19 09:27 Dose:  40 mg


Tamsulosin HCl (Flomax)  0.4 mg PO BID [facility]


   Last Admin: 01/01/19 18:21 Dose:  0.4 mg


Tiotropium Bromide (Spiriva)  18 mcg INH RQ24 [facility]


   Last Admin: 01/01/19 09:28 Dose:  18 mcg











- Labs


Labs: 


                                        





                                 12/31/18 04:48 





                                 12/31/18 04:48

## 2019-01-01 NOTE — CP.PCM.PN
Subjective





- Date & Time of Evaluation


Date of Evaluation: 01/01/19


Time of Evaluation: 15:30





- Subjective


Subjective: 





Patient seen and examined


Still complaining of shortness of breath


Also complaining of weakness


Wheezing and cough


Afebrile


Elevated lactate most likely secondary to metformin








Objective





- Vital Signs/Intake and Output


Vital Signs (last 24 hours): 


                                        











Temp Pulse Resp BP Pulse Ox


 


 98.0 F   89   20   127/89   98 


 


 01/01/19 07:00  01/01/19 07:00  01/01/19 07:00  01/01/19 14:28  01/01/19 07:00








Intake and Output: 


                                        











 01/01/19 01/01/19





 06:59 18:59


 


Intake Total  240


 


Output Total 400 800


 


Balance -400 -560














- Medications


Medications: 


                               Current Medications





Albuterol/Ipratropium (Duoneb 3 Mg/0.5 Mg (3 Ml) Ud)  3 ml INH RQ4 [facility]


   Last Admin: 01/01/19 11:26 Dose:  3 ml


Dextrose (Dextrose 50% Inj)  0 ml IV STAT PRN; Protocol


   PRN Reason: Hypoglycemia Protocol


Dextrose (Glutose 15)  0 gm PO ONCE PRN; Protocol


   PRN Reason: Hypoglycemia Protocol


Enoxaparin Sodium (Lovenox)  40 mg SC DAILY [facility]


   Last Admin: 01/01/19 09:27 Dose:  40 mg


Finasteride (Proscar)  5 mg PO DAILY [facility]


   Last Admin: 01/01/19 09:27 Dose:  5 mg


Glucagon (Glucagen Diagnostic Kit)  0 mg IM STAT PRN; Protocol


   PRN Reason: Hypoglycemia Protocol


Guaifenesin (Mucinex La)  600 mg PO BID [facility]


   Last Admin: 01/01/19 09:27 Dose:  600 mg


Home Med (Mirabegron [Myrbetriq])  50 mg PO DAILY [facility]


Dextrose (Dextrose 5% In Water 1000 Ml)  1,000 mls @ 0 mls/hr IV .Q0M PRN; 

Protocol


   PRN Reason: Hypoglycemia Protocol


Azithromycin 500 mg/ Sodium (Chloride)  250 mls @ 250 mls/hr IVPB Q24H [facility]; 

Protocol


   Last Admin: 01/01/19 12:08 Dose:  250 mls/hr


Ceftriaxone Sodium 1 gm/ (Sodium Chloride)  100 mls @ 100 mls/hr IVPB DAILY [facility];

 Protocol


   Last Admin: 01/01/19 10:18 Dose:  100 mls/hr


Insulin Aspart (Novolog)  0 unit SC ACHS [facility]; Protocol


   Last Admin: 01/01/19 12:09 Dose:  5 units


Losartan Potassium (Cozaar)  100 mg PO DAILY [facility]


   Last Admin: 01/01/19 09:27 Dose:  100 mg


Methylprednisolone (Solu-Medrol)  40 mg IVP Q8 [facility]


   Last Admin: 01/01/19 14:03 Dose:  40 mg


Montelukast Sodium (Singulair)  10 mg PO HS [facility]


   Last Admin: 12/31/18 21:15 Dose:  10 mg


Pantoprazole Sodium (Protonix Ec Tab)  40 mg PO DAILY [facility]


   Last Admin: 01/01/19 09:27 Dose:  40 mg


Tamsulosin HCl (Flomax)  0.4 mg PO BID [facility]


   Last Admin: 01/01/19 09:27 Dose:  0.4 mg


Tiotropium Bromide (Spiriva)  18 mcg INH RQ24 [facility]


   Last Admin: 01/01/19 09:28 Dose:  18 mcg











- Labs


Labs: 


                                        





                                 12/31/18 04:48 





                                 12/31/18 04:48 











- Head Exam


Head Exam: ATRAUMATIC, NORMOCEPHALIC





- ENT Exam


ENT Exam: Mucous Membranes Moist





- Neck Exam


Neck Exam: Normal Inspection





- Respiratory Exam


Respiratory Exam: Rhonchi, Wheezes





- Cardiovascular Exam


Cardiovascular Exam: REGULAR RHYTHM





- GI/Abdominal Exam


GI & Abdominal Exam: Soft, Normal Bowel Sounds





Assessment and Plan


(1) COPD exacerbation


Assessment & Plan: 


Metformin discontinued


Continue steroids, nebulizer treatment and antibiotics


Followup chest x-ray


Status: Acute

## 2019-01-02 LAB
ANISOCYTOSIS BLD QL SMEAR: SLIGHT
ARTERIAL BLOOD GAS O2 SAT: 96.4 % (ref 95–98)
ARTERIAL BLOOD GAS PCO2: 50 MM/HG (ref 35–45)
ARTERIAL BLOOD GAS TCO2: 34.7 MMOL/L (ref 22–28)
ARTERIAL PATENCY WRIST A: (no result)
BASOPHILS # BLD AUTO: 0 K/UL (ref 0–0.2)
BASOPHILS NFR BLD: 0.1 % (ref 0–2)
BUN SERPL-MCNC: 29 MG/DL (ref 9–20)
CALCIUM SERPL-MCNC: 8.7 MG/DL (ref 8.6–10.4)
EOSINOPHIL # BLD AUTO: 0 K/UL (ref 0–0.7)
EOSINOPHIL NFR BLD: 0 % (ref 0–4)
ERYTHROCYTE [DISTWIDTH] IN BLOOD BY AUTOMATED COUNT: 17.6 % (ref 11.5–14.5)
GFR NON-AFRICAN AMERICAN: > 60
HCO3 BLDA-SCNC: 30.8 MMOL/L (ref 21–28)
HGB BLD-MCNC: 14.5 G/DL (ref 12–18)
HYPOCHROMIC: SLIGHT
INHALED O2 CONCENTRATION: 30 %
LYMPHOCYTE: 2 % (ref 20–40)
LYMPHOCYTES # BLD AUTO: 0.2 K/UL (ref 1–4.3)
LYMPHOCYTES NFR BLD AUTO: 1.1 % (ref 20–40)
MCH RBC QN AUTO: 24.5 PG (ref 27–31)
MCHC RBC AUTO-ENTMCNC: 31.8 G/DL (ref 33–37)
MCV RBC AUTO: 77.1 FL (ref 80–94)
MONOCYTE: 6 % (ref 0–10)
MONOCYTES # BLD: 1 K/UL (ref 0–0.8)
MONOCYTES NFR BLD: 6.9 % (ref 0–10)
NEUTROPHILS # BLD: 13.9 K/UL (ref 1.8–7)
NEUTROPHILS NFR BLD AUTO: 91 % (ref 50–75)
NEUTROPHILS NFR BLD AUTO: 91.9 % (ref 50–75)
NEUTS BAND NFR BLD: 1 % (ref 0–2)
NRBC BLD AUTO-RTO: 0.2 % (ref 0–2)
OVALOCYTES BLD QL SMEAR: SLIGHT
PH BLDA: 7.43 [PH] (ref 7.35–7.45)
PLATELET # BLD EST: NORMAL 10*3/UL
PLATELET # BLD: 228 K/UL (ref 130–400)
PMV BLD AUTO: 9 FL (ref 7.2–11.7)
PO2 BLDA: 106 MM/HG (ref 80–100)
RBC # BLD AUTO: 5.92 MIL/UL (ref 4.4–5.9)
TOTAL CELLS COUNTED BLD: 100
WBC # BLD AUTO: 15.1 K/UL (ref 4.8–10.8)

## 2019-01-02 RX ADMIN — GUAIFENESIN SCH MG: 600 TABLET, EXTENDED RELEASE ORAL at 09:56

## 2019-01-02 RX ADMIN — IPRATROPIUM BROMIDE AND ALBUTEROL SULFATE SCH ML: .5; 3 SOLUTION RESPIRATORY (INHALATION) at 01:19

## 2019-01-02 RX ADMIN — TAZOBACTAM SODIUM AND PIPERACILLIN SODIUM SCH MLS/HR: 375; 3 INJECTION, SOLUTION INTRAVENOUS at 22:41

## 2019-01-02 RX ADMIN — METHYLPREDNISOLONE SODIUM SUCCINATE SCH: 40 INJECTION, POWDER, FOR SOLUTION INTRAMUSCULAR; INTRAVENOUS at 14:21

## 2019-01-02 RX ADMIN — IPRATROPIUM BROMIDE AND ALBUTEROL SULFATE SCH ML: .5; 3 SOLUTION RESPIRATORY (INHALATION) at 03:35

## 2019-01-02 RX ADMIN — IPRATROPIUM BROMIDE AND ALBUTEROL SULFATE SCH ML: .5; 3 SOLUTION RESPIRATORY (INHALATION) at 11:48

## 2019-01-02 RX ADMIN — GUAIFENESIN SCH MG: 600 TABLET, EXTENDED RELEASE ORAL at 18:03

## 2019-01-02 RX ADMIN — IPRATROPIUM BROMIDE AND ALBUTEROL SULFATE SCH ML: .5; 3 SOLUTION RESPIRATORY (INHALATION) at 19:14

## 2019-01-02 RX ADMIN — INSULIN ASPART SCH UNITS: 100 INJECTION, SOLUTION INTRAVENOUS; SUBCUTANEOUS at 08:13

## 2019-01-02 RX ADMIN — PANTOPRAZOLE SODIUM SCH: 40 TABLET, DELAYED RELEASE ORAL at 10:00

## 2019-01-02 RX ADMIN — TIOTROPIUM BROMIDE SCH MCG: 18 CAPSULE ORAL; RESPIRATORY (INHALATION) at 11:48

## 2019-01-02 RX ADMIN — TAZOBACTAM SODIUM AND PIPERACILLIN SODIUM SCH MLS/HR: 375; 3 INJECTION, SOLUTION INTRAVENOUS at 14:59

## 2019-01-02 RX ADMIN — ENOXAPARIN SODIUM SCH MG: 40 INJECTION SUBCUTANEOUS at 09:57

## 2019-01-02 RX ADMIN — IPRATROPIUM BROMIDE AND ALBUTEROL SULFATE SCH ML: .5; 3 SOLUTION RESPIRATORY (INHALATION) at 07:43

## 2019-01-02 RX ADMIN — PANTOPRAZOLE SODIUM SCH MG: 40 TABLET, DELAYED RELEASE ORAL at 09:56

## 2019-01-02 RX ADMIN — METHYLPREDNISOLONE SODIUM SUCCINATE SCH MG: 40 INJECTION, POWDER, FOR SOLUTION INTRAMUSCULAR; INTRAVENOUS at 06:00

## 2019-01-02 RX ADMIN — INSULIN ASPART SCH UNITS: 100 INJECTION, SOLUTION INTRAVENOUS; SUBCUTANEOUS at 18:03

## 2019-01-02 RX ADMIN — INSULIN ASPART SCH: 100 INJECTION, SOLUTION INTRAVENOUS; SUBCUTANEOUS at 22:43

## 2019-01-02 RX ADMIN — METHYLPREDNISOLONE SODIUM SUCCINATE SCH MG: 40 INJECTION, POWDER, FOR SOLUTION INTRAMUSCULAR; INTRAVENOUS at 22:41

## 2019-01-02 RX ADMIN — IPRATROPIUM BROMIDE AND ALBUTEROL SULFATE SCH ML: .5; 3 SOLUTION RESPIRATORY (INHALATION) at 16:06

## 2019-01-02 NOTE — CP.PCM.PN
<Eliel العلي - Last Filed: 01/02/19 16:58>





Subjective





- Date & Time of Evaluation


Date of Evaluation: 01/02/19


Time of Evaluation: 11:00





- Subjective


Subjective: 





PGY2 Pulm Note for Dr. Gonsalez





Patient seen and examined this morning at bedside. Patient appears to more SOB 

and complaining of a worsened non-productive cough. He is sitting up in bed 

speaking in full sentences. afebrile. 





Objective





- Vital Signs/Intake and Output


Vital Signs (last 24 hours): 


                                        











Temp Pulse Resp BP Pulse Ox


 


 97.8 F   107 H  22   146/68   94 L


 


 01/02/19 08:13  01/02/19 08:13  01/02/19 08:13  01/02/19 08:13  01/02/19 08:13











- Medications


Medications: 


                               Current Medications





Albuterol/Ipratropium (Duoneb 3 Mg/0.5 Mg (3 Ml) Ud)  3 ml INH RQ4 [facility]


   Last Admin: 01/02/19 16:06 Dose:  3 ml


Dextrose (Dextrose 50% Inj)  0 ml IV STAT PRN; Protocol


   PRN Reason: Hypoglycemia Protocol


Dextrose (Glutose 15)  0 gm PO ONCE PRN; Protocol


   PRN Reason: Hypoglycemia Protocol


Enoxaparin Sodium (Lovenox)  40 mg SC DAILY [facility]


   Last Admin: 01/02/19 09:57 Dose:  40 mg


Finasteride (Proscar)  5 mg PO DAILY [facility]


   Last Admin: 01/02/19 09:56 Dose:  5 mg


Glucagon (Glucagen Diagnostic Kit)  0 mg IM STAT PRN; Protocol


   PRN Reason: Hypoglycemia Protocol


Guaifenesin (Mucinex La)  600 mg PO BID [facility]


   Last Admin: 01/02/19 09:56 Dose:  600 mg


Home Med (Mirabegron [Myrbetriq])  50 mg PO DAILY [facility]


Dextrose (Dextrose 5% In Water 1000 Ml)  1,000 mls @ 0 mls/hr IV .Q0M PRN; 

Protocol


   PRN Reason: Hypoglycemia Protocol


Azithromycin 500 mg/ Sodium (Chloride)  250 mls @ 250 mls/hr IVPB Q24H [facility]; 

Protocol


   Last Admin: 01/01/19 12:08 Dose:  250 mls/hr


Piperacillin Sod/Tazobactam Sod (Zosyn 3.375 Gm Iv Premix)  3.375 gm in 50 mls @

 100 mls/hr IVPB Q8H [facility]; Protocol


   Last Admin: 01/02/19 14:59 Dose:  100 mls/hr


Insulin Aspart (Novolog)  0 unit SC ACHS [facility]; Protocol


   Last Admin: 01/02/19 08:13 Dose:  6 units


Losartan Potassium (Cozaar)  100 mg PO DAILY [facility]


   Last Admin: 01/02/19 09:55 Dose:  100 mg


Methylprednisolone (Solu-Medrol)  60 mg IVP Q8 [facility]


   Last Admin: 01/02/19 14:21 Dose:  Not Given


Montelukast Sodium (Singulair)  10 mg PO HS [facility]


   Last Admin: 01/01/19 21:52 Dose:  10 mg


Pantoprazole Sodium (Protonix Ec Tab)  40 mg PO DAILY [facility]


   Last Admin: 01/02/19 10:00 Dose:  Not Given


Tamsulosin HCl (Flomax)  0.4 mg PO BID [facility]


   Last Admin: 01/02/19 09:56 Dose:  0.4 mg


Tiotropium Bromide (Spiriva)  18 mcg INH RQ24 [facility]


   Last Admin: 01/02/19 11:48 Dose:  18 mcg











- Labs


Labs: 


                                        





                                 01/02/19 13:11 





                                 01/02/19 14:18 











- Constitutional


Appears: Non-toxic, No Acute Distress





- Head Exam


Head Exam: ATRAUMATIC, NORMOCEPHALIC





- Eye Exam


Eye Exam: Normal appearance





- ENT Exam


ENT Exam: Mucous Membranes Moist





- Respiratory Exam


Respiratory Exam: Rales (bilateral - R>L).  absent: Accessory Muscle Use, 

Wheezes, Respiratory Distress





- Cardiovascular Exam


Cardiovascular Exam: REGULAR RHYTHM, +S1, +S2





- GI/Abdominal Exam


GI & Abdominal Exam: Soft.  absent: Distended, Firm, Guarding, Rigid, Tenderness





- Extremities Exam


Extremities Exam: absent: Calf Tenderness, Pedal Edema





- Neurological Exam


Neurological Exam: Alert, Awake, Oriented x3





- Psychiatric Exam


Psychiatric exam: Normal Affect, Normal Mood





- Skin


Skin Exam: Dry, Warm





Assessment and Plan





- Assessment and Plan (Free Text)


Plan: 





1/2 Chest CT- Trace areas of infiltrate in superior segment of left lower lobe, 

right middle lobe, and right lower lobe base.  Bilateral apical nodularity with 

local fibrosis may not reflect neoplasm as right apical changes appear 

diminished compared to CT 5/16/17 and not significantly changed in left medial 

apex.  A tiny subpleural nodule is stable at the right upper lobe abutting 

lateral margins of major fissure 3.5mm in size and stable 2.5mm nodule at the 

superior segment of right lower lobe.  No pleural effusion or pneumothorax.  

Mild aneurysmal ascending thoracic aorta with potential pulmonary HTN and stable

 mild cardiomegaly





1/1 CXR- small bilateral pleural effusions, right basilar 

atelectasis/infiltrate, medial right upper lobe atelectasis/consolidation, 

streaky opacities involving medial bilateral upper lobes consistent with pleural

 based soft tissue/atelectasis demonstrated on CT performed 8/20/18, enlarged 

cardiomediastinal silhouette





Start BiPap


Continue nebulizer


Continue solumedrol


Continue Spiriva and Duoneb


Continue antibiotics- currently on Azithromycin and Ceftriaxone





<Raza Gonsalez - Last Filed: 01/03/19 12:11>





Objective





- Vital Signs/Intake and Output


Vital Signs (last 24 hours): 


                                        











Temp Pulse Resp BP Pulse Ox


 


 97.7 F   116 H  20   145/103 H  96 


 


 01/03/19 08:00  01/03/19 08:00  01/03/19 08:00  01/03/19 08:00  01/03/19 08:00











- Medications


Medications: 


                               Current Medications





Acetylcysteine (Acetylcysteine 20%)  4 ml INH Q6H [facility]


Albuterol/Ipratropium (Duoneb 3 Mg/0.5 Mg (3 Ml) Ud)  3 ml INH RQ4 [facility]


   Last Admin: 01/03/19 07:50 Dose:  3 ml


Dextrose (Dextrose 50% Inj)  0 ml IV STAT PRN; Protocol


   PRN Reason: Hypoglycemia Protocol


Dextrose (Glutose 15)  0 gm PO ONCE PRN; Protocol


   PRN Reason: Hypoglycemia Protocol


Enoxaparin Sodium (Lovenox)  40 mg SC DAILY [facility]


   Last Admin: 01/02/19 09:57 Dose:  40 mg


Finasteride (Proscar)  5 mg PO DAILY [facility]


   Last Admin: 01/03/19 10:56 Dose:  5 mg


Glucagon (Glucagen Diagnostic Kit)  0 mg IM STAT PRN; Protocol


   PRN Reason: Hypoglycemia Protocol


Guaifenesin (Mucinex La)  600 mg PO BID [facility]


   Last Admin: 01/03/19 10:56 Dose:  600 mg


Home Med (Mirabegron [Myrbetriq])  50 mg PO DAILY [facility]


Azithromycin 500 mg/ Sodium (Chloride)  250 mls @ 250 mls/hr IVPB Q24H [facility]; 

Protocol


   Last Admin: 01/03/19 11:00 Dose:  250 mls/hr


Piperacillin Sod/Tazobactam Sod (Zosyn 3.375 Gm Iv Premix)  3.375 gm in 50 mls @

100 mls/hr IVPB Q8H [facility]; Protocol


   Last Admin: 01/03/19 06:10 Dose:  100 mls/hr


Insulin Aspart (Novolog)  0 unit SC ACHS [facility]; Protocol


   Last Admin: 01/03/19 08:26 Dose:  5 units


Losartan Potassium (Cozaar)  100 mg PO DAILY [facility]


   Last Admin: 01/03/19 10:56 Dose:  100 mg


Methylprednisolone (Solu-Medrol)  60 mg IVP Q8 [facility]


   Last Admin: 01/03/19 06:09 Dose:  60 mg


Montelukast Sodium (Singulair)  10 mg PO HS [facility]


   Last Admin: 01/02/19 22:41 Dose:  10 mg


Pantoprazole Sodium (Protonix Ec Tab)  40 mg PO DAILY [facility]


   Last Admin: 01/03/19 10:56 Dose:  40 mg


Tamsulosin HCl (Flomax)  0.4 mg PO BID [facility]


   Last Admin: 01/03/19 10:56 Dose:  0.4 mg


Tiotropium Bromide (Spiriva)  18 mcg INH RQ24 [facility]


   Last Admin: 01/02/19 11:48 Dose:  18 mcg











- Labs


Labs: 


                                        





                                 01/02/19 13:11 





                                 01/02/19 14:18 











Assessment and Plan


(1) COPD exacerbation


Status: Acute   





Attending/Attestation





- Attestation


I have personally seen and examined this patient.: Yes


I have fully participated in the care of the patient.: Yes


I have reviewed all pertinent clinical information, including history, physical 

exam and plan: Yes


Notes (Text): 





The patient seen and examined


Still has cough, wheezing and shortness of breath


Appears in distress


Start BiPAP


Nebulizer treatment


IV steroids


Switch antibiotics


Followup ABG

## 2019-01-02 NOTE — CT
Date of service: 



01/02/2019



PROCEDURE:  CT Chest without contrast



HISTORY:

COPD exac. w/ b/l rales worse on R



COMPARISON:

Unenhanced chest CT 08/20/2018 as well as chest radiograph 01/01/2019.



TECHNIQUE:

Contiguous axial images were obtained through the chest without 

intravenous contrast enhancement. Sagittal and coronal 

reconstructions were performed.







Radiation dose:



Total exam DLP = 553.24 mGy-cm.



This CT exam was performed using one or more of the following dose 

reduction techniques: Automated exposure control, adjustment of the 

mA and/or kV according to patient size, and/or use of iterative 

reconstruction technique.



FINDINGS:



LUNGS:

 All there trace areas of suspected alveolar disease identified at 

the superior segment left lower lobe as well as the right middle and 

lower lobe bases, no large infiltrate is identified bilaterally at 

this time.  Biapical medial pulmonary nodularity with local fibrosis 

may not reflect neoplasm as right apical changes appear diminished 

compared to CT 05/16/2017 and are not significantly changed at the 

medial left apex.  A tiny nodule is stable at the right upper lobe, 

subpleural in location abutting the lateral margins major fissure 

measuring 3.5 mm in image 39 series 3.  An even tinier nodule 

measures 2.5 mm at the superior segment right lower lobe also stable.



MEDIASTINUM:

A mildly calcified atherosclerotic thoracic aorta is appreciated 

measuring 4.0 cm greatest transverse dimension at the ascending 

segment with remainder normal caliber beginning at the arch.  Mild 

cardiomegaly with extensive coronary artery atherosclerosis is 

reiterated.  Main pulmonary artery is dilated to 3.5 cm which may 

reflect pulmonary artery hypertension.  Clinically correlate further. 

 Normal sized heart. Main pulmonary artery unremarkable. No vascular 

congestion. No lymphadenopathy.



PLEURA:

No pleural fluid. No pneumothorax.



BONES:

Kyphotic thoracic spinal deformity appreciated with multilevel 

spondylosis identified.  



UPPER ABDOMEN:

Prior cholecystectomy changes reiterated with small hiatal hernia 

evident.



OTHER FINDINGS:

 None.



IMPRESSION:

1. Trace limited infiltrate superior segment left lower lobe and 

minimally at the bases of the right middle and lower lobes. 



2. Biapical fibrosis favored over neoplasm.  The appearance is not 

progressed since prior CT examination 06/17/2018 and 05/16/2017 with 

PET CT available follow-up if clinically warranted. 



3. Mildly aneurysmal ascending thoracic aorta again evident as well 

as potential pulmonary artery hypertension as discussed above.  

Stable cardiomegaly appears mild once again.

## 2019-01-02 NOTE — HP
CHIEF COMPLAINT:  Shortness of breath.



HISTORY OF PRESENT ILLNESS:  This is a 78-year-old [place] male with a

history of COPD, sleep apnea, hypertension, type 2 diabetes,

gastroesophageal reflux disease, osteoarthritis of the knee who came in

because of shortness of breath, cough, congestion, wheezing for two days. 

The patient has a prior hospitalization more than three months ago.  Since

then, he has been doing well.  [place], [place] and he at

home attempted to get better with nebulizers.  Then, he was given

Solu-Medrol in the ER, but he did not improve and he is hospitalized.  He

has cough, congestion, thick yellowish sputum production, chill, rigors,

and dyspnea on exertion.  He denies any fever or chills.  He has chest

congestion but no chest pain.  He denies any nausea or vomiting.  He had no

history of any hemoptysis.  The patient denies any abdominal pain, nausea,

or vomiting.  He denies any back pain or hip pain.  He has tingling and

numbness in the feet.  He has neck pain.  He has achiness.  He has chest

congestion.  He denies any headache, dizziness, or vertigo.



PAST MEDICAL HISTORY:  COPD, hypertension, diabetes, hyperlipidemia,

osteoarthritis.



SOCIAL HISTORY:  Ex-smoker.  Non-EtOH user.



CURRENT MEDICATIONS AT HOME:  He is on Proscar, Brovana, Ventolin HFA,

prednisone, Amaryl, Spiriva, Flomax, Singulair, Myrbetriq, Glucophage,

Cozaar, Mucinex.



FAMILY HISTORY:  Noncontributory.



PHYSICAL EXAMINATION:

GENERAL:  An elderly male, in moderate respiratory distress.

VITAL SIGNS:  Blood pressure 133/77, pulse 106, respiratory rate 22,

temperature 98.6.

SKIN:  No rashes.  No bruises.  No purpura.  No petechiae.  No ecchymosis.

HEENT:  Atraumatic and normocephalic.  Negative pallor.  Negative jaundice.

Extraocular movements are intact.

NECK:  Supple.  No JVD.  No lymph node.  No thyromegaly.  Using accessory

muscles while respiration.

CHEST WALL:  Barrel-shaped chest.  No mass.  No gynecomastia.

LUNGS:  Bilateral inspiratory and expiratory rhonchi with bibasilar rales,

decreased air entry.

CARDIOVASCULAR SYSTEM:  S1 and S2 are regular.  No heave.  No thrill.

ABDOMEN:  Soft.  Nontender.  Bowel sounds are positive.

RECTAL:  Enlarged prostate.

EXTREMITIES:  No clubbing, cyanosis, or edema.

CENTRAL NERVOUS SYSTEM:  Awake, alert, and oriented x3.  Cranial nerves II

through XII are normal.  Power 5/5 x4.  Plantar's are downgoing.



ASSESSMENT:

1.  Acute exacerbation of chronic obstructive pulmonary disease.

2.  Tracheobronchitis, rule out pneumonia.

3.  Type 2 diabetes, worsened by steroids, on Accu-Chek sliding scale.

4.  Hypertension.



PLAN:  Admit.  Detailed orders are written.  Seen and examined.







__________________________________________

Tae Hensley MD





DD:  01/01/2019 23:43:29

DT:  01/02/2019 3:00:24

Job # [id number]

## 2019-01-02 NOTE — PN
DATE:  01/01/2019



SUBJECTIVE:  The patient still has cough, congestion, and shortness of

breath.  Rapid response was called in.  An ICU evaluation was done.  The

patient is on the floor.  He is improved.  He did receive BiPAP before. 

Overall, he is unchanged.  He is still coughing and wheezing.



PHYSICAL EXAMINATION:

VITAL SIGNS:  Blood pressure 149/88, pulse 87, respiratory rate 22, and

temperature 97.9.

LUNGS:  Bilateral expiratory and inspiratory rhonchi.  Decreased air entry.

CARDIOVASCULAR SYSTEM:  S1, S2 regular.

ABDOMEN:  Soft.



ASSESSMENT:

1.  Acute exacerbation of chronic obstructive pulmonary disease.

2.  Tracheobronchitis.  Rule out pneumonia.

3.  Hypertension, poorly controlled.

4.  Diabetes, poorly controlled.



PLAN:  Continue to monitor the patient.  Continue current medications.





__________________________________________

Tae Hensley MD





DD:  01/01/2019 23:44:37

DT:  01/02/2019 0:29:13

Job # [id number]

## 2019-01-02 NOTE — CP.PCM.PN
Subjective





- Subjective


Subjective: 





dictated   





Objective





- Vital Signs/Intake and Output


Vital Signs (last 24 hours): 


                                        











Temp Pulse Resp BP Pulse Ox


 


 98.1 F   106 H  26 H  159/89 H  97 


 


 01/02/19 16:00  01/02/19 17:10  01/02/19 16:00  01/02/19 16:00  01/02/19 16:00











- Medications


Medications: 


                               Current Medications





Albuterol/Ipratropium (Duoneb 3 Mg/0.5 Mg (3 Ml) Ud)  3 ml INH RQ4 [facility]


   Last Admin: 01/02/19 19:14 Dose:  3 ml


Dextrose (Dextrose 50% Inj)  0 ml IV STAT PRN; Protocol


   PRN Reason: Hypoglycemia Protocol


Dextrose (Glutose 15)  0 gm PO ONCE PRN; Protocol


   PRN Reason: Hypoglycemia Protocol


Enoxaparin Sodium (Lovenox)  40 mg SC DAILY [facility]


   Last Admin: 01/02/19 09:57 Dose:  40 mg


Finasteride (Proscar)  5 mg PO DAILY [facility]


   Last Admin: 01/02/19 09:56 Dose:  5 mg


Glucagon (Glucagen Diagnostic Kit)  0 mg IM STAT PRN; Protocol


   PRN Reason: Hypoglycemia Protocol


Guaifenesin (Mucinex La)  600 mg PO BID [facility]


   Last Admin: 01/02/19 18:03 Dose:  600 mg


Home Med (Mirabegron [Myrbetriq])  50 mg PO DAILY [facility]


Dextrose (Dextrose 5% In Water 1000 Ml)  1,000 mls @ 0 mls/hr IV .Q0M PRN; 

Protocol


   PRN Reason: Hypoglycemia Protocol


Azithromycin 500 mg/ Sodium (Chloride)  250 mls @ 250 mls/hr IVPB Q24H [facility]; 

Protocol


   Last Admin: 01/01/19 12:08 Dose:  250 mls/hr


Piperacillin Sod/Tazobactam Sod (Zosyn 3.375 Gm Iv Premix)  3.375 gm in 50 mls @

 100 mls/hr IVPB Q8H [facility]; Protocol


   Last Admin: 01/02/19 22:41 Dose:  100 mls/hr


Insulin Aspart (Novolog)  0 unit SC ACHS [facility]; Protocol


   Last Admin: 01/02/19 22:43 Dose:  Not Given


Losartan Potassium (Cozaar)  100 mg PO DAILY [facility]


   Last Admin: 01/02/19 09:55 Dose:  100 mg


Methylprednisolone (Solu-Medrol)  60 mg IVP Q8 [facility]


   Last Admin: 01/02/19 22:41 Dose:  60 mg


Montelukast Sodium (Singulair)  10 mg PO HS [facility]


   Last Admin: 01/02/19 22:41 Dose:  10 mg


Pantoprazole Sodium (Protonix Ec Tab)  40 mg PO DAILY [facility]


   Last Admin: 01/02/19 10:00 Dose:  Not Given


Tamsulosin HCl (Flomax)  0.4 mg PO BID [facility]


   Last Admin: 01/02/19 18:03 Dose:  0.4 mg


Tiotropium Bromide (Spiriva)  18 mcg INH RQ24 [facility]


   Last Admin: 01/02/19 11:48 Dose:  18 mcg











- Labs


Labs: 


                                        





                                 01/02/19 13:11 





                                 01/02/19 14:18

## 2019-01-03 RX ADMIN — METHYLPREDNISOLONE SODIUM SUCCINATE SCH MG: 40 INJECTION, POWDER, FOR SOLUTION INTRAMUSCULAR; INTRAVENOUS at 06:09

## 2019-01-03 RX ADMIN — IPRATROPIUM BROMIDE AND ALBUTEROL SULFATE SCH ML: .5; 3 SOLUTION RESPIRATORY (INHALATION) at 00:35

## 2019-01-03 RX ADMIN — TAZOBACTAM SODIUM AND PIPERACILLIN SODIUM SCH MLS/HR: 375; 3 INJECTION, SOLUTION INTRAVENOUS at 22:11

## 2019-01-03 RX ADMIN — METHYLPREDNISOLONE SODIUM SUCCINATE SCH MG: 40 INJECTION, POWDER, FOR SOLUTION INTRAMUSCULAR; INTRAVENOUS at 13:45

## 2019-01-03 RX ADMIN — ENOXAPARIN SODIUM SCH MG: 40 INJECTION SUBCUTANEOUS at 10:56

## 2019-01-03 RX ADMIN — INSULIN ASPART SCH: 100 INJECTION, SOLUTION INTRAVENOUS; SUBCUTANEOUS at 21:40

## 2019-01-03 RX ADMIN — PANTOPRAZOLE SODIUM SCH MG: 40 TABLET, DELAYED RELEASE ORAL at 10:56

## 2019-01-03 RX ADMIN — INSULIN ASPART SCH UNITS: 100 INJECTION, SOLUTION INTRAVENOUS; SUBCUTANEOUS at 08:26

## 2019-01-03 RX ADMIN — IPRATROPIUM BROMIDE AND ALBUTEROL SULFATE SCH ML: .5; 3 SOLUTION RESPIRATORY (INHALATION) at 04:10

## 2019-01-03 RX ADMIN — INSULIN ASPART SCH UNITS: 100 INJECTION, SOLUTION INTRAVENOUS; SUBCUTANEOUS at 17:21

## 2019-01-03 RX ADMIN — GUAIFENESIN SCH MG: 600 TABLET, EXTENDED RELEASE ORAL at 10:56

## 2019-01-03 RX ADMIN — GUAIFENESIN SCH MG: 600 TABLET, EXTENDED RELEASE ORAL at 17:21

## 2019-01-03 RX ADMIN — METHYLPREDNISOLONE SODIUM SUCCINATE SCH MG: 40 INJECTION, POWDER, FOR SOLUTION INTRAMUSCULAR; INTRAVENOUS at 22:10

## 2019-01-03 RX ADMIN — INSULIN ASPART SCH UNITS: 100 INJECTION, SOLUTION INTRAVENOUS; SUBCUTANEOUS at 12:40

## 2019-01-03 RX ADMIN — IPRATROPIUM BROMIDE AND ALBUTEROL SULFATE SCH ML: .5; 3 SOLUTION RESPIRATORY (INHALATION) at 07:50

## 2019-01-03 RX ADMIN — IPRATROPIUM BROMIDE AND ALBUTEROL SULFATE SCH ML: .5; 3 SOLUTION RESPIRATORY (INHALATION) at 11:43

## 2019-01-03 RX ADMIN — IPRATROPIUM BROMIDE AND ALBUTEROL SULFATE SCH ML: .5; 3 SOLUTION RESPIRATORY (INHALATION) at 19:36

## 2019-01-03 RX ADMIN — TAZOBACTAM SODIUM AND PIPERACILLIN SODIUM SCH MLS/HR: 375; 3 INJECTION, SOLUTION INTRAVENOUS at 06:10

## 2019-01-03 RX ADMIN — IPRATROPIUM BROMIDE AND ALBUTEROL SULFATE SCH ML: .5; 3 SOLUTION RESPIRATORY (INHALATION) at 16:37

## 2019-01-03 RX ADMIN — TIOTROPIUM BROMIDE SCH: 18 CAPSULE ORAL; RESPIRATORY (INHALATION) at 13:42

## 2019-01-03 RX ADMIN — TAZOBACTAM SODIUM AND PIPERACILLIN SODIUM SCH MLS/HR: 375; 3 INJECTION, SOLUTION INTRAVENOUS at 13:45

## 2019-01-03 NOTE — PN
DATE:  01/02/2019



SUBJECTIVE:  The patient is less short of breath.  No fever.  No chills. 

Positive cough.  Positive congestion.  He feels much better.  No

temperature.



PHYSICAL EXAMINATION:

VITAL SIGNS:  /89, pulse 85, respiratory rate 22, temperature 98.1.

LUNGS:  Decreased air entry.  Bilateral inspiratory and expiratory rhonchi.

CARDIOVASCULAR SYSTEM:  S1 and S2 regular.  No heave.  No thrill.

ABDOMEN:  Soft.



ASSESSMENT:

1.  Acute exacerbation of chronic obstructive pulmonary disease.

2.  Tracheobronchitis, rule out pneumonia.

3.  Hypertension.

4.  Poorly controlled diabetes, partially due to steroids.



PLAN:  We will start tapering steroids.  In the meantime, we will monitor

the patient.







__________________________________________

Tae Hensley MD





DD:  01/02/2019 23:19:30

DT:  01/03/2019 1:20:21

Job # [id number]

## 2019-01-03 NOTE — CARD
--------------- APPROVED REPORT --------------





Date of service: 12/31/2018



EKG Measurement

Heart [id number]

IN [id number]

[id number]

[id number]

[id number]



<Conclusion>

Sinus rhythm with sinus arrhythmia with 1st degree AV block

Left axis deviation

Right bundle branch block

Inferior infarct, age undetermined

Abnormal ECG

## 2019-01-03 NOTE — CP.PCM.PN
Subjective





- Subjective


Subjective: 





dictated   





Objective





- Vital Signs/Intake and Output


Vital Signs (last 24 hours): 


                                        











Temp Pulse Resp BP Pulse Ox


 


 98.0 F   99 H  18   170/91 H  97 


 


 01/03/19 15:04  01/03/19 22:59  01/03/19 15:04  01/03/19 15:04  01/03/19 15:04











- Medications


Medications: 


                               Current Medications





Acetylcysteine (Acetylcysteine 20%)  4 ml INH RQ6 [facility]


Albuterol/Ipratropium (Duoneb 3 Mg/0.5 Mg (3 Ml) Ud)  3 ml INH RQ4 [facility]


   Last Admin: 01/03/19 19:36 Dose:  3 ml


Dextrose (Dextrose 50% Inj)  0 ml IV STAT PRN; Protocol


   PRN Reason: Hypoglycemia Protocol


Dextrose (Glutose 15)  0 gm PO ONCE PRN; Protocol


   PRN Reason: Hypoglycemia Protocol


Enoxaparin Sodium (Lovenox)  40 mg SC DAILY [facility]


   Last Admin: 01/03/19 10:56 Dose:  40 mg


Finasteride (Proscar)  5 mg PO DAILY [facility]


   Last Admin: 01/03/19 10:56 Dose:  5 mg


Glucagon (Glucagen Diagnostic Kit)  0 mg IM STAT PRN; Protocol


   PRN Reason: Hypoglycemia Protocol


Guaifenesin (Mucinex La)  600 mg PO BID [facility]


   Last Admin: 01/03/19 17:21 Dose:  600 mg


Home Med (Mirabegron [Myrbetriq])  50 mg PO DAILY [facility]


Azithromycin 500 mg/ Sodium (Chloride)  250 mls @ 250 mls/hr IVPB Q24H [facility]; 

Protocol


   Last Admin: 01/03/19 11:00 Dose:  250 mls/hr


Piperacillin Sod/Tazobactam Sod (Zosyn 3.375 Gm Iv Premix)  3.375 gm in 50 mls @

 100 mls/hr IVPB Q8H [facility]; Protocol


   Last Admin: 01/03/19 22:11 Dose:  100 mls/hr


Insulin Aspart (Novolog)  0 unit SC ACHS [facility]; Protocol


   Last Admin: 01/03/19 21:40 Dose:  Not Given


Losartan Potassium (Cozaar)  100 mg PO DAILY [facility]


   Last Admin: 01/03/19 10:56 Dose:  100 mg


Methylprednisolone (Solu-Medrol)  60 mg IVP Q8 [facility]


   Last Admin: 01/03/19 22:10 Dose:  60 mg


Montelukast Sodium (Singulair)  10 mg PO HS [facility]


   Last Admin: 01/03/19 22:11 Dose:  10 mg


Pantoprazole Sodium (Protonix Ec Tab)  40 mg PO DAILY [facility]


   Last Admin: 01/03/19 10:56 Dose:  40 mg


Tamsulosin HCl (Flomax)  0.4 mg PO BID [facility]


   Last Admin: 01/03/19 17:21 Dose:  0.4 mg


Tiotropium Bromide (Spiriva)  18 mcg INH RQ24 [facility]


   Last Admin: 01/03/19 13:42 Dose:  Not Given


Zolpidem Tartrate (Ambien)  5 mg PO HS PRN


   PRN Reason: Insomnia


   Last Admin: 01/03/19 22:54 Dose:  5 mg











- Labs


Labs: 


                                        





                                 01/02/19 13:11 





                                 01/02/19 14:18

## 2019-01-03 NOTE — CP.PCM.PN
Subjective





- Date & Time of Evaluation


Date of Evaluation: 01/03/19


Time of Evaluation: 09:20





- Subjective


Subjective: 





78 year old male with PMH copd, asthma, sleep apnea, HTN, DM.  Patient reports 

SOB and dry cough.  Denied fever, chills, chest pain, hemoptysis, nausea, 

vomiting.





Patient examined at bedside.


Afebrile. Breathing through pursed lips. On 3LPM via NC


Bilateral rales and wheezing bilaterally





1/2 ABG- Rra, pCO2 50, pO2 106m HCO3 30.8, pH 7.43, lactate 2.2


1/2 WBC 15.1, Cl 95, CO2 30





1/2 Chest CT- Trace areas of infiltrate in superior segment of left lower lobe, 

right middle lobe, and right lower lobe base.  Bilateral apical nodularity with 

local fibrosis may not reflect neoplasm as right apical changes appear 

diminished compared to CT 5/16/17 and not significantly changed in left medial 

apex.  A tiny subpleural nodule is stable at the right upper lobe abutting 

lateral margins of major fissure 3.5mm in size and stable 2.5mm nodule at the 

superior segment of right lower lobe.  No pleural effusion or pneumothorax.  

Mild aneurysmal ascending thoracic aorta with potential pulmonary HTN and stable

mild cardiomegaly


1/1 CXR- small bilateral pleural effusions, right basilar 

atelectasis/infiltrate, medial right upper lobe atelectasis/consolidation, 

streaky opacities involving medial bilateral upper lobes consistent with pleural

based soft tissue/atelectasis demonstrated on CT performed 8/20/18, enlarged 

cardiomediastinal silhouette





Continue BiPap


Chest PT q shift


Start mucomist


Continue nebulizer


Continue solumedrol


Continue Spiriva and Duoneb


Continue antibiotics- currently on Azithromycin and Zosyn





Objective





- Vital Signs/Intake and Output


Vital Signs (last 24 hours): 


                                        











Temp Pulse Resp BP Pulse Ox


 


 98.0 F   99 H  18   170/91 H  97 


 


 01/03/19 15:04  01/03/19 15:04  01/03/19 15:04  01/03/19 15:04  01/03/19 15:04











- Medications


Medications: 


                               Current Medications





Acetylcysteine (Acetylcysteine 20%)  4 ml INH RQ6 [facility]


Albuterol/Ipratropium (Duoneb 3 Mg/0.5 Mg (3 Ml) Ud)  3 ml INH RQ4 [facility]


   Last Admin: 01/03/19 16:37 Dose:  3 ml


Dextrose (Dextrose 50% Inj)  0 ml IV STAT PRN; Protocol


   PRN Reason: Hypoglycemia Protocol


Dextrose (Glutose 15)  0 gm PO ONCE PRN; Protocol


   PRN Reason: Hypoglycemia Protocol


Enoxaparin Sodium (Lovenox)  40 mg SC DAILY [facility]


   Last Admin: 01/03/19 10:56 Dose:  40 mg


Finasteride (Proscar)  5 mg PO DAILY [facility]


   Last Admin: 01/03/19 10:56 Dose:  5 mg


Glucagon (Glucagen Diagnostic Kit)  0 mg IM STAT PRN; Protocol


   PRN Reason: Hypoglycemia Protocol


Guaifenesin (Mucinex La)  600 mg PO BID [facility]


   Last Admin: 01/03/19 17:21 Dose:  600 mg


Home Med (Mirabegron [Myrbetriq])  50 mg PO DAILY [facility]


Azithromycin 500 mg/ Sodium (Chloride)  250 mls @ 250 mls/hr IVPB Q24H [facility]; 

Protocol


   Last Admin: 01/03/19 11:00 Dose:  250 mls/hr


Piperacillin Sod/Tazobactam Sod (Zosyn 3.375 Gm Iv Premix)  3.375 gm in 50 mls @

 100 mls/hr IVPB Q8H [facility]; Protocol


   Last Admin: 01/03/19 13:45 Dose:  100 mls/hr


Insulin Aspart (Novolog)  0 unit SC ACHS [facility]; Protocol


   Last Admin: 01/03/19 17:21 Dose:  4 units


Losartan Potassium (Cozaar)  100 mg PO DAILY [facility]


   Last Admin: 01/03/19 10:56 Dose:  100 mg


Methylprednisolone (Solu-Medrol)  60 mg IVP Q8 [facility]


   Last Admin: 01/03/19 13:45 Dose:  60 mg


Montelukast Sodium (Singulair)  10 mg PO HS [facility]


   Last Admin: 01/02/19 22:41 Dose:  10 mg


Pantoprazole Sodium (Protonix Ec Tab)  40 mg PO DAILY [facility]


   Last Admin: 01/03/19 10:56 Dose:  40 mg


Tamsulosin HCl (Flomax)  0.4 mg PO BID [facility]


   Last Admin: 01/03/19 17:21 Dose:  0.4 mg


Tiotropium Bromide (Spiriva)  18 mcg INH RQ24 [facility]


   Last Admin: 01/03/19 13:42 Dose:  Not Given











- Labs


Labs: 


                                        





                                 01/02/19 13:11 





                                 01/02/19 14:18 











Assessment and Plan


(1) COPD exacerbation


Status: Acute

## 2019-01-04 LAB
ALBUMIN SERPL-MCNC: 2.7 G/DL (ref 3.5–5)
ALBUMIN/GLOB SERPL: 1.2 {RATIO} (ref 1–2.1)
ALT SERPL-CCNC: 76 U/L (ref 21–72)
ANISOCYTOSIS BLD QL SMEAR: SLIGHT
APTT BLD: 26 SECONDS (ref 21–34)
ARTERIAL BLOOD GAS O2 SAT: 97.3 % (ref 95–98)
ARTERIAL BLOOD GAS PCO2: 46 MM/HG (ref 35–45)
ARTERIAL BLOOD GAS TCO2: 31.9 MMOL/L (ref 22–28)
ARTERIAL PATENCY WRIST A: (no result)
AST SERPL-CCNC: 62 U/L (ref 17–59)
BACTERIA #/AREA URNS HPF: (no result) /[HPF]
BASE EXCESS BLDV CALC-SCNC: 5.3 MMOL/L (ref 0–2)
BASOPHILS # BLD AUTO: 0 K/UL (ref 0–0.2)
BASOPHILS NFR BLD: 0.1 % (ref 0–2)
BILIRUB UR-MCNC: NEGATIVE MG/DL
BUN SERPL-MCNC: 40 MG/DL (ref 9–20)
BURR CELLS BLD QL SMEAR: SLIGHT
CALCIUM SERPL-MCNC: 7.2 MG/DL (ref 8.6–10.4)
EOSINOPHIL # BLD AUTO: 0 K/UL (ref 0–0.7)
EOSINOPHIL NFR BLD: 0 % (ref 0–4)
EOSINOPHIL NFR BLD: 1 % (ref 0–4)
ERYTHROCYTE [DISTWIDTH] IN BLOOD BY AUTOMATED COUNT: 17.5 % (ref 11.5–14.5)
GFR NON-AFRICAN AMERICAN: > 60
GLUCOSE UR STRIP-MCNC: (no result) MG/DL
HCO3 BLDA-SCNC: 29.1 MMOL/L (ref 21–28)
HGB BLD-MCNC: 12.1 G/DL (ref 12–18)
INHALED O2 CONCENTRATION: 100 %
INR PPP: 1
LEUKOCYTE ESTERASE UR-ACNC: (no result) LEU/UL
LYMPHOCYTE: 2 % (ref 20–40)
LYMPHOCYTES # BLD AUTO: 0.1 K/UL (ref 1–4.3)
LYMPHOCYTES NFR BLD AUTO: 1.4 % (ref 20–40)
MCH RBC QN AUTO: 24.8 PG (ref 27–31)
MCHC RBC AUTO-ENTMCNC: 31.7 G/DL (ref 33–37)
MCV RBC AUTO: 78 FL (ref 80–94)
MONOCYTE: 3 % (ref 0–10)
MONOCYTES # BLD: 0.5 K/UL (ref 0–0.8)
MONOCYTES NFR BLD: 5.1 % (ref 0–10)
NEUTROPHILS # BLD: 8.9 K/UL (ref 1.8–7)
NEUTROPHILS NFR BLD AUTO: 89 % (ref 50–75)
NEUTROPHILS NFR BLD AUTO: 93.4 % (ref 50–75)
NEUTS BAND NFR BLD: 3 % (ref 0–2)
NRBC BLD AUTO-RTO: 0 % (ref 0–2)
OVALOCYTES BLD QL SMEAR: SLIGHT
PCO2 BLDV: 65 MMHG (ref 40–60)
PH BLDA: 7.43 [PH] (ref 7.35–7.45)
PH BLDV: 7.32 [PH] (ref 7.32–7.43)
PH UR STRIP: 5 [PH] (ref 5–8)
PLATELET # BLD EST: NORMAL 10*3/UL
PLATELET # BLD: 161 K/UL (ref 130–400)
PMV BLD AUTO: 8.4 FL (ref 7.2–11.7)
PO2 BLDA: 399 MM/HG (ref 80–100)
POIKILOCYTOSIS BLD QL SMEAR: SLIGHT
PROT UR STRIP-MCNC: (no result) MG/DL
PROTHROMBIN TIME: 11.4 SECONDS (ref 9.7–12.2)
RBC # BLD AUTO: 4.89 MIL/UL (ref 4.4–5.9)
RBC # UR STRIP: (no result) /UL
SP GR UR STRIP: 1.03 (ref 1–1.03)
SQUAMOUS EPITHIAL: < 1 /HPF (ref 0–5)
TOTAL CELLS COUNTED BLD: 100
UROBILINOGEN UR-MCNC: NORMAL MG/DL (ref 0.2–1)
VARIANT LYMPHS NFR BLD MANUAL: 2 % (ref 0–0)
VENOUS BLOOD GAS PO2: 33 MM/HG (ref 30–55)
WBC # BLD AUTO: 9.6 K/UL (ref 4.8–10.8)

## 2019-01-04 PROCEDURE — 5A1955Z RESPIRATORY VENTILATION, GREATER THAN 96 CONSECUTIVE HOURS: ICD-10-PCS | Performed by: INTERNAL MEDICINE

## 2019-01-04 PROCEDURE — 0BH17EZ INSERTION OF ENDOTRACHEAL AIRWAY INTO TRACHEA, VIA NATURAL OR ARTIFICIAL OPENING: ICD-10-PCS | Performed by: INTERNAL MEDICINE

## 2019-01-04 RX ADMIN — PHENYLEPHRINE HYDROCHLORIDE PRN MLS/HR: 10 INJECTION, SOLUTION INTRAMUSCULAR; INTRAVENOUS; SUBCUTANEOUS at 17:30

## 2019-01-04 RX ADMIN — GUAIFENESIN SCH MG: 600 TABLET, EXTENDED RELEASE ORAL at 18:03

## 2019-01-04 RX ADMIN — PROPOFOL PRN MLS/HR: 10 INJECTION, EMULSION INTRAVENOUS at 18:11

## 2019-01-04 RX ADMIN — ACETYLCYSTEINE SCH ML: 200 INHALANT RESPIRATORY (INHALATION) at 19:30

## 2019-01-04 RX ADMIN — IPRATROPIUM BROMIDE AND ALBUTEROL SULFATE SCH ML: .5; 3 SOLUTION RESPIRATORY (INHALATION) at 16:04

## 2019-01-04 RX ADMIN — GUAIFENESIN SCH MG: 600 TABLET, EXTENDED RELEASE ORAL at 10:05

## 2019-01-04 RX ADMIN — PANTOPRAZOLE SODIUM SCH MG: 40 TABLET, DELAYED RELEASE ORAL at 10:05

## 2019-01-04 RX ADMIN — INSULIN ASPART SCH UNITS: 100 INJECTION, SOLUTION INTRAVENOUS; SUBCUTANEOUS at 11:58

## 2019-01-04 RX ADMIN — IPRATROPIUM BROMIDE AND ALBUTEROL SULFATE SCH ML: .5; 3 SOLUTION RESPIRATORY (INHALATION) at 07:37

## 2019-01-04 RX ADMIN — METHYLPREDNISOLONE SODIUM SUCCINATE SCH MG: 40 INJECTION, POWDER, FOR SOLUTION INTRAMUSCULAR; INTRAVENOUS at 16:40

## 2019-01-04 RX ADMIN — ACETYLCYSTEINE SCH ML: 200 INHALANT RESPIRATORY (INHALATION) at 07:37

## 2019-01-04 RX ADMIN — IPRATROPIUM BROMIDE AND ALBUTEROL SULFATE SCH ML: .5; 3 SOLUTION RESPIRATORY (INHALATION) at 23:40

## 2019-01-04 RX ADMIN — TIOTROPIUM BROMIDE SCH MCG: 18 CAPSULE ORAL; RESPIRATORY (INHALATION) at 08:45

## 2019-01-04 RX ADMIN — IPRATROPIUM BROMIDE AND ALBUTEROL SULFATE SCH ML: .5; 3 SOLUTION RESPIRATORY (INHALATION) at 01:55

## 2019-01-04 RX ADMIN — TAZOBACTAM SODIUM AND PIPERACILLIN SODIUM SCH MLS/HR: 375; 3 INJECTION, SOLUTION INTRAVENOUS at 05:46

## 2019-01-04 RX ADMIN — METHYLPREDNISOLONE SODIUM SUCCINATE SCH MG: 40 INJECTION, POWDER, FOR SOLUTION INTRAMUSCULAR; INTRAVENOUS at 23:13

## 2019-01-04 RX ADMIN — ACETYLCYSTEINE SCH ML: 200 INHALANT RESPIRATORY (INHALATION) at 01:00

## 2019-01-04 RX ADMIN — IPRATROPIUM BROMIDE AND ALBUTEROL SULFATE SCH ML: .5; 3 SOLUTION RESPIRATORY (INHALATION) at 11:32

## 2019-01-04 RX ADMIN — TAZOBACTAM SODIUM AND PIPERACILLIN SODIUM SCH MLS/HR: 375; 3 INJECTION, SOLUTION INTRAVENOUS at 15:00

## 2019-01-04 RX ADMIN — PROPOFOL PRN MLS/HR: 10 INJECTION, EMULSION INTRAVENOUS at 23:28

## 2019-01-04 RX ADMIN — INSULIN ASPART SCH UNITS: 100 INJECTION, SOLUTION INTRAVENOUS; SUBCUTANEOUS at 08:32

## 2019-01-04 RX ADMIN — TAZOBACTAM SODIUM AND PIPERACILLIN SODIUM SCH MLS/HR: 375; 3 INJECTION, SOLUTION INTRAVENOUS at 22:00

## 2019-01-04 RX ADMIN — PROPOFOL PRN MLS/HR: 10 INJECTION, EMULSION INTRAVENOUS at 14:00

## 2019-01-04 RX ADMIN — IPRATROPIUM BROMIDE AND ALBUTEROL SULFATE SCH ML: .5; 3 SOLUTION RESPIRATORY (INHALATION) at 19:29

## 2019-01-04 RX ADMIN — ENOXAPARIN SODIUM SCH MG: 40 INJECTION SUBCUTANEOUS at 10:05

## 2019-01-04 RX ADMIN — METHYLPREDNISOLONE SODIUM SUCCINATE SCH MG: 40 INJECTION, POWDER, FOR SOLUTION INTRAMUSCULAR; INTRAVENOUS at 05:45

## 2019-01-04 RX ADMIN — INSULIN ASPART SCH: 100 INJECTION, SOLUTION INTRAVENOUS; SUBCUTANEOUS at 16:30

## 2019-01-04 NOTE — RAD
Date of service: 



01/04/2019



HISTORY:

 s/p intubation 



COMPARISON:

01/01/2019. 



FINDINGS:

The endotracheal tube terminates in the mid trachea. 



LUNGS:

The lungs are well inflated.  There is mild pulmonary venous 

congestion 



PLEURA:

No pleural effusions or pneumothorax. 



CARDIOVASCULAR:

There is mild cardiomegaly.  No aortic atherosclerotic calcification 

present. 



OSSEOUS STRUCTURES:

Within normal limits for the patient's age.



VISUALIZED UPPER ABDOMEN:

Normal.



OTHER FINDINGS:

None.



IMPRESSION:

No active pulmonary disease.



Mild cardiomegaly and pulmonary venous congestion.  



Endotracheal tube terminates in the mid trachea.

## 2019-01-04 NOTE — CP.PCM.PN
Subjective





- Subjective


Subjective: 





dictated   





Objective





- Vital Signs/Intake and Output


Vital Signs (last 24 hours): 


                                        











Temp Pulse Resp BP Pulse Ox


 


 98.3 F   116 H  16   91/61 L  99 


 


 01/04/19 07:00  01/04/19 20:00  01/04/19 20:00  01/04/19 19:45  01/04/19 20:00








Intake and Output: 


                                        











 01/04/19 01/05/19





 18:59 06:59


 


Intake Total 1154.8 67.8


 


Balance 1154.8 67.8














- Medications


Medications: 


                               Current Medications





Acetylcysteine (Acetylcysteine 20%)  4 ml INH RQ6 [facility]


   Last Admin: 01/04/19 19:30 Dose:  4 ml


Albuterol/Ipratropium (Duoneb 3 Mg/0.5 Mg (3 Ml) Ud)  3 ml INH RQ4 [facility]


   Last Admin: 01/04/19 19:29 Dose:  3 ml


Dextrose (Dextrose 50% Inj)  0 ml IV STAT PRN; Protocol


   PRN Reason: Hypoglycemia Protocol


Dextrose (Glutose 15)  0 gm PO ONCE PRN; Protocol


   PRN Reason: Hypoglycemia Protocol


Enoxaparin Sodium (Lovenox)  40 mg SC DAILY [facility]


   Last Admin: 01/04/19 10:05 Dose:  40 mg


Finasteride (Proscar)  5 mg PO DAILY [facility]


   Last Admin: 01/04/19 10:05 Dose:  5 mg


Glucagon (Glucagen Diagnostic Kit)  0 mg IM STAT PRN; Protocol


   PRN Reason: Hypoglycemia Protocol


Guaifenesin (Mucinex La)  600 mg PO BID [facility]


   Last Admin: 01/04/19 18:03 Dose:  600 mg


Home Med (Mirabegron [Myrbetriq])  50 mg PO DAILY [facility]


Azithromycin 500 mg/ Sodium (Chloride)  250 mls @ 250 mls/hr IVPB Q24H [facility]; 

Protocol


   Last Admin: 01/04/19 11:58 Dose:  250 mls/hr


Piperacillin Sod/Tazobactam Sod (Zosyn 3.375 Gm Iv Premix)  3.375 gm in 50 mls @

100 mls/hr IVPB Q8H [facility]; Protocol


   Last Admin: 01/04/19 15:00 Dose:  100 mls/hr


Propofol (Diprivan)  1,000 mg in 100 mls @ 2.286 mls/hr IV .Q24H PRN; Protocol


   PRN Reason: TITRATE PER MD ORDER


   Last Titration: 01/04/19 19:40 Dose:  40 mcg/kg/min, 18.289 mls/hr


Phenylephrine HCl 30 mg/ (Sodium Chloride)  253 mls @ 10.12 mls/hr IV .Q24H PRN;

Protocol


   PRN Reason: TITRATE PER MD ORDER


   Last Titration: 01/04/19 19:44 Dose:  40 mcg/min, 20.24 mls/hr


Insulin Aspart (Novolog)  0 unit SC Q6H [facility]; Protocol


Losartan Potassium (Cozaar)  100 mg PO DAILY [facility]


   Last Admin: 01/04/19 10:05 Dose:  100 mg


Methylprednisolone (Solu-Medrol)  60 mg IVP Q8 [facility]


   Last Admin: 01/04/19 16:40 Dose:  60 mg


Montelukast Sodium (Singulair)  10 mg PO HS [facility]


   Last Admin: 01/03/19 22:11 Dose:  10 mg


Pantoprazole Sodium (Protonix Ec Tab)  40 mg PO DAILY [facility]


   Last Admin: 01/04/19 10:05 Dose:  40 mg


Tamsulosin HCl (Flomax)  0.4 mg PO BID [facility]


   Last Admin: 01/04/19 18:03 Dose:  0.4 mg


Tiotropium Bromide (Spiriva)  18 mcg INH RQ24 [facility]


   Last Admin: 01/04/19 08:45 Dose:  18 mcg


Zolpidem Tartrate (Ambien)  5 mg PO HS PRN


   PRN Reason: Insomnia


   Last Admin: 01/03/19 22:54 Dose:  5 mg











- Labs


Labs: 


                                        





                                 01/04/19 15:24 





                                 01/04/19 15:24 





                                        











PT  11.4 SECONDS (9.7-12.2)   01/04/19  15:24    


 


INR  1.0   01/04/19  15:24    


 


APTT  26 SECONDS (21-34)   01/04/19  15:24

## 2019-01-04 NOTE — PN
DATE:  01/03/2019



SUBJECTIVE:  The patient is less short of breath, less cough, less

wheezing.  He is afebrile.  He is on Zosyn and Zithromax.  No fever.  No

chills.  He is tachycardic.  Blood sugars are high.  He is on higher dose

of steroids compared to day before yesterday.



PHYSICAL EXAMINATION:

VITAL SIGNS:  Blood pressure 170/91, pulse 99, respiratory rate 18,

temperature 98.

LUNGS:  Decreased air entry.  Positive rhonchi.

CARDIOVASCULAR SYSTEM:  S1 and S2, regular.

ABDOMEN:  Soft and nontender.  Bowel sounds are positive.



ASSESSMENT:

1.  Exacerbation of chronic obstructive pulmonary disease.

2.  Pneumonia.

3.  Type 2 diabetes with worsening due to steroid.

4.  Hypertension.



PLAN:  Continue Solu-Medrol 60 mg antibiotics.  Monitor the patient.





__________________________________________

Tae Hensley MD





DD:  01/03/2019 23:22:48

DT:  01/03/2019 23:56:43

Job # [id number]

## 2019-01-04 NOTE — CP.PCM.CON
<Emily Coto - Last Filed: 01/04/19 14:52>





History of Present Illness





- History of Present Illness


History of Present Illness: 





ICU Consult Note for Dr. Gonsalez








HPI: 78 y/o male with PMHx of COPD, asthma, sleep apnea, HTN, DM, GERD arrived 

to ICU from  due to respiratory distress. Patient was on BiPAP and was 

intubated immediately upon arrival to the ICU. According to inpatient team 

upstairs, patient was given Ambien last night due to insomnia. This morning raj

ent was agitated and was given Haldol. /128 and -180 before patient 

sent down to ICU and VBG pH 7.3 this morning. ABG shock panel, CBC, CMP, Mg and 

Phos ordered STAT.





Due to patient condition ROS unable to be obtained. 





(taken from 1/1 consult note pulm, Dr. Gonsalez:)


PMH- as above


PSH- endoscopy


Social- returned from 3month stay in [facility] 3 days ago, felt well until 

[place].  On home O2.  Former smoker, no alcohol or drug use


Medications- Prednisone 5mg daily, Anoro Ellipta 62.5-25mcg 1 IH daily, Spiriva 

18mcg daily, Singulari 10mg daily, Ventolin, Arformoterol 1IH daily, mucinex 

600mg BID, Flomax 0.4mg BID, omeprazole 40mg, Mirabepron 20mg, metformin 1000mg 

BID, losartan 100mg


Allergies- NKDA, seasonal allergies








Review of Systems





- Review of Systems


Systems not reviewed;Unavailable: Acuity of Condition, Intubated





Past Patient History





- Infectious Disease


Hx of Infectious Diseases: None





- Past Medical History & Family History


Past Medical History?: Yes





- Past Social History


Smoking Status: Former Smoker





- CARDIAC


Hx Hypertension: Yes





- PULMONARY


Hx Asthma: Yes


Hx Chronic Obstructive Pulmonary Disease (COPD): Yes (ASTHMA)





- NEUROLOGICAL


Hx Neurological Disorder: No





- HEENT


Hx HEENT Problems: No





- RENAL


Hx Chronic Kidney Disease: No





- ENDOCRINE/METABOLIC


Hx Endocrine Disorders: Yes


Hx Diabetes Mellitus Type 1: Yes





- HEMATOLOGICAL/ONCOLOGICAL


Hx Blood Disorders: No





- INTEGUMENTARY


Hx Dermatological Problems: Yes


Other/Comment: SKIN ASTHMA





- MUSCULOSKELETAL/RHEUMATOLOGICAL


Hx Arthritis: Yes (B/L KNEE JT)





- GASTROINTESTINAL


Hx Gastritis: Yes





- GENITOURINARY/GYNECOLOGICAL


Hx Genitourinary Disorders: Yes


Hx Prostate Problems: Yes





- PSYCHIATRIC


Hx Substance Use: No





- SURGICAL HISTORY


Hx Surgeries: No





- ANESTHESIA


Hx Anesthesia: No


Hx Anesthesia Reactions: No


Hx Malignant Hyperthermia: No





Meds


Allergies/Adverse Reactions: 


                                    Allergies











Allergy/AdvReac Type Severity Reaction Status Date / Time


 


seasonal allergies Allergy Mild CONGESTION Uncoded 12/31/18 04:13














- Medications


Medications: 


                               Current Medications





Acetylcysteine (Acetylcysteine 20%)  4 ml INH RQ6 [facility]


   Last Admin: 01/04/19 07:37 Dose:  4 ml


Albuterol/Ipratropium (Duoneb 3 Mg/0.5 Mg (3 Ml) Ud)  3 ml INH RQ4 [facility]


   Last Admin: 01/04/19 11:32 Dose:  3 ml


Dextrose (Dextrose 50% Inj)  0 ml IV STAT PRN; Protocol


   PRN Reason: Hypoglycemia Protocol


Dextrose (Glutose 15)  0 gm PO ONCE PRN; Protocol


   PRN Reason: Hypoglycemia Protocol


Enoxaparin Sodium (Lovenox)  40 mg SC DAILY [facility]


   Last Admin: 01/04/19 10:05 Dose:  40 mg


Finasteride (Proscar)  5 mg PO DAILY [facility]


   Last Admin: 01/04/19 10:05 Dose:  5 mg


Glucagon (Glucagen Diagnostic Kit)  0 mg IM STAT PRN; Protocol


   PRN Reason: Hypoglycemia Protocol


Guaifenesin (Mucinex La)  600 mg PO BID [facility]


   Last Admin: 01/04/19 10:05 Dose:  600 mg


Home Med (Mirabegron [Myrbetriq])  50 mg PO DAILY [facility]


Azithromycin 500 mg/ Sodium (Chloride)  250 mls @ 250 mls/hr IVPB Q24H [facility]; 

Protocol


   Last Admin: 01/04/19 11:58 Dose:  250 mls/hr


Piperacillin Sod/Tazobactam Sod (Zosyn 3.375 Gm Iv Premix)  3.375 gm in 50 mls @

 100 mls/hr IVPB Q8H [facility]; Protocol


   Last Admin: 01/04/19 05:46 Dose:  100 mls/hr


Insulin Aspart (Novolog)  0 unit SC ACHS [facility]; Protocol


   Last Admin: 01/04/19 11:58 Dose:  3 units


Losartan Potassium (Cozaar)  100 mg PO DAILY [facility]


   Last Admin: 01/04/19 10:05 Dose:  100 mg


Methylprednisolone (Solu-Medrol)  60 mg IVP Q8 [facility]


   Last Admin: 01/04/19 05:45 Dose:  60 mg


Montelukast Sodium (Singulair)  10 mg PO HS [facility]


   Last Admin: 01/03/19 22:11 Dose:  10 mg


Pantoprazole Sodium (Protonix Ec Tab)  40 mg PO DAILY [facility]


   Last Admin: 01/04/19 10:05 Dose:  40 mg


Tamsulosin HCl (Flomax)  0.4 mg PO BID [facility]


   Last Admin: 01/04/19 10:05 Dose:  0.4 mg


Tiotropium Bromide (Spiriva)  18 mcg INH RQ24 [facility]


   Last Admin: 01/04/19 08:45 Dose:  18 mcg


Zolpidem Tartrate (Ambien)  5 mg PO HS PRN


   PRN Reason: Insomnia


   Last Admin: 01/03/19 22:54 Dose:  5 mg











Physical Exam





- Constitutional


Appears: Agitated





- Head Exam


Head Exam: ATRAUMATIC, NORMAL INSPECTION, NORMOCEPHALIC





- Eye Exam


Eye Exam: EOMI, Normal appearance





- Neck Exam


Neck exam: Positive for: Normal Inspection





- Respiratory Exam


Respiratory Exam: Accessory Muscle Use.  absent: NORMAL BREATHING PATTERN





- Cardiovascular Exam


Cardiovascular Exam: Tachycardia, Irregular Rhythm





- Extremities Exam


Extremities exam: Positive for: normal inspection





- Back Exam


Back exam: NORMAL INSPECTION





- Psychiatric Exam


Psychiatric exam: Agitated, Anxious





- Skin


Skin Exam: Cyanosis, Dry, Intact





Results





- Vital Signs


Recent Vital Signs: 


                                Last Vital Signs











Temp  98.3 F   01/04/19 07:00


 


Pulse  112 H  01/04/19 07:00


 


Resp  18   01/04/19 07:00


 


BP  166/92 H  01/04/19 07:00


 


Pulse Ox  95   01/04/19 07:00














- Labs


Result Diagrams: 


                                 01/02/19 13:11





                                 01/02/19 14:18


Labs: 


                         Laboratory Results - last 24 hr











  01/03/19 01/03/19 01/04/19





  16:15 21:13 06:44


 


pO2   


 


VBG pH   


 


VBG pCO2   


 


VBG HCO3   


 


VBG Total CO2   


 


VBG O2 Sat (Calc)   


 


VBG Base Excess   


 


VBG Potassium   


 


Sodium   


 


Chloride   


 


Glucose   


 


Lactate   


 


POC Glucose (mg/dL)  341 H  309 H  273 H


 


Venous Blood Potassium   














  01/04/19 01/04/19





  08:22 11:08


 


pO2  33 


 


VBG pH  7.32 


 


VBG pCO2  65 H 


 


VBG HCO3  28.0 


 


VBG Total CO2  35.5 H 


 


VBG O2 Sat (Calc)  64.7 


 


VBG Base Excess  5.3 H 


 


VBG Potassium  4.9 


 


Sodium  142.0 


 


Chloride  102.0 


 


Glucose  277 H 


 


Lactate  3.0 H 


 


POC Glucose (mg/dL)   299 H


 


Venous Blood Potassium  4.9 














Assessment & Plan





- Assessment and Plan (Free Text)


Assessment: 





78 y/o male with PMHx of COPD, asthma, sleep apnea, HTN, DM, GERD arrived to ICU

 from [place] due to respiratory distress. 





neuro


-was agitated prior to intubation


-sedated on propofol


-currently not alert or awake





CV


-prior to intubation, HR > 150


-s/p cardizem 5 mg IVP


-hold losartan 100 mg daily due to hypotension 70s/30s


-patient s/p 500 cc NS bolus. If no improvement in BP, consider levophed 





Pulm


-patient w/ COPD, asthma and sleep apnea


-on duonebs q4h


-stat ABG pending


-Vent settings: PRVC rate 12, PEEP 5, FiO2 100%


-CXR and ABG qAM





Renal


-renal function wnl on 1/2


-branham cath placed in ICU, UA and UCx ordered per protocol and pending results


-no acute issues


-CMP qAM





GI


-no acute issues


-once patient stabilized consider starting pulmocort tube feeds





Heme


-patient w/ H/H wnl


-no acute issues


-CBC qAM





ID


-on azithromycin 500 mg q24h started 12/31


-on zosyn 3.375 mg q8h started 12/31





Endo


-continue with ISS


-poorly controlled DM w/ recent BGs high 200s-300s


-hypoglycemic protocol


-accuchecks q6h





POC: Gigi Ariza [phone]





DVT ppx: lovenox 40 mg sq daily


GI ppx: protonix 40 mg IV daily





<Raza Gonsalez - Last Filed: 01/04/19 16:19>





Meds





- Medications


Medications: 


                               Current Medications





Acetylcysteine (Acetylcysteine 20%)  4 ml INH RQ6 [facility]


   Last Admin: 01/04/19 07:37 Dose:  4 ml


Albuterol/Ipratropium (Duoneb 3 Mg/0.5 Mg (3 Ml) Ud)  3 ml INH RQ4 [facility]


   Last Admin: 01/04/19 16:04 Dose:  3 ml


Dextrose (Dextrose 50% Inj)  0 ml IV STAT PRN; Protocol


   PRN Reason: Hypoglycemia Protocol


Dextrose (Glutose 15)  0 gm PO ONCE PRN; Protocol


   PRN Reason: Hypoglycemia Protocol


Enoxaparin Sodium (Lovenox)  40 mg SC DAILY [facility]


   Last Admin: 01/04/19 10:05 Dose:  40 mg


Finasteride (Proscar)  5 mg PO DAILY [facility]


   Last Admin: 01/04/19 10:05 Dose:  5 mg


Glucagon (Glucagen Diagnostic Kit)  0 mg IM STAT PRN; Protocol


   PRN Reason: Hypoglycemia Protocol


Guaifenesin (Mucinex La)  600 mg PO BID [facility]


   Last Admin: 01/04/19 10:05 Dose:  600 mg


Home Med (Mirabegron [Myrbetriq])  50 mg PO DAILY [facility]


Azithromycin 500 mg/ Sodium (Chloride)  250 mls @ 250 mls/hr IVPB Q24H [facility]; 

Protocol


   Last Admin: 01/04/19 11:58 Dose:  250 mls/hr


Piperacillin Sod/Tazobactam Sod (Zosyn 3.375 Gm Iv Premix)  3.375 gm in 50 mls @

 100 mls/hr IVPB Q8H [facility]; Protocol


   Last Admin: 01/04/19 05:46 Dose:  100 mls/hr


Propofol (Diprivan)  1,000 mg in 100 mls @ 2.286 mls/hr IV .Q24H PRN; Protocol


   PRN Reason: TITRATE PER MD ORDER


Phenylephrine HCl 30 mg/ (Sodium Chloride)  253 mls @ 10.12 mls/hr IV .Q24H PRN;

 Protocol


   PRN Reason: TITRATE PER MD ORDER


Insulin Aspart (Novolog)  0 unit SC ACHS [facility]; Protocol


   Last Admin: 01/04/19 11:58 Dose:  3 units


Losartan Potassium (Cozaar)  100 mg PO DAILY [facility]


   Last Admin: 01/04/19 10:05 Dose:  100 mg


Methylprednisolone (Solu-Medrol)  60 mg IVP Q8 [facility]


   Last Admin: 01/04/19 05:45 Dose:  60 mg


Montelukast Sodium (Singulair)  10 mg PO HS [facility]


   Last Admin: 01/03/19 22:11 Dose:  10 mg


Pantoprazole Sodium (Protonix Ec Tab)  40 mg PO DAILY [facility]


   Last Admin: 01/04/19 10:05 Dose:  40 mg


Tamsulosin HCl (Flomax)  0.4 mg PO BID [facility]


   Last Admin: 01/04/19 10:05 Dose:  0.4 mg


Tiotropium Bromide (Spiriva)  18 mcg INH RQ24 [facility]


   Last Admin: 01/04/19 08:45 Dose:  18 mcg


Zolpidem Tartrate (Ambien)  5 mg PO HS PRN


   PRN Reason: Insomnia


   Last Admin: 01/03/19 22:54 Dose:  5 mg











Results





- Vital Signs


Recent Vital Signs: 


                                Last Vital Signs











Temp  98.3 F   01/04/19 07:00


 


Pulse  112 H  01/04/19 07:00


 


Resp  18   01/04/19 07:00


 


BP  166/92 H  01/04/19 07:00


 


Pulse Ox  95   01/04/19 07:00














- Labs


Result Diagrams: 


                                 01/04/19 15:24





                                 01/04/19 15:24


Labs: 


                         Laboratory Results - last 24 hr











  01/03/19 01/03/19 01/04/19





  16:15 21:13 06:44


 


WBC   


 


RBC   


 


Hgb   


 


Hct   


 


MCV   


 


MCH   


 


MCHC   


 


RDW   


 


Plt Count   


 


MPV   


 


Neut % (Auto)   


 


Lymph % (Auto)   


 


Mono % (Auto)   


 


Eos % (Auto)   


 


Baso % (Auto)   


 


Neut # (Auto)   


 


Lymph # (Auto)   


 


Mono # (Auto)   


 


Eos # (Auto)   


 


Baso # (Auto)   


 


Neutrophils % (Manual)   


 


Band Neutrophils %   


 


Lymphocytes % (Manual)   


 


Reactive Lymphs %   


 


Monocytes % (Manual)   


 


Eosinophils % (Manual)   


 


Platelet Estimate   


 


Poikilocytosis (manual   


 


Anisocytosis (manual)   


 


Ovalocytes   


 


[place] Cells   


 


PT   


 


INR   


 


APTT   


 


pO2   


 


VBG pH   


 


VBG pCO2   


 


VBG HCO3   


 


VBG Total CO2   


 


VBG O2 Sat (Calc)   


 


VBG Base Excess   


 


VBG Potassium   


 


Sodium   


 


Chloride   


 


Glucose   


 


Lactate   


 


Potassium   


 


Carbon Dioxide   


 


Anion Gap   


 


BUN   


 


Creatinine   


 


Est GFR ( Amer)   


 


Est GFR (Non-Af Amer)   


 


POC Glucose (mg/dL)  341 H  309 H  273 H


 


Random Glucose   


 


Calcium   


 


Phosphorus   


 


Magnesium   


 


Total Bilirubin   


 


AST   


 


ALT   


 


Alkaline Phosphatase   


 


Total Protein   


 


Albumin   


 


Globulin   


 


Albumin/Globulin Ratio   


 


Venous Blood Potassium   














  01/04/19 01/04/19 01/04/19





  08:22 11:08 15:24


 


WBC    9.6


 


RBC    4.89


 


Hgb    12.1  D


 


Hct    38.2


 


MCV    78.0 L


 


MCH    24.8 L


 


MCHC    31.7 L


 


RDW    17.5 H


 


Plt Count    161


 


MPV    8.4


 


Neut % (Auto)    93.4 H


 


Lymph % (Auto)    1.4 L


 


Mono % (Auto)    5.1


 


Eos % (Auto)    0.0


 


Baso % (Auto)    0.1


 


Neut # (Auto)    8.9 H


 


Lymph # (Auto)    0.1 L


 


Mono # (Auto)    0.5


 


Eos # (Auto)    0.0


 


Baso # (Auto)    0.0


 


Neutrophils % (Manual)    89 H


 


Band Neutrophils %    3 H


 


Lymphocytes % (Manual)    2 L


 


Reactive Lymphs %    2 H


 


Monocytes % (Manual)    3


 


Eosinophils % (Manual)    1


 


Platelet Estimate    Normal


 


Poikilocytosis (manual    Slight


 


Anisocytosis (manual)    Slight


 


Ovalocytes    Slight


 


Laura Cells    Slight


 


PT   


 


INR   


 


APTT   


 


pO2  33  


 


VBG pH  7.32  


 


VBG pCO2  65 H  


 


VBG HCO3  28.0  


 


VBG Total CO2  35.5 H  


 


VBG O2 Sat (Calc)  64.7  


 


VBG Base Excess  5.3 H  


 


VBG Potassium  4.9  


 


Sodium  142.0  


 


Chloride  102.0  


 


Glucose  277 H  


 


Lactate  3.0 H  


 


Potassium   


 


Carbon Dioxide   


 


Anion Gap   


 


BUN   


 


Creatinine   


 


Est GFR ( Amer)   


 


Est GFR (Non-Af Amer)   


 


POC Glucose (mg/dL)   299 H 


 


Random Glucose   


 


Calcium   


 


Phosphorus   


 


Magnesium   


 


Total Bilirubin   


 


AST   


 


ALT   


 


Alkaline Phosphatase   


 


Total Protein   


 


Albumin   


 


Globulin   


 


Albumin/Globulin Ratio   


 


Venous Blood Potassium  4.9  














  01/04/19 01/04/19





  15:24 15:24


 


WBC  


 


RBC  


 


Hgb  


 


Hct  


 


MCV  


 


MCH  


 


MCHC  


 


RDW  


 


Plt Count  


 


MPV  


 


Neut % (Auto)  


 


Lymph % (Auto)  


 


Mono % (Auto)  


 


Eos % (Auto)  


 


Baso % (Auto)  


 


Neut # (Auto)  


 


Lymph # (Auto)  


 


Mono # (Auto)  


 


Eos # (Auto)  


 


Baso # (Auto)  


 


Neutrophils % (Manual)  


 


Band Neutrophils %  


 


Lymphocytes % (Manual)  


 


Reactive Lymphs %  


 


Monocytes % (Manual)  


 


Eosinophils % (Manual)  


 


Platelet Estimate  


 


Poikilocytosis (manual  


 


Anisocytosis (manual)  


 


Ovalocytes  


 


Laura Cells  


 


PT   11.4


 


INR   1.0


 


APTT   26


 


pO2  


 


VBG pH  


 


VBG pCO2  


 


VBG HCO3  


 


VBG Total CO2  


 


VBG O2 Sat (Calc)  


 


VBG Base Excess  


 


VBG Potassium  


 


Sodium  137 


 


Chloride  102 


 


Glucose  


 


Lactate  


 


Potassium  4.3 


 


Carbon Dioxide  32 H 


 


Anion Gap  8 L 


 


BUN  40 H 


 


Creatinine  1.0 


 


Est GFR ( Amer)  > 60 


 


Est GFR (Non-Af Amer)  > 60 


 


POC Glucose (mg/dL)  


 


Random Glucose  228 H D 


 


Calcium  7.2 L 


 


Phosphorus  3.0 


 


Magnesium  2.4 H 


 


Total Bilirubin  0.8 


 


AST  62 H D 


 


ALT  76 H D 


 


Alkaline Phosphatase  50 


 


Total Protein  4.9 L 


 


Albumin  2.7 L D 


 


Globulin  2.2 


 


Albumin/Globulin Ratio  1.2 


 


Venous Blood Potassium  














Assessment & Plan


(1) COPD exacerbation


Status: Acute   





Attending/Attestation





- Attestation


I have personally seen and examined this patient.: Yes


I have fully participated in the care of the patient.: Yes


I have reviewed all pertinent clinical information: Yes


Notes (Text): 





01/04/19 16:18


Patient seen and examined


Patient transferred to intensive care unit for respiratory 

distress/confusion/agitation


Intubated and placed on ventilatory support


Triple-lumen catheter inserted and right internal jugular with


Continue IV antibiotics


Continue IV steroids, nebulizer treatment


Fluid resuscitation


Culture and sensitivity


\Echocardiogram

## 2019-01-04 NOTE — RAD
Date of service: 



01/04/2019



HISTORY:

 right TLC insertion 



COMPARISON:

01/04/2019. 



FINDINGS:

Right-sided IJV catheter terminates in the SVC.  Endotracheal tube 

terminates 2.5 cm proximal to the omari. 



LUNGS:

There are low lung volumes.  There is mild pulmonary venous 

congestion.  There is subsegmental atelectasis in the lower lobes.



PLEURA:

Suspect small effusions.  No pneumothorax. 



CARDIOVASCULAR:

There is moderate cardiomegaly.  No aortic atherosclerotic 

calcification present. 



OSSEOUS STRUCTURES:

Within normal limits for the patient's age.



VISUALIZED UPPER ABDOMEN:

Normal.



OTHER FINDINGS:

None.



IMPRESSION:

Right IJV line terminates in the SVC. 



Persistent moderate cardiomegaly.  Persistent mild venous congestion 

and suspect small effusions.

## 2019-01-05 VITALS — HEART RATE: 128 BPM

## 2019-01-05 LAB
ALBUMIN SERPL-MCNC: 3.2 G/DL (ref 3.5–5)
ALBUMIN/GLOB SERPL: 1.3 {RATIO} (ref 1–2.1)
ALT SERPL-CCNC: 78 U/L (ref 21–72)
ANISOCYTOSIS BLD QL SMEAR: SLIGHT
ARTERIAL BLOOD GAS O2 SAT: 97.2 % (ref 95–98)
ARTERIAL BLOOD GAS PCO2: 57 MM/HG (ref 35–45)
ARTERIAL BLOOD GAS TCO2: 31.1 MMOL/L (ref 22–28)
AST SERPL-CCNC: 43 U/L (ref 17–59)
BASOPHILS # BLD AUTO: 0 K/UL (ref 0–0.2)
BASOPHILS NFR BLD: 0.1 % (ref 0–2)
BUN SERPL-MCNC: 36 MG/DL (ref 9–20)
CALCIUM SERPL-MCNC: 7.6 MG/DL (ref 8.6–10.4)
EOSINOPHIL # BLD AUTO: 0 K/UL (ref 0–0.7)
EOSINOPHIL NFR BLD: 0 % (ref 0–4)
ERYTHROCYTE [DISTWIDTH] IN BLOOD BY AUTOMATED COUNT: 17.8 % (ref 11.5–14.5)
GFR NON-AFRICAN AMERICAN: > 60
HCO3 BLDA-SCNC: 26.6 MMOL/L (ref 21–28)
HGB BLD-MCNC: 13.8 G/DL (ref 12–18)
HYPOCHROMIC: SLIGHT
INHALED O2 CONCENTRATION: 60 %
LG PLATELETS BLD QL SMEAR: PRESENT
LYMPHOCYTE: 1 % (ref 20–40)
LYMPHOCYTES # BLD AUTO: 0.2 K/UL (ref 1–4.3)
LYMPHOCYTES NFR BLD AUTO: 1.6 % (ref 20–40)
MCH RBC QN AUTO: 24.2 PG (ref 27–31)
MCHC RBC AUTO-ENTMCNC: 31.2 G/DL (ref 33–37)
MCV RBC AUTO: 77.7 FL (ref 80–94)
MONOCYTE: 3 % (ref 0–10)
MONOCYTES # BLD: 0.5 K/UL (ref 0–0.8)
MONOCYTES NFR BLD: 4.1 % (ref 0–10)
NEUTROPHILS # BLD: 11.3 K/UL (ref 1.8–7)
NEUTROPHILS NFR BLD AUTO: 93 % (ref 50–75)
NEUTROPHILS NFR BLD AUTO: 94.2 % (ref 50–75)
NEUTS BAND NFR BLD: 3 % (ref 0–2)
NRBC BLD AUTO-RTO: 0.1 % (ref 0–2)
OVALOCYTES BLD QL SMEAR: SLIGHT
PH BLDA: 7.32 [PH] (ref 7.35–7.45)
PLATELET # BLD EST: NORMAL 10*3/UL
PLATELET # BLD: 198 K/UL (ref 130–400)
PMV BLD AUTO: 8.8 FL (ref 7.2–11.7)
PO2 BLDA: 210 MM/HG (ref 80–100)
POIKILOCYTOSIS BLD QL SMEAR: SLIGHT
POLYCHROMIC: SLIGHT
RBC # BLD AUTO: 5.69 MIL/UL (ref 4.4–5.9)
TOTAL CELLS COUNTED BLD: 100
TOXIC GRANULES BLD QL SMEAR: PRESENT
WBC # BLD AUTO: 12 K/UL (ref 4.8–10.8)

## 2019-01-05 RX ADMIN — INSULIN ASPART SCH U: 100 INJECTION, SOLUTION INTRAVENOUS; SUBCUTANEOUS at 19:00

## 2019-01-05 RX ADMIN — IPRATROPIUM BROMIDE AND ALBUTEROL SULFATE SCH ML: .5; 3 SOLUTION RESPIRATORY (INHALATION) at 19:31

## 2019-01-05 RX ADMIN — METHYLPREDNISOLONE SODIUM SUCCINATE SCH MG: 40 INJECTION, POWDER, FOR SOLUTION INTRAMUSCULAR; INTRAVENOUS at 21:47

## 2019-01-05 RX ADMIN — ACETYLCYSTEINE SCH ML: 200 INHALANT RESPIRATORY (INHALATION) at 08:01

## 2019-01-05 RX ADMIN — PROPOFOL PRN MLS/HR: 10 INJECTION, EMULSION INTRAVENOUS at 21:50

## 2019-01-05 RX ADMIN — PROPOFOL PRN MLS/HR: 10 INJECTION, EMULSION INTRAVENOUS at 04:04

## 2019-01-05 RX ADMIN — PHENYLEPHRINE HYDROCHLORIDE PRN MLS/HR: 10 INJECTION, SOLUTION INTRAMUSCULAR; INTRAVENOUS; SUBCUTANEOUS at 05:45

## 2019-01-05 RX ADMIN — TAZOBACTAM SODIUM AND PIPERACILLIN SODIUM SCH MLS/HR: 375; 3 INJECTION, SOLUTION INTRAVENOUS at 21:46

## 2019-01-05 RX ADMIN — ACETYLCYSTEINE SCH ML: 200 INHALANT RESPIRATORY (INHALATION) at 19:30

## 2019-01-05 RX ADMIN — TAZOBACTAM SODIUM AND PIPERACILLIN SODIUM SCH MLS/HR: 375; 3 INJECTION, SOLUTION INTRAVENOUS at 06:15

## 2019-01-05 RX ADMIN — METHYLPREDNISOLONE SODIUM SUCCINATE SCH MG: 40 INJECTION, POWDER, FOR SOLUTION INTRAMUSCULAR; INTRAVENOUS at 06:09

## 2019-01-05 RX ADMIN — ACETYLCYSTEINE SCH ML: 200 INHALANT RESPIRATORY (INHALATION) at 03:05

## 2019-01-05 RX ADMIN — TAZOBACTAM SODIUM AND PIPERACILLIN SODIUM SCH MLS/HR: 375; 3 INJECTION, SOLUTION INTRAVENOUS at 15:00

## 2019-01-05 RX ADMIN — GUAIFENESIN SCH MG: 600 TABLET, EXTENDED RELEASE ORAL at 09:11

## 2019-01-05 RX ADMIN — IPRATROPIUM BROMIDE AND ALBUTEROL SULFATE SCH ML: .5; 3 SOLUTION RESPIRATORY (INHALATION) at 12:25

## 2019-01-05 RX ADMIN — INSULIN ASPART SCH U: 100 INJECTION, SOLUTION INTRAVENOUS; SUBCUTANEOUS at 11:29

## 2019-01-05 RX ADMIN — METHYLPREDNISOLONE SODIUM SUCCINATE SCH MG: 40 INJECTION, POWDER, FOR SOLUTION INTRAMUSCULAR; INTRAVENOUS at 15:00

## 2019-01-05 RX ADMIN — IPRATROPIUM BROMIDE AND ALBUTEROL SULFATE SCH ML: .5; 3 SOLUTION RESPIRATORY (INHALATION) at 03:06

## 2019-01-05 RX ADMIN — PROPOFOL PRN MLS/HR: 10 INJECTION, EMULSION INTRAVENOUS at 15:23

## 2019-01-05 RX ADMIN — ACETYLCYSTEINE SCH ML: 200 INHALANT RESPIRATORY (INHALATION) at 13:21

## 2019-01-05 RX ADMIN — ENOXAPARIN SODIUM SCH MG: 40 INJECTION SUBCUTANEOUS at 09:10

## 2019-01-05 RX ADMIN — PANTOPRAZOLE SODIUM SCH MG: 40 TABLET, DELAYED RELEASE ORAL at 09:11

## 2019-01-05 RX ADMIN — PROPOFOL PRN MLS/HR: 10 INJECTION, EMULSION INTRAVENOUS at 11:00

## 2019-01-05 RX ADMIN — IPRATROPIUM BROMIDE AND ALBUTEROL SULFATE SCH ML: .5; 3 SOLUTION RESPIRATORY (INHALATION) at 08:01

## 2019-01-05 RX ADMIN — INSULIN ASPART SCH: 100 INJECTION, SOLUTION INTRAVENOUS; SUBCUTANEOUS at 10:15

## 2019-01-05 NOTE — CP.CCUPN
CCU Subjective





- Physician Review


Events Since Last Encounter (Free Text): 





01/05/19 1Patient is a 


79-year-old male with a history of advanced COPD, diabetes and hypertension 

admitted to the hospital initially with a COPD exacerbation complicated with 

acute respiratory insufficiency with hypercapnic respiratory failure intubated 

and currently in intensive care unit.


Patient is on sedation now.


Tolerating the ventilator.


Patient is responding to sedation vacation.


Awake and responding at times.


Moving all 4 extremities.


Patient has a Manzano catheter.








On examination:


Vital signs are stable otherwise.


But episodes of tachycardia, atrial flutter fibrillation noted.


Chest good air entry, wheezing noted, thick bloody secretions noted.


Abdomen soft nontender edema in the legs is negative





Labs reviewed


X-ray reviewed


Chest x-ray as well as blood gas reviewe


Medications reviewed


Patient is currently on antibiotics and intravenous corticosteroids.





Assessment and recognition of:


79-year-old male with a history of COPD diabetes hypertension admitted to the 

hospital with acute hypercapnic respiratory failure.


Will add Cardizem.


Digoxin 1 dose.


Taper off the phenylephrine.


Respirator support.


Patient is not ready for weaning yet.


Patient may need anticoagulation, but will discuss with the cardiologist and delmy

l follow the patient.


Thick blood secretion noted in the endotracheal tube 


we will closely monitor possible anti-coagulation.





Critical Care Time Spent (in minutes): 45





CCU Objective





- Vital Signs / Intake & Output


Vital Signs (Last 4 hours): 


Vital Signs











  Pulse Resp BP Pulse Ox


 


 01/05/19 09:00  100 H  22   94 L


 


 01/05/19 08:47  119 H  20  119/74  97


 


 01/05/19 08:00  134 H  16   97


 


 01/05/19 07:46  122 H  16  122/62  97


 


 01/05/19 07:00  126 H  15   97


 


 01/05/19 06:46  111 H  16  106/74  97











Intake and Output (Last 8hrs): 


                                 Intake & Output











 01/04/19 01/05/19 01/05/19





 22:59 06:59 14:59


 


Intake Total 305.5 776.6 185.3


 


Output Total  600 200


 


Balance 305.5 176.6 -14.7


 


Weight  162 lb 


 


Intake:   


 


  .8 420.2 95


 


  Intake, IV Amount 169.7 356.4 90.3


 


    Right Distal Port 60 160 35.0


 


    Right Medial Port 109.7 146.4 55.3


 


    Right Proximal Port  50 


 


Output:   


 


  Urine  600 200


 


    Urine, Voided  600 200


 


Other:   


 


  # Voids   


 


    Urine, Voided 250  














- Medications


Active Medications: 


Active Medications











Generic Name Dose Route Start Last Admin





  Trade Name Freq  PRN Reason Stop Dose Admin


 


Acetylcysteine  4 ml  01/03/19 20:00  01/05/19 08:01





  Acetylcysteine 20%  INH   4 ml





  RQ6 OLEG   Administration





     





     





     





     


 


Albuterol/Ipratropium  3 ml  12/31/18 08:00  01/05/19 08:01





  Duoneb 3 Mg/0.5 Mg (3 Ml) Ud  INH   3 ml





  RQ4 OLEG   Administration





     





     





     





     


 


Digoxin  0.25 mg  01/05/19 10:04  





  Lanoxin  IVP  01/05/19 10:05  





  ONCE ONE   





     





     





     





     


 


Diltiazem HCl  30 mg  01/05/19 14:00  





  Cardizem  PO   





  QID [facility]   





     





     





     





     


 


Enoxaparin Sodium  40 mg  12/31/18 10:00  01/05/19 09:10





  Lovenox  SC   40 mg





  DAILY OLEG   Administration





     





     





     





     


 


Finasteride  5 mg  12/31/18 10:00  01/05/19 09:11





  Proscar  PO   5 mg





  DAILY OLEG   Administration





     





     





     





     


 


Home Med  50 mg  12/31/18 10:00  





  Mirabegron [Myrbetriq]  PO   





  DAILY [facility]   





     





     





     





     


 


Azithromycin 500 mg/ Sodium  250 mls @ 250 mls/hr  12/31/18 12:00  01/04/19 

11:58





  Chloride  IVPB   250 mls/hr





  Q24H OLEG   Administration





     





     





  Protocol   





     


 


Piperacillin Sod/Tazobactam Sod  3.375 gm in 50 mls @ 100 mls/hr  01/02/19 14:30

  01/05/19 06:15





  Zosyn 3.375 Gm Iv Premix  IVPB   100 mls/hr





  Q8H OLEG   Administration





     





     





  Protocol   





     


 


Propofol  1,000 mg in 100 mls @ 2.286 mls/hr  01/04/19 13:03  01/05/19 08:30





  Diprivan  IV   40 mcg/kg/min





  .Q24H PRN   18.289 mls/hr





  TITRATE PER MD ORDER   Titration





     





  Protocol   





  5 MCG/KG/MIN   


 


Phenylephrine HCl 30 mg/  253 mls @ 10.12 mls/hr  01/04/19 15:17  01/05/19 08:30





  Sodium Chloride  IV   15 mcg/min





  .Q24H PRN   7.59 mls/hr





  TITRATE PER MD ORDER   Titration





     





  Protocol   





  20 MCG/MIN   


 


Insulin Aspart  0 unit  01/05/19 05:56  01/05/19 06:13





  Novolog  SC   4 units





  Q6H OLEG   Administration





     





     





  Protocol   





     


 


Methylprednisolone  60 mg  01/02/19 14:15  01/05/19 06:09





  Solu-Medrol  IVP   60 mg





  Q8 OLEG   Administration





     





     





     





     


 


Pantoprazole Sodium  40 mg  12/31/18 10:00  01/05/19 09:11





  Protonix Ec Tab  PO   40 mg





  DAILY OLEG   Administration





     





     





     





     


 


Tamsulosin HCl  0.4 mg  12/31/18 10:00  01/05/19 09:11





  Flomax  PO   0.4 mg





  BID OLEG   Administration





     





     





     





     














- Patient Studies


Lab Studies: 


                                   Lab Studies











  01/05/19 01/05/19 01/05/19 Range/Units





  05:53 05:49 05:49 


 


WBC    12.0 H  (4.8-10.8)  K/uL


 


RBC    5.69  (4.40-5.90)  Mil/uL


 


Hgb    13.8  (12.0-18.0)  g/dL


 


Hct    44.2  (35.0-51.0)  %


 


MCV    77.7 L  (80.0-94.0)  fL


 


MCH    24.2 L  (27.0-31.0)  pg


 


MCHC    31.2 L  (33.0-37.0)  g/dL


 


RDW    17.8 H  (11.5-14.5)  %


 


Plt Count    198  (130-400)  K/uL


 


MPV    8.8  (7.2-11.7)  fL


 


Neut % (Auto)    94.2 H  (50.0-75.0)  %


 


Lymph % (Auto)    1.6 L  (20.0-40.0)  %


 


Mono % (Auto)    4.1  (0.0-10.0)  %


 


Eos % (Auto)    0.0  (0.0-4.0)  %


 


Baso % (Auto)    0.1  (0.0-2.0)  %


 


Neut # (Auto)    11.3 H  (1.8-7.0)  K/uL


 


Lymph # (Auto)    0.2 L  (1.0-4.3)  K/uL


 


Mono # (Auto)    0.5  (0.0-0.8)  K/uL


 


Eos # (Auto)    0.0  (0.0-0.7)  K/uL


 


Baso # (Auto)    0.0  (0.0-0.2)  K/uL


 


Neutrophils % (Manual)    93 H  (50-75)  %


 


Band Neutrophils %    3 H  (0-2)  %


 


Lymphocytes % (Manual)    1 L  (20-40)  %


 


Reactive Lymphs %     (0-0)  %


 


Monocytes % (Manual)    3  (0-10)  %


 


Eosinophils % (Manual)     (0-4)  %


 


Toxic Granulation    Present  


 


Platelet Estimate    Normal  (NORMAL)  


 


Large Platelets    Present  


 


Polychromasia    Slight  


 


Hypochromasia (manual)    Slight  


 


Poikilocytosis (manual    Slight  


 


Anisocytosis (manual)    Slight  


 


Ovalocytes    Slight  


 


[place]aura Cells     


 


PT     (9.7-12.2)  SECONDS


 


INR     


 


APTT     (21-34)  SECONDS


 


Puncture Site     


 


pCO2     (35-45)  mm/Hg


 


pO2     ()  mm/Hg


 


HCO3     (21-28)  mmol/L


 


ABG pH     (7.35-7.45)  


 


ABG Total CO2     (22-28)  mmol/L


 


ABG O2 Saturation     (95-98)  %


 


ABG Base Excess     (-2.0-3.0)  mmol/L


 


[place] Test     


 


ABG Potassium     (3.6-5.2)  mmol/L


 


A-a O2 Difference     mm/Hg


 


Respiratory Index     


 


Glucose     ()  mg/dl


 


Lactate     (0.7-2.1)  mmol/L


 


Vent Mode     


 


Mechanical Rate     


 


FiO2     %


 


Tidal Volume     


 


PEEP     


 


Sodium   140   (132-148)  mmol/L


 


Potassium   4.7   (3.6-5.2)  mmol/L


 


Chloride   101   ()  mmol/L


 


Carbon Dioxide   33 H   (22-30)  mmol/L


 


Anion Gap   11   (10-20)  


 


BUN   36 H   (9-20)  mg/dL


 


Creatinine   0.9   (0.8-1.5)  mg/dL


 


Est GFR (African Amer)   > 60   


 


Est GFR (Non-Af Amer)   > 60   


 


POC Glucose (mg/dL)  323 H    ()  mg/dL


 


Random Glucose   301 H D   ()  mg/dL


 


Calcium   7.6 L   (8.6-10.4)  mg/dl


 


Phosphorus   3.9   (2.5-4.5)  mg/dL


 


Magnesium   2.6 H   (1.6-2.3)  mg/dL


 


Total Bilirubin   0.7   (0.2-1.3)  mg/dL


 


AST   43   (17-59)  U/L


 


ALT   78 H   (21-72)  U/L


 


Alkaline Phosphatase   60   ()  U/L


 


Total Protein   5.6 L   (6.3-8.3)  g/dL


 


Albumin   3.2 L   (3.5-5.0)  g/dL


 


Globulin   2.4   (2.2-3.9)  gm/dL


 


Albumin/Globulin Ratio   1.3   (1.0-2.1)  


 


Arterial Blood Potassium     (3.6-5.2)  mmol/L


 


Urine Color     (YELLOW)  


 


Urine Clarity     (Clear)  


 


Urine pH     (5.0-8.0)  


 


Ur Specific Gravity     (1.003-1.030)  


 


Urine Protein     (NEGATIVE)  mg/dL


 


Urine Glucose (UA)     (Normal)  mg/dL


 


Urine Ketones     (NEGATIVE)  mg/dL


 


Urine Blood     (NEGATIVE)  


 


Urine Nitrate     (NEGATIVE)  


 


Urine Bilirubin     (NEGATIVE)  


 


Urine Urobilinogen     (0.2-1.0)  mg/dL


 


Ur Leukocyte Esterase     (Negative)  Sherry/uL


 


Urine WBC (Auto)     (0-5)  /hpf


 


Urine RBC (Auto)     (0-3)  /hpf


 


Ur Squamous Epith Cells     (0-5)  /hpf


 


Urine Bacteria     (<OCC)  














  01/05/19 01/05/19 01/04/19 Range/Units





  04:40 00:09 22:41 


 


WBC     (4.8-10.8)  K/uL


 


RBC     (4.40-5.90)  Mil/uL


 


Hgb     (12.0-18.0)  g/dL


 


Hct     (35.0-51.0)  %


 


MCV     (80.0-94.0)  fL


 


MCH     (27.0-31.0)  pg


 


MCHC     (33.0-37.0)  g/dL


 


RDW     (11.5-14.5)  %


 


Plt Count     (130-400)  K/uL


 


MPV     (7.2-11.7)  fL


 


Neut % (Auto)     (50.0-75.0)  %


 


Lymph % (Auto)     (20.0-40.0)  %


 


Mono % (Auto)     (0.0-10.0)  %


 


Eos % (Auto)     (0.0-4.0)  %


 


Baso % (Auto)     (0.0-2.0)  %


 


Neut # (Auto)     (1.8-7.0)  K/uL


 


Lymph # (Auto)     (1.0-4.3)  K/uL


 


Mono # (Auto)     (0.0-0.8)  K/uL


 


Eos # (Auto)     (0.0-0.7)  K/uL


 


Baso # (Auto)     (0.0-0.2)  K/uL


 


Neutrophils % (Manual)     (50-75)  %


 


Band Neutrophils %     (0-2)  %


 


Lymphocytes % (Manual)     (20-40)  %


 


Reactive Lymphs %     (0-0)  %


 


Monocytes % (Manual)     (0-10)  %


 


Eosinophils % (Manual)     (0-4)  %


 


Toxic Granulation     


 


Platelet Estimate     (NORMAL)  


 


Large Platelets     


 


Polychromasia     


 


Hypochromasia (manual)     


 


Poikilocytosis (manual     


 


Anisocytosis (manual)     


 


Ovalocytes     


 


[place] Cells     


 


PT     (9.7-12.2)  SECONDS


 


INR     


 


APTT     (21-34)  SECONDS


 


Puncture Site  Rb    


 


pCO2  57 H    (35-45)  mm/Hg


 


pO2  210 H    ()  mm/Hg


 


HCO3  26.6    (21-28)  mmol/L


 


ABG pH  7.32 L    (7.35-7.45)  


 


ABG Total CO2  31.1 H    (22-28)  mmol/L


 


ABG O2 Saturation  97.2    (95-98)  %


 


ABG Base Excess  2.0    (-2.0-3.0)  mmol/L


 


[place] Test  Na    


 


ABG Potassium  4.4    (3.6-5.2)  mmol/L


 


A-a O2 Difference  147.0    mm/Hg


 


Respiratory Index  0.7    


 


Glucose  303 H    ()  mg/dl


 


Lactate  2.6 H    (0.7-2.1)  mmol/L


 


Vent Mode  Prvc    


 


Mechanical Rate  16    


 


FiO2  60.0    %


 


Tidal Volume  500    


 


PEEP  5    


 


Sodium  142.0    (132-148)  mmol/L


 


Potassium     (3.6-5.2)  mmol/L


 


Chloride  105.0    ()  mmol/L


 


Carbon Dioxide     (22-30)  mmol/L


 


Anion Gap     (10-20)  


 


BUN     (9-20)  mg/dL


 


Creatinine     (0.8-1.5)  mg/dL


 


Est GFR (African Amer)     


 


Est GFR (Non-Af Amer)     


 


POC Glucose (mg/dL)   295 H   ()  mg/dL


 


Random Glucose     ()  mg/dL


 


Calcium     (8.6-10.4)  mg/dl


 


Phosphorus     (2.5-4.5)  mg/dL


 


Magnesium     (1.6-2.3)  mg/dL


 


Total Bilirubin     (0.2-1.3)  mg/dL


 


AST     (17-59)  U/L


 


ALT     (21-72)  U/L


 


Alkaline Phosphatase     ()  U/L


 


Total Protein     (6.3-8.3)  g/dL


 


Albumin     (3.5-5.0)  g/dL


 


Globulin     (2.2-3.9)  gm/dL


 


Albumin/Globulin Ratio     (1.0-2.1)  


 


Arterial Blood Potassium  4.4    (3.6-5.2)  mmol/L


 


Urine Color    Yellow  (YELLOW)  


 


Urine Clarity    Clear  (Clear)  


 


Urine pH    5.0  (5.0-8.0)  


 


Ur Specific Gravity    1.030  (1.003-1.030)  


 


Urine Protein    1+ H  (NEGATIVE)  mg/dL


 


Urine Glucose (UA)    1+ H  (Normal)  mg/dL


 


Urine Ketones    Trace  (NEGATIVE)  mg/dL


 


Urine Blood    1+ H  (NEGATIVE)  


 


Urine Nitrate    Negative  (NEGATIVE)  


 


Urine Bilirubin    Negative  (NEGATIVE)  


 


Urine Urobilinogen    Normal  (0.2-1.0)  mg/dL


 


Ur Leukocyte Esterase    Neg  (Negative)  Sherry/uL


 


Urine WBC (Auto)    4  (0-5)  /hpf


 


Urine RBC (Auto)    29 H  (0-3)  /hpf


 


Ur Squamous Epith Cells    < 1  (0-5)  /hpf


 


Urine Bacteria    Rare  (<OCC)  














  01/04/19 01/04/19 01/04/19 Range/Units





  20:14 16:49 15:24 


 


WBC     (4.8-10.8)  K/uL


 


RBC     (4.40-5.90)  Mil/uL


 


Hgb     (12.0-18.0)  g/dL


 


Hct     (35.0-51.0)  %


 


MCV     (80.0-94.0)  fL


 


MCH     (27.0-31.0)  pg


 


MCHC     (33.0-37.0)  g/dL


 


RDW     (11.5-14.5)  %


 


Plt Count     (130-400)  K/uL


 


MPV     (7.2-11.7)  fL


 


Neut % (Auto)     (50.0-75.0)  %


 


Lymph % (Auto)     (20.0-40.0)  %


 


Mono % (Auto)     (0.0-10.0)  %


 


Eos % (Auto)     (0.0-4.0)  %


 


Baso % (Auto)     (0.0-2.0)  %


 


Neut # (Auto)     (1.8-7.0)  K/uL


 


Lymph # (Auto)     (1.0-4.3)  K/uL


 


Mono # (Auto)     (0.0-0.8)  K/uL


 


Eos # (Auto)     (0.0-0.7)  K/uL


 


Baso # (Auto)     (0.0-0.2)  K/uL


 


Neutrophils % (Manual)     (50-75)  %


 


Band Neutrophils %     (0-2)  %


 


Lymphocytes % (Manual)     (20-40)  %


 


Reactive Lymphs %     (0-0)  %


 


Monocytes % (Manual)     (0-10)  %


 


Eosinophils % (Manual)     (0-4)  %


 


Toxic Granulation     


 


Platelet Estimate     (NORMAL)  


 


Large Platelets     


 


Polychromasia     


 


Hypochromasia (manual)     


 


Poikilocytosis (manual     


 


Anisocytosis (manual)     


 


Ovalocytes     


 


[place] Cells     


 


PT    11.4  (9.7-12.2)  SECONDS


 


INR    1.0  


 


APTT    26  (21-34)  SECONDS


 


Puncture Site   Rr   


 


pCO2   46 H   (35-45)  mm/Hg


 


pO2   399 H   ()  mm/Hg


 


HCO3   29.1 H   (21-28)  mmol/L


 


ABG pH   7.43   (7.35-7.45)  


 


ABG Total CO2   31.9 H   (22-28)  mmol/L


 


ABG O2 Saturation   97.3   (95-98)  %


 


ABG Base Excess   5.3 H   (-2.0-3.0)  mmol/L


 


[place] Test   Unable   


 


ABG Potassium   3.7   (3.6-5.2)  mmol/L


 


A-a O2 Difference   257.0   mm/Hg


 


Respiratory Index   0.6   


 


Glucose   228 H   ()  mg/dl


 


Lactate   1.3   (0.7-2.1)  mmol/L


 


Vent Mode   Prvc   


 


Mechanical Rate   16   


 


FiO2   100.0   %


 


Tidal Volume   500   


 


PEEP   5   


 


Sodium   141.0   (132-148)  mmol/L


 


Potassium     (3.6-5.2)  mmol/L


 


Chloride   110.0 H   ()  mmol/L


 


Carbon Dioxide     (22-30)  mmol/L


 


Anion Gap     (10-20)  


 


BUN     (9-20)  mg/dL


 


Creatinine     (0.8-1.5)  mg/dL


 


Est GFR (African Amer)     


 


Est GFR (Non-Af Amer)     


 


POC Glucose (mg/dL)  282 H    ()  mg/dL


 


Random Glucose     ()  mg/dL


 


Calcium     (8.6-10.4)  mg/dl


 


Phosphorus     (2.5-4.5)  mg/dL


 


Magnesium     (1.6-2.3)  mg/dL


 


Total Bilirubin     (0.2-1.3)  mg/dL


 


AST     (17-59)  U/L


 


ALT     (21-72)  U/L


 


Alkaline Phosphatase     ()  U/L


 


Total Protein     (6.3-8.3)  g/dL


 


Albumin     (3.5-5.0)  g/dL


 


Globulin     (2.2-3.9)  gm/dL


 


Albumin/Globulin Ratio     (1.0-2.1)  


 


Arterial Blood Potassium   3.7   (3.6-5.2)  mmol/L


 


Urine Color     (YELLOW)  


 


Urine Clarity     (Clear)  


 


Urine pH     (5.0-8.0)  


 


Ur Specific Gravity     (1.003-1.030)  


 


Urine Protein     (NEGATIVE)  mg/dL


 


Urine Glucose (UA)     (Normal)  mg/dL


 


Urine Ketones     (NEGATIVE)  mg/dL


 


Urine Blood     (NEGATIVE)  


 


Urine Nitrate     (NEGATIVE)  


 


Urine Bilirubin     (NEGATIVE)  


 


Urine Urobilinogen     (0.2-1.0)  mg/dL


 


Ur Leukocyte Esterase     (Negative)  Sherry/uL


 


Urine WBC (Auto)     (0-5)  /hpf


 


Urine RBC (Auto)     (0-3)  /hpf


 


Ur Squamous Epith Cells     (0-5)  /hpf


 


Urine Bacteria     (<OCC)  














  01/04/19 01/04/19 01/04/19 Range/Units





  15:24 15:24 11:08 


 


WBC   9.6   (4.8-10.8)  K/uL


 


RBC   4.89   (4.40-5.90)  Mil/uL


 


Hgb   12.1  D   (12.0-18.0)  g/dL


 


Hct   38.2   (35.0-51.0)  %


 


MCV   78.0 L   (80.0-94.0)  fL


 


MCH   24.8 L   (27.0-31.0)  pg


 


MCHC   31.7 L   (33.0-37.0)  g/dL


 


RDW   17.5 H   (11.5-14.5)  %


 


Plt Count   161   (130-400)  K/uL


 


MPV   8.4   (7.2-11.7)  fL


 


Neut % (Auto)   93.4 H   (50.0-75.0)  %


 


Lymph % (Auto)   1.4 L   (20.0-40.0)  %


 


Mono % (Auto)   5.1   (0.0-10.0)  %


 


Eos % (Auto)   0.0   (0.0-4.0)  %


 


Baso % (Auto)   0.1   (0.0-2.0)  %


 


Neut # (Auto)   8.9 H   (1.8-7.0)  K/uL


 


Lymph # (Auto)   0.1 L   (1.0-4.3)  K/uL


 


Mono # (Auto)   0.5   (0.0-0.8)  K/uL


 


Eos # (Auto)   0.0   (0.0-0.7)  K/uL


 


Baso # (Auto)   0.0   (0.0-0.2)  K/uL


 


Neutrophils % (Manual)   89 H   (50-75)  %


 


Band Neutrophils %   3 H   (0-2)  %


 


Lymphocytes % (Manual)   2 L   (20-40)  %


 


Reactive Lymphs %   2 H   (0-0)  %


 


Monocytes % (Manual)   3   (0-10)  %


 


Eosinophils % (Manual)   1   (0-4)  %


 


Toxic Granulation     


 


Platelet Estimate   Normal   (NORMAL)  


 


Large Platelets     


 


Polychromasia     


 


Hypochromasia (manual)     


 


Poikilocytosis (manual   Slight   


 


Anisocytosis (manual)   Slight   


 


Ovalocytes   Slight   


 


[place] Cells   Slight   


 


PT     (9.7-12.2)  SECONDS


 


INR     


 


APTT     (21-34)  SECONDS


 


Puncture Site     


 


pCO2     (35-45)  mm/Hg


 


pO2     ()  mm/Hg


 


HCO3     (21-28)  mmol/L


 


ABG pH     (7.35-7.45)  


 


ABG Total CO2     (22-28)  mmol/L


 


ABG O2 Saturation     (95-98)  %


 


ABG Base Excess     (-2.0-3.0)  mmol/L


 


[place] Test     


 


ABG Potassium     (3.6-5.2)  mmol/L


 


A-a O2 Difference     mm/Hg


 


Respiratory Index     


 


Glucose     ()  mg/dl


 


Lactate     (0.7-2.1)  mmol/L


 


Vent Mode     


 


Mechanical Rate     


 


FiO2     %


 


Tidal Volume     


 


PEEP     


 


Sodium  137    (132-148)  mmol/L


 


Potassium  4.3    (3.6-5.2)  mmol/L


 


Chloride  102    ()  mmol/L


 


Carbon Dioxide  32 H    (22-30)  mmol/L


 


Anion Gap  8 L    (10-20)  


 


BUN  40 H    (9-20)  mg/dL


 


Creatinine  1.0    (0.8-1.5)  mg/dL


 


Est GFR (African Amer)  > 60    


 


Est GFR (Non-Af Amer)  > 60    


 


POC Glucose (mg/dL)    299 H  ()  mg/dL


 


Random Glucose  228 H D    ()  mg/dL


 


Calcium  7.2 L    (8.6-10.4)  mg/dl


 


Phosphorus  3.0    (2.5-4.5)  mg/dL


 


Magnesium  2.4 H    (1.6-2.3)  mg/dL


 


Total Bilirubin  0.8    (0.2-1.3)  mg/dL


 


AST  62 H D    (17-59)  U/L


 


ALT  76 H D    (21-72)  U/L


 


Alkaline Phosphatase  50    ()  U/L


 


Total Protein  4.9 L    (6.3-8.3)  g/dL


 


Albumin  2.7 L D    (3.5-5.0)  g/dL


 


Globulin  2.2    (2.2-3.9)  gm/dL


 


Albumin/Globulin Ratio  1.2    (1.0-2.1)  


 


Arterial Blood Potassium     (3.6-5.2)  mmol/L


 


Urine Color     (YELLOW)  


 


Urine Clarity     (Clear)  


 


Urine pH     (5.0-8.0)  


 


Ur Specific Gravity     (1.003-1.030)  


 


Urine Protein     (NEGATIVE)  mg/dL


 


Urine Glucose (UA)     (Normal)  mg/dL


 


Urine Ketones     (NEGATIVE)  mg/dL


 


Urine Blood     (NEGATIVE)  


 


Urine Nitrate     (NEGATIVE)  


 


Urine Bilirubin     (NEGATIVE)  


 


Urine Urobilinogen     (0.2-1.0)  mg/dL


 


Ur Leukocyte Esterase     (Negative)  Sherry/uL


 


Urine WBC (Auto)     (0-5)  /hpf


 


Urine RBC (Auto)     (0-3)  /hpf


 


Ur Squamous Epith Cells     (0-5)  /hpf


 


Urine Bacteria     (<OCC)  








                         Laboratory Results - last 24 hr











  01/04/19 01/04/19 01/04/19





  11:08 15:24 15:24


 


WBC   9.6 


 


RBC   4.89 


 


Hgb   12.1  D 


 


Hct   38.2 


 


MCV   78.0 L 


 


MCH   24.8 L 


 


MCHC   31.7 L 


 


RDW   17.5 H 


 


Plt Count   161 


 


MPV   8.4 


 


Neut % (Auto)   93.4 H 


 


Lymph % (Auto)   1.4 L 


 


Mono % (Auto)   5.1 


 


Eos % (Auto)   0.0 


 


Baso % (Auto)   0.1 


 


Neut # (Auto)   8.9 H 


 


Lymph # (Auto)   0.1 L 


 


Mono # (Auto)   0.5 


 


Eos # (Auto)   0.0 


 


Baso # (Auto)   0.0 


 


Neutrophils % (Manual)   89 H 


 


Band Neutrophils %   3 H 


 


Lymphocytes % (Manual)   2 L 


 


Reactive Lymphs %   2 H 


 


Monocytes % (Manual)   3 


 


Eosinophils % (Manual)   1 


 


Toxic Granulation   


 


Platelet Estimate   Normal 


 


Large Platelets   


 


Polychromasia   


 


Hypochromasia (manual)   


 


Poikilocytosis (manual   Slight 


 


Anisocytosis (manual)   Slight 


 


Ovalocytes   Slight 


 


[place] Cells   Slight 


 


PT   


 


INR   


 


APTT   


 


Puncture Site   


 


pCO2   


 


pO2   


 


HCO3   


 


ABG pH   


 


ABG Total CO2   


 


ABG O2 Saturation   


 


ABG Base Excess   


 


[place] Test   


 


ABG Potassium   


 


A-a O2 Difference   


 


Respiratory Index   


 


Glucose   


 


Lactate   


 


Vent Mode   


 


Mechanical Rate   


 


FiO2   


 


Tidal Volume   


 


PEEP   


 


Sodium    137


 


Potassium    4.3


 


Chloride    102


 


Carbon Dioxide    32 H


 


Anion Gap    8 L


 


BUN    40 H


 


Creatinine    1.0


 


Est GFR ( Amer)    > 60


 


Est GFR (Non-Af Amer)    > 60


 


POC Glucose (mg/dL)  299 H  


 


Random Glucose    228 H D


 


Calcium    7.2 L


 


Phosphorus    3.0


 


Magnesium    2.4 H


 


Total Bilirubin    0.8


 


AST    62 H D


 


ALT    76 H D


 


Alkaline Phosphatase    50


 


Total Protein    4.9 L


 


Albumin    2.7 L D


 


Globulin    2.2


 


Albumin/Globulin Ratio    1.2


 


Arterial Blood Potassium   


 


Urine Color   


 


Urine Clarity   


 


Urine pH   


 


Ur Specific Gravity   


 


Urine Protein   


 


Urine Glucose (UA)   


 


Urine Ketones   


 


Urine Blood   


 


Urine Nitrate   


 


Urine Bilirubin   


 


Urine Urobilinogen   


 


Ur Leukocyte Esterase   


 


Urine WBC (Auto)   


 


Urine RBC (Auto)   


 


Ur Squamous Epith Cells   


 


Urine Bacteria   














  01/04/19 01/04/19 01/04/19





  15:24 16:49 20:14


 


WBC   


 


RBC   


 


Hgb   


 


Hct   


 


MCV   


 


MCH   


 


MCHC   


 


RDW   


 


Plt Count   


 


MPV   


 


Neut % (Auto)   


 


Lymph % (Auto)   


 


Mono % (Auto)   


 


Eos % (Auto)   


 


Baso % (Auto)   


 


Neut # (Auto)   


 


Lymph # (Auto)   


 


Mono # (Auto)   


 


Eos # (Auto)   


 


Baso # (Auto)   


 


Neutrophils % (Manual)   


 


Band Neutrophils %   


 


Lymphocytes % (Manual)   


 


Reactive Lymphs %   


 


Monocytes % (Manual)   


 


Eosinophils % (Manual)   


 


Toxic Granulation   


 


Platelet Estimate   


 


Large Platelets   


 


Polychromasia   


 


Hypochromasia (manual)   


 


Poikilocytosis (manual   


 


Anisocytosis (manual)   


 


Ovalocytes   


 


[place] Cells   


 


PT  11.4  


 


INR  1.0  


 


APTT  26  


 


Puncture Site   Rr 


 


pCO2   46 H 


 


pO2   399 H 


 


HCO3   29.1 H 


 


ABG pH   7.43 


 


ABG Total CO2   31.9 H 


 


ABG O2 Saturation   97.3 


 


ABG Base Excess   5.3 H 


 


[place] Test   Unable 


 


ABG Potassium   3.7 


 


A-a O2 Difference   257.0 


 


Respiratory Index   0.6 


 


Glucose   228 H 


 


Lactate   1.3 


 


Vent Mode   Prvc 


 


Mechanical Rate   16 


 


FiO2   100.0 


 


Tidal Volume   500 


 


PEEP   5 


 


Sodium   141.0 


 


Potassium   


 


Chloride   110.0 H 


 


Carbon Dioxide   


 


Anion Gap   


 


BUN   


 


Creatinine   


 


Est GFR ( Amer)   


 


Est GFR (Non-Af Amer)   


 


POC Glucose (mg/dL)    282 H


 


Random Glucose   


 


Calcium   


 


Phosphorus   


 


Magnesium   


 


Total Bilirubin   


 


AST   


 


ALT   


 


Alkaline Phosphatase   


 


Total Protein   


 


Albumin   


 


Globulin   


 


Albumin/Globulin Ratio   


 


Arterial Blood Potassium   3.7 


 


Urine Color   


 


Urine Clarity   


 


Urine pH   


 


Ur Specific Gravity   


 


Urine Protein   


 


Urine Glucose (UA)   


 


Urine Ketones   


 


Urine Blood   


 


Urine Nitrate   


 


Urine Bilirubin   


 


Urine Urobilinogen   


 


Ur Leukocyte Esterase   


 


Urine WBC (Auto)   


 


Urine RBC (Auto)   


 


Ur Squamous Epith Cells   


 


Urine Bacteria   














  01/04/19 01/05/19 01/05/19





  22:41 00:09 04:40


 


WBC   


 


RBC   


 


Hgb   


 


Hct   


 


MCV   


 


MCH   


 


MCHC   


 


RDW   


 


Plt Count   


 


MPV   


 


Neut % (Auto)   


 


Lymph % (Auto)   


 


Mono % (Auto)   


 


Eos % (Auto)   


 


Baso % (Auto)   


 


Neut # (Auto)   


 


Lymph # (Auto)   


 


Mono # (Auto)   


 


Eos # (Auto)   


 


Baso # (Auto)   


 


Neutrophils % (Manual)   


 


Band Neutrophils %   


 


Lymphocytes % (Manual)   


 


Reactive Lymphs %   


 


Monocytes % (Manual)   


 


Eosinophils % (Manual)   


 


Toxic Granulation   


 


Platelet Estimate   


 


Large Platelets   


 


Polychromasia   


 


Hypochromasia (manual)   


 


Poikilocytosis (manual   


 


Anisocytosis (manual)   


 


Ovalocytes   


 


Laura Cells   


 


PT   


 


INR   


 


APTT   


 


Puncture Site    Rb


 


pCO2    57 H


 


pO2    210 H


 


HCO3    26.6


 


ABG pH    7.32 L


 


ABG Total CO2    31.1 H


 


ABG O2 Saturation    97.2


 


ABG Base Excess    2.0


 


Austin Test    Na


 


ABG Potassium    4.4


 


A-a O2 Difference    147.0


 


Respiratory Index    0.7


 


Glucose    303 H


 


Lactate    2.6 H


 


Vent Mode    Prvc


 


Mechanical Rate    16


 


FiO2    60.0


 


Tidal Volume    500


 


PEEP    5


 


Sodium    142.0


 


Potassium   


 


Chloride    105.0


 


Carbon Dioxide   


 


Anion Gap   


 


BUN   


 


Creatinine   


 


Est GFR ( Amer)   


 


Est GFR (Non-Af Amer)   


 


POC Glucose (mg/dL)   295 H 


 


Random Glucose   


 


Calcium   


 


Phosphorus   


 


Magnesium   


 


Total Bilirubin   


 


AST   


 


ALT   


 


Alkaline Phosphatase   


 


Total Protein   


 


Albumin   


 


Globulin   


 


Albumin/Globulin Ratio   


 


Arterial Blood Potassium    4.4


 


Urine Color  Yellow  


 


Urine Clarity  Clear  


 


Urine pH  5.0  


 


Ur Specific Gravity  1.030  


 


Urine Protein  1+ H  


 


Urine Glucose (UA)  1+ H  


 


Urine Ketones  Trace  


 


Urine Blood  1+ H  


 


Urine Nitrate  Negative  


 


Urine Bilirubin  Negative  


 


Urine Urobilinogen  Normal  


 


Ur Leukocyte Esterase  Neg  


 


Urine WBC (Auto)  4  


 


Urine RBC (Auto)  29 H  


 


Ur Squamous Epith Cells  < 1  


 


Urine Bacteria  Rare  














  01/05/19 01/05/19 01/05/19





  05:49 05:49 05:53


 


WBC  12.0 H  


 


RBC  5.69  


 


Hgb  13.8  


 


Hct  44.2  


 


MCV  77.7 L  


 


MCH  24.2 L  


 


MCHC  31.2 L  


 


RDW  17.8 H  


 


Plt Count  198  


 


MPV  8.8  


 


Neut % (Auto)  94.2 H  


 


Lymph % (Auto)  1.6 L  


 


Mono % (Auto)  4.1  


 


Eos % (Auto)  0.0  


 


Baso % (Auto)  0.1  


 


Neut # (Auto)  11.3 H  


 


Lymph # (Auto)  0.2 L  


 


Mono # (Auto)  0.5  


 


Eos # (Auto)  0.0  


 


Baso # (Auto)  0.0  


 


Neutrophils % (Manual)  93 H  


 


Band Neutrophils %  3 H  


 


Lymphocytes % (Manual)  1 L  


 


Reactive Lymphs %   


 


Monocytes % (Manual)  3  


 


Eosinophils % (Manual)   


 


Toxic Granulation  Present  


 


Platelet Estimate  Normal  


 


Large Platelets  Present  


 


Polychromasia  Slight  


 


Hypochromasia (manual)  Slight  


 


Poikilocytosis (manual  Slight  


 


Anisocytosis (manual)  Slight  


 


Ovalocytes  Slight  


 


Laura Cells   


 


PT   


 


INR   


 


APTT   


 


Puncture Site   


 


pCO2   


 


pO2   


 


HCO3   


 


ABG pH   


 


ABG Total CO2   


 


ABG O2 Saturation   


 


ABG Base Excess   


 


Austin Test   


 


ABG Potassium   


 


A-a O2 Difference   


 


Respiratory Index   


 


Glucose   


 


Lactate   


 


Vent Mode   


 


Mechanical Rate   


 


FiO2   


 


Tidal Volume   


 


PEEP   


 


Sodium   140 


 


Potassium   4.7 


 


Chloride   101 


 


Carbon Dioxide   33 H 


 


Anion Gap   11 


 


BUN   36 H 


 


Creatinine   0.9 


 


Est GFR ( Amer)   > 60 


 


Est GFR (Non-Af Amer)   > 60 


 


POC Glucose (mg/dL)    323 H


 


Random Glucose   301 H D 


 


Calcium   7.6 L 


 


Phosphorus   3.9 


 


Magnesium   2.6 H 


 


Total Bilirubin   0.7 


 


AST   43 


 


ALT   78 H 


 


Alkaline Phosphatase   60 


 


Total Protein   5.6 L 


 


Albumin   3.2 L 


 


Globulin   2.4 


 


Albumin/Globulin Ratio   1.3 


 


Arterial Blood Potassium   


 


Urine Color   


 


Urine Clarity   


 


Urine pH   


 


Ur Specific Gravity   


 


Urine Protein   


 


Urine Glucose (UA)   


 


Urine Ketones   


 


Urine Blood   


 


Urine Nitrate   


 


Urine Bilirubin   


 


Urine Urobilinogen   


 


Ur Leukocyte Esterase   


 


Urine WBC (Auto)   


 


Urine RBC (Auto)   


 


Ur Squamous Epith Cells   


 


Urine Bacteria   











Radiology Impressions: 


                              Radiology Impressions





Chest X-Ray  01/04/19 13:05


IMPRESSION:


No active pulmonary disease.


 


Mild cardiomegaly and pulmonary venous congestion.  


 


Endotracheal tube terminates in the mid trachea.  


 


 








Chest X-Ray  01/04/19 14:12


IMPRESSION:


Right IJV line terminates in the SVC. 


 


Persistent moderate cardiomegaly.  Persistent mild venous congestion 


and suspect small effusions. 


 


 








Chest X-Ray  01/05/19 06:00


IMPRESSION:


Endotracheal tube terminates approximately 2.6 cm above the omari.  


Right sided IJ approach central venous catheter extends the 


cavoatrial junction.


 


Cardiomegaly. 


 


Trace pleural effusions.  Bibasilar atelectasis/infiltrates. 


 


 











EKG/Cardiology Studies: 


Cardiology / EKG Studies





01/04/19 15:11


EKG [ELECTROCARDIOGRAM] Stat 


   Comment: 


   Mode Of Transportation: 


   Reason For Exam: tachycardia


   Isolation: Contact











Fingerstick Blood Sugar Results: 323





Critical Care Progress Note





- Nutrition


Nutrition: 


                                    Nutrition











 Category Date Time Status


 


 Consistent Carbohydrate [DIET] Diets  12/31/18 Breakfast Active

## 2019-01-05 NOTE — CP.PCM.PN
Subjective





- Date & Time of Evaluation


Date of Evaluation: 01/05/19


Time of Evaluation: 16:00





- Subjective


Subjective: 





Patient seen and examined


Remained intubated on ventilatory support


Sedated on the propofol


Elevated peak pressures


Good urine output





Objective





- Vital Signs/Intake and Output


Vital Signs (last 24 hours): 


                                        











Temp Pulse Resp BP Pulse Ox


 


 98.4 F   102 H  20   103/77   98 


 


 01/05/19 06:00  01/05/19 13:00  01/05/19 13:00  01/05/19 12:46  01/05/19 13:00








Intake and Output: 


                                        











 01/05/19 01/05/19





 06:59 18:59


 


Intake Total 941.0 358.8


 


Output Total 600 500


 


Balance 341.0 -141.2














- Medications


Medications: 


                               Current Medications





Acetylcysteine (Acetylcysteine 20%)  4 ml INH RQ6 [facility]


   Last Admin: 01/05/19 13:21 Dose:  4 ml


Albuterol/Ipratropium (Duoneb 3 Mg/0.5 Mg (3 Ml) Ud)  3 ml INH RQ4 [facility]


   Last Admin: 01/05/19 12:25 Dose:  3 ml


Diltiazem HCl (Cardizem)  30 mg PO QID [facility]


   Last Admin: 01/05/19 14:30 Dose:  30 mg


Enoxaparin Sodium (Lovenox)  40 mg SC DAILY [facility]


   Last Admin: 01/05/19 09:10 Dose:  40 mg


Finasteride (Proscar)  5 mg PO DAILY [facility]


   Last Admin: 01/05/19 09:11 Dose:  5 mg


Azithromycin 500 mg/ Sodium (Chloride)  250 mls @ 250 mls/hr IVPB Q24H [facility]; 

Protocol


   Last Admin: 01/05/19 12:00 Dose:  250 mls/hr


Piperacillin Sod/Tazobactam Sod (Zosyn 3.375 Gm Iv Premix)  3.375 gm in 50 mls @

100 mls/hr IVPB Q8H [facility]; Protocol


   Last Admin: 01/05/19 15:00 Dose:  100 mls/hr


Propofol (Diprivan)  1,000 mg in 100 mls @ 2.286 mls/hr IV .Q24H PRN; Protocol


   PRN Reason: TITRATE PER MD ORDER


   Last Admin: 01/05/19 15:23 Dose:  40 mcg/kg/min, 18.289 mls/hr


Phenylephrine HCl 30 mg/ (Sodium Chloride)  253 mls @ 10.12 mls/hr IV .Q24H PRN;

Protocol


   PRN Reason: TITRATE PER MD ORDER


   Last Titration: 01/05/19 08:30 Dose:  15 mcg/min, 7.59 mls/hr


Insulin Aspart (Novolog)  0 unit SC Q6 [facility]; Protocol


   Last Admin: 01/05/19 11:29 Dose:  8 u


Insulin Glargine (Lantus)  10 unit SC HS [facility]


Methylprednisolone (Solu-Medrol)  60 mg IVP Q8 [facility]


   Last Admin: 01/05/19 15:00 Dose:  60 mg


Pantoprazole Sodium (Protonix Susp)  40 mg PO 1000 [facility]


Tamsulosin HCl (Flomax)  0.4 mg PO BID [facility]


   Last Admin: 01/05/19 09:11 Dose:  0.4 mg











- Labs


Labs: 


                                        





                                 01/05/19 05:49 





                                 01/05/19 05:49 





                                        











PT  11.4 SECONDS (9.7-12.2)   01/04/19  15:24    


 


INR  1.0   01/04/19  15:24    


 


APTT  26 SECONDS (21-34)   01/04/19  15:24    














- Head Exam


Head Exam: ATRAUMATIC, NORMOCEPHALIC





- ENT Exam


ENT Exam: Mucous Membranes Moist





- Respiratory Exam


Respiratory Exam: Rhonchi, Wheezes





- Cardiovascular Exam


Cardiovascular Exam: REGULAR RHYTHM





- GI/Abdominal Exam


GI & Abdominal Exam: Soft, Normal Bowel Sounds





Assessment and Plan


(1) Acute respiratory failure


Assessment & Plan: 


Ventilatory support


Reduce FiO2 as tolerated


CPAP trial when stable


IV steroids


IV sedation


NG tube feeding


Status: Acute   





(2) COPD exacerbation


Assessment & Plan: 


Continue nebulizer treatment


Continue antibiotics


Status: Acute

## 2019-01-05 NOTE — CP.PCM.CON
History of Present Illness





- History of Present Illness


History of Present Illness: 


Reason For Consultation: new Onset A Fib





HPI: 80 y/o male with PMHx of COPD, asthma, sleep apnea, HTN, DM, GERD arrived 

to ICU from 6T due to respiratory distress. Patient was on BiPAP and was 

intubated immediately upon arrival to the ICU. According to inpatient team 

upstairs, patient was given Ambien last night due to insomnia. This morning 

patient was agitated and was given Haldol. /128 and -180 before 

patient sent down to ICU and VBG pH 7.3 this morning. ABG shock panel, CBC, CMP,

Mg and Phos ordered STAT.





Due to patient condition ROS unable to be obtained. 





PMH- as above


PSH- endoscopy


Social- returned from 3month stay in [place] 3 days ago, felt well until 

returned to [place].  On home O2.  Former smoker, no alcohol or drug use


Medications- Prednisone 5mg daily, Anoro Ellipta 62.5-25mcg 1 IH daily, Spiriva 

18mcg daily, Singulari 10mg daily, Ventolin, Arformoterol 1IH daily, mucinex 

600mg BID, Flomax 0.4mg BID, omeprazole 40mg, Mirabepron 20mg, metformin 1000mg 

BID, losartan 100mg


Allergies- NKDA, seasonal allergies








Review of Systems





- Review of Systems


Systems not reviewed;Unavailable: Acuity of Condition, Intubated








Physical Exam





- Constitutional


Appears: Agitated





- Head Exam


Head Exam: ATRAUMATIC, NORMAL INSPECTION, NORMOCEPHALIC





- Eye Exam


Eye Exam: EOMI, Normal appearance





- Neck Exam


Neck exam: Positive for: Normal Inspection





- Respiratory Exam


Respiratory Exam: Accessory Muscle Use.  absent: NORMAL BREATHING PATTERN





- Cardiovascular Exam


Cardiovascular Exam: Tachycardia, Irregular Rhythm





- Extremities Exam


Extremities exam: Positive for: normal inspection





- Back Exam


Back exam: NORMAL INSPECTION





- Psychiatric Exam


Psychiatric exam: Agitated, Anxious





- Skin


Skin Exam: Cyanosis, Dry, Intact





Assessment & Plan





- Assessment and Plan (Free Text)


Assessment: 





80 y/o male with PMHx of COPD, asthma, sleep apnea, HTN, DM, GERD arrived to ICU

from 6T due to respiratory distress. 





neuro


-was agitated prior to intubation


-sedated on propofol


-currently not alert or awake





CV- A Fib (Prior cath: Non Obstructive Coronaries and normal EF)


Severe Pulmonary HTN


Start full AC for A Fib (Lovenox 70mg sc bid)





Pulm


-patient w/ COPD, asthma and sleep apnea


-on duonebs q4h


-stat ABG pending


-Vent settings: PRVC rate 12, PEEP 5, FiO2 100%


-CXR and ABG qAM





Renal


-renal function wnl on 1/2


-branham cath placed in ICU, UA and UCx ordered per protocol and pending results


-no acute issues


-CMP qAM





GI


-no acute issues


-once patient stabilized consider starting pulmocort tube feeds





Heme


-patient w/ H/H wnl


-no acute issues


-CBC qAM





ID


-on azithromycin 500 mg q24h started 12/31


-on zosyn 3.375 mg q8h started 12/31





Endo


-continue with ISS


-poorly controlled DM w/ recent BGs high 200s-300s


-hypoglycemic protocol


-accuchecks q6h





POC: Gigi Ariza [phone]





DVT ppx: lovenox 40 mg sq daily


GI ppx: protonix 40 mg IV daily








Past Patient History





- Infectious Disease


Hx of Infectious Diseases: None





- Past Medical History & Family History


Past Medical History?: Yes





- Past Social History


Smoking Status: Former Smoker





- CARDIAC


Hx Hypertension: Yes





- PULMONARY


Hx Asthma: Yes


Hx Chronic Obstructive Pulmonary Disease (COPD): Yes (ASTHMA)





- NEUROLOGICAL


Hx Neurological Disorder: No





- HEENT


Hx HEENT Problems: No





- RENAL


Hx Chronic Kidney Disease: No





- ENDOCRINE/METABOLIC


Hx Endocrine Disorders: Yes


Hx Diabetes Mellitus Type 1: Yes





- HEMATOLOGICAL/ONCOLOGICAL


Hx Blood Disorders: No





- INTEGUMENTARY


Hx Dermatological Problems: Yes


Other/Comment: SKIN ASTHMA





- MUSCULOSKELETAL/RHEUMATOLOGICAL


Hx Arthritis: Yes (B/L KNEE JT)





- GASTROINTESTINAL


Hx Gastritis: Yes





- GENITOURINARY/GYNECOLOGICAL


Hx Genitourinary Disorders: Yes


Hx Prostate Problems: Yes





- PSYCHIATRIC


Hx Substance Use: No





- SURGICAL HISTORY


Hx Surgeries: No





- ANESTHESIA


Hx Anesthesia: No


Hx Anesthesia Reactions: No


Hx Malignant Hyperthermia: No





Meds


Allergies/Adverse Reactions: 


                                    Allergies











Allergy/AdvReac Type Severity Reaction Status Date / Time


 


seasonal allergies Allergy Mild CONGESTION Uncoded 12/31/18 04:13














- Medications


Medications: 


                               Current Medications





Acetylcysteine (Acetylcysteine 20%)  4 ml INH RQ6 [facility]


   Last Admin: 01/05/19 13:21 Dose:  4 ml


Albuterol/Ipratropium (Duoneb 3 Mg/0.5 Mg (3 Ml) Ud)  3 ml INH RQ4 [facility]


   Last Admin: 01/05/19 12:25 Dose:  3 ml


Diltiazem HCl (Cardizem)  30 mg PO QID [facility]


   Last Admin: 01/05/19 17:12 Dose:  30 mg


Finasteride (Proscar)  5 mg PO DAILY [facility]


   Last Admin: 01/05/19 09:11 Dose:  5 mg


Azithromycin 500 mg/ Sodium (Chloride)  250 mls @ 250 mls/hr IVPB Q24H [facility]; P

rotocol


   Last Admin: 01/05/19 12:00 Dose:  250 mls/hr


Piperacillin Sod/Tazobactam Sod (Zosyn 3.375 Gm Iv Premix)  3.375 gm in 50 mls @

100 mls/hr IVPB Q8H [facility]; Protocol


   Last Admin: 01/05/19 15:00 Dose:  100 mls/hr


Propofol (Diprivan)  1,000 mg in 100 mls @ 2.286 mls/hr IV .Q24H PRN; Protocol


   PRN Reason: TITRATE PER MD ORDER


   Last Admin: 01/05/19 15:23 Dose:  40 mcg/kg/min, 18.289 mls/hr


Phenylephrine HCl 30 mg/ (Sodium Chloride)  253 mls @ 10.12 mls/hr IV .Q24H PRN;

Protocol


   PRN Reason: TITRATE PER MD ORDER


   Last Titration: 01/05/19 08:30 Dose:  15 mcg/min, 7.59 mls/hr


Insulin Aspart (Novolog)  0 unit SC Q6 [facility]; Protocol


   Last Admin: 01/05/19 19:00 Dose:  4 u


Insulin Glargine (Lantus)  10 unit SC HS [facility]


Methylprednisolone (Solu-Medrol)  60 mg IVP Q8 [facility]


   Last Admin: 01/05/19 15:00 Dose:  60 mg


Pantoprazole Sodium (Protonix Susp)  40 mg PO 1000 [facility]


Tamsulosin HCl (Flomax)  0.4 mg PO BID [facility]


   Last Admin: 01/05/19 17:12 Dose:  0.4 mg











Results





- Vital Signs


Recent Vital Signs: 


                                Last Vital Signs











Temp  98.7 F   01/05/19 16:00


 


Pulse  76   01/05/19 19:00


 


Resp  20   01/05/19 19:00


 


BP  87/61 L  01/05/19 18:59


 


Pulse Ox  97   01/05/19 19:00














- Labs


Result Diagrams: 


                                 01/05/19 05:49





                                 01/05/19 05:49


Labs: 


                         Laboratory Results - last 24 hr











  01/04/19 01/04/19 01/05/19





  20:14 22:41 00:09


 


WBC   


 


RBC   


 


Hgb   


 


Hct   


 


MCV   


 


MCH   


 


MCHC   


 


RDW   


 


Plt Count   


 


MPV   


 


Neut % (Auto)   


 


Lymph % (Auto)   


 


Mono % (Auto)   


 


Eos % (Auto)   


 


Baso % (Auto)   


 


Neut # (Auto)   


 


Lymph # (Auto)   


 


Mono # (Auto)   


 


Eos # (Auto)   


 


Baso # (Auto)   


 


Neutrophils % (Manual)   


 


Band Neutrophils %   


 


Lymphocytes % (Manual)   


 


Monocytes % (Manual)   


 


Toxic Granulation   


 


Platelet Estimate   


 


Large Platelets   


 


Polychromasia   


 


Hypochromasia (manual)   


 


Poikilocytosis (manual   


 


Anisocytosis (manual)   


 


Ovalocytes   


 


Puncture Site   


 


pCO2   


 


pO2   


 


HCO3   


 


ABG pH   


 


ABG Total CO2   


 


ABG O2 Saturation   


 


ABG Base Excess   


 


Austin Test   


 


ABG Potassium   


 


A-a O2 Difference   


 


Respiratory Index   


 


Sodium   


 


Chloride   


 


Glucose   


 


Lactate   


 


Vent Mode   


 


Mechanical Rate   


 


FiO2   


 


Tidal Volume   


 


PEEP   


 


Potassium   


 


Carbon Dioxide   


 


Anion Gap   


 


BUN   


 


Creatinine   


 


Est GFR ( Amer)   


 


Est GFR (Non-Af Amer)   


 


POC Glucose (mg/dL)  282 H   295 H


 


Random Glucose   


 


Calcium   


 


Phosphorus   


 


Magnesium   


 


Total Bilirubin   


 


AST   


 


ALT   


 


Alkaline Phosphatase   


 


Total Protein   


 


Albumin   


 


Globulin   


 


Albumin/Globulin Ratio   


 


Arterial Blood Potassium   


 


Urine Color   Yellow 


 


Urine Clarity   Clear 


 


Urine pH   5.0 


 


Ur Specific Gravity   1.030 


 


Urine Protein   1+ H 


 


Urine Glucose (UA)   1+ H 


 


Urine Ketones   Trace 


 


Urine Blood   1+ H 


 


Urine Nitrate   Negative 


 


Urine Bilirubin   Negative 


 


Urine Urobilinogen   Normal 


 


Ur Leukocyte Esterase   Neg 


 


Urine WBC (Auto)   4 


 


Urine RBC (Auto)   29 H 


 


Ur Squamous Epith Cells   < 1 


 


Urine Bacteria   Rare 














  01/05/19 01/05/19 01/05/19





  04:40 05:49 05:49


 


WBC   12.0 H 


 


RBC   5.69 


 


Hgb   13.8 


 


Hct   44.2 


 


MCV   77.7 L 


 


MCH   24.2 L 


 


MCHC   31.2 L 


 


RDW   17.8 H 


 


Plt Count   198 


 


MPV   8.8 


 


Neut % (Auto)   94.2 H 


 


Lymph % (Auto)   1.6 L 


 


Mono % (Auto)   4.1 


 


Eos % (Auto)   0.0 


 


Baso % (Auto)   0.1 


 


Neut # (Auto)   11.3 H 


 


Lymph # (Auto)   0.2 L 


 


Mono # (Auto)   0.5 


 


Eos # (Auto)   0.0 


 


Baso # (Auto)   0.0 


 


Neutrophils % (Manual)   93 H 


 


Band Neutrophils %   3 H 


 


Lymphocytes % (Manual)   1 L 


 


Monocytes % (Manual)   3 


 


Toxic Granulation   Present 


 


Platelet Estimate   Normal 


 


Large Platelets   Present 


 


Polychromasia   Slight 


 


Hypochromasia (manual)   Slight 


 


Poikilocytosis (manual   Slight 


 


Anisocytosis (manual)   Slight 


 


Ovalocytes   Slight 


 


Puncture Site  Rb  


 


pCO2  57 H  


 


pO2  210 H  


 


HCO3  26.6  


 


ABG pH  7.32 L  


 


ABG Total CO2  31.1 H  


 


ABG O2 Saturation  97.2  


 


ABG Base Excess  2.0  


 


Austin Test  Na  


 


ABG Potassium  4.4  


 


A-a O2 Difference  147.0  


 


Respiratory Index  0.7  


 


Sodium  142.0   140


 


Chloride  105.0   101


 


Glucose  303 H  


 


Lactate  2.6 H  


 


Vent Mode  Prvc  


 


Mechanical Rate  16  


 


FiO2  60.0  


 


Tidal Volume  500  


 


PEEP  5  


 


Potassium    4.7


 


Carbon Dioxide    33 H


 


Anion Gap    11


 


BUN    36 H


 


Creatinine    0.9


 


Est GFR ( Amer)    > 60


 


Est GFR (Non-Af Amer)    > 60


 


POC Glucose (mg/dL)   


 


Random Glucose    301 H D


 


Calcium    7.6 L


 


Phosphorus    3.9


 


Magnesium    2.6 H


 


Total Bilirubin    0.7


 


AST    43


 


ALT    78 H


 


Alkaline Phosphatase    60


 


Total Protein    5.6 L


 


Albumin    3.2 L


 


Globulin    2.4


 


Albumin/Globulin Ratio    1.3


 


Arterial Blood Potassium  4.4  


 


Urine Color   


 


Urine Clarity   


 


Urine pH   


 


Ur Specific Gravity   


 


Urine Protein   


 


Urine Glucose (UA)   


 


Urine Ketones   


 


Urine Blood   


 


Urine Nitrate   


 


Urine Bilirubin   


 


Urine Urobilinogen   


 


Ur Leukocyte Esterase   


 


Urine WBC (Auto)   


 


Urine RBC (Auto)   


 


Ur Squamous Epith Cells   


 


Urine Bacteria   














  01/05/19 01/05/19 01/05/19





  05:53 11:19 19:00


 


WBC   


 


RBC   


 


Hgb   


 


Hct   


 


MCV   


 


MCH   


 


MCHC   


 


RDW   


 


Plt Count   


 


MPV   


 


Neut % (Auto)   


 


Lymph % (Auto)   


 


Mono % (Auto)   


 


Eos % (Auto)   


 


Baso % (Auto)   


 


Neut # (Auto)   


 


Lymph # (Auto)   


 


Mono # (Auto)   


 


Eos # (Auto)   


 


Baso # (Auto)   


 


Neutrophils % (Manual)   


 


Band Neutrophils %   


 


Lymphocytes % (Manual)   


 


Monocytes % (Manual)   


 


Toxic Granulation   


 


Platelet Estimate   


 


Large Platelets   


 


Polychromasia   


 


Hypochromasia (manual)   


 


Poikilocytosis (manual   


 


Anisocytosis (manual)   


 


Ovalocytes   


 


Puncture Site   


 


pCO2   


 


pO2   


 


HCO3   


 


ABG pH   


 


ABG Total CO2   


 


ABG O2 Saturation   


 


ABG Base Excess   


 


[place] Test   


 


ABG Potassium   


 


A-a O2 Difference   


 


Respiratory Index   


 


Sodium   


 


Chloride   


 


Glucose   


 


Lactate   


 


Vent Mode   


 


Mechanical Rate   


 


FiO2   


 


Tidal Volume   


 


PEEP   


 


Potassium   


 


Carbon Dioxide   


 


Anion Gap   


 


BUN   


 


Creatinine   


 


Est GFR ( Amer)   


 


Est GFR (Non-Af Amer)   


 


POC Glucose (mg/dL)  323 H  317 H  227 H


 


Random Glucose   


 


Calcium   


 


Phosphorus   


 


Magnesium   


 


Total Bilirubin   


 


AST   


 


ALT   


 


Alkaline Phosphatase   


 


Total Protein   


 


Albumin   


 


Globulin   


 


Albumin/Globulin Ratio   


 


Arterial Blood Potassium   


 


Urine Color   


 


Urine Clarity   


 


Urine pH   


 


Ur Specific Gravity   


 


Urine Protein   


 


Urine Glucose (UA)   


 


Urine Ketones   


 


Urine Blood   


 


Urine Nitrate   


 


Urine Bilirubin   


 


Urine Urobilinogen   


 


Ur Leukocyte Esterase   


 


Urine WBC (Auto)   


 


Urine RBC (Auto)   


 


Ur Squamous Epith Cells   


 


Urine Bacteria

## 2019-01-05 NOTE — RAD
HISTORY:

 intubated 



COMPARISON:

Chest x-ray performed 1/4/19 



TECHNIQUE:

Chest, one view.



FINDINGS:

Endotracheal tube terminates approximately 2.6 cm above the omari.  

Right sided IJ approach central venous catheter extends the 

cavoatrial junction.



LUNGS:

Mild pulmonary venous congestion.  Bibasilar atelectasis/infiltrates.



Please note that chest x-ray has limited sensitivity for the 

detection of pulmonary masses.



PLEURA:

Trace pleural effusions.  No definite pneumothorax .



CARDIOVASCULAR:

Cardiomegaly.  Ectatic aorta.  Atherosclerotic calcifications.  



OSSEOUS STRUCTURES:

Degenerative changes.



VISUALIZED UPPER ABDOMEN:

Unremarkable.



OTHER FINDINGS:

None.



IMPRESSION:

Endotracheal tube terminates approximately 2.6 cm above the omari.  

Right sided IJ approach central venous catheter extends the 

cavoatrial junction.



Cardiomegaly. 



Trace pleural effusions.  Bibasilar atelectasis/infiltrates.

## 2019-01-05 NOTE — CP.PCM.PN
Subjective





- Subjective


Subjective: 





dictated   





Objective





- Vital Signs/Intake and Output


Vital Signs (last 24 hours): 


                                        











Temp Pulse Resp BP Pulse Ox


 


 98.7 F   74   20   101/65   98 


 


 01/05/19 16:00  01/05/19 20:00  01/05/19 20:00  01/05/19 19:46  01/05/19 20:00








Intake and Output: 


                                        











 01/05/19 01/06/19





 18:59 06:59


 


Intake Total 580.8 160.0


 


Output Total 1030 120


 


Balance -449.2 40.0














- Medications


Medications: 


                               Current Medications





Acetylcysteine (Acetylcysteine 20%)  4 ml INH RQ6 [facility]


   Last Admin: 01/05/19 19:30 Dose:  4 ml


Albuterol/Ipratropium (Duoneb 3 Mg/0.5 Mg (3 Ml) Ud)  3 ml INH RQ4 [facility]


   Last Admin: 01/05/19 19:31 Dose:  3 ml


Diltiazem HCl (Cardizem)  30 mg PO QID [facility]


   Last Admin: 01/05/19 17:12 Dose:  30 mg


Enoxaparin Sodium (Lovenox)  70 mg SC Q12H [facility]


Finasteride (Proscar)  5 mg PO DAILY [facility]


   Last Admin: 01/05/19 09:11 Dose:  5 mg


Azithromycin 500 mg/ Sodium (Chloride)  250 mls @ 250 mls/hr IVPB Q24H [facility]; 

Protocol


   Last Admin: 01/05/19 12:00 Dose:  250 mls/hr


Piperacillin Sod/Tazobactam Sod (Zosyn 3.375 Gm Iv Premix)  3.375 gm in 50 mls @

100 mls/hr IVPB Q8H [facility]; Protocol


   Last Admin: 01/05/19 15:00 Dose:  100 mls/hr


Propofol (Diprivan)  1,000 mg in 100 mls @ 2.286 mls/hr IV .Q24H PRN; Protocol


   PRN Reason: TITRATE PER MD ORDER


   Last Titration: 01/05/19 20:00 Dose:  29.52 mcg/kg/min, 13.497 mls/hr


Phenylephrine HCl 30 mg/ (Sodium Chloride)  253 mls @ 10.12 mls/hr IV .Q24H PRN;

Protocol


   PRN Reason: TITRATE PER MD ORDER


   Last Titration: 01/05/19 08:30 Dose:  15 mcg/min, 7.59 mls/hr


Insulin Aspart (Novolog)  0 unit SC Q6 [facility]; Protocol


   Last Admin: 01/05/19 19:00 Dose:  4 u


Insulin Glargine (Lantus)  10 unit SC HS [facility]


Methylprednisolone (Solu-Medrol)  60 mg IVP Q8 [facility]


   Last Admin: 01/05/19 15:00 Dose:  60 mg


Pantoprazole Sodium (Protonix Susp)  40 mg PO 1000 [facility]


Tamsulosin HCl (Flomax)  0.4 mg PO BID [facility]


   Last Admin: 01/05/19 17:12 Dose:  0.4 mg











- Labs


Labs: 


                                        





                                 01/05/19 05:49 





                                 01/05/19 05:49 





                                        











PT  11.4 SECONDS (9.7-12.2)   01/04/19  15:24    


 


INR  1.0   01/04/19  15:24    


 


APTT  26 SECONDS (21-34)   01/04/19  15:24

## 2019-01-06 LAB
ALBUMIN SERPL-MCNC: 2.9 G/DL (ref 3.5–5)
ALBUMIN/GLOB SERPL: 1.2 {RATIO} (ref 1–2.1)
ALT SERPL-CCNC: 60 U/L (ref 21–72)
ANISOCYTOSIS BLD QL SMEAR: (no result)
ARTERIAL BLOOD GAS O2 SAT: 96.9 % (ref 95–98)
ARTERIAL BLOOD GAS PCO2: 40 MM/HG (ref 35–45)
ARTERIAL BLOOD GAS TCO2: 30.3 MMOL/L (ref 22–28)
AST SERPL-CCNC: 28 U/L (ref 17–59)
BASOPHILS # BLD AUTO: 0 K/UL (ref 0–0.2)
BASOPHILS NFR BLD: 0.3 % (ref 0–2)
BUN SERPL-MCNC: 42 MG/DL (ref 9–20)
CALCIUM SERPL-MCNC: 7.5 MG/DL (ref 8.6–10.4)
EOSINOPHIL # BLD AUTO: 0 K/UL (ref 0–0.7)
EOSINOPHIL NFR BLD: 0 % (ref 0–4)
ERYTHROCYTE [DISTWIDTH] IN BLOOD BY AUTOMATED COUNT: 17.6 % (ref 11.5–14.5)
GFR NON-AFRICAN AMERICAN: > 60
HCO3 BLDA-SCNC: 28.9 MMOL/L (ref 21–28)
HGB BLD-MCNC: 13.4 G/DL (ref 12–18)
INHALED O2 CONCENTRATION: 50 %
LG PLATELETS BLD QL SMEAR: PRESENT
LYMPHOCYTE: 1 % (ref 20–40)
LYMPHOCYTES # BLD AUTO: 0.2 K/UL (ref 1–4.3)
LYMPHOCYTES NFR BLD AUTO: 1.5 % (ref 20–40)
MCH RBC QN AUTO: 24 PG (ref 27–31)
MCHC RBC AUTO-ENTMCNC: 31.1 G/DL (ref 33–37)
MCV RBC AUTO: 76.9 FL (ref 80–94)
MICROCYTES BLD QL SMEAR: SLIGHT
MONOCYTE: 4 % (ref 0–10)
MONOCYTES # BLD: 0.4 K/UL (ref 0–0.8)
MONOCYTES NFR BLD: 3.2 % (ref 0–10)
NEUTROPHILS # BLD: 10.7 K/UL (ref 1.8–7)
NEUTROPHILS NFR BLD AUTO: 93 % (ref 50–75)
NEUTROPHILS NFR BLD AUTO: 95 % (ref 50–75)
NEUTS BAND NFR BLD: 2 % (ref 0–2)
NRBC BLD AUTO-RTO: 0 % (ref 0–2)
OVALOCYTES BLD QL SMEAR: SLIGHT
PH BLDA: 7.47 [PH] (ref 7.35–7.45)
PLATELET # BLD EST: NORMAL 10*3/UL
PLATELET # BLD: 158 K/UL (ref 130–400)
PMV BLD AUTO: 8.5 FL (ref 7.2–11.7)
PO2 BLDA: 145 MM/HG (ref 80–100)
POIKILOCYTOSIS BLD QL SMEAR: SLIGHT
POLYCHROMIC: SLIGHT
RBC # BLD AUTO: 5.57 MIL/UL (ref 4.4–5.9)
TOTAL CELLS COUNTED BLD: 100
TOXIC GRANULES BLD QL SMEAR: PRESENT
WBC # BLD AUTO: 11.3 K/UL (ref 4.8–10.8)

## 2019-01-06 RX ADMIN — INSULIN ASPART SCH U: 100 INJECTION, SOLUTION INTRAVENOUS; SUBCUTANEOUS at 00:36

## 2019-01-06 RX ADMIN — ACETYLCYSTEINE SCH ML: 200 INHALANT RESPIRATORY (INHALATION) at 07:57

## 2019-01-06 RX ADMIN — INSULIN ASPART SCH U: 100 INJECTION, SOLUTION INTRAVENOUS; SUBCUTANEOUS at 12:06

## 2019-01-06 RX ADMIN — TAZOBACTAM SODIUM AND PIPERACILLIN SODIUM SCH MLS/HR: 375; 3 INJECTION, SOLUTION INTRAVENOUS at 14:22

## 2019-01-06 RX ADMIN — PROPOFOL PRN MLS/HR: 10 INJECTION, EMULSION INTRAVENOUS at 22:05

## 2019-01-06 RX ADMIN — INSULIN GLARGINE SCH UNITS: 100 INJECTION, SOLUTION SUBCUTANEOUS at 21:31

## 2019-01-06 RX ADMIN — IPRATROPIUM BROMIDE AND ALBUTEROL SULFATE SCH ML: .5; 3 SOLUTION RESPIRATORY (INHALATION) at 00:05

## 2019-01-06 RX ADMIN — IPRATROPIUM BROMIDE AND ALBUTEROL SULFATE SCH ML: .5; 3 SOLUTION RESPIRATORY (INHALATION) at 07:56

## 2019-01-06 RX ADMIN — METHYLPREDNISOLONE SODIUM SUCCINATE SCH MG: 40 INJECTION, POWDER, FOR SOLUTION INTRAMUSCULAR; INTRAVENOUS at 21:30

## 2019-01-06 RX ADMIN — IPRATROPIUM BROMIDE AND ALBUTEROL SULFATE SCH ML: .5; 3 SOLUTION RESPIRATORY (INHALATION) at 12:24

## 2019-01-06 RX ADMIN — INSULIN ASPART SCH U: 100 INJECTION, SOLUTION INTRAVENOUS; SUBCUTANEOUS at 18:29

## 2019-01-06 RX ADMIN — IPRATROPIUM BROMIDE AND ALBUTEROL SULFATE SCH ML: .5; 3 SOLUTION RESPIRATORY (INHALATION) at 15:55

## 2019-01-06 RX ADMIN — INSULIN ASPART SCH U: 100 INJECTION, SOLUTION INTRAVENOUS; SUBCUTANEOUS at 05:29

## 2019-01-06 RX ADMIN — METHYLPREDNISOLONE SODIUM SUCCINATE SCH MG: 40 INJECTION, POWDER, FOR SOLUTION INTRAMUSCULAR; INTRAVENOUS at 14:28

## 2019-01-06 RX ADMIN — TAZOBACTAM SODIUM AND PIPERACILLIN SODIUM SCH MLS/HR: 375; 3 INJECTION, SOLUTION INTRAVENOUS at 21:32

## 2019-01-06 RX ADMIN — ENOXAPARIN SODIUM SCH MG: 80 INJECTION SUBCUTANEOUS at 22:03

## 2019-01-06 RX ADMIN — IPRATROPIUM BROMIDE AND ALBUTEROL SULFATE SCH ML: .5; 3 SOLUTION RESPIRATORY (INHALATION) at 03:43

## 2019-01-06 RX ADMIN — IPRATROPIUM BROMIDE AND ALBUTEROL SULFATE SCH ML: .5; 3 SOLUTION RESPIRATORY (INHALATION) at 20:27

## 2019-01-06 RX ADMIN — ACETYLCYSTEINE SCH ML: 200 INHALANT RESPIRATORY (INHALATION) at 03:43

## 2019-01-06 RX ADMIN — ENOXAPARIN SODIUM SCH MG: 80 INJECTION SUBCUTANEOUS at 09:46

## 2019-01-06 RX ADMIN — PROPOFOL PRN MLS/HR: 10 INJECTION, EMULSION INTRAVENOUS at 05:28

## 2019-01-06 RX ADMIN — PROPOFOL PRN MLS/HR: 10 INJECTION, EMULSION INTRAVENOUS at 16:59

## 2019-01-06 RX ADMIN — PANTOPRAZOLE SODIUM SCH MG: 40 GRANULE, DELAYED RELEASE ORAL at 09:47

## 2019-01-06 RX ADMIN — TAZOBACTAM SODIUM AND PIPERACILLIN SODIUM SCH MLS/HR: 375; 3 INJECTION, SOLUTION INTRAVENOUS at 05:30

## 2019-01-06 RX ADMIN — ACETYLCYSTEINE SCH ML: 200 INHALANT RESPIRATORY (INHALATION) at 20:28

## 2019-01-06 RX ADMIN — METHYLPREDNISOLONE SODIUM SUCCINATE SCH MG: 40 INJECTION, POWDER, FOR SOLUTION INTRAMUSCULAR; INTRAVENOUS at 05:33

## 2019-01-06 RX ADMIN — METHYLPREDNISOLONE SODIUM SUCCINATE SCH: 40 INJECTION, POWDER, FOR SOLUTION INTRAMUSCULAR; INTRAVENOUS at 14:31

## 2019-01-06 NOTE — CP.PCM.PN
Subjective





- Date & Time of Evaluation


Date of Evaluation: 01/06/19


Time of Evaluation: 14:01





- Subjective


Subjective: 





No events, patient gets restless with low sedation, weaning tried with CPAP with

pressure support 15/5 stopped in 1/2 hr due to tachypnea in 42/min.





Objective





- Vital Signs/Intake and Output


Vital Signs (last 24 hours): 


                                        











Temp Pulse Resp BP Pulse Ox


 


 99.3 F   108 H  21   128/69   94 L


 


 01/06/19 12:00  01/06/19 13:10  01/06/19 13:10  01/06/19 13:10  01/06/19 13:10








Intake and Output: 


                                        











 01/06/19 01/06/19





 06:59 18:59


 


Intake Total 1313.3 604.2


 


Output Total 585 260


 


Balance 728.3 344.2














- Medications


Medications: 


                               Current Medications





Acetylcysteine (Acetylcysteine 20%)  4 ml INH RQ6 OLEG


   Last Admin: 01/06/19 07:57 Dose:  4 ml


Albuterol/Ipratropium (Duoneb 3 Mg/0.5 Mg (3 Ml) Ud)  3 ml INH RQ4 [facility]


   Last Admin: 01/06/19 12:24 Dose:  3 ml


Diltiazem HCl (Cardizem)  30 mg PO QID [facility]


   Last Admin: 01/06/19 09:45 Dose:  30 mg


Enoxaparin Sodium (Lovenox)  70 mg SC Q12H [facility]


   Last Admin: 01/06/19 09:46 Dose:  70 mg


Finasteride (Proscar)  5 mg PO DAILY [facility]


   Last Admin: 01/06/19 09:47 Dose:  5 mg


Azithromycin 500 mg/ Sodium (Chloride)  250 mls @ 250 mls/hr IVPB Q24H [facility]; 

Protocol


   Last Admin: 01/06/19 12:06 Dose:  250 mls/hr


Piperacillin Sod/Tazobactam Sod (Zosyn 3.375 Gm Iv Premix)  3.375 gm in 50 mls @

100 mls/hr IVPB Q8H [facility]; Protocol


   Last Admin: 01/06/19 05:30 Dose:  100 mls/hr


Propofol (Diprivan)  1,000 mg in 100 mls @ 2.286 mls/hr IV .Q24H [facility]; Protocol


   PRN Reason: TITRATE PER MD ORDER


   Last Titration: 01/06/19 08:50 Dose:  15 mcg/kg/min, 6.858 mls/hr


Phenylephrine HCl 30 mg/ (Sodium Chloride)  253 mls @ 10.12 mls/hr IV .Q24H PRN;

Protocol


   PRN Reason: TITRATE PER MD ORDER


   Last Titration: 01/06/19 09:46 Dose:  0 mcg/min, 0 mls/hr


Insulin Aspart (Novolog)  0 unit SC Q6 [facility]; Protocol


   Last Admin: 01/06/19 12:06 Dose:  6 u


Insulin Glargine (Lantus)  10 unit SC HS [facility]


   Last Admin: 01/05/19 21:46 Dose:  10 units


Methylprednisolone (Solu-Medrol)  60 mg IVP Q8 [facility]


   Last Admin: 01/06/19 05:33 Dose:  60 mg


Pantoprazole Sodium (Protonix Susp)  40 mg PO 1000 [facility]


   Last Admin: 01/06/19 09:47 Dose:  40 mg


Tamsulosin HCl (Flomax)  0.4 mg PO BID [facility]


   Last Admin: 01/06/19 09:46 Dose:  0.4 mg











- Labs


Labs: 


                                        





                                 01/06/19 05:22 





                                 01/06/19 05:22 





                                        











PT  11.4 SECONDS (9.7-12.2)   01/04/19  15:24    


 


INR  1.0   01/04/19  15:24    


 


APTT  26 SECONDS (21-34)   01/04/19  15:24    














- Additional Findings


Additional findings: 





HEENT DEEPALI


Neck Supple


Chest b/l scattered ronchi and some rales


CVS irregular, no gallop


PA soft


Ext no edema


CNS sedated, but able to respond to his name, moving ext


Skin normal turgor





Assessment and Plan





- Assessment and Plan (Free Text)


Assessment: 





* COPD exacerbation


* Pulm htn


* Afib this admit on lovenox theraputic anticoagulation on digoxin and cardizem


* H/o HTN


* H/o DM not well controlled likely form steroids as well


* H/o BPH





Plan: 





* Supportive care


* Weaning trial as tolerated


* Increase lantus to 20 units, continue sliding scale


* GI prophylaxis


* DVT prophylaxis with lovenox started for afib


* See orders for detail.

## 2019-01-06 NOTE — CP.PCM.PN
Subjective





- Subjective


Subjective: 





dictated





Objective





- Vital Signs/Intake and Output


Vital Signs (last 24 hours): 


                                        











Temp Pulse Resp BP Pulse Ox


 


 99.3 F   98 H  22   139/60   89 L


 


 01/06/19 16:00  01/06/19 20:10  01/06/19 20:10  01/06/19 20:10  01/06/19 20:10








Intake and Output: 


                                        











 01/06/19 01/07/19





 18:59 06:59


 


Intake Total 1040.5 53.8


 


Output Total 640 45


 


Balance 400.5 8.8














- Medications


Medications: 


                               Current Medications





Acetylcysteine (Acetylcysteine 20%)  4 ml INH RQ6 [facility]


   Last Admin: 01/06/19 20:28 Dose:  4 ml


Albuterol/Ipratropium (Duoneb 3 Mg/0.5 Mg (3 Ml) Ud)  3 ml INH RQ4 [facility]


   Last Admin: 01/06/19 20:27 Dose:  3 ml


Diltiazem HCl (Cardizem)  30 mg PO QID [facility]


   Last Admin: 01/06/19 17:00 Dose:  30 mg


Enoxaparin Sodium (Lovenox)  70 mg SC Q12H [facility]


   Last Admin: 01/06/19 09:46 Dose:  70 mg


Finasteride (Proscar)  5 mg PO DAILY [facility]


   Last Admin: 01/06/19 09:47 Dose:  5 mg


Azithromycin 500 mg/ Sodium (Chloride)  250 mls @ 250 mls/hr IVPB Q24H [facility]; Prot

ocol


   Last Admin: 01/06/19 12:06 Dose:  250 mls/hr


Piperacillin Sod/Tazobactam Sod (Zosyn 3.375 Gm Iv Premix)  3.375 gm in 50 mls @

100 mls/hr IVPB Q8H [facility]; Protocol


   Last Admin: 01/06/19 14:22 Dose:  100 mls/hr


Propofol (Diprivan)  1,000 mg in 100 mls @ 2.286 mls/hr IV .Q24H PR[facility]; Protocol


   PRN Reason: TITRATE PER MD ORDER


   Last Admin: 01/06/19 16:59 Dose:  30 mcg/kg/min, 13.717 mls/hr


Phenylephrine HCl 30 mg/ (Sodium Chloride)  253 mls @ 10.12 mls/hr IV .Q24H PRN;

Protocol


   PRN Reason: TITRATE PER MD ORDER


   Last Titration: 01/06/19 09:46 Dose:  0 mcg/min, 0 mls/hr


Insulin Aspart (Novolog)  0 unit SC Q6 [facility]; Protocol


   Last Admin: 01/06/19 18:29 Dose:  6 u


Insulin Glargine (Lantus)  20 unit SC HS [facility]


Methylprednisolone (Solu-Medrol)  40 mg IVP Q8 [facility]


   Last Admin: 01/06/19 14:31 Dose:  Not Given


Pantoprazole Sodium (Protonix Susp)  40 mg PO 1000 [facility]


   Last Admin: 01/06/19 09:47 Dose:  40 mg


Tamsulosin HCl (Flomax)  0.4 mg PO BID [facility]


   Last Admin: 01/06/19 17:00 Dose:  0.4 mg











- Labs


Labs: 


                                        





                                 01/06/19 05:22 





                                 01/06/19 05:22 





                                        











PT  11.4 SECONDS (9.7-12.2)   01/04/19  15:24    


 


INR  1.0   01/04/19  15:24    


 


APTT  26 SECONDS (21-34)   01/04/19  15:24

## 2019-01-06 NOTE — CP.PCM.PN
Subjective





- Date & Time of Evaluation


Date of Evaluation: 01/06/19





- Subjective


Subjective: 





Patient seen and examined


Not tolerating weaning


Tachypneic and tachycardic


On propofol drip


Afebrile








Objective





- Vital Signs/Intake and Output


Vital Signs (last 24 hours): 


                                        











Temp Pulse Resp BP Pulse Ox


 


 99.3 F   112 H  26 H  128/85   96 


 


 01/06/19 12:00  01/06/19 15:10  01/06/19 15:10  01/06/19 15:10  01/06/19 15:10








Intake and Output: 


                                        











 01/06/19 01/06/19





 06:59 18:59


 


Intake Total 1313.3 801.7


 


Output Total 585 520


 


Balance 728.3 281.7














- Medications


Medications: 


                               Current Medications





Acetylcysteine (Acetylcysteine 20%)  4 ml INH RQ6 [facility]


   Last Admin: 01/06/19 07:57 Dose:  4 ml


Albuterol/Ipratropium (Duoneb 3 Mg/0.5 Mg (3 Ml) Ud)  3 ml INH RQ4 [facility]


   Last Admin: 01/06/19 12:24 Dose:  3 ml


Diltiazem HCl (Cardizem)  30 mg PO QID [facility]


   Last Admin: 01/06/19 14:30 Dose:  30 mg


Enoxaparin Sodium (Lovenox)  70 mg SC Q12H [facility]


   Last Admin: 01/06/19 09:46 Dose:  70 mg


Finasteride (Proscar)  5 mg PO DAILY [facility]


   Last Admin: 01/06/19 09:47 Dose:  5 mg


Azithromycin 500 mg/ Sodium (Chloride)  250 mls @ 250 mls/hr IVPB Q24H [facility]; 

Protocol


   Last Admin: 01/06/19 12:06 Dose:  250 mls/hr


Piperacillin Sod/Tazobactam Sod (Zosyn 3.375 Gm Iv Premix)  3.375 gm in 50 mls @

100 mls/hr IVPB Q8H [facility]; Protocol


   Last Admin: 01/06/19 14:22 Dose:  100 mls/hr


Propofol (Diprivan)  1,000 mg in 100 mls @ 2.286 mls/hr IV .Q24H PRN; Protocol


   PRN Reason: TITRATE PER MD ORDER


   Last Titration: 01/06/19 15:03 Dose:  35 mcg/kg/min, 16.003 mls/hr


Phenylephrine HCl 30 mg/ (Sodium Chloride)  253 mls @ 10.12 mls/hr IV .Q24H PRN;

Protocol


   PRN Reason: TITRATE PER MD ORDER


   Last Titration: 01/06/19 09:46 Dose:  0 mcg/min, 0 mls/hr


Insulin Aspart (Novolog)  0 unit SC Q6 [facility]; Protocol


   Last Admin: 01/06/19 12:06 Dose:  6 u


Insulin Glargine (Lantus)  20 unit SC HS [facility]


Methylprednisolone (Solu-Medrol)  40 mg IVP Q8 [facility]


   Last Admin: 01/06/19 14:31 Dose:  Not Given


Pantoprazole Sodium (Protonix Susp)  40 mg PO 1000 [facility]


   Last Admin: 01/06/19 09:47 Dose:  40 mg


Tamsulosin HCl (Flomax)  0.4 mg PO BID [facility]


   Last Admin: 01/06/19 09:46 Dose:  0.4 mg











- Labs


Labs: 


                                        





                                 01/06/19 05:22 





                                 01/06/19 05:22 





                                        











PT  11.4 SECONDS (9.7-12.2)   01/04/19  15:24    


 


INR  1.0   01/04/19  15:24    


 


APTT  26 SECONDS (21-34)   01/04/19  15:24    














- Head Exam


Head Exam: ATRAUMATIC, NORMOCEPHALIC





- ENT Exam


ENT Exam: Mucous Membranes Moist





- Neck Exam


Neck Exam: Normal Inspection





- Respiratory Exam


Respiratory Exam: Rhonchi, Wheezes





- Cardiovascular Exam


Cardiovascular Exam: REGULAR RHYTHM





- GI/Abdominal Exam


GI & Abdominal Exam: Soft, Normal Bowel Sounds





Assessment and Plan


(1) Acute respiratory failure


Assessment & Plan: 


No weaning yet


Continue sedation and vent support


Continue antibiotics and steroids


Feeding


Cardizem for A. fib


Echocardiogram


Status: Acute   





(2) COPD exacerbation


Status: Acute

## 2019-01-06 NOTE — CP.PCM.PN
Subjective





- Date & Time of Evaluation


Date of Evaluation: 01/06/19


Time of Evaluation: 19:28





- Subjective


Subjective: 





Patient seen and evaluated


On ventilator


No new cardiac events





Physical Exam





- Constitutional


Appears: Agitated





- Head Exam


Head Exam: ATRAUMATIC, NORMAL INSPECTION, NORMOCEPHALIC





- Eye Exam


Eye Exam: EOMI, Normal appearance





- Neck Exam


Neck exam: Positive for: Normal Inspection





- Respiratory Exam


Respiratory Exam: Accessory Muscle Use.  absent: NORMAL BREATHING PATTERN





- Cardiovascular Exam


Cardiovascular Exam: Tachycardia, Irregular Rhythm





- Extremities Exam


Extremities exam: Positive for: normal inspection





- Back Exam


Back exam: NORMAL INSPECTION





- Psychiatric Exam


Psychiatric exam: Agitated, Anxious





- Skin


Skin Exam: Cyanosis, Dry, Intact





Assessment & Plan





- Assessment and Plan (Free Text)


Assessment: 





80 y/o male with PMHx of COPD, asthma, sleep apnea, HTN, DM, GERD arrived to ICU

from T due to respiratory distress. 





neuro


-was agitated prior to intubation


-sedated on propofol


-currently not alert or awake





CV- A Fib (Prior cath: Non Obstructive Coronaries and normal EF)


Severe Pulmonary HTN


Start full AC for A Fib (Lovenox 70mg sc bid)





Pulm


-patient w/ COPD, asthma and sleep apnea


-on duonebs q4h


-stat ABG pending


-Vent settings: PRVC rate 12, PEEP 5, FiO2 100%


-CXR and ABG qAM





Renal


-renal function wnl on 1/2


-branham cath placed in ICU, UA and UCx ordered per protocol and pending results


-no acute issues


-CMP qAM





GI


-no acute issues


-once patient stabilized consider starting pulmocort tube feeds





Heme


-patient w/ H/H wnl


-no acute issues


-CBC qAM





ID


-on azithromycin 500 mg q24h started 12/31


-on zosyn 3.375 mg q8h started 12/31





Endo


-continue with ISS


-poorly controlled DM w/ recent BGs high 200s-300s


-hypoglycemic protocol


-accuchecks q6h





POC: Gigi Ariza [phone]





DVT ppx: lovenox 40 mg sq daily


GI ppx: protonix 40 mg IV daily








Objective





- Vital Signs/Intake and Output


Vital Signs (last 24 hours): 


                                        











Temp Pulse Resp BP Pulse Ox


 


 99.3 F   109 H  22   133/77   97 


 


 01/06/19 16:00  01/06/19 18:11  01/06/19 18:11  01/06/19 18:11  01/06/19 18:11








Intake and Output: 


                                        











 01/06/19 01/07/19





 18:59 06:59


 


Intake Total 1040.5 


 


Output Total 640 


 


Balance 400.5 














- Medications


Medications: 


                               Current Medications





Acetylcysteine (Acetylcysteine 20%)  4 ml INH RQ6 [facility]


   Last Admin: 01/06/19 07:57 Dose:  4 ml


Albuterol/Ipratropium (Duoneb 3 Mg/0.5 Mg (3 Ml) Ud)  3 ml INH RQ4 [facility]


   Last Admin: 01/06/19 12:24 Dose:  3 ml


Diltiazem HCl (Cardizem)  30 mg PO QID [facility]


   Last Admin: 01/06/19 17:00 Dose:  30 mg


Enoxaparin Sodium (Lovenox)  70 mg SC Q12H [facility]


   Last Admin: 01/06/19 09:46 Dose:  70 mg


Finasteride (Proscar)  5 mg PO DAILY [facility]


   Last Admin: 01/06/19 09:47 Dose:  5 mg


Azithromycin 500 mg/ Sodium (Chloride)  250 mls @ 250 mls/hr IVPB Q24H [facility]; 

Protocol


   Last Admin: 01/06/19 12:06 Dose:  250 mls/hr


Piperacillin Sod/Tazobactam Sod (Zosyn 3.375 Gm Iv Premix)  3.375 gm in 50 mls @

100 mls/hr IVPB Q8H [facility]; Protocol


   Last Admin: 01/06/19 14:22 Dose:  100 mls/hr


Propofol (Diprivan)  1,000 mg in 100 mls @ 2.286 mls/hr IV .Q24H PRN; Protocol


   PRN Reason: TITRATE PER MD ORDER


   Last Admin: 01/06/19 16:59 Dose:  30 mcg/kg/min, 13.717 mls/hr


Phenylephrine HCl 30 mg/ (Sodium Chloride)  253 mls @ 10.12 mls/hr IV .Q24H PRN;

Protocol


   PRN Reason: TITRATE PER MD ORDER


   Last Titration: 01/06/19 09:46 Dose:  0 mcg/min, 0 mls/hr


Insulin Aspart (Novolog)  0 unit SC Q6 [facility]; Protocol


   Last Admin: 01/06/19 18:29 Dose:  6 u


Insulin Glargine (Lantus)  20 unit SC HS [facility]


Methylprednisolone (Solu-Medrol)  40 mg IVP Q8 [facility]


   Last Admin: 01/06/19 14:31 Dose:  Not Given


Pantoprazole Sodium (Protonix Susp)  40 mg PO 1000 [facility]


   Last Admin: 01/06/19 09:47 Dose:  40 mg


Tamsulosin HCl (Flomax)  0.4 mg PO BID [facility]


   Last Admin: 01/06/19 17:00 Dose:  0.4 mg











- Labs


Labs: 


                                        





                                 01/06/19 05:22 





                                 01/06/19 05:22 





                                        











PT  11.4 SECONDS (9.7-12.2)   01/04/19  15:24    


 


INR  1.0   01/04/19  15:24    


 


APTT  26 SECONDS (21-34)   01/04/19  15:24

## 2019-01-06 NOTE — PN
DATE:  01/05/2019



SUBJECTIVE:  The patient is on mechanical ventilator.  He is sedated, and

the patient is not in distress.  She is on FiO2 of 50%.  No fever, no

chills.  No involuntary movement.



PHYSICAL EXAMINATION:

VITAL SIGNS:  Blood pressure is 122/62, pulse 122, respiratory rate 16, and

temperature 98.

LUNGS:  Bilateral expiratory and inspiratory rhonchi.  Decreased air entry.

CARDIOVASCULAR SYSTEM:  S1 and S2, regular.

ABDOMEN:  Soft.



ASSESSMENT:

1.  Acute exacerbation of bronchial asthma.

2.  Acute respiratory failure.

3.  Diabetes, partly; it is type 2 partly.  It is due to steroids.



PLAN:  Continue current medications.  Monitor the patient.





__________________________________________

Tae Hensley MD





DD:  01/05/2019 22:03:28

DT:  01/06/2019 2:09:47

Job # [id number]

## 2019-01-06 NOTE — PN
DATE:  01/05/2019



SUBJECTIVE:  The patient was intubated today.  He became increasingly

restless, dyspneic, short of breath, weak, lethargic.  Did not respond to

BiPAP, could not tolerate BiPAP, and he was intubated and brought down to

ICU.  No fever.  No chills.  He is sedated right now.  He has no

involuntary movements in the limb.



PHYSICAL EXAMINATION:

VITAL SIGNS:  Blood pressure 102/71, pulse 113, respiratory rate 16,

temperature 98.

LUNGS:  Bilateral inspiratory and expiratory rales and rhonchi.  Decreased

air entry.  Positive transmitted breath sounds.

CARDIOVASCULAR SYSTEM:  S1 and S2 are regular.

ABDOMEN:  Soft.



ASSESSMENT:

1.  Acute respiratory failure due to bronchial asthma/chronic obstructive

pulmonary disease.

2.  Rule out pneumonia.

3.  Type 2 diabetes.



PLAN:  Continue current medications, nebulizer, mechanical ventilator,

attempt weaning when more stable.







__________________________________________

Tae Hensley MD





DD:  01/05/2019 22:02:32

DT:  01/06/2019 2:03:11

Job # [id number]

## 2019-01-06 NOTE — RAD
HISTORY:

 intubated 



COMPARISON:

Chest x-ray performed 1/5/19 



TECHNIQUE:

Chest, one view.



FINDINGS:

Right IJ approach central venous catheter extends the cavoatrial 

junction.  Nasogastric tube extends expected location of the stomach. 

 Endotracheal tube terminates approximately 3.6 cm above the omari.  

Numerous external wires and leads obscure evaluation of the 

underlying parenchyma.



LUNGS:

Right upper lobe consolidation.  Bibasilar atelectasis.  Biapical 

pleural thickening.  Small left pleural effusion.  Mild venous 

congestion.  No definite pneumothorax. 



CARDIOVASCULAR:

Cardiomegaly.  Ectatic aorta.  Atherosclerotic calcifications. 



OSSEOUS STRUCTURES:

Degenerative changes.  Osseous demineralization.



VISUALIZED UPPER ABDOMEN:

Unremarkable.



OTHER FINDINGS:

None.



IMPRESSION:

Right IJ approach central venous catheter extends the cavoatrial 

junction.  Nasogastric tube extends expected location of the stomach. 

 Endotracheal tube terminates approximately 3.6 cm above the omari. 



Right upper lobe consolidation.  Bibasilar atelectasis.  Biapical 

pleural thickening.  Small left pleural effusion.  Mild venous 

congestion.

## 2019-01-07 LAB
ALBUMIN SERPL-MCNC: 2.9 G/DL (ref 3.5–5)
ALBUMIN/GLOB SERPL: 1.2 {RATIO} (ref 1–2.1)
ALT SERPL-CCNC: 64 U/L (ref 21–72)
ARTERIAL BLOOD GAS O2 SAT: 97 % (ref 95–98)
ARTERIAL BLOOD GAS PCO2: 44 MM/HG (ref 35–45)
ARTERIAL BLOOD GAS TCO2: 34.9 MMOL/L (ref 22–28)
ARTERIAL PATENCY WRIST A: (no result)
AST SERPL-CCNC: 27 U/L (ref 17–59)
BASOPHILS # BLD AUTO: 0 K/UL (ref 0–0.2)
BASOPHILS NFR BLD: 0.4 % (ref 0–2)
BUN SERPL-MCNC: 44 MG/DL (ref 9–20)
CALCIUM SERPL-MCNC: 7.5 MG/DL (ref 8.6–10.4)
EOSINOPHIL # BLD AUTO: 0 K/UL (ref 0–0.7)
EOSINOPHIL NFR BLD: 0 % (ref 0–4)
ERYTHROCYTE [DISTWIDTH] IN BLOOD BY AUTOMATED COUNT: 17.7 % (ref 11.5–14.5)
GFR NON-AFRICAN AMERICAN: > 60
HCO3 BLDA-SCNC: 32 MMOL/L (ref 21–28)
HGB BLD-MCNC: 13.7 G/DL (ref 12–18)
INHALED O2 CONCENTRATION: 50 %
LYMPHOCYTE: 2 % (ref 20–40)
LYMPHOCYTES # BLD AUTO: 0.1 K/UL (ref 1–4.3)
LYMPHOCYTES NFR BLD AUTO: 1.3 % (ref 20–40)
MCH RBC QN AUTO: 24.9 PG (ref 27–31)
MCHC RBC AUTO-ENTMCNC: 32.1 G/DL (ref 33–37)
MCV RBC AUTO: 77.6 FL (ref 80–94)
MONOCYTE: 4 % (ref 0–10)
MONOCYTES # BLD: 0.6 K/UL (ref 0–0.8)
MONOCYTES NFR BLD: 6.1 % (ref 0–10)
NEUTROPHILS # BLD: 8.5 K/UL (ref 1.8–7)
NEUTROPHILS NFR BLD AUTO: 92.2 % (ref 50–75)
NEUTROPHILS NFR BLD AUTO: 94 % (ref 50–75)
NRBC BLD AUTO-RTO: 0 % (ref 0–2)
PH BLDA: 7.49 [PH] (ref 7.35–7.45)
PLATELET # BLD EST: NORMAL 10*3/UL
PLATELET # BLD: 166 K/UL (ref 130–400)
PMV BLD AUTO: 8.9 FL (ref 7.2–11.7)
PO2 BLDA: 143 MM/HG (ref 80–100)
RBC # BLD AUTO: 5.49 MIL/UL (ref 4.4–5.9)
TOTAL CELLS COUNTED BLD: 100
WBC # BLD AUTO: 9.2 K/UL (ref 4.8–10.8)

## 2019-01-07 RX ADMIN — PROPOFOL PRN MLS/HR: 10 INJECTION, EMULSION INTRAVENOUS at 03:20

## 2019-01-07 RX ADMIN — IPRATROPIUM BROMIDE AND ALBUTEROL SULFATE SCH ML: .5; 3 SOLUTION RESPIRATORY (INHALATION) at 11:26

## 2019-01-07 RX ADMIN — ACETYLCYSTEINE SCH: 200 INHALANT RESPIRATORY (INHALATION) at 14:35

## 2019-01-07 RX ADMIN — IPRATROPIUM BROMIDE AND ALBUTEROL SULFATE SCH ML: .5; 3 SOLUTION RESPIRATORY (INHALATION) at 20:34

## 2019-01-07 RX ADMIN — ACETYLCYSTEINE SCH ML: 200 INHALANT RESPIRATORY (INHALATION) at 20:34

## 2019-01-07 RX ADMIN — ENOXAPARIN SODIUM SCH MG: 80 INJECTION SUBCUTANEOUS at 22:37

## 2019-01-07 RX ADMIN — IPRATROPIUM BROMIDE AND ALBUTEROL SULFATE SCH ML: .5; 3 SOLUTION RESPIRATORY (INHALATION) at 07:31

## 2019-01-07 RX ADMIN — IPRATROPIUM BROMIDE AND ALBUTEROL SULFATE SCH ML: .5; 3 SOLUTION RESPIRATORY (INHALATION) at 03:15

## 2019-01-07 RX ADMIN — ACETYLCYSTEINE SCH ML: 200 INHALANT RESPIRATORY (INHALATION) at 07:30

## 2019-01-07 RX ADMIN — TAZOBACTAM SODIUM AND PIPERACILLIN SODIUM SCH MLS/HR: 375; 3 INJECTION, SOLUTION INTRAVENOUS at 21:42

## 2019-01-07 RX ADMIN — IPRATROPIUM BROMIDE AND ALBUTEROL SULFATE SCH ML: .5; 3 SOLUTION RESPIRATORY (INHALATION) at 00:20

## 2019-01-07 RX ADMIN — INSULIN ASPART SCH U: 100 INJECTION, SOLUTION INTRAVENOUS; SUBCUTANEOUS at 12:01

## 2019-01-07 RX ADMIN — INSULIN ASPART SCH U: 100 INJECTION, SOLUTION INTRAVENOUS; SUBCUTANEOUS at 00:14

## 2019-01-07 RX ADMIN — ACETYLCYSTEINE SCH ML: 200 INHALANT RESPIRATORY (INHALATION) at 03:15

## 2019-01-07 RX ADMIN — ENOXAPARIN SODIUM SCH MG: 80 INJECTION SUBCUTANEOUS at 09:30

## 2019-01-07 RX ADMIN — PROPOFOL PRN MLS/HR: 10 INJECTION, EMULSION INTRAVENOUS at 18:39

## 2019-01-07 RX ADMIN — PANTOPRAZOLE SODIUM SCH MG: 40 GRANULE, DELAYED RELEASE ORAL at 09:30

## 2019-01-07 RX ADMIN — IPRATROPIUM BROMIDE AND ALBUTEROL SULFATE SCH ML: .5; 3 SOLUTION RESPIRATORY (INHALATION) at 16:31

## 2019-01-07 RX ADMIN — INSULIN ASPART SCH U: 100 INJECTION, SOLUTION INTRAVENOUS; SUBCUTANEOUS at 05:31

## 2019-01-07 RX ADMIN — PROPOFOL PRN MLS/HR: 10 INJECTION, EMULSION INTRAVENOUS at 11:25

## 2019-01-07 RX ADMIN — INSULIN ASPART SCH U: 100 INJECTION, SOLUTION INTRAVENOUS; SUBCUTANEOUS at 19:27

## 2019-01-07 RX ADMIN — TAZOBACTAM SODIUM AND PIPERACILLIN SODIUM SCH MLS/HR: 375; 3 INJECTION, SOLUTION INTRAVENOUS at 15:19

## 2019-01-07 RX ADMIN — METHYLPREDNISOLONE SODIUM SUCCINATE SCH MG: 40 INJECTION, POWDER, FOR SOLUTION INTRAMUSCULAR; INTRAVENOUS at 05:31

## 2019-01-07 RX ADMIN — TAZOBACTAM SODIUM AND PIPERACILLIN SODIUM SCH MLS/HR: 375; 3 INJECTION, SOLUTION INTRAVENOUS at 05:32

## 2019-01-07 RX ADMIN — METHYLPREDNISOLONE SODIUM SUCCINATE SCH MG: 40 INJECTION, POWDER, FOR SOLUTION INTRAMUSCULAR; INTRAVENOUS at 21:41

## 2019-01-07 RX ADMIN — METHYLPREDNISOLONE SODIUM SUCCINATE SCH MG: 40 INJECTION, POWDER, FOR SOLUTION INTRAMUSCULAR; INTRAVENOUS at 15:19

## 2019-01-07 RX ADMIN — INSULIN GLARGINE SCH UNITS: 100 INJECTION, SOLUTION SUBCUTANEOUS at 21:42

## 2019-01-07 NOTE — CP.PCM.PN
Subjective





- Subjective


Subjective: 





dictated   





Objective





- Vital Signs/Intake and Output


Vital Signs (last 24 hours): 


                                        











Temp Pulse Resp BP Pulse Ox


 


 98.2 F   88   21   129/91 H  86 L


 


 01/07/19 20:00  01/07/19 21:11  01/07/19 21:11  01/07/19 21:11  01/07/19 21:11








Intake and Output: 


                                        











 01/07/19 01/08/19





 18:59 06:59


 


Intake Total 854.3 140.7


 


Output Total 631 120


 


Balance 223.3 20.7














- Medications


Medications: 


                               Current Medications





Acetylcysteine (Acetylcysteine 20%)  4 ml INH RQ6 [facility]


   Last Admin: 01/07/19 20:34 Dose:  4 ml


Albuterol/Ipratropium (Duoneb 3 Mg/0.5 Mg (3 Ml) Ud)  3 ml INH RQ4 [facility]


   Last Admin: 01/07/19 20:34 Dose:  3 ml


Carvedilol (Coreg)  3.125 mg PO BID [facility]


   Last Admin: 01/07/19 17:30 Dose:  3.125 mg


Diltiazem HCl (Cardizem)  30 mg PO QID [facility]


   Last Admin: 01/07/19 21:42 Dose:  30 mg


Enoxaparin Sodium (Lovenox)  70 mg SC Q12H [facility]


   Last Admin: 01/07/19 22:37 Dose:  70 mg


Finasteride (Proscar)  5 mg PO DAILY [facility]


   Last Admin: 01/07/19 09:30 Dose:  5 mg


Azithromycin 500 mg/ Sodium (Chloride)  250 mls @ 250 mls/hr IVPB Q24H [facility]; 

Protocol


   Last Admin: 01/07/19 11:58 Dose:  250 mls/hr


Piperacillin Sod/Tazobactam Sod (Zosyn 3.375 Gm Iv Premix)  3.375 gm in 50 mls @

100 mls/hr IVPB Q8H [facility]; Protocol


   Last Admin: 01/07/19 21:42 Dose:  100 mls/hr


Propofol (Diprivan)  1,000 mg in 100 mls @ 2.286 mls/hr IV .Q24H PRN; Protocol


   PRN Reason: TITRATE PER MD ORDER


   Last Admin: 01/07/19 18:39 Dose:  35 mcg/kg/min, 16.003 mls/hr


Phenylephrine HCl 30 mg/ (Sodium Chloride)  253 mls @ 10.12 mls/hr IV .Q24H PRN;

Protocol


   PRN Reason: TITRATE PER MD ORDER


   Last Titration: 01/06/19 09:46 Dose:  0 mcg/min, 0 mls/hr


Insulin Aspart (Novolog)  0 unit SC Q6 [facility]; Protocol


   Last Admin: 01/07/19 19:27 Dose:  4 u


Insulin Glargine (Lantus)  20 unit SC HS [facility]


   Last Admin: 01/07/19 21:42 Dose:  20 units


Methylprednisolone (Solu-Medrol)  40 mg IVP Q8 [facility]


   Last Admin: 01/07/19 21:41 Dose:  40 mg


Pantoprazole Sodium (Protonix Susp)  40 mg PO 1000 [facility]


   Last Admin: 01/07/19 09:30 Dose:  40 mg


Tamsulosin HCl (Flomax)  0.4 mg PO BID [facility]


   Last Admin: 01/07/19 17:30 Dose:  0.4 mg











- Labs


Labs: 


                                        





                                 01/07/19 05:52 





                                 01/07/19 05:52 





                                        











PT  11.4 SECONDS (9.7-12.2)   01/04/19  15:24    


 


INR  1.0   01/04/19  15:24    


 


APTT  26 SECONDS (21-34)   01/04/19  15:24

## 2019-01-07 NOTE — PN
DATE:  01/06/2019



SUBJECTIVE:  The patient is on ventilator.  No new cardiac events.  No

fever.  No cough.  No shortness of breath.  He is on sedation.  He is

afebrile.



PHYSICAL EXAMINATION:

VITAL SIGNS:  Blood pressure 139/60, pulse 98, respiratory rate 22,

temperature 98.2.

LUNGS:  Bilateral transmitted breath sounds.  Positive rales and rhonchi. 

Decreased air entry.

CARDIOVASCULAR SYSTEM:  S1, S2, regular.

ABDOMEN:  Soft.



ASSESSMENT:

1.  Acute respiratory failure due to bronchial asthma.

2.  Exacerbation of bronchial asthma.

3.  Steroid induced diabetes.

4.  Hypertension.



PLAN:  Continue current medication.  Monitor the patient.







__________________________________________

Tae Hensley MD





DD:  01/06/2019 21:25:23

DT:  01/06/2019 22:38:47

Job # [id number]

## 2019-01-07 NOTE — CP.PCM.PN
Subjective





- Date & Time of Evaluation


Date of Evaluation: 01/07/19


Time of Evaluation: 19:30





- Subjective


Subjective: 





Patient seen and evaluated


On Ventilator


A Fib on anti coagulation





Objective





- Vital Signs/Intake and Output


Vital Signs (last 24 hours): 


                                        











Temp Pulse Resp BP Pulse Ox


 


 98.2 F   92 H  21   137/80   94 L


 


 01/07/19 20:00  01/07/19 23:00  01/07/19 23:00  01/07/19 22:10  01/07/19 23:00








Intake and Output: 


                                        











 01/07/19 01/08/19





 18:59 06:59


 


Intake Total 854.3 443.9


 


Output Total 631 205


 


Balance 223.3 238.9














- Medications


Medications: 


                               Current Medications





Acetylcysteine (Acetylcysteine 20%)  4 ml INH RQ6 [facility]


   Last Admin: 01/07/19 20:34 Dose:  4 ml


Albuterol/Ipratropium (Duoneb 3 Mg/0.5 Mg (3 Ml) Ud)  3 ml INH RQ4 [facility]


   Last Admin: 01/07/19 20:34 Dose:  3 ml


Carvedilol (Coreg)  3.125 mg PO BID [facility]


   Last Admin: 01/07/19 17:30 Dose:  3.125 mg


Diltiazem HCl (Cardizem)  30 mg PO QID [facility]


   Last Admin: 01/07/19 21:42 Dose:  30 mg


Enoxaparin Sodium (Lovenox)  70 mg SC Q12H [facility]


   Last Admin: 01/07/19 22:37 Dose:  70 mg


Finasteride (Proscar)  5 mg PO DAILY [facility]


   Last Admin: 01/07/19 09:30 Dose:  5 mg


Azithromycin 500 mg/ Sodium (Chloride)  250 mls @ 250 mls/hr IVPB Q24H [facility]; 

Protocol


   Last Admin: 01/07/19 11:58 Dose:  250 mls/hr


Piperacillin Sod/Tazobactam Sod (Zosyn 3.375 Gm Iv Premix)  3.375 gm in 50 mls @

100 mls/hr IVPB Q8H [facility]; Protocol


   Last Admin: 01/07/19 21:42 Dose:  100 mls/hr


Propofol (Diprivan)  1,000 mg in 100 mls @ 2.286 mls/hr IV .Q24H PRN; Protocol


   PRN Reason: TITRATE PER MD ORDER


   Last Admin: 01/07/19 18:39 Dose:  35 mcg/kg/min, 16.003 mls/hr


Phenylephrine HCl 30 mg/ (Sodium Chloride)  253 mls @ 10.12 mls/hr IV .Q24H PRN;

Protocol


   PRN Reason: TITRATE PER MD ORDER


   Last Titration: 01/06/19 09:46 Dose:  0 mcg/min, 0 mls/hr


Insulin Aspart (Novolog)  0 unit SC Q6 [facility]; Protocol


   Last Admin: 01/07/19 19:27 Dose:  4 u


Insulin Glargine (Lantus)  20 unit SC HS [facility]


   Last Admin: 01/07/19 21:42 Dose:  20 units


Methylprednisolone (Solu-Medrol)  40 mg IVP Q8 [facility]


   Last Admin: 01/07/19 21:41 Dose:  40 mg


Pantoprazole Sodium (Protonix Susp)  40 mg PO 1000 [facility]


   Last Admin: 01/07/19 09:30 Dose:  40 mg


Tamsulosin HCl (Flomax)  0.4 mg PO BID [facility]


   Last Admin: 01/07/19 17:30 Dose:  0.4 mg











- Labs


Labs: 


                                        





                                 01/07/19 05:52 





                                 01/07/19 05:52 





                                        











PT  11.4 SECONDS (9.7-12.2)   01/04/19  15:24    


 


INR  1.0   01/04/19  15:24    


 


APTT  26 SECONDS (21-34)   01/04/19  15:24

## 2019-01-07 NOTE — CP.PCM.PN
Subjective





- Date & Time of Evaluation


Date of Evaluation: 01/07/19


Time of Evaluation: 11:00





- Subjective


Subjective: 





patient seen and examined


remains intubated


Not tolerating weaning


Sedated on Diprivan


Afebrile





Objective





- Vital Signs/Intake and Output


Vital Signs (last 24 hours): 


                                        











Temp Pulse Resp BP Pulse Ox


 


 97.8 F   83   20   117/87   95 


 


 01/07/19 12:00  01/07/19 17:10  01/07/19 17:10  01/07/19 17:10  01/07/19 17:10








Intake and Output: 


                                        











 01/07/19 01/07/19





 06:59 18:59


 


Intake Total 1379.5 754.3


 


Output Total 455 631


 


Balance 924.5 123.3














- Medications


Medications: 


                               Current Medications





Acetylcysteine (Acetylcysteine 20%)  4 ml INH RQ6 [facility]


   Last Admin: 01/07/19 14:35 Dose:  Not Given


Albuterol/Ipratropium (Duoneb 3 Mg/0.5 Mg (3 Ml) Ud)  3 ml INH RQ4 [facility]


   Last Admin: 01/07/19 11:26 Dose:  3 ml


Carvedilol (Coreg)  3.125 mg PO BID [facility]


   Last Admin: 01/07/19 17:30 Dose:  3.125 mg


Diltiazem HCl (Cardizem)  30 mg PO QID [facility]


   Last Admin: 01/07/19 17:30 Dose:  30 mg


Enoxaparin Sodium (Lovenox)  70 mg SC Q12H [facility]


   Last Admin: 01/07/19 09:30 Dose:  70 mg


Finasteride (Proscar)  5 mg PO DAILY [facility]


   Last Admin: 01/07/19 09:30 Dose:  5 mg


Azithromycin 500 mg/ Sodium (Chloride)  250 mls @ 250 mls/hr IVPB Q24H [facility]; 

Protocol


   Last Admin: 01/07/19 11:58 Dose:  250 mls/hr


Piperacillin Sod/Tazobactam Sod (Zosyn 3.375 Gm Iv Premix)  3.375 gm in 50 mls @

100 mls/hr IVPB Q8H [facility]; Protocol


   Last Admin: 01/07/19 15:19 Dose:  100 mls/hr


Propofol (Diprivan)  1,000 mg in 100 mls @ 2.286 mls/hr IV .Q24H PRN; Protocol


   PRN Reason: TITRATE PER MD ORDER


   Last Admin: 01/07/19 11:25 Dose:  35 mcg/kg/min, 16.003 mls/hr


Phenylephrine HCl 30 mg/ (Sodium Chloride)  253 mls @ 10.12 mls/hr IV .Q24H PRN;

Protocol


   PRN Reason: TITRATE PER MD ORDER


   Last Titration: 01/06/19 09:46 Dose:  0 mcg/min, 0 mls/hr


Insulin Aspart (Novolog)  0 unit SC Q6 [facility]; Protocol


   Last Admin: 01/07/19 12:01 Dose:  6 u


Insulin Glargine (Lantus)  20 unit SC HS [facility]


   Last Admin: 01/06/19 21:31 Dose:  20 units


Methylprednisolone (Solu-Medrol)  40 mg IVP Q8 [facility]


   Last Admin: 01/07/19 15:19 Dose:  40 mg


Pantoprazole Sodium (Protonix Susp)  40 mg PO 1000 [facility]


   Last Admin: 01/07/19 09:30 Dose:  40 mg


Tamsulosin HCl (Flomax)  0.4 mg PO BID [facility]


   Last Admin: 01/07/19 17:30 Dose:  0.4 mg











- Labs


Labs: 


                                        





                                 01/07/19 05:52 





                                 01/07/19 05:52 





                                        











PT  11.4 SECONDS (9.7-12.2)   01/04/19  15:24    


 


INR  1.0   01/04/19  15:24    


 


APTT  26 SECONDS (21-34)   01/04/19  15:24    














- Head Exam


Head Exam: ATRAUMATIC, NORMOCEPHALIC





- ENT Exam


ENT Exam: Mucous Membranes Moist





- Neck Exam


Neck Exam: Normal Inspection





- Respiratory Exam


Respiratory Exam: Rhonchi, Wheezes





- Cardiovascular Exam


Cardiovascular Exam: Irregular Rhythm





- GI/Abdominal Exam


GI & Abdominal Exam: Soft, Normal Bowel Sounds





Assessment and Plan


(1) Acute respiratory failure


Status: Acute   





(2) COPD exacerbation


Status: Acute

## 2019-01-07 NOTE — CP.CCUPN
<Emily Coto - Last Filed: 19 11:31>





CCU Subjective





- Physician Review


Subjective (Free Text): 





19 11:35


ICU Progress Note for Dr. Mireles





Patient seen and examined at bedside this morning before morning rounds. Patient

responded with sternal rub but not with voice at that time. Patient became more 

awake and moving his arms during morning rounds. Due to patient condition ROS 

unable to be obtained.





Per RN patient failed wean from PRVC to CPAP yesterday and today.





CCU Objective





- Vital Signs / Intake & Output


Vital Signs (Last 4 hours): 


Vital Signs











  Temp Pulse Resp BP Pulse Ox


 


 19 09:10   84  20  138/88  93 L


 


 19 08:11   92 H  20  112/71  95


 


 19 08:00  98.3 F    











Intake and Output (Last 8hrs): 


                                 Intake & Output











 19





 22:59 06:59 14:59


 


Intake Total 789.2 908.7 156.8


 


Output Total 310 320 185


 


Balance 479.2 588.7 -28.2


 


Weight  159 lb 


 


Intake:   


 


   143 49


 


  Intake, IV Amount 124.2 145.7 27.8


 


    Right Medial Port 124.2 145.7 27.8


 


  Tube Feeding 310 320 80


 


  Other 200 300 


 


Output:   


 


  Urine 310 320 185


 


    Urine, Voided 310 320 185


 


Other:   


 


  # Bowel Movements 0 1 














- Physical Exam


Physical Exam Limitations: Positive for: Other (intubated)


Head: Positive for: Atraumatic, Normocephalic


Conjunctiva: Positive for: Normal


Respiratory/Chest: Positive for: Clear to Auscultation, Good Air Exchange


Cardiovascular: Positive for: Regular Rate and Rhythm


Abdomen: Negative for: Tenderness, Rebound, Guarding


Upper Extremity: Positive for: Normal Inspection, NORMAL PULSES, Capillary 

Refill < 2s.  Negative for: Cyanosis, Edema


Lower Extremity: Positive for: Normal Inspection, NORMAL PULSES, Capillary 

Refill < 2 s


Neurological: Positive for: Other (unable to assess due to patient condition)


Psychiatric: Positive for: Lethargic





- Medications


Active Medications: 


Active Medications











Generic Name Dose Route Start Last Admin





  Trade Name Freq  PRN Reason Stop Dose Admin


 


Acetylcysteine  4 ml  19 20:00  19 07:30





  Acetylcysteine 20%  INH   4 ml





  RQ6 OLEG   Administration





     





     





     





     


 


Albuterol/Ipratropium  3 ml  18 08:00  19 07:31





  Duoneb 3 Mg/0.5 Mg (3 Ml) Ud  INH   3 ml





  RQ4 OLEG   Administration





     





     





     





     


 


Carvedilol  3.125 mg  19 10:15  





  Coreg  PO   





  BID OLEG   





     





     





     





     


 


Diltiazem HCl  30 mg  19 14:00  19 09:29





  Cardizem  PO   30 mg





  QID OLEG   Administration





     





     





     





     


 


Enoxaparin Sodium  70 mg  19 10:00  19 09:30





  Lovenox  SC   70 mg





  Q12H OLEG   Administration





     





     





     





     


 


Finasteride  5 mg  18 10:00  19 09:30





  Proscar  PO   5 mg





  DAILY OLEG   Administration





     





     





     





     


 


Azithromycin 500 mg/ Sodium  250 mls @ 250 mls/hr  18 12:00  19 

12:06





  Chloride  IVPB   250 mls/hr





  Q24H OLEG   Administration





     





     





  Protocol   





     


 


Piperacillin Sod/Tazobactam Sod  3.375 gm in 50 mls @ 100 mls/hr  19 14:30

 19 05:32





  Zosyn 3.375 Gm Iv Premix  IVPB   100 mls/hr





  Q8H [facility]   Administration





     





     





  Protocol   





     


 


Propofol  1,000 mg in 100 mls @ 2.286 mls/hr  19 13:03  19 09:40





  Diprivan  IV   5 mcg/kg/min





  .Q24H PRN   2.286 mls/hr





  TITRATE PER MD ORDER   Titration





     





  Protocol   





  5 MCG/KG/MIN   


 


Phenylephrine HCl 30 mg/  253 mls @ 10.12 mls/hr  19 15:17  19 09:46





  Sodium Chloride  IV   0 mcg/min





  .Q24H PRN   0 mls/hr





  TITRATE PER MD ORDER   Titration





     





  Protocol   





  20 MCG/MIN   


 


Insulin Aspart  0 unit  19 10:13  19 05:31





  Novolog  SC   6 u





  Q6 [facility]   Administration





     





     





  Protocol   





     


 


Insulin Glargine  20 unit  19 14:12  19 21:31





  Lantus  SC   20 units





  HS OLEG   Administration





     





     





     





     


 


Methylprednisolone  40 mg  19 14:00  19 05:31





  Solu-Medrol  IVP   40 mg





  Q8 OLEG   Administration





     





     





     





     


 


Pantoprazole Sodium  40 mg  19 10:00  19 09:30





  Protonix Susp  PO   40 mg





  1000 OLEG   Administration





     





     





     





     


 


Tamsulosin HCl  0.4 mg  18 10:00  19 09:29





  Flomax  PO   0.4 mg





  BID OLEG   Administration





     





     





     





     














- Patient Studies


Lab Studies: 


                              Microbiology Studies











 19 22:41 Urine Culture - Final





 Urine,Catheterized    No Growth (<1,000 CFU/ML)








                                   Lab Studies











  19 Range/Units





  05:52 05:52 05:25 


 


WBC   9.2   (4.8-10.8)  K/uL


 


RBC   5.49   (4.40-5.90)  Mil/uL


 


Hgb   13.7   (12.0-18.0)  g/dL


 


Hct   42.6   (35.0-51.0)  %


 


MCV   77.6 L   (80.0-94.0)  fL


 


MCH   24.9 L   (27.0-31.0)  pg


 


MCHC   32.1 L   (33.0-37.0)  g/dL


 


RDW   17.7 H   (11.5-14.5)  %


 


Plt Count   166   (130-400)  K/uL


 


MPV   8.9   (7.2-11.7)  fL


 


Neut % (Auto)   92.2 H   (50.0-75.0)  %


 


Lymph % (Auto)   1.3 L   (20.0-40.0)  %


 


Mono % (Auto)   6.1   (0.0-10.0)  %


 


Eos % (Auto)   0.0   (0.0-4.0)  %


 


Baso % (Auto)   0.4   (0.0-2.0)  %


 


Neut # (Auto)   8.5 H   (1.8-7.0)  K/uL


 


Lymph # (Auto)   0.1 L   (1.0-4.3)  K/uL


 


Mono # (Auto)   0.6   (0.0-0.8)  K/uL


 


Eos # (Auto)   0.0   (0.0-0.7)  K/uL


 


Baso # (Auto)   0.0   (0.0-0.2)  K/uL


 


Neutrophils % (Manual)   94 H   (50-75)  %


 


Lymphocytes % (Manual)   2 L   (20-40)  %


 


Monocytes % (Manual)   4   (0-10)  %


 


Platelet Estimate   Normal   (NORMAL)  


 


Puncture Site     


 


pCO2     (35-45)  mm/Hg


 


pO2     ()  mm/Hg


 


HCO3     (21-28)  mmol/L


 


ABG pH     (7.35-7.45)  


 


ABG Total CO2     (22-28)  mmol/L


 


ABG O2 Saturation     (95-98)  %


 


ABG Base Excess     (-2.0-3.0)  mmol/L


 


[place] Test     


 


ABG Potassium     (3.6-5.2)  mmol/L


 


A-a O2 Difference     mm/Hg


 


Respiratory Index     


 


Sodium  138    (132-148)  mmol/l


 


Chloride  101    ()  mmol/L


 


Glucose     ()  mg/dl


 


Lactate     (0.7-2.1)  mmol/L


 


Vent Mode     


 


Mechanical Rate     


 


FiO2     %


 


Tidal Volume     


 


PEEP     


 


Potassium  4.5    (3.6-5.2)  mmol/L


 


Carbon Dioxide  33 H    (22-30)  mmol/L


 


Anion Gap  9 L    (10-20)  


 


BUN  44 H    (9-20)  mg/dL


 


Creatinine  0.8    (0.8-1.5)  mg/dL


 


Est GFR (African Amer)  > 60    


 


Est GFR (Non-Af Amer)  > 60    


 


POC Glucose (mg/dL)    278 H  ()  mg/dL


 


Random Glucose  262 H    ()  mg/dL


 


Calcium  7.5 L    (8.6-10.4)  mg/dl


 


Phosphorus  3.3    (2.5-4.5)  mg/dL


 


Magnesium  2.6 H    (1.6-2.3)  mg/dL


 


Total Bilirubin  0.6    (0.2-1.3)  mg/dL


 


AST  27    (17-59)  U/L


 


ALT  64    (21-72)  U/L


 


Alkaline Phosphatase  57    ()  U/L


 


Total Protein  5.2 L    (6.3-8.3)  g/dL


 


Albumin  2.9 L    (3.5-5.0)  g/dL


 


Globulin  2.3    (2.2-3.9)  gm/dL


 


Albumin/Globulin Ratio  1.2    (1.0-2.1)  


 


Arterial Blood Potassium     (3.6-5.2)  mmol/L














  19 Range/Units





  05:14 23:21 18:01 


 


WBC     (4.8-10.8)  K/uL


 


RBC     (4.40-5.90)  Mil/uL


 


Hgb     (12.0-18.0)  g/dL


 


Hct     (35.0-51.0)  %


 


MCV     (80.0-94.0)  fL


 


MCH     (27.0-31.0)  pg


 


MCHC     (33.0-37.0)  g/dL


 


RDW     (11.5-14.5)  %


 


Plt Count     (130-400)  K/uL


 


MPV     (7.2-11.7)  fL


 


Neut % (Auto)     (50.0-75.0)  %


 


Lymph % (Auto)     (20.0-40.0)  %


 


Mono % (Auto)     (0.0-10.0)  %


 


Eos % (Auto)     (0.0-4.0)  %


 


Baso % (Auto)     (0.0-2.0)  %


 


Neut # (Auto)     (1.8-7.0)  K/uL


 


Lymph # (Auto)     (1.0-4.3)  K/uL


 


Mono # (Auto)     (0.0-0.8)  K/uL


 


Eos # (Auto)     (0.0-0.7)  K/uL


 


Baso # (Auto)     (0.0-0.2)  K/uL


 


Neutrophils % (Manual)     (50-75)  %


 


Lymphocytes % (Manual)     (20-40)  %


 


Monocytes % (Manual)     (0-10)  %


 


Platelet Estimate     (NORMAL)  


 


Puncture Site  Lr    


 


pCO2  44    (35-45)  mm/Hg


 


pO2  143 H    ()  mm/Hg


 


HCO3  32.0 H    (21-28)  mmol/L


 


ABG pH  7.49 H    (7.35-7.45)  


 


ABG Total CO2  34.9 H    (22-28)  mmol/L


 


ABG O2 Saturation  97.0    (95-98)  %


 


ABG Base Excess  9.0 H    (-2.0-3.0)  mmol/L


 


[place] Test  Pos    


 


ABG Potassium  4.9    (3.6-5.2)  mmol/L


 


A-a O2 Difference  159.0    mm/Hg


 


Respiratory Index  1.1    


 


Sodium  139.0    (132-148)  mmol/l


 


Chloride  107.0    ()  mmol/L


 


Glucose  266 H    ()  mg/dl


 


Lactate  1.3    (0.7-2.1)  mmol/L


 


Vent Mode  Prvc    


 


Mechanical Rate  20    


 


FiO2  50.0    %


 


Tidal Volume  500    


 


PEEP  5    


 


Potassium     (3.6-5.2)  mmol/L


 


Carbon Dioxide     (22-30)  mmol/L


 


Anion Gap     (10-20)  


 


BUN     (9-20)  mg/dL


 


Creatinine     (0.8-1.5)  mg/dL


 


Est GFR (African Amer)     


 


Est GFR (Non-Af Amer)     


 


POC Glucose (mg/dL)   290 H  265 H  ()  mg/dL


 


Random Glucose     ()  mg/dL


 


Calcium     (8.6-10.4)  mg/dl


 


Phosphorus     (2.5-4.5)  mg/dL


 


Magnesium     (1.6-2.3)  mg/dL


 


Total Bilirubin     (0.2-1.3)  mg/dL


 


AST     (17-59)  U/L


 


ALT     (21-72)  U/L


 


Alkaline Phosphatase     ()  U/L


 


Total Protein     (6.3-8.3)  g/dL


 


Albumin     (3.5-5.0)  g/dL


 


Globulin     (2.2-3.9)  gm/dL


 


Albumin/Globulin Ratio     (1.0-2.1)  


 


Arterial Blood Potassium  4.9    (3.6-5.2)  mmol/L














  19 Range/Units





  11:11 


 


WBC   (4.8-10.8)  K/uL


 


RBC   (4.40-5.90)  Mil/uL


 


Hgb   (12.0-18.0)  g/dL


 


Hct   (35.0-51.0)  %


 


MCV   (80.0-94.0)  fL


 


MCH   (27.0-31.0)  pg


 


MCHC   (33.0-37.0)  g/dL


 


RDW   (11.5-14.5)  %


 


Plt Count   (130-400)  K/uL


 


MPV   (7.2-11.7)  fL


 


Neut % (Auto)   (50.0-75.0)  %


 


Lymph % (Auto)   (20.0-40.0)  %


 


Mono % (Auto)   (0.0-10.0)  %


 


Eos % (Auto)   (0.0-4.0)  %


 


Baso % (Auto)   (0.0-2.0)  %


 


Neut # (Auto)   (1.8-7.0)  K/uL


 


Lymph # (Auto)   (1.0-4.3)  K/uL


 


Mono # (Auto)   (0.0-0.8)  K/uL


 


Eos # (Auto)   (0.0-0.7)  K/uL


 


Baso # (Auto)   (0.0-0.2)  K/uL


 


Neutrophils % (Manual)   (50-75)  %


 


Lymphocytes % (Manual)   (20-40)  %


 


Monocytes % (Manual)   (0-10)  %


 


Platelet Estimate   (NORMAL)  


 


Puncture Site   


 


pCO2   (35-45)  mm/Hg


 


pO2   ()  mm/Hg


 


HCO3   (21-28)  mmol/L


 


ABG pH   (7.35-7.45)  


 


ABG Total CO2   (22-28)  mmol/L


 


ABG O2 Saturation   (95-98)  %


 


ABG Base Excess   (-2.0-3.0)  mmol/L


 


[place] Test   


 


ABG Potassium   (3.6-5.2)  mmol/L


 


A-a O2 Difference   mm/Hg


 


Respiratory Index   


 


Sodium   (132-148)  mmol/l


 


Chloride   ()  mmol/L


 


Glucose   ()  mg/dl


 


Lactate   (0.7-2.1)  mmol/L


 


Vent Mode   


 


Mechanical Rate   


 


FiO2   %


 


Tidal Volume   


 


PEEP   


 


Potassium   (3.6-5.2)  mmol/L


 


Carbon Dioxide   (22-30)  mmol/L


 


Anion Gap   (10-20)  


 


BUN   (9-20)  mg/dL


 


Creatinine   (0.8-1.5)  mg/dL


 


Est GFR (African Amer)   


 


Est GFR (Non-Af Amer)   


 


POC Glucose (mg/dL)  281 H  ()  mg/dL


 


Random Glucose   ()  mg/dL


 


Calcium   (8.6-10.4)  mg/dl


 


Phosphorus   (2.5-4.5)  mg/dL


 


Magnesium   (1.6-2.3)  mg/dL


 


Total Bilirubin   (0.2-1.3)  mg/dL


 


AST   (17-59)  U/L


 


ALT   (21-72)  U/L


 


Alkaline Phosphatase   ()  U/L


 


Total Protein   (6.3-8.3)  g/dL


 


Albumin   (3.5-5.0)  g/dL


 


Globulin   (2.2-3.9)  gm/dL


 


Albumin/Globulin Ratio   (1.0-2.1)  


 


Arterial Blood Potassium   (3.6-5.2)  mmol/L








                         Laboratory Results - last 24 hr











  19





  11:11 18:01 23:21


 


WBC   


 


RBC   


 


Hgb   


 


Hct   


 


MCV   


 


MCH   


 


MCHC   


 


RDW   


 


Plt Count   


 


MPV   


 


Neut % (Auto)   


 


Lymph % (Auto)   


 


Mono % (Auto)   


 


Eos % (Auto)   


 


Baso % (Auto)   


 


Neut # (Auto)   


 


Lymph # (Auto)   


 


Mono # (Auto)   


 


Eos # (Auto)   


 


Baso # (Auto)   


 


Neutrophils % (Manual)   


 


Lymphocytes % (Manual)   


 


Monocytes % (Manual)   


 


Platelet Estimate   


 


Puncture Site   


 


pCO2   


 


pO2   


 


HCO3   


 


ABG pH   


 


ABG Total CO2   


 


ABG O2 Saturation   


 


ABG Base Excess   


 


[place] Test   


 


ABG Potassium   


 


A-a O2 Difference   


 


Respiratory Index   


 


Sodium   


 


Chloride   


 


Glucose   


 


Lactate   


 


Vent Mode   


 


Mechanical Rate   


 


FiO2   


 


Tidal Volume   


 


PEEP   


 


Potassium   


 


Carbon Dioxide   


 


Anion Gap   


 


BUN   


 


Creatinine   


 


Est GFR ( Amer)   


 


Est GFR (Non-Af Amer)   


 


POC Glucose (mg/dL)  281 H  265 H  290 H


 


Random Glucose   


 


Calcium   


 


Phosphorus   


 


Magnesium   


 


Total Bilirubin   


 


AST   


 


ALT   


 


Alkaline Phosphatase   


 


Total Protein   


 


Albumin   


 


Globulin   


 


Albumin/Globulin Ratio   


 


Arterial Blood Potassium   














  19





  05:14 05:25 05:52


 


WBC    9.2


 


RBC    5.49


 


Hgb    13.7


 


Hct    42.6


 


MCV    77.6 L


 


MCH    24.9 L


 


MCHC    32.1 L


 


RDW    17.7 H


 


Plt Count    166


 


MPV    8.9


 


Neut % (Auto)    92.2 H


 


Lymph % (Auto)    1.3 L


 


Mono % (Auto)    6.1


 


Eos % (Auto)    0.0


 


Baso % (Auto)    0.4


 


Neut # (Auto)    8.5 H


 


Lymph # (Auto)    0.1 L


 


Mono # (Auto)    0.6


 


Eos # (Auto)    0.0


 


Baso # (Auto)    0.0


 


Neutrophils % (Manual)    94 H


 


Lymphocytes % (Manual)    2 L


 


Monocytes % (Manual)    4


 


Platelet Estimate    Normal


 


Puncture Site  Lr  


 


pCO2  44  


 


pO2  143 H  


 


HCO3  32.0 H  


 


ABG pH  7.49 H  


 


ABG Total CO2  34.9 H  


 


ABG O2 Saturation  97.0  


 


ABG Base Excess  9.0 H  


 


[place] Test  Pos  


 


ABG Potassium  4.9  


 


A-a O2 Difference  159.0  


 


Respiratory Index  1.1  


 


Sodium  139.0  


 


Chloride  107.0  


 


Glucose  266 H  


 


Lactate  1.3  


 


Vent Mode  Prvc  


 


Mechanical Rate  20  


 


FiO2  50.0  


 


Tidal Volume  500  


 


PEEP  5  


 


Potassium   


 


Carbon Dioxide   


 


Anion Gap   


 


BUN   


 


Creatinine   


 


Est GFR ( Amer)   


 


Est GFR (Non-Af Amer)   


 


POC Glucose (mg/dL)   278 H 


 


Random Glucose   


 


Calcium   


 


Phosphorus   


 


Magnesium   


 


Total Bilirubin   


 


AST   


 


ALT   


 


Alkaline Phosphatase   


 


Total Protein   


 


Albumin   


 


Globulin   


 


Albumin/Globulin Ratio   


 


Arterial Blood Potassium  4.9  














  19





  05:52


 


WBC 


 


RBC 


 


Hgb 


 


Hct 


 


MCV 


 


MCH 


 


MCHC 


 


RDW 


 


Plt Count 


 


MPV 


 


Neut % (Auto) 


 


Lymph % (Auto) 


 


Mono % (Auto) 


 


Eos % (Auto) 


 


Baso % (Auto) 


 


Neut # (Auto) 


 


Lymph # (Auto) 


 


Mono # (Auto) 


 


Eos # (Auto) 


 


Baso # (Auto) 


 


Neutrophils % (Manual) 


 


Lymphocytes % (Manual) 


 


Monocytes % (Manual) 


 


Platelet Estimate 


 


Puncture Site 


 


pCO2 


 


pO2 


 


HCO3 


 


ABG pH 


 


ABG Total CO2 


 


ABG O2 Saturation 


 


ABG Base Excess 


 


[place] Test 


 


ABG Potassium 


 


A-a O2 Difference 


 


Respiratory Index 


 


Sodium  138


 


Chloride  101


 


Glucose 


 


Lactate 


 


Vent Mode 


 


Mechanical Rate 


 


FiO2 


 


Tidal Volume 


 


PEEP 


 


Potassium  4.5


 


Carbon Dioxide  33 H


 


Anion Gap  9 L


 


BUN  44 H


 


Creatinine  0.8


 


Est GFR ( Amer)  > 60


 


Est GFR (Non-Af Amer)  > 60


 


POC Glucose (mg/dL) 


 


Random Glucose  262 H


 


Calcium  7.5 L


 


Phosphorus  3.3


 


Magnesium  2.6 H


 


Total Bilirubin  0.6


 


AST  27


 


ALT  64


 


Alkaline Phosphatase  57


 


Total Protein  5.2 L


 


Albumin  2.9 L


 


Globulin  2.3


 


Albumin/Globulin Ratio  1.2


 


Arterial Blood Potassium 











Radiology Impressions: 


                              Radiology Impressions





Chest X-Ray  19 06:00


IMPRESSION:


Right IJ approach central venous catheter extends the cavoatrial 


junction.  Nasogastric tube extends expected location of the stomach. 


 Endotracheal tube terminates approximately 3.6 cm above the omari. 


 


Right upper lobe consolidation.  Bibasilar atelectasis.  Biapical 


pleural thickening.  Small left pleural effusion.  Mild venous 


congestion.  


 


 








Chest X-Ray  19 06:00


IMPRESSION:


No interval change in mild right apical patchy infiltrate and minimal 


left pleural effusion and biapical pleural thickening reiterated.  


Right central venous line retracted slightly.  Nasogastric and 


endotracheal tubes unchanged in position.  


 


 











Fingerstick Blood Sugar Results: 278





Critical Care Progress Note





- Nutrition


Nutrition: 


                                    Nutrition











 Category Date Time Status


 


 Consistent Carbohydrate [DIET] Diets  18 Breakfast Active














Assessment/Plan





- Assessment and Plan (Free Text)


Assessment: 





78 y/o male with PMHx of COPD, asthma, sleep apnea, HTN, DM, GERD arrived to ICU

 from  due to respiratory distress on  and was intubated in ICU. Currently 

stable and still in ICU. 





neuro


-was agitated prior to intubation on 


-sedated on propofol


-currently lethargic





CV


-severe pHTN and Afib. Prior cath - non obstructive and EF normal


-prior to intubation, HR > 150 on , s/p cardizem 5 mg IVP and at least 2 x 

500cc NS boluses due to hypotension 70s/30s


-currently on coreg 3.125 mg BID - rx today 


-continue with diltiazem 30 mg PO qid


-continue with finasteride 5 mg 


-Dr. Randle cardio recs: lovenox 70 mg sq BID, full AC for Afib. Already ordered 

by him.





Pulm


-patient w/ COPD, asthma and sleep apnea


-on duonebs q4h


-AB.49, pCO2 44, HCO3 32, O2 143 - metabolic alkalosis, hypercapnea, oxygen 

level sufficient - can wean


-Vent settings: PRVC rate 12, PEEP 5, FiO2 100% -> did not tolerate CPAP trial 

yesterday  or today 


-CXR and ABG qAM


-pulm Dr. Gonsalez following





Renal


-renal function wnl on  - BUN elevated, Cr wnl


-no acute issues


-CMP qAM





GI


-no acute issues





Heme


-patient w/ H/H wnl


-no acute issues


-CBC qAM





ID


-on azithromycin 500 mg q24h started 


-on zosyn 3.375 mg q8h started 





Endo


-continue with ISS


-lantus 20 u qhs


-accuchecks q6h





POC: Gigi Ariza [phone]





DVT ppx: lovenox 40 mg sq daily


GI ppx: protonix 40 mg IV daily





case discussed with Dr. Aline Coto PGY1





<Peter Mireles - Last Filed: 19 20:35>





CCU Subjective





- Physician Review


Events Since Last Encounter (Free Text): 





19 20:34


In the morning rounds, I discussed with resident about the patient's overall 

condition, radiological investigation, x-rays, as well as labs reviewed with 

resident.


But attempted for CPAP trial,


Weaning failed.


Spoke to the family.


Overall prognosis is guarded.


Currently on antibiotic.


On steroid.


Bronchodilator.


Time spent 40 minutes.





CCU Objective





- Vital Signs / Intake & Output


Vital Signs (Last 4 hours): 


Vital Signs











  Temp Pulse Resp BP Pulse Ox


 


 19 20:11   90  19  100/78  88 L


 


 19 20:00  98.2 F  87  20  100/78  95


 


 19 19:11   84  20  129/79  95


 


 19 19:00   82  20   94 L


 


 19 18:11   90  20  124/89  95


 


 19 17:10   83  20  117/87  95











Intake and Output (Last 8hrs): 


                                 Intake & Output











 19





 06:59 14:59 22:59


 


Intake Total 908.7 668.1 280.0


 


Output Total 320 493 218


 


Balance 588.7 175.1 62.0


 


Weight 159 lb  


 


Intake:   


 


   57 100


 


  Intake, IV Amount 145.7 411.1 60.0


 


    Right Distal Port  300 


 


    Right Medial Port 145.7 111.1 60.0


 


  Tube Feeding 320 200 120


 


  Other 300  


 


Output:   


 


  Urine 320 493 218


 


    Urine, Voided 320 493 218


 


Other:   


 


  # Bowel Movements 1  1














- Medications


Active Medications: 


Active Medications











Generic Name Dose Route Start Last Admin





  Trade Name Freq  PRN Reason Stop Dose Admin


 


Acetylcysteine  4 ml  19 20:00  19 14:35





  Acetylcysteine 20%  INH   Not Given





  RQ6 OLEG   





     





     





     





     


 


Albuterol/Ipratropium  3 ml  18 08:00  19 11:26





  Duoneb 3 Mg/0.5 Mg (3 Ml) Ud  INH   3 ml





  RQ4 OLEG   Administration





     





     





     





     


 


Carvedilol  3.125 mg  19 10:15  19 17:30





  Coreg  PO   3.125 mg





  BID OLEG   Administration





     





     





     





     


 


Diltiazem HCl  30 mg  19 14:00  19 17:30





  Cardizem  PO   30 mg





  QID OLEG   Administration





     





     





     





     


 


Enoxaparin Sodium  70 mg  19 10:00  19 09:30





  Lovenox  SC   70 mg





  Q12H OLEG   Administration





     





     





     





     


 


Finasteride  5 mg  18 10:00  19 09:30





  Proscar  PO   5 mg





  DAILY OLEG   Administration





     





     





     





     


 


Azithromycin 500 mg/ Sodium  250 mls @ 250 mls/hr  18 12:00  19 

11:58





  Chloride  IVPB   250 mls/hr





  Q24H OLEG   Administration





     





     





  Protocol   





     


 


Piperacillin Sod/Tazobactam Sod  3.375 gm in 50 mls @ 100 mls/hr  19 14:30

  19 15:19





  Zosyn 3.375 Gm Iv Premix  IVPB   100 mls/hr





  Q8H OLEG   Administration





     





     





  Protocol   





     


 


Propofol  1,000 mg in 100 mls @ 2.286 mls/hr  19 13:03  19 18:39





  Diprivan  IV   35 mcg/kg/min





  .Q24H PRN   16.003 mls/hr





  TITRATE PER MD ORDER   Administration





     





  Protocol   





  5 MCG/KG/MIN   


 


Phenylephrine HCl 30 mg/  253 mls @ 10.12 mls/hr  19 15:17  19 09:46





  Sodium Chloride  IV   0 mcg/min





  .Q24H PRN   0 mls/hr





  TITRATE PER MD ORDER   Titration





     





  Protocol   





  20 MCG/MIN   


 


Insulin Aspart  0 unit  19 10:13  19 19:27





  Novolog  SC   4 u





  Q6 OLEG   Administration





     





     





  Protocol   





     


 


Insulin Glargine  20 unit  19 14:12  19 21:31





  Lantus  SC   20 units





  HS OLEG   Administration





     





     





     





     


 


Methylprednisolone  40 mg  19 14:00  19 15:19





  Solu-Medrol  IVP   40 mg





  Q8 OLEG   Administration





     





     





     





     


 


Pantoprazole Sodium  40 mg  19 10:00  19 09:30





  Protonix Susp  PO   40 mg





  1000 OLEG   Administration





     





     





     





     


 


Tamsulosin HCl  0.4 mg  18 10:00  19 17:30





  Flomax  PO   0.4 mg





  BID OLEG   Administration





     





     





     





     














- Patient Studies


Lab Studies: 


                                   Lab Studies











  19 Range/Units





  17:38 11:11 05:52 


 


WBC     (4.8-10.8)  K/uL


 


RBC     (4.40-5.90)  Mil/uL


 


Hgb     (12.0-18.0)  g/dL


 


Hct     (35.0-51.0)  %


 


MCV     (80.0-94.0)  fL


 


MCH     (27.0-31.0)  pg


 


MCHC     (33.0-37.0)  g/dL


 


RDW     (11.5-14.5)  %


 


Plt Count     (130-400)  K/uL


 


MPV     (7.2-11.7)  fL


 


Neut % (Auto)     (50.0-75.0)  %


 


Lymph % (Auto)     (20.0-40.0)  %


 


Mono % (Auto)     (0.0-10.0)  %


 


Eos % (Auto)     (0.0-4.0)  %


 


Baso % (Auto)     (0.0-2.0)  %


 


Neut # (Auto)     (1.8-7.0)  K/uL


 


Lymph # (Auto)     (1.0-4.3)  K/uL


 


Mono # (Auto)     (0.0-0.8)  K/uL


 


Eos # (Auto)     (0.0-0.7)  K/uL


 


Baso # (Auto)     (0.0-0.2)  K/uL


 


Neutrophils % (Manual)     (50-75)  %


 


Lymphocytes % (Manual)     (20-40)  %


 


Monocytes % (Manual)     (0-10)  %


 


Platelet Estimate     (NORMAL)  


 


Puncture Site     


 


pCO2     (35-45)  mm/Hg


 


pO2     ()  mm/Hg


 


HCO3     (21-28)  mmol/L


 


ABG pH     (7.35-7.45)  


 


ABG Total CO2     (22-28)  mmol/L


 


ABG O2 Saturation     (95-98)  %


 


ABG Base Excess     (-2.0-3.0)  mmol/L


 


[place] Test     


 


ABG Potassium     (3.6-5.2)  mmol/L


 


A-a O2 Difference     mm/Hg


 


Respiratory Index     


 


Sodium    138  (132-148)  mmol/l


 


Chloride    101  ()  mmol/L


 


Glucose     ()  mg/dl


 


Lactate     (0.7-2.1)  mmol/L


 


Vent Mode     


 


Mechanical Rate     


 


FiO2     %


 


Tidal Volume     


 


PEEP     


 


Potassium    4.5  (3.6-5.2)  mmol/L


 


Carbon Dioxide    33 H  (22-30)  mmol/L


 


Anion Gap    9 L  (10-20)  


 


BUN    44 H  (9-20)  mg/dL


 


Creatinine    0.8  (0.8-1.5)  mg/dL


 


Est GFR (African Amer)    > 60  


 


Est GFR (Non-Af Amer)    > 60  


 


POC Glucose (mg/dL)  225 H  250 H   ()  mg/dL


 


Random Glucose    262 H  ()  mg/dL


 


Calcium    7.5 L  (8.6-10.4)  mg/dl


 


Phosphorus    3.3  (2.5-4.5)  mg/dL


 


Magnesium    2.6 H  (1.6-2.3)  mg/dL


 


Total Bilirubin    0.6  (0.2-1.3)  mg/dL


 


AST    27  (17-59)  U/L


 


ALT    64  (21-72)  U/L


 


Alkaline Phosphatase    57  ()  U/L


 


Total Protein    5.2 L  (6.3-8.3)  g/dL


 


Albumin    2.9 L  (3.5-5.0)  g/dL


 


Globulin    2.3  (2.2-3.9)  gm/dL


 


Albumin/Globulin Ratio    1.2  (1.0-2.1)  


 


Arterial Blood Potassium     (3.6-5.2)  mmol/L














  19 Range/Units





  05:52 05:25 05:14 


 


WBC  9.2    (4.8-10.8)  K/uL


 


RBC  5.49    (4.40-5.90)  Mil/uL


 


Hgb  13.7    (12.0-18.0)  g/dL


 


Hct  42.6    (35.0-51.0)  %


 


MCV  77.6 L    (80.0-94.0)  fL


 


MCH  24.9 L    (27.0-31.0)  pg


 


MCHC  32.1 L    (33.0-37.0)  g/dL


 


RDW  17.7 H    (11.5-14.5)  %


 


Plt Count  166    (130-400)  K/uL


 


MPV  8.9    (7.2-11.7)  fL


 


Neut % (Auto)  92.2 H    (50.0-75.0)  %


 


Lymph % (Auto)  1.3 L    (20.0-40.0)  %


 


Mono % (Auto)  6.1    (0.0-10.0)  %


 


Eos % (Auto)  0.0    (0.0-4.0)  %


 


Baso % (Auto)  0.4    (0.0-2.0)  %


 


Neut # (Auto)  8.5 H    (1.8-7.0)  K/uL


 


Lymph # (Auto)  0.1 L    (1.0-4.3)  K/uL


 


Mono # (Auto)  0.6    (0.0-0.8)  K/uL


 


Eos # (Auto)  0.0    (0.0-0.7)  K/uL


 


Baso # (Auto)  0.0    (0.0-0.2)  K/uL


 


Neutrophils % (Manual)  94 H    (50-75)  %


 


Lymphocytes % (Manual)  2 L    (20-40)  %


 


Monocytes % (Manual)  4    (0-10)  %


 


Platelet Estimate  Normal    (NORMAL)  


 


Puncture Site    Lr  


 


pCO2    44  (35-45)  mm/Hg


 


pO2    143 H  ()  mm/Hg


 


HCO3    32.0 H  (21-28)  mmol/L


 


ABG pH    7.49 H  (7.35-7.45)  


 


ABG Total CO2    34.9 H  (22-28)  mmol/L


 


ABG O2 Saturation    97.0  (95-98)  %


 


ABG Base Excess    9.0 H  (-2.0-3.0)  mmol/L


 


[place] Test    Pos  


 


ABG Potassium    4.9  (3.6-5.2)  mmol/L


 


A-a O2 Difference    159.0  mm/Hg


 


Respiratory Index    1.1  


 


Sodium    139.0  (132-148)  mmol/l


 


Chloride    107.0  ()  mmol/L


 


Glucose    266 H  ()  mg/dl


 


Lactate    1.3  (0.7-2.1)  mmol/L


 


Vent Mode    Prvc  


 


Mechanical Rate    20  


 


FiO2    50.0  %


 


Tidal Volume    500  


 


PEEP    5  


 


Potassium     (3.6-5.2)  mmol/L


 


Carbon Dioxide     (22-30)  mmol/L


 


Anion Gap     (10-20)  


 


BUN     (9-20)  mg/dL


 


Creatinine     (0.8-1.5)  mg/dL


 


Est GFR (African Amer)     


 


Est GFR (Non-Af Amer)     


 


POC Glucose (mg/dL)   278 H   ()  mg/dL


 


Random Glucose     ()  mg/dL


 


Calcium     (8.6-10.4)  mg/dl


 


Phosphorus     (2.5-4.5)  mg/dL


 


Magnesium     (1.6-2.3)  mg/dL


 


Total Bilirubin     (0.2-1.3)  mg/dL


 


AST     (17-59)  U/L


 


ALT     (21-72)  U/L


 


Alkaline Phosphatase     ()  U/L


 


Total Protein     (6.3-8.3)  g/dL


 


Albumin     (3.5-5.0)  g/dL


 


Globulin     (2.2-3.9)  gm/dL


 


Albumin/Globulin Ratio     (1.0-2.1)  


 


Arterial Blood Potassium    4.9  (3.6-5.2)  mmol/L














  19 Range/Units





  23:21 


 


WBC   (4.8-10.8)  K/uL


 


RBC   (4.40-5.90)  Mil/uL


 


Hgb   (12.0-18.0)  g/dL


 


Hct   (35.0-51.0)  %


 


MCV   (80.0-94.0)  fL


 


MCH   (27.0-31.0)  pg


 


MCHC   (33.0-37.0)  g/dL


 


RDW   (11.5-14.5)  %


 


Plt Count   (130-400)  K/uL


 


MPV   (7.2-11.7)  fL


 


Neut % (Auto)   (50.0-75.0)  %


 


Lymph % (Auto)   (20.0-40.0)  %


 


Mono % (Auto)   (0.0-10.0)  %


 


Eos % (Auto)   (0.0-4.0)  %


 


Baso % (Auto)   (0.0-2.0)  %


 


Neut # (Auto)   (1.8-7.0)  K/uL


 


Lymph # (Auto)   (1.0-4.3)  K/uL


 


Mono # (Auto)   (0.0-0.8)  K/uL


 


Eos # (Auto)   (0.0-0.7)  K/uL


 


Baso # (Auto)   (0.0-0.2)  K/uL


 


Neutrophils % (Manual)   (50-75)  %


 


Lymphocytes % (Manual)   (20-40)  %


 


Monocytes % (Manual)   (0-10)  %


 


Platelet Estimate   (NORMAL)  


 


Puncture Site   


 


pCO2   (35-45)  mm/Hg


 


pO2   ()  mm/Hg


 


HCO3   (21-28)  mmol/L


 


ABG pH   (7.35-7.45)  


 


ABG Total CO2   (22-28)  mmol/L


 


ABG O2 Saturation   (95-98)  %


 


ABG Base Excess   (-2.0-3.0)  mmol/L


 


[place] Test   


 


ABG Potassium   (3.6-5.2)  mmol/L


 


A-a O2 Difference   mm/Hg


 


Respiratory Index   


 


Sodium   (132-148)  mmol/l


 


Chloride   ()  mmol/L


 


Glucose   ()  mg/dl


 


Lactate   (0.7-2.1)  mmol/L


 


Vent Mode   


 


Mechanical Rate   


 


FiO2   %


 


Tidal Volume   


 


PEEP   


 


Potassium   (3.6-5.2)  mmol/L


 


Carbon Dioxide   (22-30)  mmol/L


 


Anion Gap   (10-20)  


 


BUN   (9-20)  mg/dL


 


Creatinine   (0.8-1.5)  mg/dL


 


Est GFR (African Amer)   


 


Est GFR (Non-Af Amer)   


 


POC Glucose (mg/dL)  290 H  ()  mg/dL


 


Random Glucose   ()  mg/dL


 


Calcium   (8.6-10.4)  mg/dl


 


Phosphorus   (2.5-4.5)  mg/dL


 


Magnesium   (1.6-2.3)  mg/dL


 


Total Bilirubin   (0.2-1.3)  mg/dL


 


AST   (17-59)  U/L


 


ALT   (21-72)  U/L


 


Alkaline Phosphatase   ()  U/L


 


Total Protein   (6.3-8.3)  g/dL


 


Albumin   (3.5-5.0)  g/dL


 


Globulin   (2.2-3.9)  gm/dL


 


Albumin/Globulin Ratio   (1.0-2.1)  


 


Arterial Blood Potassium   (3.6-5.2)  mmol/L








                         Laboratory Results - last 24 hr











  19





  23:21 05:14 05:25


 


WBC   


 


RBC   


 


Hgb   


 


Hct   


 


MCV   


 


MCH   


 


MCHC   


 


RDW   


 


Plt Count   


 


MPV   


 


Neut % (Auto)   


 


Lymph % (Auto)   


 


Mono % (Auto)   


 


Eos % (Auto)   


 


Baso % (Auto)   


 


Neut # (Auto)   


 


Lymph # (Auto)   


 


Mono # (Auto)   


 


Eos # (Auto)   


 


Baso # (Auto)   


 


Neutrophils % (Manual)   


 


Lymphocytes % (Manual)   


 


Monocytes % (Manual)   


 


Platelet Estimate   


 


Puncture Site   Lr 


 


pCO2   44 


 


pO2   143 H 


 


HCO3   32.0 H 


 


ABG pH   7.49 H 


 


ABG Total CO2   34.9 H 


 


ABG O2 Saturation   97.0 


 


ABG Base Excess   9.0 H 


 


[place] Test   Pos 


 


ABG Potassium   4.9 


 


A-a O2 Difference   159.0 


 


Respiratory Index   1.1 


 


Sodium   139.0 


 


Chloride   107.0 


 


Glucose   266 H 


 


Lactate   1.3 


 


Vent Mode   Prvc 


 


Mechanical Rate   20 


 


FiO2   50.0 


 


Tidal Volume   500 


 


PEEP   5 


 


Potassium   


 


Carbon Dioxide   


 


Anion Gap   


 


BUN   


 


Creatinine   


 


Est GFR ( Amer)   


 


Est GFR (Non-Af Amer)   


 


POC Glucose (mg/dL)  290 H   278 H


 


Random Glucose   


 


Calcium   


 


Phosphorus   


 


Magnesium   


 


Total Bilirubin   


 


AST   


 


ALT   


 


Alkaline Phosphatase   


 


Total Protein   


 


Albumin   


 


Globulin   


 


Albumin/Globulin Ratio   


 


Arterial Blood Potassium   4.9 














  19





  05:52 05:52 11:11


 


WBC  9.2  


 


RBC  5.49  


 


Hgb  13.7  


 


Hct  42.6  


 


MCV  77.6 L  


 


MCH  24.9 L  


 


MCHC  32.1 L  


 


RDW  17.7 H  


 


Plt Count  166  


 


MPV  8.9  


 


Neut % (Auto)  92.2 H  


 


Lymph % (Auto)  1.3 L  


 


Mono % (Auto)  6.1  


 


Eos % (Auto)  0.0  


 


Baso % (Auto)  0.4  


 


Neut # (Auto)  8.5 H  


 


Lymph # (Auto)  0.1 L  


 


Mono # (Auto)  0.6  


 


Eos # (Auto)  0.0  


 


Baso # (Auto)  0.0  


 


Neutrophils % (Manual)  94 H  


 


Lymphocytes % (Manual)  2 L  


 


Monocytes % (Manual)  4  


 


Platelet Estimate  Normal  


 


Puncture Site   


 


pCO2   


 


pO2   


 


HCO3   


 


ABG pH   


 


ABG Total CO2   


 


ABG O2 Saturation   


 


ABG Base Excess   


 


[place] Test   


 


ABG Potassium   


 


A-a O2 Difference   


 


Respiratory Index   


 


Sodium   138 


 


Chloride   101 


 


Glucose   


 


Lactate   


 


Vent Mode   


 


Mechanical Rate   


 


FiO2   


 


Tidal Volume   


 


PEEP   


 


Potassium   4.5 


 


Carbon Dioxide   33 H 


 


Anion Gap   9 L 


 


BUN   44 H 


 


Creatinine   0.8 


 


Est GFR ( Amer)   > 60 


 


Est GFR (Non-Af Amer)   > 60 


 


POC Glucose (mg/dL)    250 H


 


Random Glucose   262 H 


 


Calcium   7.5 L 


 


Phosphorus   3.3 


 


Magnesium   2.6 H 


 


Total Bilirubin   0.6 


 


AST   27 


 


ALT   64 


 


Alkaline Phosphatase   57 


 


Total Protein   5.2 L 


 


Albumin   2.9 L 


 


Globulin   2.3 


 


Albumin/Globulin Ratio   1.2 


 


Arterial Blood Potassium   














  19





  17:38


 


WBC 


 


RBC 


 


Hgb 


 


Hct 


 


MCV 


 


MCH 


 


MCHC 


 


RDW 


 


Plt Count 


 


MPV 


 


Neut % (Auto) 


 


Lymph % (Auto) 


 


Mono % (Auto) 


 


Eos % (Auto) 


 


Baso % (Auto) 


 


Neut # (Auto) 


 


Lymph # (Auto) 


 


Mono # (Auto) 


 


Eos # (Auto) 


 


Baso # (Auto) 


 


Neutrophils % (Manual) 


 


Lymphocytes % (Manual) 


 


Monocytes % (Manual) 


 


Platelet Estimate 


 


Puncture Site 


 


pCO2 


 


pO2 


 


HCO3 


 


ABG pH 


 


ABG Total CO2 


 


ABG O2 Saturation 


 


ABG Base Excess 


 


[place] Test 


 


ABG Potassium 


 


A-a O2 Difference 


 


Respiratory Index 


 


Sodium 


 


Chloride 


 


Glucose 


 


Lactate 


 


Vent Mode 


 


Mechanical Rate 


 


FiO2 


 


Tidal Volume 


 


PEEP 


 


Potassium 


 


Carbon Dioxide 


 


Anion Gap 


 


BUN 


 


Creatinine 


 


Est GFR ( Amer) 


 


Est GFR (Non-Af Amer) 


 


POC Glucose (mg/dL)  225 H


 


Random Glucose 


 


Calcium 


 


Phosphorus 


 


Magnesium 


 


Total Bilirubin 


 


AST 


 


ALT 


 


Alkaline Phosphatase 


 


Total Protein 


 


Albumin 


 


Globulin 


 


Albumin/Globulin Ratio 


 


Arterial Blood Potassium 











Radiology Impressions: 


                              Radiology Impressions





Chest X-Ray  19 06:00


IMPRESSION:


No interval change in mild right apical patchy infiltrate and minimal 


left pleural effusion and biapical pleural thickening reiterated.  


Right central venous line retracted slightly.  Nasogastric and 


endotracheal tubes unchanged in position.  


 


 














Critical Care Progress Note





- Nutrition


Nutrition: 


                                    Nutrition











 Category Date Time Status


 


 Consistent Carbohydrate [DIET] Diets  18 Breakfast Active

## 2019-01-07 NOTE — CARD
--------------- APPROVED REPORT --------------





Date of service: 01/04/2019



EKG Measurement

Heart [id number]

[id number]

[id number]

[id number]



<Conclusion>

Atrial fibrillation

Left axis deviation

Right bundle branch block

Inferior infarct, age undetermined

Abnormal ECG

## 2019-01-08 LAB
ALBUMIN SERPL-MCNC: 2.8 G/DL (ref 3.5–5)
ALBUMIN/GLOB SERPL: 1.1 {RATIO} (ref 1–2.1)
ALT SERPL-CCNC: 57 U/L (ref 21–72)
ANISOCYTOSIS BLD QL SMEAR: SLIGHT
ARTERIAL BLOOD GAS O2 SAT: 95.8 % (ref 95–98)
ARTERIAL BLOOD GAS PCO2: 43 MM/HG (ref 35–45)
ARTERIAL BLOOD GAS TCO2: 33.3 MMOL/L (ref 22–28)
ARTERIAL PATENCY WRIST A: (no result)
AST SERPL-CCNC: 36 U/L (ref 17–59)
BASOPHILS # BLD AUTO: 0 K/UL (ref 0–0.2)
BASOPHILS NFR BLD: 0.3 % (ref 0–2)
BUN SERPL-MCNC: 52 MG/DL (ref 9–20)
CALCIUM SERPL-MCNC: 7.5 MG/DL (ref 8.6–10.4)
EOSINOPHIL # BLD AUTO: 0 K/UL (ref 0–0.7)
EOSINOPHIL NFR BLD: 0 % (ref 0–4)
ERYTHROCYTE [DISTWIDTH] IN BLOOD BY AUTOMATED COUNT: 18.4 % (ref 11.5–14.5)
GFR NON-AFRICAN AMERICAN: > 60
HCO3 BLDA-SCNC: 30.8 MMOL/L (ref 21–28)
HGB BLD-MCNC: 13.9 G/DL (ref 12–18)
INHALED O2 CONCENTRATION: 40 %
LYMPHOCYTE: 1 % (ref 20–40)
LYMPHOCYTES # BLD AUTO: 0.2 K/UL (ref 1–4.3)
LYMPHOCYTES NFR BLD AUTO: 1.8 % (ref 20–40)
MCH RBC QN AUTO: 25 PG (ref 27–31)
MCHC RBC AUTO-ENTMCNC: 32.2 G/DL (ref 33–37)
MCV RBC AUTO: 77.8 FL (ref 80–94)
MONOCYTE: 6 % (ref 0–10)
MONOCYTES # BLD: 0.5 K/UL (ref 0–0.8)
MONOCYTES NFR BLD: 4.7 % (ref 0–10)
NEUTROPHILS # BLD: 9.4 K/UL (ref 1.8–7)
NEUTROPHILS NFR BLD AUTO: 93 % (ref 50–75)
NEUTROPHILS NFR BLD AUTO: 93.2 % (ref 50–75)
NRBC BLD AUTO-RTO: 0 % (ref 0–2)
PH BLDA: 7.48 [PH] (ref 7.35–7.45)
PLATELET # BLD EST: NORMAL 10*3/UL
PLATELET # BLD: 174 K/UL (ref 130–400)
PMV BLD AUTO: 9.1 FL (ref 7.2–11.7)
PO2 BLDA: 83 MM/HG (ref 80–100)
RBC # BLD AUTO: 5.56 MIL/UL (ref 4.4–5.9)
TOTAL CELLS COUNTED BLD: 100
WBC # BLD AUTO: 10.1 K/UL (ref 4.8–10.8)

## 2019-01-08 RX ADMIN — METHYLPREDNISOLONE SODIUM SUCCINATE SCH MG: 40 INJECTION, POWDER, FOR SOLUTION INTRAMUSCULAR; INTRAVENOUS at 06:07

## 2019-01-08 RX ADMIN — DEXMEDETOMIDINE HYDROCHLORIDE PRN MLS/HR: 100 INJECTION, SOLUTION, CONCENTRATE INTRAVENOUS at 10:20

## 2019-01-08 RX ADMIN — DEXMEDETOMIDINE HYDROCHLORIDE PRN MLS/HR: 100 INJECTION, SOLUTION, CONCENTRATE INTRAVENOUS at 23:00

## 2019-01-08 RX ADMIN — ENOXAPARIN SODIUM SCH MG: 80 INJECTION SUBCUTANEOUS at 09:52

## 2019-01-08 RX ADMIN — IPRATROPIUM BROMIDE AND ALBUTEROL SULFATE SCH ML: .5; 3 SOLUTION RESPIRATORY (INHALATION) at 16:27

## 2019-01-08 RX ADMIN — ACETYLCYSTEINE SCH ML: 200 INHALANT RESPIRATORY (INHALATION) at 20:19

## 2019-01-08 RX ADMIN — TAZOBACTAM SODIUM AND PIPERACILLIN SODIUM SCH MLS/HR: 375; 3 INJECTION, SOLUTION INTRAVENOUS at 21:39

## 2019-01-08 RX ADMIN — INSULIN GLARGINE SCH UNITS: 100 INJECTION, SOLUTION SUBCUTANEOUS at 21:31

## 2019-01-08 RX ADMIN — PANTOPRAZOLE SODIUM SCH MG: 40 GRANULE, DELAYED RELEASE ORAL at 09:52

## 2019-01-08 RX ADMIN — IPRATROPIUM BROMIDE AND ALBUTEROL SULFATE SCH ML: .5; 3 SOLUTION RESPIRATORY (INHALATION) at 11:45

## 2019-01-08 RX ADMIN — IPRATROPIUM BROMIDE AND ALBUTEROL SULFATE SCH: .5; 3 SOLUTION RESPIRATORY (INHALATION) at 04:05

## 2019-01-08 RX ADMIN — IPRATROPIUM BROMIDE AND ALBUTEROL SULFATE SCH ML: .5; 3 SOLUTION RESPIRATORY (INHALATION) at 20:19

## 2019-01-08 RX ADMIN — ENOXAPARIN SODIUM SCH MG: 80 INJECTION SUBCUTANEOUS at 21:38

## 2019-01-08 RX ADMIN — INSULIN ASPART SCH U: 100 INJECTION, SOLUTION INTRAVENOUS; SUBCUTANEOUS at 06:07

## 2019-01-08 RX ADMIN — METHYLPREDNISOLONE SODIUM SUCCINATE SCH MG: 40 INJECTION, POWDER, FOR SOLUTION INTRAMUSCULAR; INTRAVENOUS at 14:41

## 2019-01-08 RX ADMIN — ACETYLCYSTEINE SCH ML: 200 INHALANT RESPIRATORY (INHALATION) at 08:50

## 2019-01-08 RX ADMIN — IPRATROPIUM BROMIDE AND ALBUTEROL SULFATE SCH ML: .5; 3 SOLUTION RESPIRATORY (INHALATION) at 00:31

## 2019-01-08 RX ADMIN — INSULIN ASPART SCH U: 100 INJECTION, SOLUTION INTRAVENOUS; SUBCUTANEOUS at 00:26

## 2019-01-08 RX ADMIN — ACETYLCYSTEINE SCH: 200 INHALANT RESPIRATORY (INHALATION) at 04:05

## 2019-01-08 RX ADMIN — INSULIN ASPART SCH U: 100 INJECTION, SOLUTION INTRAVENOUS; SUBCUTANEOUS at 19:00

## 2019-01-08 RX ADMIN — TAZOBACTAM SODIUM AND PIPERACILLIN SODIUM SCH MLS/HR: 375; 3 INJECTION, SOLUTION INTRAVENOUS at 06:06

## 2019-01-08 RX ADMIN — TAZOBACTAM SODIUM AND PIPERACILLIN SODIUM SCH MLS/HR: 375; 3 INJECTION, SOLUTION INTRAVENOUS at 15:05

## 2019-01-08 RX ADMIN — PROPOFOL PRN MLS/HR: 10 INJECTION, EMULSION INTRAVENOUS at 02:35

## 2019-01-08 RX ADMIN — ACETYLCYSTEINE SCH ML: 200 INHALANT RESPIRATORY (INHALATION) at 11:45

## 2019-01-08 RX ADMIN — METHYLPREDNISOLONE SODIUM SUCCINATE SCH MG: 40 INJECTION, POWDER, FOR SOLUTION INTRAMUSCULAR; INTRAVENOUS at 21:31

## 2019-01-08 RX ADMIN — IPRATROPIUM BROMIDE AND ALBUTEROL SULFATE SCH ML: .5; 3 SOLUTION RESPIRATORY (INHALATION) at 08:50

## 2019-01-08 RX ADMIN — INSULIN ASPART SCH U: 100 INJECTION, SOLUTION INTRAVENOUS; SUBCUTANEOUS at 12:13

## 2019-01-08 NOTE — CP.CCUPN
<Emily Coto - Last Filed: 01/08/19 10:42>





CCU Subjective





- Physician Review


Subjective (Free Text): 





01/07/19 11:35


ICU Progress Note for Dr. Gonsalez





Patient seen and examined at bedside this morning before morning rounds. Patient

responded with sternal rub but not with voice at that time. Due to patient 

condition ROS unable to be obtained. Patient currently tolerating CPAP this 

morning.








01/08/19 10:43








CCU Objective





- Vital Signs / Intake & Output


Vital Signs (Last 4 hours): 


Vital Signs











  Pulse Resp BP Pulse Ox


 


 01/08/19 10:00  99 H  30 H   96


 


 01/08/19 09:52    135/70 


 


 01/08/19 09:11    174/110 H 


 


 01/08/19 09:00  92 H  28 H   94 L


 


 01/08/19 08:10  74  20  126/85  92 L


 


 01/08/19 08:00  85  20   95


 


 01/08/19 07:10  83  20  117/86  93 L


 


 01/08/19 07:00  84  20   95











Intake and Output (Last 8hrs): 


                                 Intake & Output











 01/07/19 01/08/19 01/08/19





 22:59 06:59 14:59


 


Intake Total 578.5 698.4 49.2


 


Output Total 303 350 245


 


Balance 275.5 348.4 -195.8


 


Weight  159 lb 


 


Intake:   


 


   100 


 


  Intake, IV Amount 78.5 78.4 9.2


 


    Right Medial Port 78.5 78.4 9.2


 


  Tube Feeding 200 320 40


 


  Albumin  200 


 


  Other 200  


 


Output:   


 


  Urine 303 350 245


 


    Urine, Voided 303 350 245


 


Other:   


 


  # Bowel Movements 1 1 














- Physical Exam


Head: Positive for: Atraumatic, Normocephalic


Conjunctiva: Positive for: Normal


Respiratory/Chest: Positive for: Clear to Auscultation, Good Air Exchange


Cardiovascular: Positive for: Regular Rate and Rhythm


Abdomen: Negative for: Tenderness, Rebound, Guarding


Upper Extremity: Positive for: Normal Inspection, NORMAL PULSES, Capillary Refil

l < 2s.  Negative for: Cyanosis, Edema


Lower Extremity: Positive for: Normal Inspection, NORMAL PULSES, Capillary 

Refill < 2 s


Neurological: Positive for: Other (unable to assess due to patient condition)


Psychiatric: Positive for: Lethargic





- Medications


Active Medications: 


Active Medications











Generic Name Dose Route Start Last Admin





  Trade Name Freq  PRN Reason Stop Dose Admin


 


Acetylcysteine  4 ml  01/03/19 20:00  01/08/19 08:50





  Acetylcysteine 20%  INH   4 ml





  RQ6 OLEG   Administration





     





     





     





     


 


Albuterol/Ipratropium  3 ml  12/31/18 08:00  01/08/19 08:50





  Duoneb 3 Mg/0.5 Mg (3 Ml) Ud  INH   3 ml





  RQ4 OLEG   Administration





     





     





     





     


 


Carvedilol  3.125 mg  01/07/19 10:15  01/08/19 09:53





  Coreg  PO   3.125 mg





  BID OLEG   Administration





     





     





     





     


 


Diltiazem HCl  30 mg  01/05/19 14:00  01/08/19 09:52





  Cardizem  PO   30 mg





  QID OLEG   Administration





     





     





     





     


 


Enoxaparin Sodium  70 mg  01/06/19 10:00  01/08/19 09:52





  Lovenox  SC   70 mg





  Q12H OLEG   Administration





     





     





     





     


 


Finasteride  5 mg  12/31/18 10:00  01/08/19 09:52





  Proscar  PO   5 mg





  DAILY OLEG   Administration





     





     





     





     


 


Azithromycin 500 mg/ Sodium  250 mls @ 250 mls/hr  12/31/18 12:00  01/07/19 

11:58





  Chloride  IVPB   250 mls/hr





  Q24H OLEG   Administration





     





     





  Protocol   





     


 


Piperacillin Sod/Tazobactam Sod  3.375 gm in 50 mls @ 100 mls/hr  01/02/19 14:30

 01/08/19 06:06





  Zosyn 3.375 Gm Iv Premix  IVPB   100 mls/hr





  Q8H OLEG   Administration





     





     





  Protocol   





     


 


Propofol  1,000 mg in 100 mls @ 2.286 mls/hr  01/04/19 13:03  01/08/19 02:35





  Diprivan  IV   20 mcg/kg/min





  .Q24H PRN   9.144 mls/hr





  TITRATE PER MD ORDER   Administration





     





  Protocol   





  5 MCG/KG/MIN   


 


Phenylephrine HCl 30 mg/  253 mls @ 10.12 mls/hr  01/04/19 15:17  01/06/19 09:46





  Sodium Chloride  IV   0 mcg/min





  .Q24H PRN   0 mls/hr





  TITRATE PER MD ORDER   Titration





     





  Protocol   





  20 MCG/MIN   


 


Dexmedetomidine HCl 200 mcg/  50 mls @ 3.61 mls/hr  01/08/19 10:06  





  Sodium Chloride  IV   





  TITR PRN   





  Sedation   





     





  Protocol   





  0.2 MCG/KG/HR   


 


Insulin Aspart  0 unit  01/05/19 10:13  01/08/19 06:07





  Novolog  SC   4 u





  Q6 OLEG   Administration





     





     





  Protocol   





     


 


Insulin Glargine  20 unit  01/06/19 14:12  01/07/19 21:42





  Lantus  SC   20 units





  HS OLEG   Administration





     





     





     





     


 


Methylprednisolone  40 mg  01/06/19 14:00  01/08/19 06:07





  Solu-Medrol  IVP   40 mg





  Q8 OLEG   Administration





     





     





     





     


 


Pantoprazole Sodium  40 mg  01/06/19 10:00  01/08/19 09:52





  Protonix Susp  PO   40 mg





  1000 OLEG   Administration





     





     





     





     


 


Tamsulosin HCl  0.4 mg  12/31/18 10:00  01/08/19 09:52





  Flomax  PO   0.4 mg





  BID OLEG   Administration





     





     





     





     














- Patient Studies


Lab Studies: 


                                   Lab Studies











  01/08/19 01/08/19 01/08/19 Range/Units





  06:10 06:10 05:25 


 


WBC   10.1   (4.8-10.8)  K/uL


 


RBC   5.56   (4.40-5.90)  Mil/uL


 


Hgb   13.9   (12.0-18.0)  g/dL


 


Hct   43.2   (35.0-51.0)  %


 


MCV   77.8 L   (80.0-94.0)  fL


 


MCH   25.0 L   (27.0-31.0)  pg


 


MCHC   32.2 L   (33.0-37.0)  g/dL


 


RDW   18.4 H   (11.5-14.5)  %


 


Plt Count   174   (130-400)  K/uL


 


MPV   9.1   (7.2-11.7)  fL


 


Neut % (Auto)   93.2 H   (50.0-75.0)  %


 


Lymph % (Auto)   1.8 L   (20.0-40.0)  %


 


Mono % (Auto)   4.7   (0.0-10.0)  %


 


Eos % (Auto)   0.0   (0.0-4.0)  %


 


Baso % (Auto)   0.3   (0.0-2.0)  %


 


Neut # (Auto)   9.4 H   (1.8-7.0)  K/uL


 


Lymph # (Auto)   0.2 L   (1.0-4.3)  K/uL


 


Mono # (Auto)   0.5   (0.0-0.8)  K/uL


 


Eos # (Auto)   0.0   (0.0-0.7)  K/uL


 


Baso # (Auto)   0.0   (0.0-0.2)  K/uL


 


Neutrophils % (Manual)   93 H   (50-75)  %


 


Lymphocytes % (Manual)   1 L   (20-40)  %


 


Monocytes % (Manual)   6   (0-10)  %


 


Platelet Estimate   Normal   (NORMAL)  


 


Anisocytosis (manual)   Slight   


 


Puncture Site     


 


pCO2     (35-45)  mm/Hg


 


pO2     ()  mm/Hg


 


HCO3     (21-28)  mmol/L


 


ABG pH     (7.35-7.45)  


 


ABG Total CO2     (22-28)  mmol/L


 


ABG O2 Saturation     (95-98)  %


 


ABG Base Excess     (-2.0-3.0)  mmol/L


 


Austin Test     


 


ABG Potassium     (3.6-5.2)  mmol/L


 


A-a O2 Difference     mm/Hg


 


Respiratory Index     


 


Sodium  141    (132-148)  mmol/l


 


Chloride  102    ()  mmol/L


 


Glucose     ()  mg/dl


 


Lactate     (0.7-2.1)  mmol/L


 


Vent Mode     


 


Mechanical Rate     


 


FiO2     %


 


Tidal Volume     


 


PEEP     


 


Potassium  4.6    (3.6-5.2)  mmol/L


 


Carbon Dioxide  37 H    (22-30)  mmol/L


 


Anion Gap  7 L    (10-20)  


 


BUN  52 H    (9-20)  mg/dL


 


Creatinine  0.9    (0.8-1.5)  mg/dL


 


Est GFR (African Amer)  > 60    


 


Est GFR (Non-Af Amer)  > 60    


 


POC Glucose (mg/dL)    209 H  ()  mg/dL


 


Random Glucose  219 H    ()  mg/dL


 


Calcium  7.5 L    (8.6-10.4)  mg/dl


 


Phosphorus  3.4    (2.5-4.5)  mg/dL


 


Magnesium  2.7 H    (1.6-2.3)  mg/dL


 


Total Bilirubin  0.5    (0.2-1.3)  mg/dL


 


AST  36    (17-59)  U/L


 


ALT  57    (21-72)  U/L


 


Alkaline Phosphatase  56    ()  U/L


 


Total Protein  5.3 L    (6.3-8.3)  g/dL


 


Albumin  2.8 L    (3.5-5.0)  g/dL


 


Globulin  2.6    (2.2-3.9)  gm/dL


 


Albumin/Globulin Ratio  1.1    (1.0-2.1)  


 


Arterial Blood Potassium     (3.6-5.2)  mmol/L














  01/08/19 01/08/19 01/07/19 Range/Units





  04:58 00:17 17:38 


 


WBC     (4.8-10.8)  K/uL


 


RBC     (4.40-5.90)  Mil/uL


 


Hgb     (12.0-18.0)  g/dL


 


Hct     (35.0-51.0)  %


 


MCV     (80.0-94.0)  fL


 


MCH     (27.0-31.0)  pg


 


MCHC     (33.0-37.0)  g/dL


 


RDW     (11.5-14.5)  %


 


Plt Count     (130-400)  K/uL


 


MPV     (7.2-11.7)  fL


 


Neut % (Auto)     (50.0-75.0)  %


 


Lymph % (Auto)     (20.0-40.0)  %


 


Mono % (Auto)     (0.0-10.0)  %


 


Eos % (Auto)     (0.0-4.0)  %


 


Baso % (Auto)     (0.0-2.0)  %


 


Neut # (Auto)     (1.8-7.0)  K/uL


 


Lymph # (Auto)     (1.0-4.3)  K/uL


 


Mono # (Auto)     (0.0-0.8)  K/uL


 


Eos # (Auto)     (0.0-0.7)  K/uL


 


Baso # (Auto)     (0.0-0.2)  K/uL


 


Neutrophils % (Manual)     (50-75)  %


 


Lymphocytes % (Manual)     (20-40)  %


 


Monocytes % (Manual)     (0-10)  %


 


Platelet Estimate     (NORMAL)  


 


Anisocytosis (manual)     


 


Puncture Site  Rr    


 


pCO2  43    (35-45)  mm/Hg


 


pO2  83    ()  mm/Hg


 


HCO3  30.8 H    (21-28)  mmol/L


 


ABG pH  7.48 H    (7.35-7.45)  


 


ABG Total CO2  33.3 H    (22-28)  mmol/L


 


ABG O2 Saturation  95.8    (95-98)  %


 


ABG Base Excess  7.6 H    (-2.0-3.0)  mmol/L


 


[place] Test  Pos    


 


ABG Potassium  4.4    (3.6-5.2)  mmol/L


 


A-a O2 Difference  148.0    mm/Hg


 


Respiratory Index  1.8    


 


Sodium  142.0    (132-148)  mmol/l


 


Chloride  109.0 H    ()  mmol/L


 


Glucose  221 H    ()  mg/dl


 


Lactate  1.2    (0.7-2.1)  mmol/L


 


Vent Mode  Prvc    


 


Mechanical Rate  20    


 


FiO2  40.0    %


 


Tidal Volume  500    


 


PEEP  5    


 


Potassium     (3.6-5.2)  mmol/L


 


Carbon Dioxide     (22-30)  mmol/L


 


Anion Gap     (10-20)  


 


BUN     (9-20)  mg/dL


 


Creatinine     (0.8-1.5)  mg/dL


 


Est GFR (African Amer)     


 


Est GFR (Non-Af Amer)     


 


POC Glucose (mg/dL)   215 H  225 H  ()  mg/dL


 


Random Glucose     ()  mg/dL


 


Calcium     (8.6-10.4)  mg/dl


 


Phosphorus     (2.5-4.5)  mg/dL


 


Magnesium     (1.6-2.3)  mg/dL


 


Total Bilirubin     (0.2-1.3)  mg/dL


 


AST     (17-59)  U/L


 


ALT     (21-72)  U/L


 


Alkaline Phosphatase     ()  U/L


 


Total Protein     (6.3-8.3)  g/dL


 


Albumin     (3.5-5.0)  g/dL


 


Globulin     (2.2-3.9)  gm/dL


 


Albumin/Globulin Ratio     (1.0-2.1)  


 


Arterial Blood Potassium  4.4    (3.6-5.2)  mmol/L














  01/07/19 Range/Units





  11:11 


 


WBC   (4.8-10.8)  K/uL


 


RBC   (4.40-5.90)  Mil/uL


 


Hgb   (12.0-18.0)  g/dL


 


Hct   (35.0-51.0)  %


 


MCV   (80.0-94.0)  fL


 


MCH   (27.0-31.0)  pg


 


MCHC   (33.0-37.0)  g/dL


 


RDW   (11.5-14.5)  %


 


Plt Count   (130-400)  K/uL


 


MPV   (7.2-11.7)  fL


 


Neut % (Auto)   (50.0-75.0)  %


 


Lymph % (Auto)   (20.0-40.0)  %


 


Mono % (Auto)   (0.0-10.0)  %


 


Eos % (Auto)   (0.0-4.0)  %


 


Baso % (Auto)   (0.0-2.0)  %


 


Neut # (Auto)   (1.8-7.0)  K/uL


 


Lymph # (Auto)   (1.0-4.3)  K/uL


 


Mono # (Auto)   (0.0-0.8)  K/uL


 


Eos # (Auto)   (0.0-0.7)  K/uL


 


Baso # (Auto)   (0.0-0.2)  K/uL


 


Neutrophils % (Manual)   (50-75)  %


 


Lymphocytes % (Manual)   (20-40)  %


 


Monocytes % (Manual)   (0-10)  %


 


Platelet Estimate   (NORMAL)  


 


Anisocytosis (manual)   


 


Puncture Site   


 


pCO2   (35-45)  mm/Hg


 


pO2   ()  mm/Hg


 


HCO3   (21-28)  mmol/L


 


ABG pH   (7.35-7.45)  


 


ABG Total CO2   (22-28)  mmol/L


 


ABG O2 Saturation   (95-98)  %


 


ABG Base Excess   (-2.0-3.0)  mmol/L


 


Austin Test   


 


ABG Potassium   (3.6-5.2)  mmol/L


 


A-a O2 Difference   mm/Hg


 


Respiratory Index   


 


Sodium   (132-148)  mmol/l


 


Chloride   ()  mmol/L


 


Glucose   ()  mg/dl


 


Lactate   (0.7-2.1)  mmol/L


 


Vent Mode   


 


Mechanical Rate   


 


FiO2   %


 


Tidal Volume   


 


PEEP   


 


Potassium   (3.6-5.2)  mmol/L


 


Carbon Dioxide   (22-30)  mmol/L


 


Anion Gap   (10-20)  


 


BUN   (9-20)  mg/dL


 


Creatinine   (0.8-1.5)  mg/dL


 


Est GFR (African Amer)   


 


Est GFR (Non-Af Amer)   


 


POC Glucose (mg/dL)  250 H  ()  mg/dL


 


Random Glucose   ()  mg/dL


 


Calcium   (8.6-10.4)  mg/dl


 


Phosphorus   (2.5-4.5)  mg/dL


 


Magnesium   (1.6-2.3)  mg/dL


 


Total Bilirubin   (0.2-1.3)  mg/dL


 


AST   (17-59)  U/L


 


ALT   (21-72)  U/L


 


Alkaline Phosphatase   ()  U/L


 


Total Protein   (6.3-8.3)  g/dL


 


Albumin   (3.5-5.0)  g/dL


 


Globulin   (2.2-3.9)  gm/dL


 


Albumin/Globulin Ratio   (1.0-2.1)  


 


Arterial Blood Potassium   (3.6-5.2)  mmol/L








                         Laboratory Results - last 24 hr











  01/07/19 01/07/19 01/08/19





  11:11 17:38 00:17


 


WBC   


 


RBC   


 


Hgb   


 


Hct   


 


MCV   


 


MCH   


 


MCHC   


 


RDW   


 


Plt Count   


 


MPV   


 


Neut % (Auto)   


 


Lymph % (Auto)   


 


Mono % (Auto)   


 


Eos % (Auto)   


 


Baso % (Auto)   


 


Neut # (Auto)   


 


Lymph # (Auto)   


 


Mono # (Auto)   


 


Eos # (Auto)   


 


Baso # (Auto)   


 


Neutrophils % (Manual)   


 


Lymphocytes % (Manual)   


 


Monocytes % (Manual)   


 


Platelet Estimate   


 


Anisocytosis (manual)   


 


Puncture Site   


 


pCO2   


 


pO2   


 


HCO3   


 


ABG pH   


 


ABG Total CO2   


 


ABG O2 Saturation   


 


ABG Base Excess   


 


Austin Test   


 


ABG Potassium   


 


A-a O2 Difference   


 


Respiratory Index   


 


Sodium   


 


Chloride   


 


Glucose   


 


Lactate   


 


Vent Mode   


 


Mechanical Rate   


 


FiO2   


 


Tidal Volume   


 


PEEP   


 


Potassium   


 


Carbon Dioxide   


 


Anion Gap   


 


BUN   


 


Creatinine   


 


Est GFR ( Amer)   


 


Est GFR (Non-Af Amer)   


 


POC Glucose (mg/dL)  250 H  225 H  215 H


 


Random Glucose   


 


Calcium   


 


Phosphorus   


 


Magnesium   


 


Total Bilirubin   


 


AST   


 


ALT   


 


Alkaline Phosphatase   


 


Total Protein   


 


Albumin   


 


Globulin   


 


Albumin/Globulin Ratio   


 


Arterial Blood Potassium   














  01/08/19 01/08/19 01/08/19





  04:58 05:25 06:10


 


WBC    10.1


 


RBC    5.56


 


Hgb    13.9


 


Hct    43.2


 


MCV    77.8 L


 


MCH    25.0 L


 


MCHC    32.2 L


 


RDW    18.4 H


 


Plt Count    174


 


MPV    9.1


 


Neut % (Auto)    93.2 H


 


Lymph % (Auto)    1.8 L


 


Mono % (Auto)    4.7


 


Eos % (Auto)    0.0


 


Baso % (Auto)    0.3


 


Neut # (Auto)    9.4 H


 


Lymph # (Auto)    0.2 L


 


Mono # (Auto)    0.5


 


Eos # (Auto)    0.0


 


Baso # (Auto)    0.0


 


Neutrophils % (Manual)    93 H


 


Lymphocytes % (Manual)    1 L


 


Monocytes % (Manual)    6


 


Platelet Estimate    Normal


 


Anisocytosis (manual)    Slight


 


Puncture Site  Rr  


 


pCO2  43  


 


pO2  83  


 


HCO3  30.8 H  


 


ABG pH  7.48 H  


 


ABG Total CO2  33.3 H  


 


ABG O2 Saturation  95.8  


 


ABG Base Excess  7.6 H  


 


[place] Test  Pos  


 


ABG Potassium  4.4  


 


A-a O2 Difference  148.0  


 


Respiratory Index  1.8  


 


Sodium  142.0  


 


Chloride  109.0 H  


 


Glucose  221 H  


 


Lactate  1.2  


 


Vent Mode  Prvc  


 


Mechanical Rate  20  


 


FiO2  40.0  


 


Tidal Volume  500  


 


PEEP  5  


 


Potassium   


 


Carbon Dioxide   


 


Anion Gap   


 


BUN   


 


Creatinine   


 


Est GFR ( Amer)   


 


Est GFR (Non-Af Amer)   


 


POC Glucose (mg/dL)   209 H 


 


Random Glucose   


 


Calcium   


 


Phosphorus   


 


Magnesium   


 


Total Bilirubin   


 


AST   


 


ALT   


 


Alkaline Phosphatase   


 


Total Protein   


 


Albumin   


 


Globulin   


 


Albumin/Globulin Ratio   


 


Arterial Blood Potassium  4.4  














  01/08/19





  06:10


 


WBC 


 


RBC 


 


Hgb 


 


Hct 


 


MCV 


 


MCH 


 


MCHC 


 


RDW 


 


Plt Count 


 


MPV 


 


Neut % (Auto) 


 


Lymph % (Auto) 


 


Mono % (Auto) 


 


Eos % (Auto) 


 


Baso % (Auto) 


 


Neut # (Auto) 


 


Lymph # (Auto) 


 


Mono # (Auto) 


 


Eos # (Auto) 


 


Baso # (Auto) 


 


Neutrophils % (Manual) 


 


Lymphocytes % (Manual) 


 


Monocytes % (Manual) 


 


Platelet Estimate 


 


Anisocytosis (manual) 


 


Puncture Site 


 


pCO2 


 


pO2 


 


HCO3 


 


ABG pH 


 


ABG Total CO2 


 


ABG O2 Saturation 


 


ABG Base Excess 


 


Austin Test 


 


ABG Potassium 


 


A-a O2 Difference 


 


Respiratory Index 


 


Sodium  141


 


Chloride  102


 


Glucose 


 


Lactate 


 


Vent Mode 


 


Mechanical Rate 


 


FiO2 


 


Tidal Volume 


 


PEEP 


 


Potassium  4.6


 


Carbon Dioxide  37 H


 


Anion Gap  7 L


 


BUN  52 H


 


Creatinine  0.9


 


Est GFR ( Amer)  > 60


 


Est GFR (Non-Af Amer)  > 60


 


POC Glucose (mg/dL) 


 


Random Glucose  219 H


 


Calcium  7.5 L


 


Phosphorus  3.4


 


Magnesium  2.7 H


 


Total Bilirubin  0.5


 


AST  36


 


ALT  57


 


Alkaline Phosphatase  56


 


Total Protein  5.3 L


 


Albumin  2.8 L


 


Globulin  2.6


 


Albumin/Globulin Ratio  1.1


 


Arterial Blood Potassium 











Fingerstick Blood Sugar Results: 209





Critical Care Progress Note





- Nutrition


Nutrition: 


                                    Nutrition











 Category Date Time Status


 


 Consistent Carbohydrate [DIET] Diets  12/31/18 Breakfast Active














Assessment/Plan





- Assessment and Plan (Free Text)


Assessment: 





80 y/o male with PMHx of COPD, asthma, sleep apnea, HTN, DM, GERD arrived to ICU

 from  due to respiratory distress on 1/4 and was intubated in ICU. Currently 

stable and still in ICU. 





neuro


-was agitated prior to intubation on 1/4


-sedated on propofol


-currently lethargic





CV


-severe pHTN and Afib. Prior cath - non obstructive and EF normal


-prior to intubation, HR > 150 on 1/4, s/p cardizem 5 mg IVP and at least 2 x 

500cc NS boluses due to hypotension 70s/30s


-currently on coreg 3.125 mg BID - rx today 1/7


-continue with diltiazem 30 mg PO qid


-continue with finasteride 5 mg 


-Dr. Randle cardio recs: lovenox 70 mg sq BID, full AC for Afib. Already ordered 

by him.





Pulm


-patient w/ COPD, asthma and sleep apnea


-on duonebs q4h


-ABG: metabolic alkalosis, hypercapnea, improving


-Vent settings: CPAP this morning 1/8 with rate 28 and  -> SBI 62. Will 

attempt extubation. Patient currently not on pressors.


-CXR 1/8 congestion -> lasix 40 mg IV x 1


-CXR and ABG qAM


-pulm Dr. Gonsalez following





Renal


-renal function wnl on 1/2 - BUN elevated, Cr wnl


-no acute issues


-CMP qAM





GI


-no acute issues





Heme


-patient w/ H/H wnl


-no acute issues


-CBC qAM





ID


-on azithromycin 500 mg q24h started 12/31


-on zosyn 3.375 mg q8h started 12/31





Endo


-continue with ISS


-lantus 20 u qhs


-accuchecks q6h





POC: Gigi Ariza [phone]





DVT ppx: lovenox 40 mg sq daily


GI ppx: protonix 40 mg IV daily





case discussed with Dr. Wallace Coto PGY1





<Raza Gonsalez S - Last Filed: 01/08/19 16:27>





CCU Subjective





- Physician Review


Critical Care Time Spent (in minutes): 40





CCU Objective





- Vital Signs / Intake & Output


Vital Signs (Last 4 hours): 


Vital Signs











  Pulse Resp BP Pulse Ox


 


 01/08/19 16:11  76  20  81/53 L  95


 


 01/08/19 15:02    121/100 H 


 


 01/08/19 15:00  93 H  50 H   97


 


 01/08/19 14:17    136/110 H 


 


 01/08/19 14:07     97


 


 01/08/19 14:00  103 H  29 H   96


 


 01/08/19 13:13    156/112 H 


 


 01/08/19 13:00  107 H  26 H   97


 


 01/08/19 12:44  96 H  30 H  140/114 H  96


 


 01/08/19 12:26  109 H  18  155/92 H  97











Intake and Output (Last 8hrs): 


                                 Intake & Output











 01/08/19 01/08/19 01/08/19





 06:59 14:59 22:59


 


Intake Total 698.4 362.0 19.6


 


Output Total 350 1745 325


 


Balance 348.4 -1383.0 -305.4


 


Weight 159 lb  


 


Intake:   


 


    


 


  Intake, IV Amount 78.4 322.0 19.6


 


    Right Distal Port  250 


 


    Right Medial Port 78.4 72.0 19.6


 


  Tube Feeding 320 40 


 


  Albumin 200  


 


Output:   


 


  Urine 350 1745 325


 


    Urine, Voided 350 1745 325


 


Other:   


 


  # Bowel Movements 1  














- Medications


Active Medications: 


Active Medications











Generic Name Dose Route Start Last Admin





  Trade Name Freq  PRN Reason Stop Dose Admin


 


Acetylcysteine  4 ml  01/03/19 20:00  01/08/19 08:50





  Acetylcysteine 20%  INH   4 ml





  RQ6 OLEG   Administration





     





     





     





     


 


Albuterol/Ipratropium  3 ml  12/31/18 08:00  01/08/19 08:50





  Duoneb 3 Mg/0.5 Mg (3 Ml) Ud  INH   3 ml





  RQ4 OLEG   Administration





     





     





     





     


 


Carvedilol  3.125 mg  01/07/19 10:15  01/08/19 09:53





  Coreg  PO   3.125 mg





  BID OLEG   Administration





     





     





     





     


 


Diltiazem HCl  30 mg  01/05/19 14:00  01/08/19 14:41





  Cardizem  PO   30 mg





  QID OLEG   Administration





     





     





     





     


 


Enoxaparin Sodium  70 mg  01/06/19 10:00  01/08/19 09:52





  Lovenox  SC   70 mg





  Q12H OLEG   Administration





     





     





     





     


 


Finasteride  5 mg  12/31/18 10:00  01/08/19 09:52





  Proscar  PO   5 mg





  DAILY OLEG   Administration





     





     





     





     


 


Azithromycin 500 mg/ Sodium  250 mls @ 250 mls/hr  12/31/18 12:00  01/08/19 

12:13





  Chloride  IVPB   250 mls/hr





  Q24H OLEG   Administration





     





     





  Protocol   





     


 


Piperacillin Sod/Tazobactam Sod  3.375 gm in 50 mls @ 100 mls/hr  01/02/19 14:30

  01/08/19 15:05





  Zosyn 3.375 Gm Iv Premix  IVPB   100 mls/hr





  Q8H OLEG   Administration





     





     





  Protocol   





     


 


Propofol  1,000 mg in 100 mls @ 2.286 mls/hr  01/04/19 13:03  01/08/19 02:35





  Diprivan  IV   20 mcg/kg/min





  .Q24H PRN   9.144 mls/hr





  TITRATE PER MD ORDER   Administration





     





  Protocol   





  5 MCG/KG/MIN   


 


Phenylephrine HCl 30 mg/  253 mls @ 10.12 mls/hr  01/04/19 15:17  01/06/19 09:46





  Sodium Chloride  IV   0 mcg/min





  .Q24H PRN   0 mls/hr





  TITRATE PER MD ORDER   Titration





     





  Protocol   





  20 MCG/MIN   


 


Dexmedetomidine HCl 200 mcg/  50 mls @ 3.61 mls/hr  01/08/19 14:01  





  Sodium Chloride  IV   





  TITR PRN   





  Sedation   





     





  Protocol   





  0.2 MCG/KG/HR   


 


Propofol  1,000 mg in 100 mls @ 2.164 mls/hr  01/08/19 15:02  





  Diprivan  IV   





  .Q24H PRN   





  TITRATE PER MD ORDER   





     





  Protocol   





  5 MCG/KG/MIN   


 


Insulin Aspart  0 unit  01/05/19 10:13  01/08/19 12:13





  Novolog  SC   4 u





  Q6 OLEG   Administration





     





     





  Protocol   





     


 


Insulin Glargine  20 unit  01/06/19 14:12  01/07/19 21:42





  Lantus  SC   20 units





  HS OLEG   Administration





     





     





     





     


 


Methylprednisolone  40 mg  01/06/19 14:00  01/08/19 14:41





  Solu-Medrol  IVP   40 mg





  Q8 OLEG   Administration





     





     





     





     


 


Pantoprazole Sodium  40 mg  01/06/19 10:00  01/08/19 09:52





  Protonix Susp  PO   40 mg





  1000 OLEG   Administration





     





     





     





     


 


Tamsulosin HCl  0.4 mg  12/31/18 10:00  01/08/19 09:52





  Flomax  PO   0.4 mg





  BID OLEG   Administration





     





     





     





     














- Patient Studies


Lab Studies: 


                                   Lab Studies











  01/08/19 01/08/19 01/08/19 Range/Units





  11:10 06:10 06:10 


 


WBC    10.1  (4.8-10.8)  K/uL


 


RBC    5.56  (4.40-5.90)  Mil/uL


 


Hgb    13.9  (12.0-18.0)  g/dL


 


Hct    43.2  (35.0-51.0)  %


 


MCV    77.8 L  (80.0-94.0)  fL


 


MCH    25.0 L  (27.0-31.0)  pg


 


MCHC    32.2 L  (33.0-37.0)  g/dL


 


RDW    18.4 H  (11.5-14.5)  %


 


Plt Count    174  (130-400)  K/uL


 


MPV    9.1  (7.2-11.7)  fL


 


Neut % (Auto)    93.2 H  (50.0-75.0)  %


 


Lymph % (Auto)    1.8 L  (20.0-40.0)  %


 


Mono % (Auto)    4.7  (0.0-10.0)  %


 


Eos % (Auto)    0.0  (0.0-4.0)  %


 


Baso % (Auto)    0.3  (0.0-2.0)  %


 


Neut # (Auto)    9.4 H  (1.8-7.0)  K/uL


 


Lymph # (Auto)    0.2 L  (1.0-4.3)  K/uL


 


Mono # (Auto)    0.5  (0.0-0.8)  K/uL


 


Eos # (Auto)    0.0  (0.0-0.7)  K/uL


 


Baso # (Auto)    0.0  (0.0-0.2)  K/uL


 


Neutrophils % (Manual)    93 H  (50-75)  %


 


Lymphocytes % (Manual)    1 L  (20-40)  %


 


Monocytes % (Manual)    6  (0-10)  %


 


Platelet Estimate    Normal  (NORMAL)  


 


Anisocytosis (manual)    Slight  


 


Puncture Site     


 


pCO2     (35-45)  mm/Hg


 


pO2     ()  mm/Hg


 


HCO3     (21-28)  mmol/L


 


ABG pH     (7.35-7.45)  


 


ABG Total CO2     (22-28)  mmol/L


 


ABG O2 Saturation     (95-98)  %


 


ABG Base Excess     (-2.0-3.0)  mmol/L


 


[place] Test     


 


ABG Potassium     (3.6-5.2)  mmol/L


 


A-a O2 Difference     mm/Hg


 


Respiratory Index     


 


Sodium   141   (132-148)  mmol/l


 


Chloride   102   ()  mmol/L


 


Glucose     ()  mg/dl


 


Lactate     (0.7-2.1)  mmol/L


 


Vent Mode     


 


Mechanical Rate     


 


FiO2     %


 


Tidal Volume     


 


PEEP     


 


Potassium   4.6   (3.6-5.2)  mmol/L


 


Carbon Dioxide   37 H   (22-30)  mmol/L


 


Anion Gap   7 L   (10-20)  


 


BUN   52 H   (9-20)  mg/dL


 


Creatinine   0.9   (0.8-1.5)  mg/dL


 


Est GFR (African Amer)   > 60   


 


Est GFR (Non-Af Amer)   > 60   


 


POC Glucose (mg/dL)  242 H    ()  mg/dL


 


Random Glucose   219 H   ()  mg/dL


 


Calcium   7.5 L   (8.6-10.4)  mg/dl


 


Phosphorus   3.4   (2.5-4.5)  mg/dL


 


Magnesium   2.7 H   (1.6-2.3)  mg/dL


 


Total Bilirubin   0.5   (0.2-1.3)  mg/dL


 


AST   36   (17-59)  U/L


 


ALT   57   (21-72)  U/L


 


Alkaline Phosphatase   56   ()  U/L


 


Total Protein   5.3 L   (6.3-8.3)  g/dL


 


Albumin   2.8 L   (3.5-5.0)  g/dL


 


Globulin   2.6   (2.2-3.9)  gm/dL


 


Albumin/Globulin Ratio   1.1   (1.0-2.1)  


 


Arterial Blood Potassium     (3.6-5.2)  mmol/L














  01/08/19 01/08/19 01/08/19 Range/Units





  05:25 04:58 00:17 


 


WBC     (4.8-10.8)  K/uL


 


RBC     (4.40-5.90)  Mil/uL


 


Hgb     (12.0-18.0)  g/dL


 


Hct     (35.0-51.0)  %


 


MCV     (80.0-94.0)  fL


 


MCH     (27.0-31.0)  pg


 


MCHC     (33.0-37.0)  g/dL


 


RDW     (11.5-14.5)  %


 


Plt Count     (130-400)  K/uL


 


MPV     (7.2-11.7)  fL


 


Neut % (Auto)     (50.0-75.0)  %


 


Lymph % (Auto)     (20.0-40.0)  %


 


Mono % (Auto)     (0.0-10.0)  %


 


Eos % (Auto)     (0.0-4.0)  %


 


Baso % (Auto)     (0.0-2.0)  %


 


Neut # (Auto)     (1.8-7.0)  K/uL


 


Lymph # (Auto)     (1.0-4.3)  K/uL


 


Mono # (Auto)     (0.0-0.8)  K/uL


 


Eos # (Auto)     (0.0-0.7)  K/uL


 


Baso # (Auto)     (0.0-0.2)  K/uL


 


Neutrophils % (Manual)     (50-75)  %


 


Lymphocytes % (Manual)     (20-40)  %


 


Monocytes % (Manual)     (0-10)  %


 


Platelet Estimate     (NORMAL)  


 


Anisocytosis (manual)     


 


Puncture Site   Rr   


 


pCO2   43   (35-45)  mm/Hg


 


pO2   83   ()  mm/Hg


 


HCO3   30.8 H   (21-28)  mmol/L


 


ABG pH   7.48 H   (7.35-7.45)  


 


ABG Total CO2   33.3 H   (22-28)  mmol/L


 


ABG O2 Saturation   95.8   (95-98)  %


 


ABG Base Excess   7.6 H   (-2.0-3.0)  mmol/L


 


Austin Test   Pos   


 


ABG Potassium   4.4   (3.6-5.2)  mmol/L


 


A-a O2 Difference   148.0   mm/Hg


 


Respiratory Index   1.8   


 


Sodium   142.0   (132-148)  mmol/l


 


Chloride   109.0 H   ()  mmol/L


 


Glucose   221 H   ()  mg/dl


 


Lactate   1.2   (0.7-2.1)  mmol/L


 


Vent Mode   Prvc   


 


Mechanical Rate   20   


 


FiO2   40.0   %


 


Tidal Volume   500   


 


PEEP   5   


 


Potassium     (3.6-5.2)  mmol/L


 


Carbon Dioxide     (22-30)  mmol/L


 


Anion Gap     (10-20)  


 


BUN     (9-20)  mg/dL


 


Creatinine     (0.8-1.5)  mg/dL


 


Est GFR (African Amer)     


 


Est GFR (Non-Af Amer)     


 


POC Glucose (mg/dL)  209 H   215 H  ()  mg/dL


 


Random Glucose     ()  mg/dL


 


Calcium     (8.6-10.4)  mg/dl


 


Phosphorus     (2.5-4.5)  mg/dL


 


Magnesium     (1.6-2.3)  mg/dL


 


Total Bilirubin     (0.2-1.3)  mg/dL


 


AST     (17-59)  U/L


 


ALT     (21-72)  U/L


 


Alkaline Phosphatase     ()  U/L


 


Total Protein     (6.3-8.3)  g/dL


 


Albumin     (3.5-5.0)  g/dL


 


Globulin     (2.2-3.9)  gm/dL


 


Albumin/Globulin Ratio     (1.0-2.1)  


 


Arterial Blood Potassium   4.4   (3.6-5.2)  mmol/L














  01/07/19 01/07/19 Range/Units





  17:38 11:11 


 


WBC    (4.8-10.8)  K/uL


 


RBC    (4.40-5.90)  Mil/uL


 


Hgb    (12.0-18.0)  g/dL


 


Hct    (35.0-51.0)  %


 


MCV    (80.0-94.0)  fL


 


MCH    (27.0-31.0)  pg


 


MCHC    (33.0-37.0)  g/dL


 


RDW    (11.5-14.5)  %


 


Plt Count    (130-400)  K/uL


 


MPV    (7.2-11.7)  fL


 


Neut % (Auto)    (50.0-75.0)  %


 


Lymph % (Auto)    (20.0-40.0)  %


 


Mono % (Auto)    (0.0-10.0)  %


 


Eos % (Auto)    (0.0-4.0)  %


 


Baso % (Auto)    (0.0-2.0)  %


 


Neut # (Auto)    (1.8-7.0)  K/uL


 


Lymph # (Auto)    (1.0-4.3)  K/uL


 


Mono # (Auto)    (0.0-0.8)  K/uL


 


Eos # (Auto)    (0.0-0.7)  K/uL


 


Baso # (Auto)    (0.0-0.2)  K/uL


 


Neutrophils % (Manual)    (50-75)  %


 


Lymphocytes % (Manual)    (20-40)  %


 


Monocytes % (Manual)    (0-10)  %


 


Platelet Estimate    (NORMAL)  


 


Anisocytosis (manual)    


 


Puncture Site    


 


pCO2    (35-45)  mm/Hg


 


pO2    ()  mm/Hg


 


HCO3    (21-28)  mmol/L


 


ABG pH    (7.35-7.45)  


 


ABG Total CO2    (22-28)  mmol/L


 


ABG O2 Saturation    (95-98)  %


 


ABG Base Excess    (-2.0-3.0)  mmol/L


 


Austin Test    


 


ABG Potassium    (3.6-5.2)  mmol/L


 


A-a O2 Difference    mm/Hg


 


Respiratory Index    


 


Sodium    (132-148)  mmol/l


 


Chloride    ()  mmol/L


 


Glucose    ()  mg/dl


 


Lactate    (0.7-2.1)  mmol/L


 


Vent Mode    


 


Mechanical Rate    


 


FiO2    %


 


Tidal Volume    


 


PEEP    


 


Potassium    (3.6-5.2)  mmol/L


 


Carbon Dioxide    (22-30)  mmol/L


 


Anion Gap    (10-20)  


 


BUN    (9-20)  mg/dL


 


Creatinine    (0.8-1.5)  mg/dL


 


Est GFR (African Amer)    


 


Est GFR (Non-Af Amer)    


 


POC Glucose (mg/dL)  225 H  250 H  ()  mg/dL


 


Random Glucose    ()  mg/dL


 


Calcium    (8.6-10.4)  mg/dl


 


Phosphorus    (2.5-4.5)  mg/dL


 


Magnesium    (1.6-2.3)  mg/dL


 


Total Bilirubin    (0.2-1.3)  mg/dL


 


AST    (17-59)  U/L


 


ALT    (21-72)  U/L


 


Alkaline Phosphatase    ()  U/L


 


Total Protein    (6.3-8.3)  g/dL


 


Albumin    (3.5-5.0)  g/dL


 


Globulin    (2.2-3.9)  gm/dL


 


Albumin/Globulin Ratio    (1.0-2.1)  


 


Arterial Blood Potassium    (3.6-5.2)  mmol/L








                         Laboratory Results - last 24 hr











  01/07/19 01/07/19 01/08/19





  11:11 17:38 00:17


 


WBC   


 


RBC   


 


Hgb   


 


Hct   


 


MCV   


 


MCH   


 


MCHC   


 


RDW   


 


Plt Count   


 


MPV   


 


Neut % (Auto)   


 


Lymph % (Auto)   


 


Mono % (Auto)   


 


Eos % (Auto)   


 


Baso % (Auto)   


 


Neut # (Auto)   


 


Lymph # (Auto)   


 


Mono # (Auto)   


 


Eos # (Auto)   


 


Baso # (Auto)   


 


Neutrophils % (Manual)   


 


Lymphocytes % (Manual)   


 


Monocytes % (Manual)   


 


Platelet Estimate   


 


Anisocytosis (manual)   


 


Puncture Site   


 


pCO2   


 


pO2   


 


HCO3   


 


ABG pH   


 


ABG Total CO2   


 


ABG O2 Saturation   


 


ABG Base Excess   


 


Austin Test   


 


ABG Potassium   


 


A-a O2 Difference   


 


Respiratory Index   


 


Sodium   


 


Chloride   


 


Glucose   


 


Lactate   


 


Vent Mode   


 


Mechanical Rate   


 


FiO2   


 


Tidal Volume   


 


PEEP   


 


Potassium   


 


Carbon Dioxide   


 


Anion Gap   


 


BUN   


 


Creatinine   


 


Est GFR ( Amer)   


 


Est GFR (Non-Af Amer)   


 


POC Glucose (mg/dL)  250 H  225 H  215 H


 


Random Glucose   


 


Calcium   


 


Phosphorus   


 


Magnesium   


 


Total Bilirubin   


 


AST   


 


ALT   


 


Alkaline Phosphatase   


 


Total Protein   


 


Albumin   


 


Globulin   


 


Albumin/Globulin Ratio   


 


Arterial Blood Potassium   














  01/08/19 01/08/19 01/08/19





  04:58 05:25 06:10


 


WBC    10.1


 


RBC    5.56


 


Hgb    13.9


 


Hct    43.2


 


MCV    77.8 L


 


MCH    25.0 L


 


MCHC    32.2 L


 


RDW    18.4 H


 


Plt Count    174


 


MPV    9.1


 


Neut % (Auto)    93.2 H


 


Lymph % (Auto)    1.8 L


 


Mono % (Auto)    4.7


 


Eos % (Auto)    0.0


 


Baso % (Auto)    0.3


 


Neut # (Auto)    9.4 H


 


Lymph # (Auto)    0.2 L


 


Mono # (Auto)    0.5


 


Eos # (Auto)    0.0


 


Baso # (Auto)    0.0


 


Neutrophils % (Manual)    93 H


 


Lymphocytes % (Manual)    1 L


 


Monocytes % (Manual)    6


 


Platelet Estimate    Normal


 


Anisocytosis (manual)    Slight


 


Puncture Site  Rr  


 


pCO2  43  


 


pO2  83  


 


HCO3  30.8 H  


 


ABG pH  7.48 H  


 


ABG Total CO2  33.3 H  


 


ABG O2 Saturation  95.8  


 


ABG Base Excess  7.6 H  


 


[place] Test  Pos  


 


ABG Potassium  4.4  


 


A-a O2 Difference  148.0  


 


Respiratory Index  1.8  


 


Sodium  142.0  


 


Chloride  109.0 H  


 


Glucose  221 H  


 


Lactate  1.2  


 


Vent Mode  Prvc  


 


Mechanical Rate  20  


 


FiO2  40.0  


 


Tidal Volume  500  


 


PEEP  5  


 


Potassium   


 


Carbon Dioxide   


 


Anion Gap   


 


BUN   


 


Creatinine   


 


Est GFR ( Amer)   


 


Est GFR (Non-Af Amer)   


 


POC Glucose (mg/dL)   209 H 


 


Random Glucose   


 


Calcium   


 


Phosphorus   


 


Magnesium   


 


Total Bilirubin   


 


AST   


 


ALT   


 


Alkaline Phosphatase   


 


Total Protein   


 


Albumin   


 


Globulin   


 


Albumin/Globulin Ratio   


 


Arterial Blood Potassium  4.4  














  01/08/19 01/08/19





  06:10 11:10


 


WBC  


 


RBC  


 


Hgb  


 


Hct  


 


MCV  


 


MCH  


 


MCHC  


 


RDW  


 


Plt Count  


 


MPV  


 


Neut % (Auto)  


 


Lymph % (Auto)  


 


Mono % (Auto)  


 


Eos % (Auto)  


 


Baso % (Auto)  


 


Neut # (Auto)  


 


Lymph # (Auto)  


 


Mono # (Auto)  


 


Eos # (Auto)  


 


Baso # (Auto)  


 


Neutrophils % (Manual)  


 


Lymphocytes % (Manual)  


 


Monocytes % (Manual)  


 


Platelet Estimate  


 


Anisocytosis (manual)  


 


Puncture Site  


 


pCO2  


 


pO2  


 


HCO3  


 


ABG pH  


 


ABG Total CO2  


 


ABG O2 Saturation  


 


ABG Base Excess  


 


Austin Test  


 


ABG Potassium  


 


A-a O2 Difference  


 


Respiratory Index  


 


Sodium  141 


 


Chloride  102 


 


Glucose  


 


Lactate  


 


Vent Mode  


 


Mechanical Rate  


 


FiO2  


 


Tidal Volume  


 


PEEP  


 


Potassium  4.6 


 


Carbon Dioxide  37 H 


 


Anion Gap  7 L 


 


BUN  52 H 


 


Creatinine  0.9 


 


Est GFR ( Amer)  > 60 


 


Est GFR (Non-Af Amer)  > 60 


 


POC Glucose (mg/dL)   242 H


 


Random Glucose  219 H 


 


Calcium  7.5 L 


 


Phosphorus  3.4 


 


Magnesium  2.7 H 


 


Total Bilirubin  0.5 


 


AST  36 


 


ALT  57 


 


Alkaline Phosphatase  56 


 


Total Protein  5.3 L 


 


Albumin  2.8 L 


 


Globulin  2.6 


 


Albumin/Globulin Ratio  1.1 


 


Arterial Blood Potassium  











Radiology Impressions: 


                              Radiology Impressions





Chest X-Ray  01/08/19 06:00


Impression:


 


Lines and tubes in stable position. 


 


Biapical pleural thickening with upper granulomatous changes. 


 


Diffuse increased interstitial lung markings. 


 


Scattered patchy areas of consolidative changes seen in the upper 


lung zones and lung bases. 


 


Small bilateral pleural effusions. 


 


Right hilar prominence. 


 


Enlarged ectatic. 


 


Cardiomegaly. 


 


 














Critical Care Progress Note





- Nutrition


Nutrition: 


                                    Nutrition











 Category Date Time Status


 


 Consistent Carbohydrate [DIET] Diets  12/31/18 Breakfast Active














Assessment/Plan


(1) Acute respiratory failure


Current Visit: Yes   Status: Acute   





(2) COPD exacerbation


Current Visit: No   Status: Acute   





Attending/Attestation





- Attestation


I have personally seen and examined this patient.: Yes


I have fully participated in the care of the patient.: Yes


I have reviewed all pertinent clinical information: Yes


Notes (Text): 





01/08/19 16:26


Patient seen and examined


Tolerated CPAP for many hours


Responsive and still on Precedex for agitation


Tachypneic


Continue antibiotics IV steroids and bronchodilators


CPAP trial tomorrow morning


Continue feeding

## 2019-01-08 NOTE — RAD
Chest x-ray single frontal view 



History: Intubated. 



Comparison: 01/07/2019 



Findings:



Lines and tubes in stable position. 



Biapical pleural thickening with upper granulomatous changes. 



Diffuse increased interstitial lung markings. 



Scattered patchy areas of consolidative changes seen in the upper 

lung zones and lung bases. 



Small bilateral pleural effusions. 



Right hilar prominence. 



Enlarged ectatic. 



Cardiomegaly. 



Degenerative changes in the spine and shoulders. 



Impression:



Lines and tubes in stable position. 



Biapical pleural thickening with upper granulomatous changes. 



Diffuse increased interstitial lung markings. 



Scattered patchy areas of consolidative changes seen in the upper 

lung zones and lung bases. 



Small bilateral pleural effusions. 



Right hilar prominence. 



Enlarged ectatic. 



Cardiomegaly.

## 2019-01-08 NOTE — CP.PCM.PN
<Shreya Manuel - Last Filed: 01/08/19 17:09>





Subjective





- Date & Time of Evaluation


Date of Evaluation: 01/08/19


Time of Evaluation: 17:09





- Subjective


Subjective: 





PGY 3 progress note for cardiology





Pt seen and examined at bedside.  No acute events overnight.  Pt is currently on

CPAP trial with minimal sedation and is currently agitated.  ET tube in place.  

Family at bedside.  ROS unobtainable due to intubation.  





Objective





- Vital Signs/Intake and Output


Vital Signs (last 24 hours): 


                                        











Temp Pulse Resp BP Pulse Ox


 


 99.2 F   76   20   81/53 L  95 


 


 01/08/19 04:00  01/08/19 16:11  01/08/19 16:11  01/08/19 16:11  01/08/19 16:11








Intake and Output: 


                                        











 01/08/19 01/08/19





 06:59 18:59


 


Intake Total 1090.7 381.6


 


Output Total 515 2070


 


Balance 575.7 -1688.4














- Medications


Medications: 


                               Current Medications





Acetylcysteine (Acetylcysteine 20%)  4 ml INH RQ6 [facility]


   Last Admin: 01/08/19 08:50 Dose:  4 ml


Albuterol/Ipratropium (Duoneb 3 Mg/0.5 Mg (3 Ml) Ud)  3 ml INH RQ4 [facility]


   Last Admin: 01/08/19 16:27 Dose:  3 ml


Carvedilol (Coreg)  3.125 mg PO BID [facility]


   Last Admin: 01/08/19 09:53 Dose:  3.125 mg


Diltiazem HCl (Cardizem)  30 mg PO QID [facility]


   Last Admin: 01/08/19 14:41 Dose:  30 mg


Enoxaparin Sodium (Lovenox)  70 mg SC Q12H [facility]


   Last Admin: 01/08/19 09:52 Dose:  70 mg


Finasteride (Proscar)  5 mg PO DAILY [facility]


   Last Admin: 01/08/19 09:52 Dose:  5 mg


Azithromycin 500 mg/ Sodium (Chloride)  250 mls @ 250 mls/hr IVPB Q24H [facility]; 

Protocol


   Last Admin: 01/08/19 12:13 Dose:  250 mls/hr


Piperacillin Sod/Tazobactam Sod (Zosyn 3.375 Gm Iv Premix)  3.375 gm in 50 mls @

100 mls/hr IVPB Q8H [facility]; Protocol


   Last Admin: 01/08/19 15:05 Dose:  100 mls/hr


Propofol (Diprivan)  1,000 mg in 100 mls @ 2.286 mls/hr IV .Q24H PRN; Protocol


   PRN Reason: TITRATE PER MD ORDER


   Last Admin: 01/08/19 02:35 Dose:  20 mcg/kg/min, 9.144 mls/hr


Phenylephrine HCl 30 mg/ (Sodium Chloride)  253 mls @ 10.12 mls/hr IV .Q24H PRN;

Protocol


   PRN Reason: TITRATE PER MD ORDER


   Last Titration: 01/06/19 09:46 Dose:  0 mcg/min, 0 mls/hr


Dexmedetomidine HCl 200 mcg/ (Sodium Chloride)  50 mls @ 3.61 mls/hr IV TITR HI

N; Protocol


   PRN Reason: Sedation


Propofol (Diprivan)  1,000 mg in 100 mls @ 2.164 mls/hr IV .Q24H PRN; Protocol


   PRN Reason: TITRATE PER MD ORDER


Insulin Aspart (Novolog)  0 unit SC Q6 [facility]; Protocol


   Last Admin: 01/08/19 12:13 Dose:  4 u


Insulin Glargine (Lantus)  20 unit SC HS [facility]


   Last Admin: 01/07/19 21:42 Dose:  20 units


Methylprednisolone (Solu-Medrol)  40 mg IVP Q8 [facility]


   Last Admin: 01/08/19 14:41 Dose:  40 mg


Pantoprazole Sodium (Protonix Susp)  40 mg PO 1000 [facility]


   Last Admin: 01/08/19 09:52 Dose:  40 mg


Tamsulosin HCl (Flomax)  0.4 mg PO BID [facility]


   Last Admin: 01/08/19 09:52 Dose:  0.4 mg











- Labs


Labs: 


                                        





                                 01/08/19 06:10 





                                 01/08/19 06:10 





                                        











PT  11.4 SECONDS (9.7-12.2)   01/04/19  15:24    


 


INR  1.0   01/04/19  15:24    


 


APTT  26 SECONDS (21-34)   01/04/19  15:24    














- Constitutional


Appears: Non-toxic, No Acute Distress





- Head Exam


Head Exam: ATRAUMATIC, NORMOCEPHALIC





- ENT Exam


ENT Exam: Mucous Membranes Moist





- Respiratory Exam


Respiratory Exam: Rhonchi.  absent: Accessory Muscle Use, Rales, Wheezes, 

Respiratory Distress





- Cardiovascular Exam


Cardiovascular Exam: REGULAR RHYTHM, +S1, +S2





- GI/Abdominal Exam


GI & Abdominal Exam: Soft, Normal Bowel Sounds.  absent: Distended, Firm, 

Guarding, Rigid, Tenderness, Organomegaly





- Extremities Exam


Extremities Exam: absent: Pedal Edema, Tenderness





- Neurological Exam


Neurological Exam: absent: Alert, Awake





- Psychiatric Exam


Psychiatric exam: absent: Normal Affect, Normal Mood





- Skin


Skin Exam: Dry, Intact, Normal Color, Warm





Assessment and Plan





- Assessment and Plan (Free Text)


Assessment: 





79 year old male with past medical history of COPD, asthma, sleep apnea, HTN, 

DM< GERD and arthirtis is being seen for A fib.  





A fib


- Continue current management with Coreg 3.125 po BID, cardizem 30 mg po QID


- Continue AC with Lovenox 70 mg SC Q12





HTN


- Currently hypotensive





Case will be discussed with attending, Dr. Randle 








<Quinton Randle - Last Filed: 01/08/19 22:31>





Objective





- Vital Signs/Intake and Output


Vital Signs (last 24 hours): 


                                        











Temp Pulse Resp BP Pulse Ox


 


 97.6 F   93 H  20   130/90   92 L


 


 01/08/19 20:00  01/08/19 22:00  01/08/19 22:00  01/08/19 21:54  01/08/19 22:00








Intake and Output: 


                                        











 01/08/19 01/09/19





 18:59 06:59


 


Intake Total 782.8 182.4


 


Output Total 2070 280


 


Balance -1287.2 -97.6














- Medications


Medications: 


                               Current Medications





Acetylcysteine (Acetylcysteine 20%)  4 ml INH RQ6 [facility]


   Last Admin: 01/08/19 20:19 Dose:  4 ml


Albuterol/Ipratropium (Duoneb 3 Mg/0.5 Mg (3 Ml) Ud)  3 ml INH RQ4 [facility]


   Last Admin: 01/08/19 20:19 Dose:  3 ml


Carvedilol (Coreg)  3.125 mg PO BID [facility]


   Last Admin: 01/08/19 18:59 Dose:  3.125 mg


Diltiazem HCl (Cardizem)  30 mg PO QID [facility]


   Last Admin: 01/08/19 21:31 Dose:  30 mg


Enoxaparin Sodium (Lovenox)  70 mg SC Q12H [facility]


   Last Admin: 01/08/19 21:38 Dose:  70 mg


Finasteride (Proscar)  5 mg PO DAILY [facility]


   Last Admin: 01/08/19 09:52 Dose:  5 mg


Azithromycin 500 mg/ Sodium (Chloride)  250 mls @ 250 mls/hr IVPB Q24H [facility]; 

Protocol


   Last Admin: 01/08/19 12:13 Dose:  250 mls/hr


Piperacillin Sod/Tazobactam Sod (Zosyn 3.375 Gm Iv Premix)  3.375 gm in 50 mls @

100 mls/hr IVPB Q8H [facility]; Protocol


   Last Admin: 01/08/19 21:39 Dose:  100 mls/hr


Propofol (Diprivan)  1,000 mg in 100 mls @ 2.286 mls/hr IV .Q24H PRN; Protocol


   PRN Reason: TITRATE PER MD ORDER


   Last Titration: 01/08/19 17:00 Dose:  0 mcg/kg/min, 0 mls/hr


Phenylephrine HCl 30 mg/ (Sodium Chloride)  253 mls @ 10.12 mls/hr IV .Q24H PRN;

Protocol


   PRN Reason: TITRATE PER MD ORDER


   Last Titration: 01/06/19 09:46 Dose:  0 mcg/min, 0 mls/hr


Dexmedetomidine HCl 200 mcg/ (Sodium Chloride)  50 mls @ 3.61 mls/hr IV TITR 

PRN; Protocol


   PRN Reason: Sedation


   Last Titration: 01/08/19 18:00 Dose:  0.3 mcg/kg/hr, 5.41 mls/hr


Propofol (Diprivan)  1,000 mg in 100 mls @ 2.164 mls/hr IV .Q24H PRN; Protocol


   PRN Reason: TITRATE PER MD ORDER


Insulin Aspart (Novolog)  0 unit SC Q6 [facility]; Protocol


   Last Admin: 01/08/19 19:00 Dose:  2 u


Insulin Glargine (Lantus)  20 unit SC HS [facility]


   Last Admin: 01/08/19 21:31 Dose:  20 units


Methylprednisolone (Solu-Medrol)  40 mg IVP Q8 [facility]


   Last Admin: 01/08/19 21:31 Dose:  40 mg


Pantoprazole Sodium (Protonix Susp)  40 mg PO 1000 [facility]


   Last Admin: 01/08/19 09:52 Dose:  40 mg


Tamsulosin HCl (Flomax)  0.4 mg PO BID [facility]


   Last Admin: 01/08/19 18:59 Dose:  0.4 mg











- Labs


Labs: 


                                        





                                 01/08/19 06:10 





                                 01/08/19 06:10 





                                        











PT  11.4 SECONDS (9.7-12.2)   01/04/19  15:24    


 


INR  1.0   01/04/19  15:24    


 


APTT  26 SECONDS (21-34)   01/04/19  15:24    














Assessment and Plan





- Assessment and Plan (Free Text)


Assessment: 


Patient seen and evaluated personally by me. Plan of care d/w the medical 

resident and as documented


BP improved

## 2019-01-08 NOTE — CP.PCM.PN
Subjective





- Subjective


Subjective: 





dictated   





Objective





- Vital Signs/Intake and Output


Vital Signs (last 24 hours): 


                                        











Temp Pulse Resp BP Pulse Ox


 


 97.6 F   88   20   118/76   94 L


 


 01/08/19 20:00  01/08/19 20:00  01/08/19 20:00  01/08/19 19:54  01/08/19 20:00








Intake and Output: 


                                        











 01/08/19 01/09/19





 18:59 06:59


 


Intake Total 782.8 91.2


 


Output Total 2070 150


 


Balance -1287.2 -58.8














- Medications


Medications: 


                               Current Medications





Acetylcysteine (Acetylcysteine 20%)  4 ml INH RQ6 [facility]


   Last Admin: 01/08/19 20:19 Dose:  4 ml


Albuterol/Ipratropium (Duoneb 3 Mg/0.5 Mg (3 Ml) Ud)  3 ml INH RQ4 [facility]


   Last Admin: 01/08/19 20:19 Dose:  3 ml


Carvedilol (Coreg)  3.125 mg PO BID [facility]


   Last Admin: 01/08/19 18:59 Dose:  3.125 mg


Diltiazem HCl (Cardizem)  30 mg PO QID [facility]


   Last Admin: 01/08/19 21:31 Dose:  30 mg


Enoxaparin Sodium (Lovenox)  70 mg SC Q12H [facility]


   Last Admin: 01/08/19 21:38 Dose:  70 mg


Finasteride (Proscar)  5 mg PO DAILY [facility]


   Last Admin: 01/08/19 09:52 Dose:  5 mg


Azithromycin 500 mg/ Sodium (Chloride)  250 mls @ 250 mls/hr IVPB Q24H [facility]; 

Protocol


   Last Admin: 01/08/19 12:13 Dose:  250 mls/hr


Piperacillin Sod/Tazobactam Sod (Zosyn 3.375 Gm Iv Premix)  3.375 gm in 50 mls @

100 mls/hr IVPB Q8H [facility]; Protocol


   Last Admin: 01/08/19 21:39 Dose:  100 mls/hr


Propofol (Diprivan)  1,000 mg in 100 mls @ 2.286 mls/hr IV .Q24H PRN; Protocol


   PRN Reason: TITRATE PER MD ORDER


   Last Titration: 01/08/19 17:00 Dose:  0 mcg/kg/min, 0 mls/hr


Phenylephrine HCl 30 mg/ (Sodium Chloride)  253 mls @ 10.12 mls/hr IV .Q24H PRN;

Protocol


   PRN Reason: TITRATE PER MD ORDER


   Last Titration: 01/06/19 09:46 Dose:  0 mcg/min, 0 mls/hr


Dexmedetomidine HCl 200 mcg/ (Sodium Chloride)  50 mls @ 3.61 mls/hr IV TITR 

PRN; Protocol


   PRN Reason: Sedation


   Last Titration: 01/08/19 18:00 Dose:  0.3 mcg/kg/hr, 5.41 mls/hr


Propofol (Diprivan)  1,000 mg in 100 mls @ 2.164 mls/hr IV .Q24H PRN; Protocol


   PRN Reason: TITRATE PER MD ORDER


Insulin Aspart (Novolog)  0 unit SC Q6 [facility]; Protocol


   Last Admin: 01/08/19 19:00 Dose:  2 u


Insulin Glargine (Lantus)  20 unit SC HS [facility]


   Last Admin: 01/08/19 21:31 Dose:  20 units


Methylprednisolone (Solu-Medrol)  40 mg IVP Q8 [facility]


   Last Admin: 01/08/19 21:31 Dose:  40 mg


Pantoprazole Sodium (Protonix Susp)  40 mg PO 1000 [facility]


   Last Admin: 01/08/19 09:52 Dose:  40 mg


Tamsulosin HCl (Flomax)  0.4 mg PO BID [facility]


   Last Admin: 01/08/19 18:59 Dose:  0.4 mg











- Labs


Labs: 


                                        





                                 01/08/19 06:10 





                                 01/08/19 06:10 





                                        











PT  11.4 SECONDS (9.7-12.2)   01/04/19  15:24    


 


INR  1.0   01/04/19  15:24    


 


APTT  26 SECONDS (21-34)   01/04/19  15:24

## 2019-01-08 NOTE — PCM.PROC
Procedures


Attestation:: I certify that I have explained the specified Operation(s) or 

Procedure(s), risks, benefits and reasonable alternatives to the Patient and/or 

other person responsible. The opportunity was given to ask questions and all 

questions answered





- Central Line Placement


  ** Right Internal Jugular Triple Lumen Catheter


Aseptic technique was employed throughout the procedure: Hand Hygiene done prior

to procedure, Full sterile barriers (mask, hair cover, sterile gown, sterile 

gloves), Full body sterile drape, Chloraprep Antiseptic: 30 second prep for IJ 

or SC sites


CVP Time Out Performed: Yes


Pt. Placed on Pulse Ox Monitor: Yes


Central Line Prep: Chlorhexidine-Alcohol Combination


Local Anesthesia Used: Lidocaine 1%


Amount of Anesthesia Used (mls): 2


Ultrasound Used for Placement: Yes


Central Line Lumen Inserted: triple


Central Line Length: 20 cm


Post Procedure: Sutured in Place, Good Blood Return, All Ports Aspirated, 

Flushed, Capped, Sterile Dressing Applied


Secured by: Securement device


Post procedure dressing: Clear vapor permeable


Post Procedure X-Ray: Yes


Patient Tolerated Procedure: Well


Immediate Complications: None

## 2019-01-09 LAB
ALBUMIN SERPL-MCNC: 2.7 G/DL (ref 3.5–5)
ALBUMIN/GLOB SERPL: 1.1 {RATIO} (ref 1–2.1)
ALT SERPL-CCNC: 54 U/L (ref 21–72)
ANISOCYTOSIS BLD QL SMEAR: SLIGHT
ARTERIAL BLOOD GAS O2 SAT: 94.2 % (ref 95–98)
ARTERIAL BLOOD GAS O2 SAT: 96.6 % (ref 95–98)
ARTERIAL BLOOD GAS PCO2: 41 MM/HG (ref 35–45)
ARTERIAL BLOOD GAS PCO2: 43 MM/HG (ref 35–45)
ARTERIAL BLOOD GAS TCO2: 35.6 MMOL/L (ref 22–28)
ARTERIAL BLOOD GAS TCO2: 37.2 MMOL/L (ref 22–28)
ARTERIAL PATENCY WRIST A: (no result)
ARTERIAL PATENCY WRIST A: (no result)
AST SERPL-CCNC: 37 U/L (ref 17–59)
BASOPHILS # BLD AUTO: 0 K/UL (ref 0–0.2)
BASOPHILS NFR BLD: 0.4 % (ref 0–2)
BUN SERPL-MCNC: 56 MG/DL (ref 9–20)
CALCIUM SERPL-MCNC: 6.9 MG/DL (ref 8.6–10.4)
EOSINOPHIL # BLD AUTO: 0 K/UL (ref 0–0.7)
EOSINOPHIL NFR BLD: 0 % (ref 0–4)
ERYTHROCYTE [DISTWIDTH] IN BLOOD BY AUTOMATED COUNT: 17.8 % (ref 11.5–14.5)
GFR NON-AFRICAN AMERICAN: > 60
HCO3 BLDA-SCNC: 33.1 MMOL/L (ref 21–28)
HCO3 BLDA-SCNC: 34.1 MMOL/L (ref 21–28)
HGB BLD-MCNC: 15.5 G/DL (ref 12–18)
HYPOCHROMIC: SLIGHT
INHALED O2 CONCENTRATION: 40 %
INHALED O2 CONCENTRATION: 40 %
LYMPHOCYTE: 2 % (ref 20–40)
LYMPHOCYTES # BLD AUTO: 0.2 K/UL (ref 1–4.3)
LYMPHOCYTES NFR BLD AUTO: 1.9 % (ref 20–40)
MCH RBC QN AUTO: 23.9 PG (ref 27–31)
MCHC RBC AUTO-ENTMCNC: 30.6 G/DL (ref 33–37)
MCV RBC AUTO: 78 FL (ref 80–94)
MICROCYTES BLD QL SMEAR: SLIGHT
MONOCYTE: 5 % (ref 0–10)
MONOCYTES # BLD: 0.9 K/UL (ref 0–0.8)
MONOCYTES NFR BLD: 7.3 % (ref 0–10)
NEUTROPHILS # BLD: 11.2 K/UL (ref 1.8–7)
NEUTROPHILS NFR BLD AUTO: 90.4 % (ref 50–75)
NEUTROPHILS NFR BLD AUTO: 92 % (ref 50–75)
NEUTS BAND NFR BLD: 1 % (ref 0–2)
NRBC BLD AUTO-RTO: 0 % (ref 0–2)
OVALOCYTES BLD QL SMEAR: SLIGHT
PH BLDA: 7.53 [PH] (ref 7.35–7.45)
PH BLDA: 7.53 [PH] (ref 7.35–7.45)
PLATELET # BLD EST: NORMAL 10*3/UL
PLATELET # BLD: 188 K/UL (ref 130–400)
PMV BLD AUTO: 9.7 FL (ref 7.2–11.7)
PO2 BLDA: 101 MM/HG (ref 80–100)
PO2 BLDA: 71 MM/HG (ref 80–100)
POLYCHROMIC: SLIGHT
RBC # BLD AUTO: 6.5 MIL/UL (ref 4.4–5.9)
TARGETS BLD QL SMEAR: SLIGHT
TOTAL CELLS COUNTED BLD: 100
WBC # BLD AUTO: 12.4 K/UL (ref 4.8–10.8)

## 2019-01-09 RX ADMIN — TAZOBACTAM SODIUM AND PIPERACILLIN SODIUM SCH MLS/HR: 375; 3 INJECTION, SOLUTION INTRAVENOUS at 13:30

## 2019-01-09 RX ADMIN — METHYLPREDNISOLONE SODIUM SUCCINATE SCH MG: 40 INJECTION, POWDER, FOR SOLUTION INTRAMUSCULAR; INTRAVENOUS at 06:02

## 2019-01-09 RX ADMIN — METHYLPREDNISOLONE SODIUM SUCCINATE SCH MG: 40 INJECTION, POWDER, FOR SOLUTION INTRAMUSCULAR; INTRAVENOUS at 21:04

## 2019-01-09 RX ADMIN — ACETYLCYSTEINE SCH ML: 200 INHALANT RESPIRATORY (INHALATION) at 20:39

## 2019-01-09 RX ADMIN — ENOXAPARIN SODIUM SCH MG: 80 INJECTION SUBCUTANEOUS at 21:04

## 2019-01-09 RX ADMIN — IPRATROPIUM BROMIDE AND ALBUTEROL SULFATE SCH ML: .5; 3 SOLUTION RESPIRATORY (INHALATION) at 00:49

## 2019-01-09 RX ADMIN — PROPOFOL PRN MLS/HR: 10 INJECTION, EMULSION INTRAVENOUS at 18:07

## 2019-01-09 RX ADMIN — INSULIN ASPART SCH U: 100 INJECTION, SOLUTION INTRAVENOUS; SUBCUTANEOUS at 00:05

## 2019-01-09 RX ADMIN — INSULIN GLARGINE SCH UNITS: 100 INJECTION, SOLUTION SUBCUTANEOUS at 21:04

## 2019-01-09 RX ADMIN — IPRATROPIUM BROMIDE AND ALBUTEROL SULFATE SCH ML: .5; 3 SOLUTION RESPIRATORY (INHALATION) at 03:10

## 2019-01-09 RX ADMIN — INSULIN ASPART SCH: 100 INJECTION, SOLUTION INTRAVENOUS; SUBCUTANEOUS at 18:52

## 2019-01-09 RX ADMIN — IPRATROPIUM BROMIDE AND ALBUTEROL SULFATE SCH ML: .5; 3 SOLUTION RESPIRATORY (INHALATION) at 20:39

## 2019-01-09 RX ADMIN — METHYLPREDNISOLONE SODIUM SUCCINATE SCH MG: 40 INJECTION, POWDER, FOR SOLUTION INTRAMUSCULAR; INTRAVENOUS at 13:46

## 2019-01-09 RX ADMIN — ENOXAPARIN SODIUM SCH MG: 80 INJECTION SUBCUTANEOUS at 09:36

## 2019-01-09 RX ADMIN — DEXMEDETOMIDINE HYDROCHLORIDE PRN MLS/HR: 100 INJECTION, SOLUTION, CONCENTRATE INTRAVENOUS at 05:03

## 2019-01-09 RX ADMIN — IPRATROPIUM BROMIDE AND ALBUTEROL SULFATE SCH ML: .5; 3 SOLUTION RESPIRATORY (INHALATION) at 07:10

## 2019-01-09 RX ADMIN — ACETYLCYSTEINE SCH ML: 200 INHALANT RESPIRATORY (INHALATION) at 03:10

## 2019-01-09 RX ADMIN — INSULIN ASPART SCH: 100 INJECTION, SOLUTION INTRAVENOUS; SUBCUTANEOUS at 12:00

## 2019-01-09 RX ADMIN — POLYETHYLENE GLYCOL 3350 SCH GM: 17 POWDER, FOR SOLUTION ORAL at 11:03

## 2019-01-09 RX ADMIN — IPRATROPIUM BROMIDE AND ALBUTEROL SULFATE SCH: .5; 3 SOLUTION RESPIRATORY (INHALATION) at 16:00

## 2019-01-09 RX ADMIN — IPRATROPIUM BROMIDE AND ALBUTEROL SULFATE SCH ML: .5; 3 SOLUTION RESPIRATORY (INHALATION) at 13:45

## 2019-01-09 RX ADMIN — ACETYLCYSTEINE SCH ML: 200 INHALANT RESPIRATORY (INHALATION) at 07:10

## 2019-01-09 RX ADMIN — TAZOBACTAM SODIUM AND PIPERACILLIN SODIUM SCH MLS/HR: 375; 3 INJECTION, SOLUTION INTRAVENOUS at 06:01

## 2019-01-09 RX ADMIN — INSULIN ASPART SCH U: 100 INJECTION, SOLUTION INTRAVENOUS; SUBCUTANEOUS at 06:02

## 2019-01-09 RX ADMIN — ACETYLCYSTEINE SCH ML: 200 INHALANT RESPIRATORY (INHALATION) at 13:45

## 2019-01-09 RX ADMIN — TAZOBACTAM SODIUM AND PIPERACILLIN SODIUM SCH MLS/HR: 375; 3 INJECTION, SOLUTION INTRAVENOUS at 22:00

## 2019-01-09 NOTE — PN
DATE:  01/08/2019



SUBJECTIVE:  The patient is still on ventilator, when I saw the patient. 

He might be weaned off today.  He is afebrile.  No shortness of breath. 

His sedation has been reduced.



 PHYSICAL EXAMINATION:

VITAL SIGNS:  Blood pressure 121/76, pulse 78, respiratory rate 20,

temperature 99.

LUNGS:  Bilateral transmitted breath sounds.  Decreased air entry. 

Positive rhonchi.

CARDIOVASCULAR SYSTEM:  S1, S2 regular.

ABDOMEN:  Soft.  Nontender.  Bowel sounds are positive.



ASSESSMENT:

1.  Acute exacerbation of bronchial asthma.

2.  Acute respiratory failure, on ventilator.

3.  Hypertension.

4.  Diabetes.



PLAN:  Continue current medication.  Monitor the patient.





__________________________________________

Tae Hensley MD





DD:  01/08/2019 21:46:58

DT:  01/08/2019 23:21:13

Job # [id number]

## 2019-01-09 NOTE — CP.PCM.PN
<Shreya Manuel - Last Filed: 01/09/19 15:23>





Subjective





- Date & Time of Evaluation


Date of Evaluation: 01/09/19


Time of Evaluation: 15:24





- Subjective


Subjective: 





PGY3 progress note for cardiology





Pt seen and examined at bedside.  Daughter at bedside. No acute events 

overnight.  Pt is currently intubated and undergoing CPAP trial.  He is able to 

follow simple commands. ROS unobtainable due to intubation. 





Objective





- Vital Signs/Intake and Output


Vital Signs (last 24 hours): 


                                        











Temp Pulse Resp BP Pulse Ox


 


 97.5 F L  92 H  38 H  149/96 H  95 


 


 01/09/19 12:00  01/09/19 12:00  01/09/19 12:00  01/09/19 11:55  01/09/19 12:00








Intake and Output: 


                                        











 01/09/19 01/09/19





 06:59 18:59


 


Intake Total 771.2 60.8


 


Output Total 1740 1650


 


Balance -968.8 -1589.2














- Medications


Medications: 


                               Current Medications





Acetylcysteine (Acetylcysteine 20%)  4 ml INH RQ6 [facility]


   Last Admin: 01/09/19 13:45 Dose:  4 ml


Albuterol/Ipratropium (Duoneb 3 Mg/0.5 Mg (3 Ml) Ud)  3 ml INH RQ4 [facility]


   Last Admin: 01/09/19 13:45 Dose:  3 ml


Carvedilol (Coreg)  3.125 mg PO BID [facility]


   Last Admin: 01/09/19 09:30 Dose:  3.125 mg


Diltiazem HCl (Cardizem)  30 mg PO QID [facility]


   Last Admin: 01/09/19 13:46 Dose:  30 mg


Enoxaparin Sodium (Lovenox)  70 mg SC Q12H [facility]


   Last Admin: 01/09/19 09:36 Dose:  70 mg


Finasteride (Proscar)  5 mg PO DAILY [facility]


   Last Admin: 01/09/19 09:30 Dose:  5 mg


Azithromycin 500 mg/ Sodium (Chloride)  250 mls @ 250 mls/hr IVPB Q24H [facility]; 

Protocol


   Last Admin: 01/09/19 12:00 Dose:  250 mls/hr


Piperacillin Sod/Tazobactam Sod (Zosyn 3.375 Gm Iv Premix)  3.375 gm in 50 mls @

100 mls/hr IVPB Q8H [facility]; Protocol


   Last Admin: 01/09/19 13:30 Dose:  100 mls/hr


Propofol (Diprivan)  1,000 mg in 100 mls @ 2.286 mls/hr IV .Q24H [facility]; Protocol


   PRN Reason: TITRATE PER MD ORDER


   Last Titration: 01/08/19 17:00 Dose:  0 mcg/kg/min, 0 mls/hr


Phenylephrine HCl 30 mg/ (Sodium Chloride)  253 mls @ 10.12 mls/hr IV .Q24H [facility];

Protocol


   PRN Reason: TITRATE PER MD ORDER


   Last Titration: 01/06/19 09:46 Dose:  0 mcg/min, 0 mls/hr


Dexmedetomidine HCl 200 mcg/ (Sodium Chloride)  50 mls @ 3.61 mls/hr IV TITR 

PRN; Protocol


   PRN Reason: Sedation


   Last Titration: 01/09/19 09:35 Dose:  0 mcg/kg/hr, 0 mls/hr


Propofol (Diprivan)  1,000 mg in 100 mls @ 2.164 mls/hr IV .Q24H [facility]; Protocol


   PRN Reason: TITRATE PER MD ORDER


Insulin Aspart (Novolog)  0 unit SC Q6 [facility]; Protocol


   Last Admin: 01/09/19 12:00 Dose:  Not Given


Insulin Glargine (Lantus)  20 unit SC HS [facility]


   Last Admin: 01/08/19 21:31 Dose:  20 units


Methylprednisolone (Solu-Medrol)  40 mg IVP Q8 [facility]


   Last Admin: 01/09/19 13:46 Dose:  40 mg


Pantoprazole Sodium (Protonix Inj)  40 mg IVP DAILY [facility]


   Last Admin: 01/09/19 09:30 Dose:  40 mg


Polyethylene Glycol (Miralax)  17 gm PO DAILY [facility]


   Last Admin: 01/09/19 11:03 Dose:  17 gm


Tamsulosin HCl (Flomax)  0.4 mg PO BID [facility]


   Last Admin: 01/09/19 09:30 Dose:  0.4 mg











- Labs


Labs: 


                                        





                                 01/09/19 06:35 





                                 01/09/19 06:35 





                                        











PT  11.4 SECONDS (9.7-12.2)   01/04/19  15:24    


 


INR  1.0   01/04/19  15:24    


 


APTT  26 SECONDS (21-34)   01/04/19  15:24    














- Constitutional


Appears: Non-toxic, No Acute Distress





- Head Exam


Head Exam: ATRAUMATIC, NORMOCEPHALIC





- ENT Exam


ENT Exam: Mucous Membranes Moist





- Respiratory Exam


Respiratory Exam: Rhonchi.  absent: Accessory Muscle Use, Rales, Wheezes, 

Respiratory Distress





- Cardiovascular Exam


Cardiovascular Exam: REGULAR RHYTHM, +S1, +S2





- GI/Abdominal Exam


GI & Abdominal Exam: Soft, Normal Bowel Sounds.  absent: Distended, Firm, 

Guarding, Rigid, Tenderness, Organomegaly





- Extremities Exam


Extremities Exam: absent: Pedal Edema, Tenderness





- Neurological Exam


Neurological Exam: Alert, Awake





- Skin


Skin Exam: Dry, Intact, Normal Color, Warm





Assessment and Plan





- Assessment and Plan (Free Text)


Assessment: 





79 year old male with past medical history of COPD, asthma, sleep apnea, HTN, 

DM< GERD and arthirtis is being seen for A fib.  





A fib


- Continue current management with Coreg 3.125 po BID, cardizem 30 mg po QID


- Continue AC with Lovenox 70 mg SC Q12





HTN


-Receiving Coreg and cardizem 





Respiratory arrest


- s/p extubation today.  Management per ICU team





Case will be discussed with attending, Dr. Randle 





<Quinton Randle - Last Filed: 01/09/19 22:15>





Objective





- Vital Signs/Intake and Output


Vital Signs (last 24 hours): 


                                        











Temp Pulse Resp BP Pulse Ox


 


 97.9 F   88   20   116/76   93 L


 


 01/09/19 20:00  01/09/19 20:00  01/09/19 20:00  01/09/19 19:55  01/09/19 20:00








Intake and Output: 


                                        











 01/09/19 01/10/19





 18:59 06:59


 


Intake Total 111.6 23.2


 


Output Total 3650 0


 


Balance -3538.4 23.2














- Medications


Medications: 


                               Current Medications





Acetylcysteine (Acetylcysteine 20%)  4 ml INH RQ6 [facility]


   Last Admin: 01/09/19 20:39 Dose:  4 ml


Albuterol/Ipratropium (Duoneb 3 Mg/0.5 Mg (3 Ml) Ud)  3 ml INH RQ4 [facility]


   Last Admin: 01/09/19 20:39 Dose:  3 ml


Carvedilol (Coreg)  3.125 mg PO BID [facility]


   Last Admin: 01/09/19 18:02 Dose:  3.125 mg


Diltiazem HCl (Cardizem)  30 mg PO QID [facility]


   Last Admin: 01/09/19 21:04 Dose:  30 mg


Enoxaparin Sodium (Lovenox)  70 mg SC Q12H [facility]


   Last Admin: 01/09/19 21:04 Dose:  70 mg


Finasteride (Proscar)  5 mg PO DAILY [facility]


   Last Admin: 01/09/19 09:30 Dose:  5 mg


Azithromycin 500 mg/ Sodium (Chloride)  250 mls @ 250 mls/hr IVPB Q24H [facility]; 

Protocol


   Last Admin: 01/09/19 12:00 Dose:  250 mls/hr


Piperacillin Sod/Tazobactam Sod (Zosyn 3.375 Gm Iv Premix)  3.375 gm in 50 mls @

100 mls/hr IVPB Q8H [facility]; Protocol


   Last Admin: 01/09/19 13:30 Dose:  100 mls/hr


Propofol (Diprivan)  1,000 mg in 100 mls @ 2.164 mls/hr IV .Q24H [facility]; Protocol


   PRN Reason: TITRATE PER MD ORDER


   Last Admin: 01/09/19 18:07 Dose:  25 mcg/kg/min, 10.818 mls/hr


Insulin Aspart (Novolog)  0 unit SC Q6 [facility]; Protocol


   Last Admin: 01/09/19 18:52 Dose:  Not Given


Insulin Glargine (Lantus)  20 unit SC HS [facility]


   Last Admin: 01/09/19 21:04 Dose:  20 units


Methylprednisolone (Solu-Medrol)  40 mg IVP Q8 [facility]


   Last Admin: 01/09/19 21:04 Dose:  40 mg


Pantoprazole Sodium (Protonix Inj)  40 mg IVP DAILY [facility]


   Last Admin: 01/09/19 09:30 Dose:  40 mg


Polyethylene Glycol (Miralax)  17 gm PO DAILY [facility]


   Last Admin: 01/09/19 11:03 Dose:  17 gm


Tamsulosin HCl (Flomax)  0.4 mg PO BID [facility]


   Last Admin: 01/09/19 18:02 Dose:  0.4 mg











- Labs


Labs: 


                                        





                                 01/09/19 06:35 





                                 01/09/19 06:35 





                                        











PT  11.4 SECONDS (9.7-12.2)   01/04/19  15:24    


 


INR  1.0   01/04/19  15:24    


 


APTT  26 SECONDS (21-34)   01/04/19  15:24    














Assessment and Plan





- Assessment and Plan (Free Text)


Assessment: 


Patient seen and evaluated personally by me. Plan of care d/w the medical 

resident and as documented

## 2019-01-09 NOTE — RAD
HISTORY:

 s/p intubated and gastric tube 



COMPARISON:

Chest x-ray performed 1/8/19 



TECHNIQUE:

Chest, one view.



FINDINGS:

Endotracheal tube terminates approximately 2.3 cm above the omari.  

Nasogastric tube extends beyond hemidiaphragm towards expected 

location of the stomach.



External wires and leads obscure evaluation of the underlying 

parenchyma.



LUNGS:

Small bilateral pleural effusions and associated 

atelectasis/infiltrates.  Subtle medial right upper lobe infiltrate.  

No definite pneumothorax. 



CARDIOVASCULAR:

Cardiomegaly.  Atherosclerotic calcifications.  Ectatic aorta. 



OSSEOUS STRUCTURES:

Degenerative changes.  Osseous demineralization.



VISUALIZED UPPER ABDOMEN:

Unremarkable.



OTHER FINDINGS:

None.



IMPRESSION:

Endotracheal tube.  Nasogastric tube. 



Small bilateral pleural effusions and associated 

atelectasis/infiltrates.  Subtle medial right upper lobe infiltrate.

## 2019-01-09 NOTE — CP.PCM.PN
Subjective





- Subjective


Subjective: 





dictated   





Objective





- Vital Signs/Intake and Output


Vital Signs (last 24 hours): 


                                        











Temp Pulse Resp BP Pulse Ox


 


 97.9 F   88   20   116/76   93 L


 


 01/09/19 20:00  01/09/19 20:00  01/09/19 20:00  01/09/19 19:55  01/09/19 20:00








Intake and Output: 


                                        











 01/09/19 01/10/19





 18:59 06:59


 


Intake Total 111.6 23.2


 


Output Total 3650 0


 


Balance -3538.4 23.2














- Medications


Medications: 


                               Current Medications





Acetylcysteine (Acetylcysteine 20%)  4 ml INH RQ6 [facility]


   Last Admin: 01/09/19 20:39 Dose:  4 ml


Albuterol/Ipratropium (Duoneb 3 Mg/0.5 Mg (3 Ml) Ud)  3 ml INH RQ4 [facility]


   Last Admin: 01/09/19 20:39 Dose:  3 ml


Carvedilol (Coreg)  3.125 mg PO BID [facility]


   Last Admin: 01/09/19 18:02 Dose:  3.125 mg


Diltiazem HCl (Cardizem)  30 mg PO QID [facility]


   Last Admin: 01/09/19 18:02 Dose:  30 mg


Enoxaparin Sodium (Lovenox)  70 mg SC Q12H [facility]


   Last Admin: 01/09/19 09:36 Dose:  70 mg


Finasteride (Proscar)  5 mg PO DAILY [facility]


   Last Admin: 01/09/19 09:30 Dose:  5 mg


Azithromycin 500 mg/ Sodium (Chloride)  250 mls @ 250 mls/hr IVPB Q24H [facility]; 

Protocol


   Last Admin: 01/09/19 12:00 Dose:  250 mls/hr


Piperacillin Sod/Tazobactam Sod (Zosyn 3.375 Gm Iv Premix)  3.375 gm in 50 mls @

100 mls/hr IVPB Q8H [facility]; Protocol


   Last Admin: 01/09/19 13:30 Dose:  100 mls/hr


Propofol (Diprivan)  1,000 mg in 100 mls @ 2.164 mls/hr IV .Q24H [facility]; Protocol


   PRN Reason: TITRATE PER MD ORDER


   Last Admin: 01/09/19 18:07 Dose:  25 mcg/kg/min, 10.818 mls/hr


Insulin Aspart (Novolog)  0 unit SC Q6 [facility]; Protocol


   Last Admin: 01/09/19 18:52 Dose:  Not Given


Insulin Glargine (Lantus)  20 unit SC HS [facility]


   Last Admin: 01/08/19 21:31 Dose:  20 units


Methylprednisolone (Solu-Medrol)  40 mg IVP Q8 [facility]


   Last Admin: 01/09/19 13:46 Dose:  40 mg


Pantoprazole Sodium (Protonix Inj)  40 mg IVP DAILY [facility]


   Last Admin: 01/09/19 09:30 Dose:  40 mg


Polyethylene Glycol (Miralax)  17 gm PO DAILY [facility]


   Last Admin: 01/09/19 11:03 Dose:  17 gm


Tamsulosin HCl (Flomax)  0.4 mg PO BID [facility]


   Last Admin: 01/09/19 18:02 Dose:  0.4 mg











- Labs


Labs: 


                                        





                                 01/09/19 06:35 





                                 01/09/19 06:35 





                                        











PT  11.4 SECONDS (9.7-12.2)   01/04/19  15:24    


 


INR  1.0   01/04/19  15:24    


 


APTT  26 SECONDS (21-34)   01/04/19  15:24

## 2019-01-09 NOTE — PCM.PROC
Procedures


Attestation:: I certify that I have explained the specified Operation(s) or 

Procedure(s), risks, benefits and reasonable alternatives to the Patient and/or 

other person responsible. The opportunity was given to ask questions and all 

questions answered





- Intubation


Time Out Performed: Yes


Sedative: Etomidate


Mg Given: 20


Laryngoscope: Murphy (3)


ET Tube Size: 8.0


ET Tube Uncuffed: No


ET Tube Secured at Depth: 23


ET Tube Secured Locarion: Lips


ET Tube Placement Confirmation: Visualized Passing Through Cords, Breath Sounds 

Equal Bilaterally, No Breath Sounds Over Epigastrum, Confirmation w/Capnometry


Patient Tolerated Procedure: Well


Procedure Immediate Complications: None

## 2019-01-09 NOTE — CP.CCUPN
<Emily Coto - Last Filed: 01/09/19 10:23>





CCU Subjective





- Physician Review


Subjective (Free Text): 





01/07/19 11:35


ICU Progress Note for Dr. Gonsalez





Patient seen and examined at bedside this morning before morning rounds. Patient

moving arms and legs appearing agitated. On precedex, not propofol this morning.

Due to patient condition ROS unable to be obtained. Patient also currently 

tolerating CPAP this morning. Plans to extubate later today.








01/08/19 10:43





01/09/19 10:30








CCU Objective





- Vital Signs / Intake & Output


Vital Signs (Last 4 hours): 


Vital Signs











  Temp Pulse Resp BP Pulse Ox


 


 01/09/19 08:00  98.7 F  104 H  37 H   96


 


 01/09/19 07:56   100 H  38 H  155/83 H  96


 


 01/09/19 07:19   93 H  22  114/81  95


 


 01/09/19 07:00   80  20   96


 


 01/09/19 06:55   87  21  102/71  96











Intake and Output (Last 8hrs): 


                                 Intake & Output











 01/08/19 01/09/19 01/09/19





 22:59 06:59 14:59


 


Intake Total 353.2 588.8 60.8


 


Output Total 605 1460 


 


Balance -251.8 -871.2 60.8


 


Weight  155 lb 


 


Intake:   


 


  IV 20 100 15


 


  Intake, IV Amount 53.2 128.8 5.8


 


    Left Forearm 16.8 50 


 


    Right Forearm  78.8 5.8


 


    Right Medial Port 36.4  


 


  Tube Feeding 280 360 40


 


Output:   


 


  Urine 605 1460 


 


    Urine, Voided 605 1460 














- Physical Exam


Head: Positive for: Atraumatic, Normocephalic


Conjunctiva: Positive for: Normal


Respiratory/Chest: Positive for: Wheezes (throughout lung fields)


Cardiovascular: Positive for: Regular Rate and Rhythm


Abdomen: Negative for: Tenderness, Rebound, Guarding


Upper Extremity: Positive for: Normal Inspection, NORMAL PULSES, Capillary 

Refill < 2s.  Negative for: Cyanosis, Edema


Lower Extremity: Positive for: Normal Inspection, NORMAL PULSES, Capillary 

Refill < 2 s


Neurological: Positive for: Other (unable to assess due to patient condition)


Psychiatric: Positive for: Lethargic





- Medications


Active Medications: 


Active Medications











Generic Name Dose Route Start Last Admin





  Trade Name Freq  PRN Reason Stop Dose Admin


 


Acetylcysteine  4 ml  01/03/19 20:00  01/09/19 07:10





  Acetylcysteine 20%  INH   4 ml





  RQ6 OLEG   Administration





     





     





     





     


 


Albuterol/Ipratropium  3 ml  12/31/18 08:00  01/09/19 07:10





  Duoneb 3 Mg/0.5 Mg (3 Ml) Ud  INH   3 ml





  RQ4 OLEG   Administration





     





     





     





     


 


Carvedilol  3.125 mg  01/07/19 10:15  01/09/19 09:30





  Coreg  PO   3.125 mg





  BID OLEG   Administration





     





     





     





     


 


Diltiazem HCl  30 mg  01/05/19 14:00  01/09/19 09:30





  Cardizem  PO   30 mg





  QID OLEG   Administration





     





     





     





     


 


Enoxaparin Sodium  70 mg  01/06/19 10:00  01/09/19 09:36





  Lovenox  SC   70 mg





  Q12H OLEG   Administration





     





     





     





     


 


Finasteride  5 mg  12/31/18 10:00  01/09/19 09:30





  Proscar  PO   5 mg





  DAILY OLEG   Administration





     





     





     





     


 


Furosemide  40 mg  01/08/19 23:45  01/09/19 00:04





  Lasix  IVP  01/09/19 11:46  40 mg





  Q12H OLEG   Administration





     





     





     





     


 


Azithromycin 500 mg/ Sodium  250 mls @ 250 mls/hr  12/31/18 12:00  01/08/19 

12:13





  Chloride  IVPB   250 mls/hr





  Q24H OLEG   Administration





     





     





  Protocol   





     


 


Piperacillin Sod/Tazobactam Sod  3.375 gm in 50 mls @ 100 mls/hr  01/02/19 14:30

 01/09/19 06:01





  Zosyn 3.375 Gm Iv Premix  IVPB   100 mls/hr





  Q8H OLEG   Administration





     





     





  Protocol   





     


 


Propofol  1,000 mg in 100 mls @ 2.286 mls/hr  01/04/19 13:03  01/08/19 17:00





  Diprivan  IV   0 mcg/kg/min





  .Q24H PRN   0 mls/hr





  TITRATE PER MD ORDER   Titration





     





  Protocol   





  5 MCG/KG/MIN   


 


Phenylephrine HCl 30 mg/  253 mls @ 10.12 mls/hr  01/04/19 15:17  01/06/19 09:46





  Sodium Chloride  IV   0 mcg/min





  .Q24H PRN   0 mls/hr





  TITRATE PER MD ORDER   Titration





     





  Protocol   





  20 MCG/MIN   


 


Dexmedetomidine HCl 200 mcg/  50 mls @ 3.61 mls/hr  01/08/19 14:01  01/09/19 

09:35





  Sodium Chloride  IV   0 mcg/kg/hr





  TITR PRN   0 mls/hr





  Sedation   Titration





     





  Protocol   





  0.2 MCG/KG/HR   


 


Propofol  1,000 mg in 100 mls @ 2.164 mls/hr  01/08/19 15:02  





  Diprivan  IV   





  .Q24H PRN   





  TITRATE PER MD ORDER   





     





  Protocol   





  5 MCG/KG/MIN   


 


Insulin Aspart  0 unit  01/05/19 10:13  01/09/19 06:02





  Novolog  SC   4 u





  Q6 OLEG   Administration





     





     





  Protocol   





     


 


Insulin Glargine  20 unit  01/06/19 14:12  01/08/19 21:31





  Lantus  SC   20 units





  HS OLEG   Administration





     





     





     





     


 


Methylprednisolone  40 mg  01/06/19 14:00  01/09/19 06:02





  Solu-Medrol  IVP   40 mg





  Q8 OLEG   Administration





     





     





     





     


 


Pantoprazole Sodium  40 mg  01/09/19 10:00  01/09/19 09:30





  Protonix Inj  IVP   40 mg





  DAILY OLEG   Administration





     





     





     





     


 


Polyethylene Glycol  17 gm  01/09/19 10:15  





  Miralax  PO   





  DAILY OLEG   





     





     





     





     


 


Tamsulosin HCl  0.4 mg  12/31/18 10:00  01/09/19 09:30





  Flomax  PO   0.4 mg





  BID OLEG   Administration





     





     





     





     














- Patient Studies


Lab Studies: 


                                   Lab Studies











  01/09/19 01/09/19 01/09/19 Range/Units





  06:35 06:35 05:29 


 


WBC   12.4 H   (4.8-10.8)  K/uL


 


RBC   6.50 H   (4.40-5.90)  Mil/uL


 


Hgb   15.5   (12.0-18.0)  g/dL


 


Hct   50.7   (35.0-51.0)  %


 


MCV   78.0 L   (80.0-94.0)  fL


 


MCH   23.9 L   (27.0-31.0)  pg


 


MCHC   30.6 L   (33.0-37.0)  g/dL


 


RDW   17.8 H   (11.5-14.5)  %


 


Plt Count   188   (130-400)  K/uL


 


MPV   9.7   (7.2-11.7)  fL


 


Neut % (Auto)   90.4 H   (50.0-75.0)  %


 


Lymph % (Auto)   1.9 L   (20.0-40.0)  %


 


Mono % (Auto)   7.3   (0.0-10.0)  %


 


Eos % (Auto)   0.0   (0.0-4.0)  %


 


Baso % (Auto)   0.4   (0.0-2.0)  %


 


Neut # (Auto)   11.2 H   (1.8-7.0)  K/uL


 


Lymph # (Auto)   0.2 L   (1.0-4.3)  K/uL


 


Mono # (Auto)   0.9 H   (0.0-0.8)  K/uL


 


Eos # (Auto)   0.0   (0.0-0.7)  K/uL


 


Baso # (Auto)   0.0   (0.0-0.2)  K/uL


 


Neutrophils % (Manual)   92 H   (50-75)  %


 


Band Neutrophils %   1   (0-2)  %


 


Lymphocytes % (Manual)   2 L   (20-40)  %


 


Monocytes % (Manual)   5   (0-10)  %


 


Platelet Estimate   Normal   (NORMAL)  


 


Polychromasia   Slight   


 


Hypochromasia (manual)   Slight   


 


Anisocytosis (manual)   Slight   


 


Microcytosis (manual)   Slight   


 


Target Cells   Slight   


 


Ovalocytes   Slight   


 


Puncture Site     


 


pCO2     (35-45)  mm/Hg


 


pO2     ()  mm/Hg


 


HCO3     (21-28)  mmol/L


 


ABG pH     (7.35-7.45)  


 


ABG Total CO2     (22-28)  mmol/L


 


ABG O2 Saturation     (95-98)  %


 


ABG Base Excess     (-2.0-3.0)  mmol/L


 


[place] Test     


 


ABG Potassium     (3.6-5.2)  mmol/L


 


A-a O2 Difference     mm/Hg


 


Respiratory Index     


 


Sodium  142    (132-148)  mmol/l


 


Chloride  107    ()  mmol/L


 


Glucose     ()  mg/dl


 


Lactate     (0.7-2.1)  mmol/L


 


Vent Mode     


 


Mechanical Rate     


 


FiO2     %


 


Tidal Volume     


 


PEEP     


 


Potassium  3.9    (3.6-5.2)  mmol/L


 


Carbon Dioxide  30    (22-30)  mmol/L


 


Anion Gap  9 L    (10-20)  


 


BUN  56 H    (9-20)  mg/dL


 


Creatinine  0.8    (0.8-1.5)  mg/dL


 


Est GFR (African Amer)  > 60    


 


Est GFR (Non-Af Amer)  > 60    


 


POC Glucose (mg/dL)    205 H  ()  mg/dL


 


Random Glucose  194 H    ()  mg/dL


 


Calcium  6.9 L    (8.6-10.4)  mg/dl


 


Phosphorus  3.3    (2.5-4.5)  mg/dL


 


Magnesium  2.3    (1.6-2.3)  mg/dL


 


Total Bilirubin  0.7    (0.2-1.3)  mg/dL


 


AST  37    (17-59)  U/L


 


ALT  54    (21-72)  U/L


 


Alkaline Phosphatase  43    ()  U/L


 


Total Protein  5.3 L    (6.3-8.3)  g/dL


 


Albumin  2.7 L    (3.5-5.0)  g/dL


 


Globulin  2.6    (2.2-3.9)  gm/dL


 


Albumin/Globulin Ratio  1.1    (1.0-2.1)  


 


Arterial Blood Potassium     (3.6-5.2)  mmol/L














  01/09/19 01/08/19 01/08/19 Range/Units





  05:18 23:48 17:44 


 


WBC     (4.8-10.8)  K/uL


 


RBC     (4.40-5.90)  Mil/uL


 


Hgb     (12.0-18.0)  g/dL


 


Hct     (35.0-51.0)  %


 


MCV     (80.0-94.0)  fL


 


MCH     (27.0-31.0)  pg


 


MCHC     (33.0-37.0)  g/dL


 


RDW     (11.5-14.5)  %


 


Plt Count     (130-400)  K/uL


 


MPV     (7.2-11.7)  fL


 


Neut % (Auto)     (50.0-75.0)  %


 


Lymph % (Auto)     (20.0-40.0)  %


 


Mono % (Auto)     (0.0-10.0)  %


 


Eos % (Auto)     (0.0-4.0)  %


 


Baso % (Auto)     (0.0-2.0)  %


 


Neut # (Auto)     (1.8-7.0)  K/uL


 


Lymph # (Auto)     (1.0-4.3)  K/uL


 


Mono # (Auto)     (0.0-0.8)  K/uL


 


Eos # (Auto)     (0.0-0.7)  K/uL


 


Baso # (Auto)     (0.0-0.2)  K/uL


 


Neutrophils % (Manual)     (50-75)  %


 


Band Neutrophils %     (0-2)  %


 


Lymphocytes % (Manual)     (20-40)  %


 


Monocytes % (Manual)     (0-10)  %


 


Platelet Estimate     (NORMAL)  


 


Polychromasia     


 


Hypochromasia (manual)     


 


Anisocytosis (manual)     


 


Microcytosis (manual)     


 


Target Cells     


 


Ovalocytes     


 


Puncture Site  R rad    


 


pCO2  41    (35-45)  mm/Hg


 


pO2  101 H    ()  mm/Hg


 


HCO3  33.1 H    (21-28)  mmol/L


 


ABG pH  7.53 H    (7.35-7.45)  


 


ABG Total CO2  35.6 H    (22-28)  mmol/L


 


ABG O2 Saturation  96.6    (95-98)  %


 


ABG Base Excess  10.5 H    (-2.0-3.0)  mmol/L


 


[place] Test  Pos    


 


ABG Potassium  3.9    (3.6-5.2)  mmol/L


 


A-a O2 Difference  133.0    mm/Hg


 


Respiratory Index  1.3    


 


Sodium  148.0    (132-148)  mmol/l


 


Chloride  109.0 H    ()  mmol/L


 


Glucose  215 H    ()  mg/dl


 


Lactate  1.7    (0.7-2.1)  mmol/L


 


Vent Mode  Prvc    


 


Mechanical Rate  20    


 


FiO2  40.0    %


 


Tidal Volume  500    


 


PEEP  5    


 


Potassium     (3.6-5.2)  mmol/L


 


Carbon Dioxide     (22-30)  mmol/L


 


Anion Gap     (10-20)  


 


BUN     (9-20)  mg/dL


 


Creatinine     (0.8-1.5)  mg/dL


 


Est GFR (African Amer)     


 


Est GFR (Non-Af Amer)     


 


POC Glucose (mg/dL)   267 H  179 H  ()  mg/dL


 


Random Glucose     ()  mg/dL


 


Calcium     (8.6-10.4)  mg/dl


 


Phosphorus     (2.5-4.5)  mg/dL


 


Magnesium     (1.6-2.3)  mg/dL


 


Total Bilirubin     (0.2-1.3)  mg/dL


 


AST     (17-59)  U/L


 


ALT     (21-72)  U/L


 


Alkaline Phosphatase     ()  U/L


 


Total Protein     (6.3-8.3)  g/dL


 


Albumin     (3.5-5.0)  g/dL


 


Globulin     (2.2-3.9)  gm/dL


 


Albumin/Globulin Ratio     (1.0-2.1)  


 


Arterial Blood Potassium  3.9    (3.6-5.2)  mmol/L














  01/08/19 Range/Units





  11:10 


 


WBC   (4.8-10.8)  K/uL


 


RBC   (4.40-5.90)  Mil/uL


 


Hgb   (12.0-18.0)  g/dL


 


Hct   (35.0-51.0)  %


 


MCV   (80.0-94.0)  fL


 


MCH   (27.0-31.0)  pg


 


MCHC   (33.0-37.0)  g/dL


 


RDW   (11.5-14.5)  %


 


Plt Count   (130-400)  K/uL


 


MPV   (7.2-11.7)  fL


 


Neut % (Auto)   (50.0-75.0)  %


 


Lymph % (Auto)   (20.0-40.0)  %


 


Mono % (Auto)   (0.0-10.0)  %


 


Eos % (Auto)   (0.0-4.0)  %


 


Baso % (Auto)   (0.0-2.0)  %


 


Neut # (Auto)   (1.8-7.0)  K/uL


 


Lymph # (Auto)   (1.0-4.3)  K/uL


 


Mono # (Auto)   (0.0-0.8)  K/uL


 


Eos # (Auto)   (0.0-0.7)  K/uL


 


Baso # (Auto)   (0.0-0.2)  K/uL


 


Neutrophils % (Manual)   (50-75)  %


 


Band Neutrophils %   (0-2)  %


 


Lymphocytes % (Manual)   (20-40)  %


 


Monocytes % (Manual)   (0-10)  %


 


Platelet Estimate   (NORMAL)  


 


Polychromasia   


 


Hypochromasia (manual)   


 


Anisocytosis (manual)   


 


Microcytosis (manual)   


 


Target Cells   


 


Ovalocytes   


 


Puncture Site   


 


pCO2   (35-45)  mm/Hg


 


pO2   ()  mm/Hg


 


HCO3   (21-28)  mmol/L


 


ABG pH   (7.35-7.45)  


 


ABG Total CO2   (22-28)  mmol/L


 


ABG O2 Saturation   (95-98)  %


 


ABG Base Excess   (-2.0-3.0)  mmol/L


 


[place] Test   


 


ABG Potassium   (3.6-5.2)  mmol/L


 


A-a O2 Difference   mm/Hg


 


Respiratory Index   


 


Sodium   (132-148)  mmol/l


 


Chloride   ()  mmol/L


 


Glucose   ()  mg/dl


 


Lactate   (0.7-2.1)  mmol/L


 


Vent Mode   


 


Mechanical Rate   


 


FiO2   %


 


Tidal Volume   


 


PEEP   


 


Potassium   (3.6-5.2)  mmol/L


 


Carbon Dioxide   (22-30)  mmol/L


 


Anion Gap   (10-20)  


 


BUN   (9-20)  mg/dL


 


Creatinine   (0.8-1.5)  mg/dL


 


Est GFR (African Amer)   


 


Est GFR (Non-Af Amer)   


 


POC Glucose (mg/dL)  242 H  ()  mg/dL


 


Random Glucose   ()  mg/dL


 


Calcium   (8.6-10.4)  mg/dl


 


Phosphorus   (2.5-4.5)  mg/dL


 


Magnesium   (1.6-2.3)  mg/dL


 


Total Bilirubin   (0.2-1.3)  mg/dL


 


AST   (17-59)  U/L


 


ALT   (21-72)  U/L


 


Alkaline Phosphatase   ()  U/L


 


Total Protein   (6.3-8.3)  g/dL


 


Albumin   (3.5-5.0)  g/dL


 


Globulin   (2.2-3.9)  gm/dL


 


Albumin/Globulin Ratio   (1.0-2.1)  


 


Arterial Blood Potassium   (3.6-5.2)  mmol/L








                         Laboratory Results - last 24 hr











  01/08/19 01/08/19 01/08/19





  11:10 17:44 23:48


 


WBC   


 


RBC   


 


Hgb   


 


Hct   


 


MCV   


 


MCH   


 


MCHC   


 


RDW   


 


Plt Count   


 


MPV   


 


Neut % (Auto)   


 


Lymph % (Auto)   


 


Mono % (Auto)   


 


Eos % (Auto)   


 


Baso % (Auto)   


 


Neut # (Auto)   


 


Lymph # (Auto)   


 


Mono # (Auto)   


 


Eos # (Auto)   


 


Baso # (Auto)   


 


Neutrophils % (Manual)   


 


Band Neutrophils %   


 


Lymphocytes % (Manual)   


 


Monocytes % (Manual)   


 


Platelet Estimate   


 


Polychromasia   


 


Hypochromasia (manual)   


 


Anisocytosis (manual)   


 


Microcytosis (manual)   


 


Target Cells   


 


Ovalocytes   


 


Puncture Site   


 


pCO2   


 


pO2   


 


HCO3   


 


ABG pH   


 


ABG Total CO2   


 


ABG O2 Saturation   


 


ABG Base Excess   


 


[place] Test   


 


ABG Potassium   


 


A-a O2 Difference   


 


Respiratory Index   


 


Sodium   


 


Chloride   


 


Glucose   


 


Lactate   


 


Vent Mode   


 


Mechanical Rate   


 


FiO2   


 


Tidal Volume   


 


PEEP   


 


Potassium   


 


Carbon Dioxide   


 


Anion Gap   


 


BUN   


 


Creatinine   


 


Est GFR ( Amer)   


 


Est GFR (Non-Af Amer)   


 


POC Glucose (mg/dL)  242 H  179 H  267 H


 


Random Glucose   


 


Calcium   


 


Phosphorus   


 


Magnesium   


 


Total Bilirubin   


 


AST   


 


ALT   


 


Alkaline Phosphatase   


 


Total Protein   


 


Albumin   


 


Globulin   


 


Albumin/Globulin Ratio   


 


Arterial Blood Potassium   














  01/09/19 01/09/19 01/09/19





  05:18 05:29 06:35


 


WBC    12.4 H


 


RBC    6.50 H


 


Hgb    15.5


 


Hct    50.7


 


MCV    78.0 L


 


MCH    23.9 L


 


MCHC    30.6 L


 


RDW    17.8 H


 


Plt Count    188


 


MPV    9.7


 


Neut % (Auto)    90.4 H


 


Lymph % (Auto)    1.9 L


 


Mono % (Auto)    7.3


 


Eos % (Auto)    0.0


 


Baso % (Auto)    0.4


 


Neut # (Auto)    11.2 H


 


Lymph # (Auto)    0.2 L


 


Mono # (Auto)    0.9 H


 


Eos # (Auto)    0.0


 


Baso # (Auto)    0.0


 


Neutrophils % (Manual)    92 H


 


Band Neutrophils %    1


 


Lymphocytes % (Manual)    2 L


 


Monocytes % (Manual)    5


 


Platelet Estimate    Normal


 


Polychromasia    Slight


 


Hypochromasia (manual)    Slight


 


Anisocytosis (manual)    Slight


 


Microcytosis (manual)    Slight


 


Target Cells    Slight


 


Ovalocytes    Slight


 


Puncture Site  R rad  


 


pCO2  41  


 


pO2  101 H  


 


HCO3  33.1 H  


 


ABG pH  7.53 H  


 


ABG Total CO2  35.6 H  


 


ABG O2 Saturation  96.6  


 


ABG Base Excess  10.5 H  


 


[place] Test  Pos  


 


ABG Potassium  3.9  


 


A-a O2 Difference  133.0  


 


Respiratory Index  1.3  


 


Sodium  148.0  


 


Chloride  109.0 H  


 


Glucose  215 H  


 


Lactate  1.7  


 


Vent Mode  Prvc  


 


Mechanical Rate  20  


 


FiO2  40.0  


 


Tidal Volume  500  


 


PEEP  5  


 


Potassium   


 


Carbon Dioxide   


 


Anion Gap   


 


BUN   


 


Creatinine   


 


Est GFR ( Amer)   


 


Est GFR (Non-Af Amer)   


 


POC Glucose (mg/dL)   205 H 


 


Random Glucose   


 


Calcium   


 


Phosphorus   


 


Magnesium   


 


Total Bilirubin   


 


AST   


 


ALT   


 


Alkaline Phosphatase   


 


Total Protein   


 


Albumin   


 


Globulin   


 


Albumin/Globulin Ratio   


 


Arterial Blood Potassium  3.9  














  01/09/19





  06:35


 


WBC 


 


RBC 


 


Hgb 


 


Hct 


 


MCV 


 


MCH 


 


MCHC 


 


RDW 


 


Plt Count 


 


MPV 


 


Neut % (Auto) 


 


Lymph % (Auto) 


 


Mono % (Auto) 


 


Eos % (Auto) 


 


Baso % (Auto) 


 


Neut # (Auto) 


 


Lymph # (Auto) 


 


Mono # (Auto) 


 


Eos # (Auto) 


 


Baso # (Auto) 


 


Neutrophils % (Manual) 


 


Band Neutrophils % 


 


Lymphocytes % (Manual) 


 


Monocytes % (Manual) 


 


Platelet Estimate 


 


Polychromasia 


 


Hypochromasia (manual) 


 


Anisocytosis (manual) 


 


Microcytosis (manual) 


 


Target Cells 


 


Ovalocytes 


 


Puncture Site 


 


pCO2 


 


pO2 


 


HCO3 


 


ABG pH 


 


ABG Total CO2 


 


ABG O2 Saturation 


 


ABG Base Excess 


 


[place] Test 


 


ABG Potassium 


 


A-a O2 Difference 


 


Respiratory Index 


 


Sodium  142


 


Chloride  107


 


Glucose 


 


Lactate 


 


Vent Mode 


 


Mechanical Rate 


 


FiO2 


 


Tidal Volume 


 


PEEP 


 


Potassium  3.9


 


Carbon Dioxide  30


 


Anion Gap  9 L


 


BUN  56 H


 


Creatinine  0.8


 


Est GFR ( Amer)  > 60


 


Est GFR (Non-Af Amer)  > 60


 


POC Glucose (mg/dL) 


 


Random Glucose  194 H


 


Calcium  6.9 L


 


Phosphorus  3.3


 


Magnesium  2.3


 


Total Bilirubin  0.7


 


AST  37


 


ALT  54


 


Alkaline Phosphatase  43


 


Total Protein  5.3 L


 


Albumin  2.7 L


 


Globulin  2.6


 


Albumin/Globulin Ratio  1.1


 


Arterial Blood Potassium 











Radiology Impressions: 


                              Radiology Impressions





Chest X-Ray  01/08/19 06:00


Impression:


 


Lines and tubes in stable position. 


 


Biapical pleural thickening with upper granulomatous changes. 


 


Diffuse increased interstitial lung markings. 


 


Scattered patchy areas of consolidative changes seen in the upper 


lung zones and lung bases. 


 


Small bilateral pleural effusions. 


 


Right hilar prominence. 


 


Enlarged ectatic. 


 


Cardiomegaly. 


 


 











Fingerstick Blood Sugar Results: 209





Assessment/Plan





- Assessment and Plan (Free Text)


Assessment: 





78 y/o male with PMHx of COPD, asthma, sleep apnea, HTN, DM, GERD arrived to ICU

from  due to respiratory distress on 1/4 and was intubated in ICU. Currently 

stable and still in ICU. 





neuro


-was agitated prior to intubation on 1/4


-sedated on precedex 0.2, titrate as needed





CV


-severe pHTN and Afib. Prior cath - non obstructive and EF normal


-prior to intubation, HR > 150 on 1/4, s/p cardizem 5 mg IVP and at least 2 x 

500cc NS boluses due to hypotension 70s/30s


-currently on coreg 3.125 mg BID 


-continue with diltiazem 30 mg PO qid


-continue with finasteride 5 mg 


-Dr. Randle cardio recs: lovenox 70 mg sq BID, full AC for Afib. Already ordered 

by him.





Pulm


-patient w/ COPD, asthma and sleep apnea


-on duonebs q4h


-on solumedrol 40 mg IV q8h


-ABG: metabolic alkalosis, hypercapnea, improving


-Vent settings: CPAP: PEEP 5, FiO2 40%. Will attempt extubation again. 


-ABG suggestive of alkalosis 2/2 tachypnea; patient has been failing extubation 

as he gets tachypneic


-CXR and ABG qAM while intubated





Renal


-renal function w/ chronically elevated BUN but Cr wnl


-no acute issues


-CMP qAM





GI


-no acute issues





Heme


-patient w/ H/H wnl


-no acute issues


-CBC qAM





ID


-on azithromycin 500 mg q24h started 12/31


-on zosyn 3.375 mg q8h started 12/31





Endo


-continue with ISS


-lantus 20 u qhs


-accuchecks q6h





POC: Gigi Ariza [phone]





DVT ppx: lovenox 40 mg sq daily


GI ppx: protonix 40 mg IV daily





case discussed with Dr. Wallace Coto PGY1





<Raza Gonsalez - Last Filed: 01/09/19 16:23>





CCU Subjective





- Physician Review


Critical Care Time Spent (in minutes): 40





CCU Objective





- Vital Signs / Intake & Output


Intake and Output (Last 8hrs): 


                                 Intake & Output











 01/09/19 01/09/19 01/09/19





 06:59 14:59 22:59


 


Intake Total 588.8 60.8 


 


Output Total 1460 1650 


 


Balance -871.2 -1589.2 


 


Weight 155 lb  


 


Intake:   


 


   15 


 


  Intake, IV Amount 128.8 5.8 


 


    Left Forearm 50  


 


    Right Forearm 78.8 5.8 


 


  Tube Feeding 360 40 


 


Output:   


 


  Urine 1460 1650 


 


    Urine, Voided 1460 1650 














- Medications


Active Medications: 


Active Medications











Generic Name Dose Route Start Last Admin





  Trade Name Freq  PRN Reason Stop Dose Admin


 


Acetylcysteine  4 ml  01/03/19 20:00  01/09/19 13:45





  Acetylcysteine 20%  INH   4 ml





  RQ6 OLEG   Administration





     





     





     





     


 


Albuterol/Ipratropium  3 ml  12/31/18 08:00  01/09/19 13:45





  Duoneb 3 Mg/0.5 Mg (3 Ml) Ud  INH   3 ml





  RQ4 OLEG   Administration





     





     





     





     


 


Carvedilol  3.125 mg  01/07/19 10:15  01/09/19 09:30





  Coreg  PO   3.125 mg





  BID OLEG   Administration





     





     





     





     


 


Diltiazem HCl  30 mg  01/05/19 14:00  01/09/19 13:46





  Cardizem  PO   30 mg





  QID OLEG   Administration





     





     





     





     


 


Enoxaparin Sodium  70 mg  01/06/19 10:00  01/09/19 09:36





  Lovenox  SC   70 mg





  Q12H OLEG   Administration





     





     





     





     


 


Finasteride  5 mg  12/31/18 10:00  01/09/19 09:30





  Proscar  PO   5 mg





  DAILY OLEG   Administration





     





     





     





     


 


Azithromycin 500 mg/ Sodium  250 mls @ 250 mls/hr  12/31/18 12:00  01/09/19 

12:00





  Chloride  IVPB   250 mls/hr





  Q24H OLEG   Administration





     





     





  Protocol   





     


 


Piperacillin Sod/Tazobactam Sod  3.375 gm in 50 mls @ 100 mls/hr  01/02/19 14:30

 01/09/19 13:30





  Zosyn 3.375 Gm Iv Premix  IVPB   100 mls/hr





  Q8H OLEG   Administration





     





     





  Protocol   





     


 


Propofol  1,000 mg in 100 mls @ 2.286 mls/hr  01/04/19 13:03  01/08/19 17:00





  Diprivan  IV   0 mcg/kg/min





  .Q24H PRN   0 mls/hr





  TITRATE PER MD ORDER   Titration





     





  Protocol   





  5 MCG/KG/MIN   


 


Phenylephrine HCl 30 mg/  253 mls @ 10.12 mls/hr  01/04/19 15:17  01/06/19 09:46





  Sodium Chloride  IV   0 mcg/min





  .Q24H PRN   0 mls/hr





  TITRATE PER MD ORDER   Titration





     





  Protocol   





  20 MCG/MIN   


 


Dexmedetomidine HCl 200 mcg/  50 mls @ 3.61 mls/hr  01/08/19 14:01  01/09/19 

09:35





  Sodium Chloride  IV   0 mcg/kg/hr





  TITR PRN   0 mls/hr





  Sedation   Titration





     





  Protocol   





  0.2 MCG/KG/HR   


 


Propofol  1,000 mg in 100 mls @ 2.164 mls/hr  01/08/19 15:02  





  Diprivan  IV   





  .Q24H PRN   





  TITRATE PER MD ORDER   





     





  Protocol   





  5 MCG/KG/MIN   


 


Insulin Aspart  0 unit  01/05/19 10:13  01/09/19 12:00





  Novolog  SC   Not Given





  Q6 [facility]   





     





     





  Protocol   





     


 


Insulin Glargine  20 unit  01/06/19 14:12  01/08/19 21:31





  Lantus  SC   20 units





  HS OLEG   Administration





     





     





     





     


 


Methylprednisolone  40 mg  01/06/19 14:00  01/09/19 13:46





  Solu-Medrol  IVP   40 mg





  Q8 OLEG   Administration





     





     





     





     


 


Pantoprazole Sodium  40 mg  01/09/19 10:00  01/09/19 09:30





  Protonix Inj  IVP   40 mg





  DAILY OLEG   Administration





     





     





     





     


 


Polyethylene Glycol  17 gm  01/09/19 10:15  01/09/19 11:03





  Miralax  PO   17 gm





  DAILY OLEG   Administration





     





     





     





     


 


Tamsulosin HCl  0.4 mg  12/31/18 10:00  01/09/19 09:30





  Flomax  PO   0.4 mg





  BID OLEG   Administration





     





     





     





     














- Patient Studies


Lab Studies: 


                                   Lab Studies











  01/09/19 01/09/19 01/09/19 Range/Units





  11:32 06:35 06:35 


 


WBC    12.4 H  (4.8-10.8)  K/uL


 


RBC    6.50 H  (4.40-5.90)  Mil/uL


 


Hgb    15.5  (12.0-18.0)  g/dL


 


Hct    50.7  (35.0-51.0)  %


 


MCV    78.0 L  (80.0-94.0)  fL


 


MCH    23.9 L  (27.0-31.0)  pg


 


MCHC    30.6 L  (33.0-37.0)  g/dL


 


RDW    17.8 H  (11.5-14.5)  %


 


Plt Count    188  (130-400)  K/uL


 


MPV    9.7  (7.2-11.7)  fL


 


Neut % (Auto)    90.4 H  (50.0-75.0)  %


 


Lymph % (Auto)    1.9 L  (20.0-40.0)  %


 


Mono % (Auto)    7.3  (0.0-10.0)  %


 


Eos % (Auto)    0.0  (0.0-4.0)  %


 


Baso % (Auto)    0.4  (0.0-2.0)  %


 


Neut # (Auto)    11.2 H  (1.8-7.0)  K/uL


 


Lymph # (Auto)    0.2 L  (1.0-4.3)  K/uL


 


Mono # (Auto)    0.9 H  (0.0-0.8)  K/uL


 


Eos # (Auto)    0.0  (0.0-0.7)  K/uL


 


Baso # (Auto)    0.0  (0.0-0.2)  K/uL


 


Neutrophils % (Manual)    92 H  (50-75)  %


 


Band Neutrophils %    1  (0-2)  %


 


Lymphocytes % (Manual)    2 L  (20-40)  %


 


Monocytes % (Manual)    5  (0-10)  %


 


Platelet Estimate    Normal  (NORMAL)  


 


Polychromasia    Slight  


 


Hypochromasia (manual)    Slight  


 


Anisocytosis (manual)    Slight  


 


Microcytosis (manual)    Slight  


 


Target Cells    Slight  


 


Ovalocytes    Slight  


 


Puncture Site     


 


pCO2     (35-45)  mm/Hg


 


pO2     ()  mm/Hg


 


HCO3     (21-28)  mmol/L


 


ABG pH     (7.35-7.45)  


 


ABG Total CO2     (22-28)  mmol/L


 


ABG O2 Saturation     (95-98)  %


 


ABG Base Excess     (-2.0-3.0)  mmol/L


 


Austin Test     


 


ABG Potassium     (3.6-5.2)  mmol/L


 


A-a O2 Difference     mm/Hg


 


Respiratory Index     


 


Sodium   142   (132-148)  mmol/l


 


Chloride   107   ()  mmol/L


 


Glucose     ()  mg/dl


 


Lactate     (0.7-2.1)  mmol/L


 


Vent Mode     


 


Mechanical Rate     


 


FiO2     %


 


Tidal Volume     


 


PEEP     


 


Potassium   3.9   (3.6-5.2)  mmol/L


 


Carbon Dioxide   30   (22-30)  mmol/L


 


Anion Gap   9 L   (10-20)  


 


BUN   56 H   (9-20)  mg/dL


 


Creatinine   0.8   (0.8-1.5)  mg/dL


 


Est GFR (African Amer)   > 60   


 


Est GFR (Non-Af Amer)   > 60   


 


POC Glucose (mg/dL)  113 H    ()  mg/dL


 


Random Glucose   194 H   ()  mg/dL


 


Calcium   6.9 L   (8.6-10.4)  mg/dl


 


Phosphorus   3.3   (2.5-4.5)  mg/dL


 


Magnesium   2.3   (1.6-2.3)  mg/dL


 


Total Bilirubin   0.7   (0.2-1.3)  mg/dL


 


AST   37   (17-59)  U/L


 


ALT   54   (21-72)  U/L


 


Alkaline Phosphatase   43   ()  U/L


 


Total Protein   5.3 L   (6.3-8.3)  g/dL


 


Albumin   2.7 L   (3.5-5.0)  g/dL


 


Globulin   2.6   (2.2-3.9)  gm/dL


 


Albumin/Globulin Ratio   1.1   (1.0-2.1)  


 


Arterial Blood Potassium     (3.6-5.2)  mmol/L














  01/09/19 01/09/19 01/08/19 Range/Units





  05:29 05:18 23:48 


 


WBC     (4.8-10.8)  K/uL


 


RBC     (4.40-5.90)  Mil/uL


 


Hgb     (12.0-18.0)  g/dL


 


Hct     (35.0-51.0)  %


 


MCV     (80.0-94.0)  fL


 


MCH     (27.0-31.0)  pg


 


MCHC     (33.0-37.0)  g/dL


 


RDW     (11.5-14.5)  %


 


Plt Count     (130-400)  K/uL


 


MPV     (7.2-11.7)  fL


 


Neut % (Auto)     (50.0-75.0)  %


 


Lymph % (Auto)     (20.0-40.0)  %


 


Mono % (Auto)     (0.0-10.0)  %


 


Eos % (Auto)     (0.0-4.0)  %


 


Baso % (Auto)     (0.0-2.0)  %


 


Neut # (Auto)     (1.8-7.0)  K/uL


 


Lymph # (Auto)     (1.0-4.3)  K/uL


 


Mono # (Auto)     (0.0-0.8)  K/uL


 


Eos # (Auto)     (0.0-0.7)  K/uL


 


Baso # (Auto)     (0.0-0.2)  K/uL


 


Neutrophils % (Manual)     (50-75)  %


 


Band Neutrophils %     (0-2)  %


 


Lymphocytes % (Manual)     (20-40)  %


 


Monocytes % (Manual)     (0-10)  %


 


Platelet Estimate     (NORMAL)  


 


Polychromasia     


 


Hypochromasia (manual)     


 


Anisocytosis (manual)     


 


Microcytosis (manual)     


 


Target Cells     


 


Ovalocytes     


 


Puncture Site   R rad   


 


pCO2   41   (35-45)  mm/Hg


 


pO2   101 H   ()  mm/Hg


 


HCO3   33.1 H   (21-28)  mmol/L


 


ABG pH   7.53 H   (7.35-7.45)  


 


ABG Total CO2   35.6 H   (22-28)  mmol/L


 


ABG O2 Saturation   96.6   (95-98)  %


 


ABG Base Excess   10.5 H   (-2.0-3.0)  mmol/L


 


Austin Test   Pos   


 


ABG Potassium   3.9   (3.6-5.2)  mmol/L


 


A-a O2 Difference   133.0   mm/Hg


 


Respiratory Index   1.3   


 


Sodium   148.0   (132-148)  mmol/l


 


Chloride   109.0 H   ()  mmol/L


 


Glucose   215 H   ()  mg/dl


 


Lactate   1.7   (0.7-2.1)  mmol/L


 


Vent Mode   Prvc   


 


Mechanical Rate   20   


 


FiO2   40.0   %


 


Tidal Volume   500   


 


PEEP   5   


 


Potassium     (3.6-5.2)  mmol/L


 


Carbon Dioxide     (22-30)  mmol/L


 


Anion Gap     (10-20)  


 


BUN     (9-20)  mg/dL


 


Creatinine     (0.8-1.5)  mg/dL


 


Est GFR (African Amer)     


 


Est GFR (Non-Af Amer)     


 


POC Glucose (mg/dL)  205 H   267 H  ()  mg/dL


 


Random Glucose     ()  mg/dL


 


Calcium     (8.6-10.4)  mg/dl


 


Phosphorus     (2.5-4.5)  mg/dL


 


Magnesium     (1.6-2.3)  mg/dL


 


Total Bilirubin     (0.2-1.3)  mg/dL


 


AST     (17-59)  U/L


 


ALT     (21-72)  U/L


 


Alkaline Phosphatase     ()  U/L


 


Total Protein     (6.3-8.3)  g/dL


 


Albumin     (3.5-5.0)  g/dL


 


Globulin     (2.2-3.9)  gm/dL


 


Albumin/Globulin Ratio     (1.0-2.1)  


 


Arterial Blood Potassium   3.9   (3.6-5.2)  mmol/L














  01/08/19 Range/Units





  17:44 


 


WBC   (4.8-10.8)  K/uL


 


RBC   (4.40-5.90)  Mil/uL


 


Hgb   (12.0-18.0)  g/dL


 


Hct   (35.0-51.0)  %


 


MCV   (80.0-94.0)  fL


 


MCH   (27.0-31.0)  pg


 


MCHC   (33.0-37.0)  g/dL


 


RDW   (11.5-14.5)  %


 


Plt Count   (130-400)  K/uL


 


MPV   (7.2-11.7)  fL


 


Neut % (Auto)   (50.0-75.0)  %


 


Lymph % (Auto)   (20.0-40.0)  %


 


Mono % (Auto)   (0.0-10.0)  %


 


Eos % (Auto)   (0.0-4.0)  %


 


Baso % (Auto)   (0.0-2.0)  %


 


Neut # (Auto)   (1.8-7.0)  K/uL


 


Lymph # (Auto)   (1.0-4.3)  K/uL


 


Mono # (Auto)   (0.0-0.8)  K/uL


 


Eos # (Auto)   (0.0-0.7)  K/uL


 


Baso # (Auto)   (0.0-0.2)  K/uL


 


Neutrophils % (Manual)   (50-75)  %


 


Band Neutrophils %   (0-2)  %


 


Lymphocytes % (Manual)   (20-40)  %


 


Monocytes % (Manual)   (0-10)  %


 


Platelet Estimate   (NORMAL)  


 


Polychromasia   


 


Hypochromasia (manual)   


 


Anisocytosis (manual)   


 


Microcytosis (manual)   


 


Target Cells   


 


Ovalocytes   


 


Puncture Site   


 


pCO2   (35-45)  mm/Hg


 


pO2   ()  mm/Hg


 


HCO3   (21-28)  mmol/L


 


ABG pH   (7.35-7.45)  


 


ABG Total CO2   (22-28)  mmol/L


 


ABG O2 Saturation   (95-98)  %


 


ABG Base Excess   (-2.0-3.0)  mmol/L


 


Austin Test   


 


ABG Potassium   (3.6-5.2)  mmol/L


 


A-a O2 Difference   mm/Hg


 


Respiratory Index   


 


Sodium   (132-148)  mmol/l


 


Chloride   ()  mmol/L


 


Glucose   ()  mg/dl


 


Lactate   (0.7-2.1)  mmol/L


 


Vent Mode   


 


Mechanical Rate   


 


FiO2   %


 


Tidal Volume   


 


PEEP   


 


Potassium   (3.6-5.2)  mmol/L


 


Carbon Dioxide   (22-30)  mmol/L


 


Anion Gap   (10-20)  


 


BUN   (9-20)  mg/dL


 


Creatinine   (0.8-1.5)  mg/dL


 


Est GFR (African Amer)   


 


Est GFR (Non-Af Amer)   


 


POC Glucose (mg/dL)  179 H  ()  mg/dL


 


Random Glucose   ()  mg/dL


 


Calcium   (8.6-10.4)  mg/dl


 


Phosphorus   (2.5-4.5)  mg/dL


 


Magnesium   (1.6-2.3)  mg/dL


 


Total Bilirubin   (0.2-1.3)  mg/dL


 


AST   (17-59)  U/L


 


ALT   (21-72)  U/L


 


Alkaline Phosphatase   ()  U/L


 


Total Protein   (6.3-8.3)  g/dL


 


Albumin   (3.5-5.0)  g/dL


 


Globulin   (2.2-3.9)  gm/dL


 


Albumin/Globulin Ratio   (1.0-2.1)  


 


Arterial Blood Potassium   (3.6-5.2)  mmol/L








                         Laboratory Results - last 24 hr











  01/08/19 01/08/19 01/09/19





  17:44 23:48 05:18


 


WBC   


 


RBC   


 


Hgb   


 


Hct   


 


MCV   


 


MCH   


 


MCHC   


 


RDW   


 


Plt Count   


 


MPV   


 


Neut % (Auto)   


 


Lymph % (Auto)   


 


Mono % (Auto)   


 


Eos % (Auto)   


 


Baso % (Auto)   


 


Neut # (Auto)   


 


Lymph # (Auto)   


 


Mono # (Auto)   


 


Eos # (Auto)   


 


Baso # (Auto)   


 


Neutrophils % (Manual)   


 


Band Neutrophils %   


 


Lymphocytes % (Manual)   


 


Monocytes % (Manual)   


 


Platelet Estimate   


 


Polychromasia   


 


Hypochromasia (manual)   


 


Anisocytosis (manual)   


 


Microcytosis (manual)   


 


Target Cells   


 


Ovalocytes   


 


Puncture Site    R rad


 


pCO2    41


 


pO2    101 H


 


HCO3    33.1 H


 


ABG pH    7.53 H


 


ABG Total CO2    35.6 H


 


ABG O2 Saturation    96.6


 


ABG Base Excess    10.5 H


 


Austin Test    Pos


 


ABG Potassium    3.9


 


A-a O2 Difference    133.0


 


Respiratory Index    1.3


 


Sodium    148.0


 


Chloride    109.0 H


 


Glucose    215 H


 


Lactate    1.7


 


Vent Mode    Prvc


 


Mechanical Rate    20


 


FiO2    40.0


 


Tidal Volume    500


 


PEEP    5


 


Potassium   


 


Carbon Dioxide   


 


Anion Gap   


 


BUN   


 


Creatinine   


 


Est GFR ( Amer)   


 


Est GFR (Non-Af Amer)   


 


POC Glucose (mg/dL)  179 H  267 H 


 


Random Glucose   


 


Calcium   


 


Phosphorus   


 


Magnesium   


 


Total Bilirubin   


 


AST   


 


ALT   


 


Alkaline Phosphatase   


 


Total Protein   


 


Albumin   


 


Globulin   


 


Albumin/Globulin Ratio   


 


Arterial Blood Potassium    3.9














  01/09/19 01/09/19 01/09/19





  05:29 06:35 06:35


 


WBC   12.4 H 


 


RBC   6.50 H 


 


Hgb   15.5 


 


Hct   50.7 


 


MCV   78.0 L 


 


MCH   23.9 L 


 


MCHC   30.6 L 


 


RDW   17.8 H 


 


Plt Count   188 


 


MPV   9.7 


 


Neut % (Auto)   90.4 H 


 


Lymph % (Auto)   1.9 L 


 


Mono % (Auto)   7.3 


 


Eos % (Auto)   0.0 


 


Baso % (Auto)   0.4 


 


Neut # (Auto)   11.2 H 


 


Lymph # (Auto)   0.2 L 


 


Mono # (Auto)   0.9 H 


 


Eos # (Auto)   0.0 


 


Baso # (Auto)   0.0 


 


Neutrophils % (Manual)   92 H 


 


Band Neutrophils %   1 


 


Lymphocytes % (Manual)   2 L 


 


Monocytes % (Manual)   5 


 


Platelet Estimate   Normal 


 


Polychromasia   Slight 


 


Hypochromasia (manual)   Slight 


 


Anisocytosis (manual)   Slight 


 


Microcytosis (manual)   Slight 


 


Target Cells   Slight 


 


Ovalocytes   Slight 


 


Puncture Site   


 


pCO2   


 


pO2   


 


HCO3   


 


ABG pH   


 


ABG Total CO2   


 


ABG O2 Saturation   


 


ABG Base Excess   


 


Austin Test   


 


ABG Potassium   


 


A-a O2 Difference   


 


Respiratory Index   


 


Sodium    142


 


Chloride    107


 


Glucose   


 


Lactate   


 


Vent Mode   


 


Mechanical Rate   


 


FiO2   


 


Tidal Volume   


 


PEEP   


 


Potassium    3.9


 


Carbon Dioxide    30


 


Anion Gap    9 L


 


BUN    56 H


 


Creatinine    0.8


 


Est GFR ( Amer)    > 60


 


Est GFR (Non-Af Amer)    > 60


 


POC Glucose (mg/dL)  205 H  


 


Random Glucose    194 H


 


Calcium    6.9 L


 


Phosphorus    3.3


 


Magnesium    2.3


 


Total Bilirubin    0.7


 


AST    37


 


ALT    54


 


Alkaline Phosphatase    43


 


Total Protein    5.3 L


 


Albumin    2.7 L


 


Globulin    2.6


 


Albumin/Globulin Ratio    1.1


 


Arterial Blood Potassium   














  01/09/19





  11:32


 


WBC 


 


RBC 


 


Hgb 


 


Hct 


 


MCV 


 


MCH 


 


MCHC 


 


RDW 


 


Plt Count 


 


MPV 


 


Neut % (Auto) 


 


Lymph % (Auto) 


 


Mono % (Auto) 


 


Eos % (Auto) 


 


Baso % (Auto) 


 


Neut # (Auto) 


 


Lymph # (Auto) 


 


Mono # (Auto) 


 


Eos # (Auto) 


 


Baso # (Auto) 


 


Neutrophils % (Manual) 


 


Band Neutrophils % 


 


Lymphocytes % (Manual) 


 


Monocytes % (Manual) 


 


Platelet Estimate 


 


Polychromasia 


 


Hypochromasia (manual) 


 


Anisocytosis (manual) 


 


Microcytosis (manual) 


 


Target Cells 


 


Ovalocytes 


 


Puncture Site 


 


pCO2 


 


pO2 


 


HCO3 


 


ABG pH 


 


ABG Total CO2 


 


ABG O2 Saturation 


 


ABG Base Excess 


 


Austin Test 


 


ABG Potassium 


 


A-a O2 Difference 


 


Respiratory Index 


 


Sodium 


 


Chloride 


 


Glucose 


 


Lactate 


 


Vent Mode 


 


Mechanical Rate 


 


FiO2 


 


Tidal Volume 


 


PEEP 


 


Potassium 


 


Carbon Dioxide 


 


Anion Gap 


 


BUN 


 


Creatinine 


 


Est GFR ( Amer) 


 


Est GFR (Non-Af Amer) 


 


POC Glucose (mg/dL)  113 H


 


Random Glucose 


 


Calcium 


 


Phosphorus 


 


Magnesium 


 


Total Bilirubin 


 


AST 


 


ALT 


 


Alkaline Phosphatase 


 


Total Protein 


 


Albumin 


 


Globulin 


 


Albumin/Globulin Ratio 


 


Arterial Blood Potassium 














Assessment/Plan


(1) Acute respiratory failure


Current Visit: Yes   Status: Acute   





(2) COPD exacerbation


Current Visit: No   Status: Acute   





Attending/Attestation





- Attestation


I have personally seen and examined this patient.: Yes


I have fully participated in the care of the patient.: Yes


I have reviewed all pertinent clinical information: Yes


Notes (Text): 





01/09/19 16:22


Patient seen and examined in the intensive care unit.


Patient extubated after weaning trial


Placed on BiPAP using accessory muscles


Continue antibiotics, IV steroids and nebulizer treatment


Lasix as needed

## 2019-01-09 NOTE — PN
DATE:  01/08/2019



SUBJECTIVE:  The patient remains on mechanical ventilator.  He is afebrile.

He is weaned off.  He is down to have FiO2 of 40%.  No fever.  No chills. 

No shaking.  No distress.  Steroids are also being tapered.



PHYSICAL EXAMINATION:

VITAL SIGNS:  Blood pressure is 100/78, pulse 90, respiratory rate 19, and

temperature 99.

LUNGS:  Bilateral transmitted breath sounds, rales and rhonchi.

CARDIOVASCULAR SYSTEM:  S1 and S2 are regular.

ABDOMEN:  Soft.



ASSESSMENT:

1.  Acute exacerbation of bronchial asthma.

2.  Acute respiratory failure due to asthma.

3.  Hypertension.

4.  Diabetes.



PLAN:  Monitor the patient.  Taper steroids.  Wean the patient off

ventilator.







__________________________________________

Tae Hensley MD





DD:  01/08/2019 21:36:01

DT:  01/08/2019 23:05:16

Job # [id number]

## 2019-01-10 LAB
ALBUMIN SERPL-MCNC: 2.8 G/DL (ref 3.5–5)
ALBUMIN/GLOB SERPL: 1 {RATIO} (ref 1–2.1)
ALT SERPL-CCNC: 85 U/L (ref 21–72)
ARTERIAL BLOOD GAS HEMOGLOBIN: 15.3 G/DL (ref 11.7–17.4)
ARTERIAL BLOOD GAS O2 SAT: 97 % (ref 95–98)
ARTERIAL BLOOD GAS PCO2: 42 MM/HG (ref 35–45)
ARTERIAL BLOOD GAS TCO2: 37.2 MMOL/L (ref 22–28)
AST SERPL-CCNC: 54 U/L (ref 17–59)
BASOPHILS # BLD AUTO: 0 K/UL (ref 0–0.2)
BASOPHILS NFR BLD: 0.2 % (ref 0–2)
BUN SERPL-MCNC: 71 MG/DL (ref 9–20)
CALCIUM SERPL-MCNC: 7.7 MG/DL (ref 8.6–10.4)
EOSINOPHIL # BLD AUTO: 0 K/UL (ref 0–0.7)
EOSINOPHIL NFR BLD: 0 % (ref 0–4)
ERYTHROCYTE [DISTWIDTH] IN BLOOD BY AUTOMATED COUNT: 18.3 % (ref 11.5–14.5)
GFR NON-AFRICAN AMERICAN: > 60
HCO3 BLDA-SCNC: 34.3 MMOL/L (ref 21–28)
HGB BLD-MCNC: 15.4 G/DL (ref 12–18)
INHALED O2 CONCENTRATION: 50 %
LYMPHOCYTE: 3 % (ref 20–40)
LYMPHOCYTES # BLD AUTO: 0.2 K/UL (ref 1–4.3)
LYMPHOCYTES NFR BLD AUTO: 1.5 % (ref 20–40)
MCH RBC QN AUTO: 24.6 PG (ref 27–31)
MCHC RBC AUTO-ENTMCNC: 31.8 G/DL (ref 33–37)
MCV RBC AUTO: 77.5 FL (ref 80–94)
MONOCYTE: 4 % (ref 0–10)
MONOCYTES # BLD: 0.8 K/UL (ref 0–0.8)
MONOCYTES NFR BLD: 6.9 % (ref 0–10)
NEUTROPHILS # BLD: 11.1 K/UL (ref 1.8–7)
NEUTROPHILS NFR BLD AUTO: 88 % (ref 50–75)
NEUTROPHILS NFR BLD AUTO: 91.4 % (ref 50–75)
NEUTS BAND NFR BLD: 2 % (ref 0–2)
NRBC BLD AUTO-RTO: 0.1 % (ref 0–2)
PH BLDA: 7.54 [PH] (ref 7.35–7.45)
PLATELET # BLD EST: NORMAL 10*3/UL
PLATELET # BLD: 191 K/UL (ref 130–400)
PMV BLD AUTO: 9.6 FL (ref 7.2–11.7)
PO2 BLDA: 134 MM/HG (ref 80–100)
RBC # BLD AUTO: 6.25 MIL/UL (ref 4.4–5.9)
TOTAL CELLS COUNTED BLD: 100
VARIANT LYMPHS NFR BLD MANUAL: 3 % (ref 0–0)
WBC # BLD AUTO: 12.1 K/UL (ref 4.8–10.8)

## 2019-01-10 RX ADMIN — IPRATROPIUM BROMIDE AND ALBUTEROL SULFATE SCH ML: .5; 3 SOLUTION RESPIRATORY (INHALATION) at 01:05

## 2019-01-10 RX ADMIN — INSULIN ASPART SCH U: 100 INJECTION, SOLUTION INTRAVENOUS; SUBCUTANEOUS at 01:13

## 2019-01-10 RX ADMIN — IPRATROPIUM BROMIDE AND ALBUTEROL SULFATE SCH ML: .5; 3 SOLUTION RESPIRATORY (INHALATION) at 07:31

## 2019-01-10 RX ADMIN — INSULIN ASPART SCH U: 100 INJECTION, SOLUTION INTRAVENOUS; SUBCUTANEOUS at 06:02

## 2019-01-10 RX ADMIN — INSULIN ASPART SCH: 100 INJECTION, SOLUTION INTRAVENOUS; SUBCUTANEOUS at 18:26

## 2019-01-10 RX ADMIN — ACETYLCYSTEINE SCH ML: 200 INHALANT RESPIRATORY (INHALATION) at 20:12

## 2019-01-10 RX ADMIN — METHYLPREDNISOLONE SODIUM SUCCINATE SCH MG: 40 INJECTION, POWDER, FOR SOLUTION INTRAMUSCULAR; INTRAVENOUS at 21:35

## 2019-01-10 RX ADMIN — INSULIN GLARGINE SCH UNITS: 100 INJECTION, SOLUTION SUBCUTANEOUS at 21:53

## 2019-01-10 RX ADMIN — TAZOBACTAM SODIUM AND PIPERACILLIN SODIUM SCH MLS/HR: 375; 3 INJECTION, SOLUTION INTRAVENOUS at 06:03

## 2019-01-10 RX ADMIN — ENOXAPARIN SODIUM SCH MG: 80 INJECTION SUBCUTANEOUS at 09:07

## 2019-01-10 RX ADMIN — IPRATROPIUM BROMIDE AND ALBUTEROL SULFATE SCH ML: .5; 3 SOLUTION RESPIRATORY (INHALATION) at 03:51

## 2019-01-10 RX ADMIN — ACETYLCYSTEINE SCH ML: 200 INHALANT RESPIRATORY (INHALATION) at 01:05

## 2019-01-10 RX ADMIN — METHYLPREDNISOLONE SODIUM SUCCINATE SCH MG: 40 INJECTION, POWDER, FOR SOLUTION INTRAMUSCULAR; INTRAVENOUS at 13:00

## 2019-01-10 RX ADMIN — INSULIN ASPART SCH U: 100 INJECTION, SOLUTION INTRAVENOUS; SUBCUTANEOUS at 13:00

## 2019-01-10 RX ADMIN — TAZOBACTAM SODIUM AND PIPERACILLIN SODIUM SCH MLS/HR: 375; 3 INJECTION, SOLUTION INTRAVENOUS at 21:35

## 2019-01-10 RX ADMIN — ACETYLCYSTEINE SCH ML: 200 INHALANT RESPIRATORY (INHALATION) at 07:30

## 2019-01-10 RX ADMIN — ACETYLCYSTEINE SCH ML: 200 INHALANT RESPIRATORY (INHALATION) at 13:58

## 2019-01-10 RX ADMIN — METHYLPREDNISOLONE SODIUM SUCCINATE SCH MG: 40 INJECTION, POWDER, FOR SOLUTION INTRAMUSCULAR; INTRAVENOUS at 05:49

## 2019-01-10 RX ADMIN — IPRATROPIUM BROMIDE AND ALBUTEROL SULFATE SCH ML: .5; 3 SOLUTION RESPIRATORY (INHALATION) at 20:12

## 2019-01-10 RX ADMIN — ENOXAPARIN SODIUM SCH MG: 80 INJECTION SUBCUTANEOUS at 21:38

## 2019-01-10 RX ADMIN — INSULIN ASPART SCH U: 100 INJECTION, SOLUTION INTRAVENOUS; SUBCUTANEOUS at 23:50

## 2019-01-10 RX ADMIN — POLYETHYLENE GLYCOL 3350 SCH GM: 17 POWDER, FOR SOLUTION ORAL at 09:07

## 2019-01-10 RX ADMIN — IPRATROPIUM BROMIDE AND ALBUTEROL SULFATE SCH ML: .5; 3 SOLUTION RESPIRATORY (INHALATION) at 17:15

## 2019-01-10 RX ADMIN — TAZOBACTAM SODIUM AND PIPERACILLIN SODIUM SCH MLS/HR: 375; 3 INJECTION, SOLUTION INTRAVENOUS at 13:30

## 2019-01-10 RX ADMIN — PROPOFOL PRN MLS/HR: 10 INJECTION, EMULSION INTRAVENOUS at 15:45

## 2019-01-10 RX ADMIN — PROPOFOL PRN MLS/HR: 10 INJECTION, EMULSION INTRAVENOUS at 01:16

## 2019-01-10 RX ADMIN — IPRATROPIUM BROMIDE AND ALBUTEROL SULFATE SCH ML: .5; 3 SOLUTION RESPIRATORY (INHALATION) at 13:58

## 2019-01-10 NOTE — CP.PCM.PN
Subjective





- Date & Time of Evaluation


Date of Evaluation: 01/10/19


Time of Evaluation: 17:30





- Subjective


Subjective: 





Patient seen and examined at bedside. Patient intubated 





CCU Objective





- Physical Exam


Head: Positive for: Atraumatic, Normocephalic


Conjunctiva: Positive for: Normal


Mouth: Positive for: Other (ET tube and gastric tube in place)


Respiratory/Chest: Positive for: Good Air Exchange.  Negative for: Clear to 

Auscultation, Respiratory Distress


Cardiovascular: Positive for: Normal S1, S2, Irregular Rhythm, Tachycardic


Abdomen: Positive for: Normal Bowel Sounds.  Negative for: Tenderness, 

Distention, Rebound, Guarding


Upper Extremity: Positive for: Normal Inspection.  Negative for: Cyanosis, Edema


Lower Extremity: Positive for: Normal Inspection, NORMAL PULSES


Neurological: Positive for: Other (unable to assess due to patient condition).  

Negative for: GCS=15


Psychiatric: Negative for: Alert, Oriented x 3








Assessment/Plan





- Assessment and Plan (Free Text)


Assessment: 





Patient is a 80 yo male w/PMH COPD, asthma, HTN, diabetes, pulm HTN, sleep 

apnea, and GERD admitted to ICU for respiratory distress requiring intubation. 

Patient was extubated yesterday however became severely dypsenic in 3 hours 

requiring reintubation.





Neuro


Intubated; Off sedation for possible extubation attempt





CV: A fib


Cardizem 


Carvedilol





Pulm


On Cpap tiral for possible extubation


Duoneb


Acetylcysteine


Solumedrol





GI


On tube feeds with oral gastric tube


Protonix; Miralax


no acute issues





Endo


Glargine 


Insulin Aspart





Renal


Flomax


Finasteride


Manzano for strict Is/Os





ID


Zosyn (started on 1-2 consider dc after 14 days)


elevated white count possible steroid induced; afebrile; no positive cx





Disposition: Attempt to extubate and assess patient; If need for reintubation 

consider trach/peg





Objective





- Vital Signs/Intake and Output


Vital Signs (last 24 hours): 


                                        











Temp Pulse Resp BP Pulse Ox


 


 98.3 F   115 H  21   93/73 L  93 L


 


 01/10/19 20:00  01/10/19 22:00  01/10/19 22:00  01/10/19 21:28  01/10/19 22:00








Intake and Output: 


                                        











 01/10/19 01/11/19





 18:59 06:59


 


Intake Total 267.6 196.8


 


Output Total 1000 200


 


Balance -732.4 -3.2














- Medications


Medications: 


                               Current Medications





Acetylcysteine (Acetylcysteine 20%)  4 ml INH RQ6 [facility]


   Last Admin: 01/10/19 20:12 Dose:  4 ml


Albuterol/Ipratropium (Duoneb 3 Mg/0.5 Mg (3 Ml) Ud)  3 ml INH RQ4 [facility]


   Last Admin: 01/10/19 20:12 Dose:  3 ml


Carvedilol (Coreg)  3.125 mg PO BID [facility]


   Last Admin: 01/10/19 17:02 Dose:  3.125 mg


Diltiazem HCl (Cardizem)  30 mg PO QID [facility]


   Last Admin: 01/10/19 21:35 Dose:  30 mg


Enoxaparin Sodium (Lovenox)  70 mg SC Q12H [facility]


   Last Admin: 01/10/19 21:38 Dose:  70 mg


Finasteride (Proscar)  5 mg PO DAILY [facility]


   Last Admin: 01/10/19 09:08 Dose:  5 mg


Piperacillin Sod/Tazobactam Sod (Zosyn 3.375 Gm Iv Premix)  3.375 gm in 50 mls @

100 mls/hr IVPB Q8H [facility]; Protocol


   Last Admin: 01/10/19 21:35 Dose:  100 mls/hr


Propofol (Diprivan)  1,000 mg in 100 mls @ 2.123 mls/hr IV .Q24H [facility]; Protocol


   PRN Reason: TITRATE PER MD ORDER


   Last Admin: 01/10/19 15:45 Dose:  5 mcg/kg/min, 2.123 mls/hr


Insulin Aspart (Novolog)  0 unit SC Q6 [facility]; Protocol


   Last Admin: 01/10/19 18:26 Dose:  Not Given


Insulin Glargine (Lantus)  20 unit SC HS [facility]


   Last Admin: 01/10/19 21:53 Dose:  20 units


Methylprednisolone (Solu-Medrol)  40 mg IVP Q8 [facility]


   Last Admin: 01/10/19 21:35 Dose:  40 mg


Pantoprazole Sodium (Protonix Inj)  40 mg IVP DAILY [facility]


   Last Admin: 01/10/19 09:07 Dose:  40 mg


Polyethylene Glycol (Miralax)  17 gm PO DAILY [facility]


   Last Admin: 01/10/19 09:07 Dose:  17 gm


Tamsulosin HCl (Flomax)  0.4 mg PO BID [facility]


   Last Admin: 01/10/19 16:59 Dose:  0.4 mg











- Labs


Labs: 


                                        





                                 01/10/19 06:41 





                                 01/10/19 06:41 





                                        











PT  11.4 SECONDS (9.7-12.2)   01/04/19  15:24    


 


INR  1.0   01/04/19  15:24    


 


APTT  26 SECONDS (21-34)   01/04/19  15:24

## 2019-01-10 NOTE — CP.PCM.PN
Subjective





- Date & Time of Evaluation


Date of Evaluation: 01/10/19


Time of Evaluation: 12:20





- Subjective


Subjective: 





Patient seen and examined this AM. Patient is on ventilator prvc, 40% FiO2, PEEP

5, satting 93%. Patient's daughter at bedside, said he was responding to her 

earlier today, he is able to move all extremities. Will attempt CPAP trial and 

extubation again today.





Exam:


Gen: no acute distress, drowsy, sedated on ventilator


Card: tachycardic, no murmurs, rubs, or gallops


Lungs: on ventilator; diffuse wheezes bilaterally


Gi: soft, non-tender to palpation





A&P


1. COPD exacerbation


- 1/10/19 ABG: pH 7.54, pCO2 42, pO2 134, HCO3 34.3, base excess 12.0


- current vent settings:  PRVC, PEEP 5, FiO2 40%. Will attempt CPAP trial today.


- continue Duoneb INH 3mL RQ4


- continue acetylcysteine 4 mL INH RQ6  


- continue Solu-Medrol 40mg IVP Q8


- pending CTA





Objective





- Vital Signs/Intake and Output


Vital Signs (last 24 hours): 


                                        











Temp Pulse Resp BP Pulse Ox


 


 98.2 F   107 H  18   135/78   94 L


 


 01/10/19 16:00  01/10/19 17:00  01/10/19 17:00  01/10/19 16:27  01/10/19 17:00








Intake and Output: 


                                        











 01/10/19 01/10/19





 06:59 18:59


 


Intake Total 895.8 133.8


 


Output Total 700 


 


Balance 195.8 133.8














- Medications


Medications: 


                               Current Medications





Acetylcysteine (Acetylcysteine 20%)  4 ml INH RQ6 [facility]


   Last Admin: 01/10/19 13:58 Dose:  4 ml


Albuterol/Ipratropium (Duoneb 3 Mg/0.5 Mg (3 Ml) Ud)  3 ml INH RQ4 [facility]


   Last Admin: 01/10/19 17:15 Dose:  3 ml


Carvedilol (Coreg)  3.125 mg PO BID [facility]


   Last Admin: 01/10/19 17:02 Dose:  3.125 mg


Diltiazem HCl (Cardizem)  30 mg PO QID [facility]


   Last Admin: 01/10/19 16:59 Dose:  30 mg


Enoxaparin Sodium (Lovenox)  70 mg SC Q12H [facility]


   Last Admin: 01/10/19 09:07 Dose:  70 mg


Finasteride (Proscar)  5 mg PO DAILY [facility]


   Last Admin: 01/10/19 09:08 Dose:  5 mg


Piperacillin Sod/Tazobactam Sod (Zosyn 3.375 Gm Iv Premix)  3.375 gm in 50 mls @

100 mls/hr IVPB Q8H [facility]; Protocol


   Last Admin: 01/10/19 13:30 Dose:  100 mls/hr


Propofol (Diprivan)  1,000 mg in 100 mls @ 2.123 mls/hr IV .Q24H [facility]; Protocol


   PRN Reason: TITRATE PER MD ORDER


   Last Admin: 01/10/19 15:45 Dose:  5 mcg/kg/min, 2.123 mls/hr


Insulin Aspart (Novolog)  0 unit SC Q6 [facility]; Protocol


   Last Admin: 01/10/19 13:00 Dose:  4 u


Insulin Glargine (Lantus)  20 unit SC HS [facility]


   Last Admin: 01/09/19 21:04 Dose:  20 units


Methylprednisolone (Solu-Medrol)  40 mg IVP Q8 [facility]


   Last Admin: 01/10/19 13:00 Dose:  40 mg


Pantoprazole Sodium (Protonix Inj)  40 mg IVP DAILY [facility]


   Last Admin: 01/10/19 09:07 Dose:  40 mg


Polyethylene Glycol (Miralax)  17 gm PO DAILY [facility]


   Last Admin: 01/10/19 09:07 Dose:  17 gm


Tamsulosin HCl (Flomax)  0.4 mg PO BID [facility]


   Last Admin: 01/10/19 16:59 Dose:  0.4 mg











- Labs


Labs: 


                                        





                                 01/10/19 06:41 





                                 01/10/19 06:41 





                                        











PT  11.4 SECONDS (9.7-12.2)   01/04/19  15:24    


 


INR  1.0   01/04/19  15:24    


 


APTT  26 SECONDS (21-34)   01/04/19  15:24    














Assessment and Plan


(1) Acute respiratory failure


Status: Acute   





(2) COPD exacerbation


Status: Acute

## 2019-01-10 NOTE — PN
DATE:  01/09/2019



SUBJECTIVE:  Patient, Penelope, was extubated today.  He developed stridor

and respiratory distress; and he was reintubated.  He has been reintubated

and placed back on ventilator with sedation.  Still no fever.  No chills. 

On antibiotics, DVT prophylaxis, nebulizer and ventilator.



PHYSICAL EXAMINATION:

VITAL SIGNS:  Blood pressure 110/60, pulse 88, respiratory rate 20,

temperature 97.9.

LUNGS:  Bilateral transmitted breath sounds.  Positive crackles.  Positive

rhonchi.

CARDIOVASCULAR SYSTEM:  S1 and S2 regular.

ABDOMEN:  Soft and nontender.  Bowel sounds are positive.



LABORATORY DATA:  WBC 12.4, hemoglobin 15.5, hematocrit 50.7, and platelets

188.  Sodium 142, potassium 3.9, chloride 107, bicarbonate 30, BUN 56,

creatinine 0.8.  Sugar 130 _____.



Chest x-ray done today, it shows endotracheal tube in place.  NG tube in

place and bilateral pleural effusion and atelectasis versus infiltrates and

medial right upper lobe infiltrate.



ASSESSMENT:

1.  Pneumonia.

2.  Respiratory failure.

3.  Type 2 diabetes.

4.  Dehydration.



PLAN:  Continue nebulizer.  Continue mechanical ventilator.  Monitor the

patient.





__________________________________________

Tae Hensley MD





DD:  01/09/2019 21:03:09

DT:  01/09/2019 22:19:09

Job # [id number]

## 2019-01-10 NOTE — CP.PCM.PN
Subjective





- Subjective


Subjective: 





dictated   





Objective





- Vital Signs/Intake and Output


Vital Signs (last 24 hours): 


                                        











Temp Pulse Resp BP Pulse Ox


 


 98.2 F   111 H  16   116/69   94 L


 


 01/10/19 16:00  01/10/19 20:00  01/10/19 20:00  01/10/19 19:27  01/10/19 20:00








Intake and Output: 


                                        











 01/10/19 01/11/19





 18:59 06:59


 


Intake Total 267.6 98.4


 


Output Total 1000 100


 


Balance -732.4 -1.6














- Medications


Medications: 


                               Current Medications





Acetylcysteine (Acetylcysteine 20%)  4 ml INH RQ6 [facility]


   Last Admin: 01/10/19 20:12 Dose:  4 ml


Albuterol/Ipratropium (Duoneb 3 Mg/0.5 Mg (3 Ml) Ud)  3 ml INH RQ4 [facility]


   Last Admin: 01/10/19 20:12 Dose:  3 ml


Carvedilol (Coreg)  3.125 mg PO BID [facility]


   Last Admin: 01/10/19 17:02 Dose:  3.125 mg


Diltiazem HCl (Cardizem)  30 mg PO QID [facility]


   Last Admin: 01/10/19 16:59 Dose:  30 mg


Enoxaparin Sodium (Lovenox)  70 mg SC Q12H [facility]


   Last Admin: 01/10/19 09:07 Dose:  70 mg


Finasteride (Proscar)  5 mg PO DAILY [facility]


   Last Admin: 01/10/19 09:08 Dose:  5 mg


Piperacillin Sod/Tazobactam Sod (Zosyn 3.375 Gm Iv Premix)  3.375 gm in 50 mls @

100 mls/hr IVPB Q8H [facility]; Protocol


   Last Admin: 01/10/19 13:30 Dose:  100 mls/hr


Propofol (Diprivan)  1,000 mg in 100 mls @ 2.123 mls/hr IV .Q24H [facility]; Protocol


   PRN Reason: TITRATE PER MD ORDER


   Last Admin: 01/10/19 15:45 Dose:  5 mcg/kg/min, 2.123 mls/hr


Insulin Aspart (Novolog)  0 unit SC Q6 [facility]; Protocol


   Last Admin: 01/10/19 18:26 Dose:  Not Given


Insulin Glargine (Lantus)  20 unit SC HS [facility]


   Last Admin: 01/09/19 21:04 Dose:  20 units


Methylprednisolone (Solu-Medrol)  40 mg IVP Q8 [facility]


   Last Admin: 01/10/19 13:00 Dose:  40 mg


Pantoprazole Sodium (Protonix Inj)  40 mg IVP DAILY [facility]


   Last Admin: 01/10/19 09:07 Dose:  40 mg


Polyethylene Glycol (Miralax)  17 gm PO DAILY [facility]


   Last Admin: 01/10/19 09:07 Dose:  17 gm


Tamsulosin HCl (Flomax)  0.4 mg PO BID [facility]


   Last Admin: 01/10/19 16:59 Dose:  0.4 mg











- Labs


Labs: 


                                        





                                 01/10/19 06:41 





                                 01/10/19 06:41 





                                        











PT  11.4 SECONDS (9.7-12.2)   01/04/19  15:24    


 


INR  1.0   01/04/19  15:24    


 


APTT  26 SECONDS (21-34)   01/04/19  15:24

## 2019-01-10 NOTE — CP.CCUPN
<Luis Felipe Strong - Last Filed: 01/10/19 14:05>





CCU Subjective





- Physician Review


Events Since Last Encounter (Free Text): 





extubated yesterday; became tachypneic even on bipap; reintubated


Subjective (Free Text): 





01/10/19 14:15





Critical Care Progress Note for Dr. Montiel' service





Patient seen and examined at bedside. Patient intubated limiting ROS. Patient on

trial of CPAP for possible extubation attempt today. 





01/10/19 14:14





Critical Care Time Spent (in minutes): 35





CCU Objective





- Vital Signs / Intake & Output


Vital Signs (Last 4 hours): 


Vital Signs











  Temp Pulse Resp BP Pulse Ox


 


 01/10/19 12:27   92 H  25 H  112/88  90 L


 


 01/10/19 12:23   86  27 H  


 


 01/10/19 12:00  98.1 F     94 L


 


 01/10/19 11:00   105 H  19   93 L


 


 01/10/19 10:55   96 H  25 H  119/83  94 L











Intake and Output (Last 8hrs): 


                                 Intake & Output











 01/09/19 01/10/19 01/10/19





 22:59 06:59 14:59


 


Intake Total 257.2 689.4 44.6


 


Output Total 1380 520 


 


Balance -1122.8 169.4 44.6


 


Weight  156 lb 


 


Intake:   


 


  IV  160 


 


  Intake, IV Amount 57.2 169.4 4.6


 


    Left Forearm  100 


 


    Right Forearm 57.2 69.4 4.6


 


  Tube Feeding 200 360 40


 


Output:   


 


  Urine 1380 520 


 


    Urethral (Manzano) 180 520 


 


    Urine, Voided 1200  


 


Other:   


 


  # Voids   


 


    Urine, Voided 100  














- Physical Exam


Head: Positive for: Atraumatic, Normocephalic


Conjunctiva: Positive for: Normal


Mouth: Positive for: Other (ET tube and gastric tube in place)


Respiratory/Chest: Positive for: Good Air Exchange.  Negative for: Clear to 

Auscultation, Respiratory Distress


Cardiovascular: Positive for: Normal S1, S2, Irregular Rhythm, Tachycardic


Abdomen: Positive for: Normal Bowel Sounds.  Negative for: Tenderness, 

Distention, Rebound, Guarding


Upper Extremity: Positive for: Normal Inspection.  Negative for: Cyanosis, Edema


Lower Extremity: Positive for: Normal Inspection, NORMAL PULSES


Neurological: Positive for: Other (unable to assess due to patient condition).  

Negative for: GCS=15


Psychiatric: Negative for: Alert, Oriented x 3





- Medications


Active Medications: 


Active Medications











Generic Name Dose Route Start Last Admin





  Trade Name Vimal  PRN Reason Stop Dose Admin


 


Acetylcysteine  4 ml  01/03/19 20:00  01/10/19 13:58





  Acetylcysteine 20%  INH   4 ml





  RQ6 OLEG   Administration





     





     





     





     


 


Albuterol/Ipratropium  3 ml  12/31/18 08:00  01/10/19 13:58





  Duoneb 3 Mg/0.5 Mg (3 Ml) Ud  INH   3 ml





  RQ4 OLEG   Administration





     





     





     





     


 


Carvedilol  3.125 mg  01/07/19 10:15  01/10/19 09:07





  Coreg  PO   3.125 mg





  BID OLEG   Administration





     





     





     





     


 


Diltiazem HCl  30 mg  01/05/19 14:00  01/10/19 09:08





  Cardizem  PO   30 mg





  QID OLEG   Administration





     





     





     





     


 


Enoxaparin Sodium  70 mg  01/06/19 10:00  01/10/19 09:07





  Lovenox  SC   70 mg





  Q12H OLEG   Administration





     





     





     





     


 


Finasteride  5 mg  12/31/18 10:00  01/10/19 09:08





  Proscar  PO   5 mg





  DAILY OLEG   Administration





     





     





     





     


 


Piperacillin Sod/Tazobactam Sod  3.375 gm in 50 mls @ 100 mls/hr  01/02/19 14:30

 01/10/19 13:30





  Zosyn 3.375 Gm Iv Premix  IVPB   100 mls/hr





  Q8H OLEG   Administration





     





     





  Protocol   





     


 


Insulin Aspart  0 unit  01/05/19 10:13  01/10/19 13:00





  Novolog  SC   4 u





  Q6 OLEG   Administration





     





     





  Protocol   





     


 


Insulin Glargine  20 unit  01/06/19 14:12  01/09/19 21:04





  Lantus  SC   20 units





  HS OLEG   Administration





     





     





     





     


 


Methylprednisolone  40 mg  01/06/19 14:00  01/10/19 05:49





  Solu-Medrol  IVP   40 mg





  Q8 OLEG   Administration





     





     





     





     


 


Pantoprazole Sodium  40 mg  01/09/19 10:00  01/10/19 09:07





  Protonix Inj  IVP   40 mg





  DAILY OLEG   Administration





     





     





     





     


 


Polyethylene Glycol  17 gm  01/09/19 10:15  01/10/19 09:07





  Miralax  PO   17 gm





  DAILY OLGE   Administration





     





     





     





     


 


Tamsulosin HCl  0.4 mg  12/31/18 10:00  01/10/19 09:07





  Flomax  PO   0.4 mg





  BID OLEG   Administration





     





     





     





     














- Patient Studies


Lab Studies: 


                                   Lab Studies











  01/10/19 01/10/19 01/10/19 Range/Units





  06:41 06:41 05:55 


 


WBC   12.1 H   (4.8-10.8)  K/uL


 


RBC   6.25 H   (4.40-5.90)  Mil/uL


 


Hgb   15.4   (12.0-18.0)  g/dL


 


Hct   48.4   (35.0-51.0)  %


 


MCV   77.5 L   (80.0-94.0)  fL


 


MCH   24.6 L   (27.0-31.0)  pg


 


MCHC   31.8 L   (33.0-37.0)  g/dL


 


RDW   18.3 H   (11.5-14.5)  %


 


Plt Count   191   (130-400)  K/uL


 


MPV   9.6   (7.2-11.7)  fL


 


Neut % (Auto)   91.4 H   (50.0-75.0)  %


 


Lymph % (Auto)   1.5 L   (20.0-40.0)  %


 


Mono % (Auto)   6.9   (0.0-10.0)  %


 


Eos % (Auto)   0.0   (0.0-4.0)  %


 


Baso % (Auto)   0.2   (0.0-2.0)  %


 


Neut # (Auto)   11.1 H   (1.8-7.0)  K/uL


 


Lymph # (Auto)   0.2 L   (1.0-4.3)  K/uL


 


Mono # (Auto)   0.8   (0.0-0.8)  K/uL


 


Eos # (Auto)   0.0   (0.0-0.7)  K/uL


 


Baso # (Auto)   0.0   (0.0-0.2)  K/uL


 


Neutrophils % (Manual)   88 H   (50-75)  %


 


Band Neutrophils %   2   (0-2)  %


 


Lymphocytes % (Manual)   3 L   (20-40)  %


 


Reactive Lymphs %   3 H   (0-0)  %


 


Monocytes % (Manual)   4   (0-10)  %


 


Platelet Estimate   Normal   (NORMAL)  


 


Puncture Site     


 


pCO2     (35-45)  mm/Hg


 


pO2     ()  mm/Hg


 


HCO3     (21-28)  mmol/L


 


ABG pH     (7.35-7.45)  


 


ABG Total CO2     (22-28)  mmol/L


 


ABG O2 Saturation     (95-98)  %


 


ABG Base Excess     (-2.0-3.0)  mmol/L


 


ABG Hemoglobin     (11.7-17.4)  g/dL


 


ABG Carboxyhemoglobin     (0.5-1.5)  %


 


POC ABG HHb (Measured)     (0.0-5.0)  %


 


ABG Methemoglobin     (0.0-3.0)  %


 


Austin Test     


 


A-a O2 Difference     mm/Hg


 


Respiratory Index     


 


Hgb O2 Saturation     (95.0-98.0)  %


 


Vent Mode     


 


Mechanical Rate     


 


FiO2     %


 


Tidal Volume     


 


PEEP     


 


Sodium  144    (132-148)  mmol/L


 


Potassium  3.6    (3.6-5.2)  mmol/L


 


Chloride  105    ()  mmol/L


 


Carbon Dioxide  34 H    (22-30)  mmol/L


 


Anion Gap  9 L    (10-20)  


 


BUN  71 H    (9-20)  mg/dL


 


Creatinine  1.0    (0.8-1.5)  mg/dL


 


Est GFR (African Amer)  > 60    


 


Est GFR (Non-Af Amer)  > 60    


 


POC Glucose (mg/dL)    232 H  ()  mg/dL


 


Random Glucose  220 H    ()  mg/dL


 


Calcium  7.7 L    (8.6-10.4)  mg/dl


 


Phosphorus  4.8 H    (2.5-4.5)  mg/dL


 


Magnesium  2.7 H    (1.6-2.3)  mg/dL


 


Total Bilirubin  0.9    (0.2-1.3)  mg/dL


 


AST  54    (17-59)  U/L


 


ALT  85 H D    (21-72)  U/L


 


Alkaline Phosphatase  57    ()  U/L


 


Total Protein  5.6 L    (6.3-8.3)  g/dL


 


Albumin  2.8 L    (3.5-5.0)  g/dL


 


Globulin  2.7    (2.2-3.9)  gm/dL


 


Albumin/Globulin Ratio  1.0    (1.0-2.1)  














  01/10/19 01/10/19 01/09/19 Range/Units





  05:17 00:18 18:42 


 


WBC     (4.8-10.8)  K/uL


 


RBC     (4.40-5.90)  Mil/uL


 


Hgb     (12.0-18.0)  g/dL


 


Hct     (35.0-51.0)  %


 


MCV     (80.0-94.0)  fL


 


MCH     (27.0-31.0)  pg


 


MCHC     (33.0-37.0)  g/dL


 


RDW     (11.5-14.5)  %


 


Plt Count     (130-400)  K/uL


 


MPV     (7.2-11.7)  fL


 


Neut % (Auto)     (50.0-75.0)  %


 


Lymph % (Auto)     (20.0-40.0)  %


 


Mono % (Auto)     (0.0-10.0)  %


 


Eos % (Auto)     (0.0-4.0)  %


 


Baso % (Auto)     (0.0-2.0)  %


 


Neut # (Auto)     (1.8-7.0)  K/uL


 


Lymph # (Auto)     (1.0-4.3)  K/uL


 


Mono # (Auto)     (0.0-0.8)  K/uL


 


Eos # (Auto)     (0.0-0.7)  K/uL


 


Baso # (Auto)     (0.0-0.2)  K/uL


 


Neutrophils % (Manual)     (50-75)  %


 


Band Neutrophils %     (0-2)  %


 


Lymphocytes % (Manual)     (20-40)  %


 


Reactive Lymphs %     (0-0)  %


 


Monocytes % (Manual)     (0-10)  %


 


Platelet Estimate     (NORMAL)  


 


Puncture Site  R brac    


 


pCO2  42    (35-45)  mm/Hg


 


pO2  134 H    ()  mm/Hg


 


HCO3  34.3 H    (21-28)  mmol/L


 


ABG pH  7.54 H    (7.35-7.45)  


 


ABG Total CO2  37.2 H    (22-28)  mmol/L


 


ABG O2 Saturation  97.0    (95-98)  %


 


ABG Base Excess  12.0 H    (-2.0-3.0)  mmol/L


 


ABG Hemoglobin  15.3    (11.7-17.4)  g/dL


 


ABG Carboxyhemoglobin  0 L    (0.5-1.5)  %


 


POC ABG HHb (Measured)  3.0    (0.0-5.0)  %


 


ABG Methemoglobin  0.2    (0.0-3.0)  %


 


Austin Test  Na    


 


A-a O2 Difference  170.0    mm/Hg


 


Respiratory Index  1.3    


 


Hgb O2 Saturation  96.7    (95.0-98.0)  %


 


Vent Mode  Prvc    


 


Mechanical Rate  14    


 


FiO2  50.0    %


 


Tidal Volume  500    


 


PEEP  5    


 


Sodium     (132-148)  mmol/L


 


Potassium     (3.6-5.2)  mmol/L


 


Chloride     ()  mmol/L


 


Carbon Dioxide     (22-30)  mmol/L


 


Anion Gap     (10-20)  


 


BUN     (9-20)  mg/dL


 


Creatinine     (0.8-1.5)  mg/dL


 


Est GFR (African Amer)     


 


Est GFR (Non-Af Amer)     


 


POC Glucose (mg/dL)   283 H  130 H  ()  mg/dL


 


Random Glucose     ()  mg/dL


 


Calcium     (8.6-10.4)  mg/dl


 


Phosphorus     (2.5-4.5)  mg/dL


 


Magnesium     (1.6-2.3)  mg/dL


 


Total Bilirubin     (0.2-1.3)  mg/dL


 


AST     (17-59)  U/L


 


ALT     (21-72)  U/L


 


Alkaline Phosphatase     ()  U/L


 


Total Protein     (6.3-8.3)  g/dL


 


Albumin     (3.5-5.0)  g/dL


 


Globulin     (2.2-3.9)  gm/dL


 


Albumin/Globulin Ratio     (1.0-2.1)  














  01/09/19 Range/Units





  17:30 


 


WBC   (4.8-10.8)  K/uL


 


RBC   (4.40-5.90)  Mil/uL


 


Hgb   (12.0-18.0)  g/dL


 


Hct   (35.0-51.0)  %


 


MCV   (80.0-94.0)  fL


 


MCH   (27.0-31.0)  pg


 


MCHC   (33.0-37.0)  g/dL


 


RDW   (11.5-14.5)  %


 


Plt Count   (130-400)  K/uL


 


MPV   (7.2-11.7)  fL


 


Neut % (Auto)   (50.0-75.0)  %


 


Lymph % (Auto)   (20.0-40.0)  %


 


Mono % (Auto)   (0.0-10.0)  %


 


Eos % (Auto)   (0.0-4.0)  %


 


Baso % (Auto)   (0.0-2.0)  %


 


Neut # (Auto)   (1.8-7.0)  K/uL


 


Lymph # (Auto)   (1.0-4.3)  K/uL


 


Mono # (Auto)   (0.0-0.8)  K/uL


 


Eos # (Auto)   (0.0-0.7)  K/uL


 


Baso # (Auto)   (0.0-0.2)  K/uL


 


Neutrophils % (Manual)   (50-75)  %


 


Band Neutrophils %   (0-2)  %


 


Lymphocytes % (Manual)   (20-40)  %


 


Reactive Lymphs %   (0-0)  %


 


Monocytes % (Manual)   (0-10)  %


 


Platelet Estimate   (NORMAL)  


 


Puncture Site  Drawn  


 


pCO2  43  (35-45)  mm/Hg


 


pO2  71 L  ()  mm/Hg


 


HCO3  34.1 H  (21-28)  mmol/L


 


ABG pH  7.53 H  (7.35-7.45)  


 


ABG Total CO2  37.2 H  (22-28)  mmol/L


 


ABG O2 Saturation  94.2 L  (95-98)  %


 


ABG Base Excess  11.8 H  (-2.0-3.0)  mmol/L


 


ABG Hemoglobin   (11.7-17.4)  g/dL


 


ABG Carboxyhemoglobin   (0.5-1.5)  %


 


POC ABG HHb (Measured)   (0.0-5.0)  %


 


ABG Methemoglobin   (0.0-3.0)  %


 


Austin Test  Unable  


 


A-a O2 Difference  160.0  mm/Hg


 


Respiratory Index  2.3  


 


Hgb O2 Saturation   (95.0-98.0)  %


 


Vent Mode  Prvc  


 


Mechanical Rate  20  


 


FiO2  40.0  %


 


Tidal Volume  500  


 


PEEP  5  


 


Sodium   (132-148)  mmol/L


 


Potassium   (3.6-5.2)  mmol/L


 


Chloride   ()  mmol/L


 


Carbon Dioxide   (22-30)  mmol/L


 


Anion Gap   (10-20)  


 


BUN   (9-20)  mg/dL


 


Creatinine   (0.8-1.5)  mg/dL


 


Est GFR (African Amer)   


 


Est GFR (Non-Af Amer)   


 


POC Glucose (mg/dL)   ()  mg/dL


 


Random Glucose   ()  mg/dL


 


Calcium   (8.6-10.4)  mg/dl


 


Phosphorus   (2.5-4.5)  mg/dL


 


Magnesium   (1.6-2.3)  mg/dL


 


Total Bilirubin   (0.2-1.3)  mg/dL


 


AST   (17-59)  U/L


 


ALT   (21-72)  U/L


 


Alkaline Phosphatase   ()  U/L


 


Total Protein   (6.3-8.3)  g/dL


 


Albumin   (3.5-5.0)  g/dL


 


Globulin   (2.2-3.9)  gm/dL


 


Albumin/Globulin Ratio   (1.0-2.1)  








                         Laboratory Results - last 24 hr











  01/09/19 01/09/19 01/10/19





  17:30 18:42 00:18


 


WBC   


 


RBC   


 


Hgb   


 


Hct   


 


MCV   


 


MCH   


 


MCHC   


 


RDW   


 


Plt Count   


 


MPV   


 


Neut % (Auto)   


 


Lymph % (Auto)   


 


Mono % (Auto)   


 


Eos % (Auto)   


 


Baso % (Auto)   


 


Neut # (Auto)   


 


Lymph # (Auto)   


 


Mono # (Auto)   


 


Eos # (Auto)   


 


Baso # (Auto)   


 


Neutrophils % (Manual)   


 


Band Neutrophils %   


 


Lymphocytes % (Manual)   


 


Reactive Lymphs %   


 


Monocytes % (Manual)   


 


Platelet Estimate   


 


Puncture Site  Drawn  


 


pCO2  43  


 


pO2  71 L  


 


HCO3  34.1 H  


 


ABG pH  7.53 H  


 


ABG Total CO2  37.2 H  


 


ABG O2 Saturation  94.2 L  


 


ABG Base Excess  11.8 H  


 


ABG Hemoglobin   


 


ABG Carboxyhemoglobin   


 


POC ABG HHb (Measured)   


 


ABG Methemoglobin   


 


Austin Test  Unable  


 


A-a O2 Difference  160.0  


 


Respiratory Index  2.3  


 


Hgb O2 Saturation   


 


Vent Mode  Prvc  


 


Mechanical Rate  20  


 


FiO2  40.0  


 


Tidal Volume  500  


 


PEEP  5  


 


Sodium   


 


Potassium   


 


Chloride   


 


Carbon Dioxide   


 


Anion Gap   


 


BUN   


 


Creatinine   


 


Est GFR ( Amer)   


 


Est GFR (Non-Af Amer)   


 


POC Glucose (mg/dL)   130 H  283 H


 


Random Glucose   


 


Calcium   


 


Phosphorus   


 


Magnesium   


 


Total Bilirubin   


 


AST   


 


ALT   


 


Alkaline Phosphatase   


 


Total Protein   


 


Albumin   


 


Globulin   


 


Albumin/Globulin Ratio   














  01/10/19 01/10/19 01/10/19





  05:17 05:55 06:41


 


WBC    12.1 H


 


RBC    6.25 H


 


Hgb    15.4


 


Hct    48.4


 


MCV    77.5 L


 


MCH    24.6 L


 


MCHC    31.8 L


 


RDW    18.3 H


 


Plt Count    191


 


MPV    9.6


 


Neut % (Auto)    91.4 H


 


Lymph % (Auto)    1.5 L


 


Mono % (Auto)    6.9


 


Eos % (Auto)    0.0


 


Baso % (Auto)    0.2


 


Neut # (Auto)    11.1 H


 


Lymph # (Auto)    0.2 L


 


Mono # (Auto)    0.8


 


Eos # (Auto)    0.0


 


Baso # (Auto)    0.0


 


Neutrophils % (Manual)    88 H


 


Band Neutrophils %    2


 


Lymphocytes % (Manual)    3 L


 


Reactive Lymphs %    3 H


 


Monocytes % (Manual)    4


 


Platelet Estimate    Normal


 


Puncture Site  R brac  


 


pCO2  42  


 


pO2  134 H  


 


HCO3  34.3 H  


 


ABG pH  7.54 H  


 


ABG Total CO2  37.2 H  


 


ABG O2 Saturation  97.0  


 


ABG Base Excess  12.0 H  


 


ABG Hemoglobin  15.3  


 


ABG Carboxyhemoglobin  0 L  


 


POC ABG HHb (Measured)  3.0  


 


ABG Methemoglobin  0.2  


 


Austin Test  Na  


 


A-a O2 Difference  170.0  


 


Respiratory Index  1.3  


 


Hgb O2 Saturation  96.7  


 


Vent Mode  Prvc  


 


Mechanical Rate  14  


 


FiO2  50.0  


 


Tidal Volume  500  


 


PEEP  5  


 


Sodium   


 


Potassium   


 


Chloride   


 


Carbon Dioxide   


 


Anion Gap   


 


BUN   


 


Creatinine   


 


Est GFR ( Amer)   


 


Est GFR (Non-Af Amer)   


 


POC Glucose (mg/dL)   232 H 


 


Random Glucose   


 


Calcium   


 


Phosphorus   


 


Magnesium   


 


Total Bilirubin   


 


AST   


 


ALT   


 


Alkaline Phosphatase   


 


Total Protein   


 


Albumin   


 


Globulin   


 


Albumin/Globulin Ratio   














  01/10/19





  06:41


 


WBC 


 


RBC 


 


Hgb 


 


Hct 


 


MCV 


 


MCH 


 


MCHC 


 


RDW 


 


Plt Count 


 


MPV 


 


Neut % (Auto) 


 


Lymph % (Auto) 


 


Mono % (Auto) 


 


Eos % (Auto) 


 


Baso % (Auto) 


 


Neut # (Auto) 


 


Lymph # (Auto) 


 


Mono # (Auto) 


 


Eos # (Auto) 


 


Baso # (Auto) 


 


Neutrophils % (Manual) 


 


Band Neutrophils % 


 


Lymphocytes % (Manual) 


 


Reactive Lymphs % 


 


Monocytes % (Manual) 


 


Platelet Estimate 


 


Puncture Site 


 


pCO2 


 


pO2 


 


HCO3 


 


ABG pH 


 


ABG Total CO2 


 


ABG O2 Saturation 


 


ABG Base Excess 


 


ABG Hemoglobin 


 


ABG Carboxyhemoglobin 


 


POC ABG HHb (Measured) 


 


ABG Methemoglobin 


 


Austin Test 


 


A-a O2 Difference 


 


Respiratory Index 


 


Hgb O2 Saturation 


 


Vent Mode 


 


Mechanical Rate 


 


FiO2 


 


Tidal Volume 


 


PEEP 


 


Sodium  144


 


Potassium  3.6


 


Chloride  105


 


Carbon Dioxide  34 H


 


Anion Gap  9 L


 


BUN  71 H


 


Creatinine  1.0


 


Est GFR ( Amer)  > 60


 


Est GFR (Non-Af Amer)  > 60


 


POC Glucose (mg/dL) 


 


Random Glucose  220 H


 


Calcium  7.7 L


 


Phosphorus  4.8 H


 


Magnesium  2.7 H


 


Total Bilirubin  0.9


 


AST  54


 


ALT  85 H D


 


Alkaline Phosphatase  57


 


Total Protein  5.6 L


 


Albumin  2.8 L


 


Globulin  2.7


 


Albumin/Globulin Ratio  1.0











Radiology Impressions: 


                              Radiology Impressions





Chest X-Ray  01/09/19 16:53


IMPRESSION:


Endotracheal tube.  Nasogastric tube. 


 


Small bilateral pleural effusions and associated 


atelectasis/infiltrates.  Subtle medial right upper lobe infiltrate.  


 


 








Chest CT  01/10/19 10:56


IMPRESSION:


No evidence of pulmonary embolism. 


 


Bilateral apical spiculated pleural thickening with ana encasement 


of the left upper lobe pulmonary artery branch.  This encasing 


behavior is unchanged compared to examinations dating back to 


6/17/2018.  The overall size of this pleural thickening is unchanged. 


 This is still suspicious for malignant neoplasm.  Question whether 


this represents treated neoplasm mild aneurysmal dilatation of the 


ascending thoracic aorta.  Minimal dilatation of the main pulmonary 


artery.  Additional minor findings as above. 


 


 











Fingerstick Blood Sugar Results: 209





Review of Systems





- Review of Systems


Systems not reviewed;Unavailable: Intubated





Critical Care Progress Note





- Ventilator Checklist


Head of Bed 30 Degrees: Yes


Daily Sedation Vacation: Yes


Daily Assessment of Readiness to Wean: Yes


Daily Spontaneous Breathing Trial: Yes


PUD Prophalyxis: Yes


DVT Prophylaxis: Yes


Oral Care with Chlorhexidine Gluconate {CHG}: Yes





- Vent Settings


MODE:: CPAP


FIO2:: 40


PEEP:: 5


PRESSURE SUPPORT:: 12





- Extremities/Vascular


Does the Patient have a Central Venous Catheter?: Yes


Insertion Site: Internal Jugular Vein


Does the Patient need a Central Venous Catheter?: Yes


Does the Patient have a Manzano Catheter?: Yes


Does the Patient need a Manzano Catheter?: Yes


Catheter Insertion Criteria: Patient requires prolonged immobilization





- Restraints


Justification for Restraints: High risk for self extubation, High risk for 

removing IV access, High risk for harming self





- Prophylaxis GI


Prophylaxis GI: PPI





- Prophylaxis DVT


Prophylaxis DVT: Lovenox





Assessment/Plan





- Assessment and Plan (Free Text)


Assessment: 





Patient is a 78 yo male w/PMH COPD, asthma, HTN, diabetes, pulm HTN, sleep 

apnea, and GERD admitted to ICU for respiratory distress requiring intubation. 

Patient was extubated yesterday however became severely dypsenic in 3 hours requ

iring reintubation.





Neuro


Intubated; Off sedation for possible extubation attempt





CV


Cardizem 


Carvedilol





Pulm


On Cpap tiral for possible extubation


Duoneb


Acetylcysteine


Solumedrol





GI


On tube feeds with oral gastric tube


Protonix; Miralax


no acute issues





Endo


Glargine 


Insulin Aspart





Renal


Flomax


Finasteride


Manzano for strict Is/Os





ID


Zosyn (started on 1-2 consider dc after 14 days)


elevated white count possible steroid induced; afebrile; no positive cx





Disposition: Attempt to extubate and assess patient; If need for reintubation 

consider trach/peg





Luis Felipe Strong PGY-1 


Case d/w Dr. Montiel





<DineshDeonte M - Last Filed: 01/10/19 20:23>





CCU Objective





- Vital Signs / Intake & Output


Vital Signs (Last 4 hours): 


Vital Signs











  Pulse Resp BP Pulse Ox


 


 01/10/19 20:00  111 H  16   94 L


 


 01/10/19 19:27    116/69 


 


 01/10/19 18:27  109 H  16  118/69  94 L


 


 01/10/19 18:00  102 H  16   93 L


 


 01/10/19 17:28  103 H  15  121/92 H  95


 


 01/10/19 17:00  107 H  18   94 L


 


 01/10/19 16:27  117 H  19  135/78  94 L











Intake and Output (Last 8hrs): 


                                 Intake & Output











 01/10/19 01/10/19 01/10/19





 06:59 14:59 22:59


 


Intake Total 689.4 89.2 276.8


 


Output Total 520  1100


 


Balance 169.4 89.2 -823.2


 


Weight 156 lb  


 


Intake:   


 


    


 


  Intake, IV Amount 169.4 9.2 36.8


 


    Left Forearm 100  


 


    Right Forearm 69.4 9.2 36.8


 


  Tube Feeding 360 80 240


 


Output:   


 


  Urine 520  1100


 


    Urethral ([place]) 520  1100


 


Other:   


 


  # Bowel Movements   0














- Medications


Active Medications: 


Active Medications











Generic Name Dose Route Start Last Admin





  Trade Name Freq  PRN Reason Stop Dose Admin


 


Acetylcysteine  4 ml  01/03/19 20:00  01/10/19 20:12





  Acetylcysteine 20%  INH   4 ml





  RQ6 OLEG   Administration





     





     





     





     


 


Albuterol/Ipratropium  3 ml  12/31/18 08:00  01/10/19 20:12





  Duoneb 3 Mg/0.5 Mg (3 Ml) Ud  INH   3 ml





  RQ4 OLEG   Administration





     





     





     





     


 


Carvedilol  3.125 mg  01/07/19 10:15  01/10/19 17:02





  Coreg  PO   3.125 mg





  BID OLEG   Administration





     





     





     





     


 


Diltiazem HCl  30 mg  01/05/19 14:00  01/10/19 16:59





  Cardizem  PO   30 mg





  QID OLEG   Administration





     





     





     





     


 


Enoxaparin Sodium  70 mg  01/06/19 10:00  01/10/19 09:07





  Lovenox  SC   70 mg





  Q12H OLEG   Administration





     





     





     





     


 


Finasteride  5 mg  12/31/18 10:00  01/10/19 09:08





  Proscar  PO   5 mg





  DAILY OLEG   Administration





     





     





     





     


 


Piperacillin Sod/Tazobactam Sod  3.375 gm in 50 mls @ 100 mls/hr  01/02/19 14:30

 01/10/19 13:30





  Zosyn 3.375 Gm Iv Premix  IVPB   100 mls/hr





  Q8H OLEG   Administration





     





     





  Protocol   





     


 


Propofol  1,000 mg in 100 mls @ 2.123 mls/hr  01/10/19 15:43  01/10/19 15:45





  Diprivan  IV   5 mcg/kg/min





  .Q24H PRN   2.123 mls/hr





  TITRATE PER MD ORDER   Administration





     





  Protocol   





  5 MCG/KG/MIN   


 


Insulin Aspart  0 unit  01/05/19 10:13  01/10/19 18:26





  Novolog  SC   Not Given





  Q6 OLEG   





     





     





  Protocol   





     


 


Insulin Glargine  20 unit  01/06/19 14:12  01/09/19 21:04





  Lantus  SC   20 units





  HS OLEG   Administration





     





     





     





     


 


Methylprednisolone  40 mg  01/06/19 14:00  01/10/19 13:00





  Solu-Medrol  IVP   40 mg





  Q8 OLEG   Administration





     





     





     





     


 


Pantoprazole Sodium  40 mg  01/09/19 10:00  01/10/19 09:07





  Protonix Inj  IVP   40 mg





  DAILY OLEG   Administration





     





     





     





     


 


Polyethylene Glycol  17 gm  01/09/19 10:15  01/10/19 09:07





  Miralax  PO   17 gm





  DAILY OLEG   Administration





     





     





     





     


 


Tamsulosin HCl  0.4 mg  12/31/18 10:00  01/10/19 16:59





  Flomax  PO   0.4 mg





  BID OLEG   Administration





     





     





     





     














- Patient Studies


Lab Studies: 


                                   Lab Studies











  01/10/19 01/10/19 01/10/19 Range/Units





  17:57 11:12 06:41 


 


WBC     (4.8-10.8)  K/uL


 


RBC     (4.40-5.90)  Mil/uL


 


Hgb     (12.0-18.0)  g/dL


 


Hct     (35.0-51.0)  %


 


MCV     (80.0-94.0)  fL


 


MCH     (27.0-31.0)  pg


 


MCHC     (33.0-37.0)  g/dL


 


RDW     (11.5-14.5)  %


 


Plt Count     (130-400)  K/uL


 


MPV     (7.2-11.7)  fL


 


Neut % (Auto)     (50.0-75.0)  %


 


Lymph % (Auto)     (20.0-40.0)  %


 


Mono % (Auto)     (0.0-10.0)  %


 


Eos % (Auto)     (0.0-4.0)  %


 


Baso % (Auto)     (0.0-2.0)  %


 


Neut # (Auto)     (1.8-7.0)  K/uL


 


Lymph # (Auto)     (1.0-4.3)  K/uL


 


Mono # (Auto)     (0.0-0.8)  K/uL


 


Eos # (Auto)     (0.0-0.7)  K/uL


 


Baso # (Auto)     (0.0-0.2)  K/uL


 


Neutrophils % (Manual)     (50-75)  %


 


Band Neutrophils %     (0-2)  %


 


Lymphocytes % (Manual)     (20-40)  %


 


Reactive Lymphs %     (0-0)  %


 


Monocytes % (Manual)     (0-10)  %


 


Platelet Estimate     (NORMAL)  


 


Puncture Site     


 


pCO2     (35-45)  mm/Hg


 


pO2     ()  mm/Hg


 


HCO3     (21-28)  mmol/L


 


ABG pH     (7.35-7.45)  


 


ABG Total CO2     (22-28)  mmol/L


 


ABG O2 Saturation     (95-98)  %


 


ABG Base Excess     (-2.0-3.0)  mmol/L


 


ABG Hemoglobin     (11.7-17.4)  g/dL


 


ABG Carboxyhemoglobin     (0.5-1.5)  %


 


POC ABG HHb (Measured)     (0.0-5.0)  %


 


ABG Methemoglobin     (0.0-3.0)  %


 


[place] Test     


 


A-a O2 Difference     mm/Hg


 


Respiratory Index     


 


Hgb O2 Saturation     (95.0-98.0)  %


 


Vent Mode     


 


Mechanical Rate     


 


FiO2     %


 


Tidal Volume     


 


PEEP     


 


Sodium    144  (132-148)  mmol/L


 


Potassium    3.6  (3.6-5.2)  mmol/L


 


Chloride    105  ()  mmol/L


 


Carbon Dioxide    34 H  (22-30)  mmol/L


 


Anion Gap    9 L  (10-20)  


 


BUN    71 H  (9-20)  mg/dL


 


Creatinine    1.0  (0.8-1.5)  mg/dL


 


Est GFR (African Amer)    > 60  


 


Est GFR (Non-Af Amer)    > 60  


 


POC Glucose (mg/dL)  152 H  244 H   ()  mg/dL


 


Random Glucose    220 H  ()  mg/dL


 


Calcium    7.7 L  (8.6-10.4)  mg/dl


 


Phosphorus    4.8 H  (2.5-4.5)  mg/dL


 


Magnesium    2.7 H  (1.6-2.3)  mg/dL


 


Total Bilirubin    0.9  (0.2-1.3)  mg/dL


 


AST    54  (17-59)  U/L


 


ALT    85 H D  (21-72)  U/L


 


Alkaline Phosphatase    57  ()  U/L


 


Total Protein    5.6 L  (6.3-8.3)  g/dL


 


Albumin    2.8 L  (3.5-5.0)  g/dL


 


Globulin    2.7  (2.2-3.9)  gm/dL


 


Albumin/Globulin Ratio    1.0  (1.0-2.1)  














  01/10/19 01/10/19 01/10/19 Range/Units





  06:41 05:55 05:17 


 


WBC  12.1 H    (4.8-10.8)  K/uL


 


RBC  6.25 H    (4.40-5.90)  Mil/uL


 


Hgb  15.4    (12.0-18.0)  g/dL


 


Hct  48.4    (35.0-51.0)  %


 


MCV  77.5 L    (80.0-94.0)  fL


 


MCH  24.6 L    (27.0-31.0)  pg


 


MCHC  31.8 L    (33.0-37.0)  g/dL


 


RDW  18.3 H    (11.5-14.5)  %


 


Plt Count  191    (130-400)  K/uL


 


MPV  9.6    (7.2-11.7)  fL


 


Neut % (Auto)  91.4 H    (50.0-75.0)  %


 


Lymph % (Auto)  1.5 L    (20.0-40.0)  %


 


Mono % (Auto)  6.9    (0.0-10.0)  %


 


Eos % (Auto)  0.0    (0.0-4.0)  %


 


Baso % (Auto)  0.2    (0.0-2.0)  %


 


Neut # (Auto)  11.1 H    (1.8-7.0)  K/uL


 


Lymph # (Auto)  0.2 L    (1.0-4.3)  K/uL


 


Mono # (Auto)  0.8    (0.0-0.8)  K/uL


 


Eos # (Auto)  0.0    (0.0-0.7)  K/uL


 


Baso # (Auto)  0.0    (0.0-0.2)  K/uL


 


Neutrophils % (Manual)  88 H    (50-75)  %


 


Band Neutrophils %  2    (0-2)  %


 


Lymphocytes % (Manual)  3 L    (20-40)  %


 


Reactive Lymphs %  3 H    (0-0)  %


 


Monocytes % (Manual)  4    (0-10)  %


 


Platelet Estimate  Normal    (NORMAL)  


 


Puncture Site    R brac  


 


pCO2    42  (35-45)  mm/Hg


 


pO2    134 H  ()  mm/Hg


 


HCO3    34.3 H  (21-28)  mmol/L


 


ABG pH    7.54 H  (7.35-7.45)  


 


ABG Total CO2    37.2 H  (22-28)  mmol/L


 


ABG O2 Saturation    97.0  (95-98)  %


 


ABG Base Excess    12.0 H  (-2.0-3.0)  mmol/L


 


ABG Hemoglobin    15.3  (11.7-17.4)  g/dL


 


ABG Carboxyhemoglobin    0 L  (0.5-1.5)  %


 


POC ABG HHb (Measured)    3.0  (0.0-5.0)  %


 


ABG Methemoglobin    0.2  (0.0-3.0)  %


 


[place] Test    Na  


 


A-a O2 Difference    170.0  mm/Hg


 


Respiratory Index    1.3  


 


Hgb O2 Saturation    96.7  (95.0-98.0)  %


 


Vent Mode    Prvc  


 


Mechanical Rate    14  


 


FiO2    50.0  %


 


Tidal Volume    500  


 


PEEP    5  


 


Sodium     (132-148)  mmol/L


 


Potassium     (3.6-5.2)  mmol/L


 


Chloride     ()  mmol/L


 


Carbon Dioxide     (22-30)  mmol/L


 


Anion Gap     (10-20)  


 


BUN     (9-20)  mg/dL


 


Creatinine     (0.8-1.5)  mg/dL


 


Est GFR (African Amer)     


 


Est GFR (Non-Af Amer)     


 


POC Glucose (mg/dL)   232 H   ()  mg/dL


 


Random Glucose     ()  mg/dL


 


Calcium     (8.6-10.4)  mg/dl


 


Phosphorus     (2.5-4.5)  mg/dL


 


Magnesium     (1.6-2.3)  mg/dL


 


Total Bilirubin     (0.2-1.3)  mg/dL


 


AST     (17-59)  U/L


 


ALT     (21-72)  U/L


 


Alkaline Phosphatase     ()  U/L


 


Total Protein     (6.3-8.3)  g/dL


 


Albumin     (3.5-5.0)  g/dL


 


Globulin     (2.2-3.9)  gm/dL


 


Albumin/Globulin Ratio     (1.0-2.1)  














  01/10/19 Range/Units





  00:18 


 


WBC   (4.8-10.8)  K/uL


 


RBC   (4.40-5.90)  Mil/uL


 


Hgb   (12.0-18.0)  g/dL


 


Hct   (35.0-51.0)  %


 


MCV   (80.0-94.0)  fL


 


MCH   (27.0-31.0)  pg


 


MCHC   (33.0-37.0)  g/dL


 


RDW   (11.5-14.5)  %


 


Plt Count   (130-400)  K/uL


 


MPV   (7.2-11.7)  fL


 


Neut % (Auto)   (50.0-75.0)  %


 


Lymph % (Auto)   (20.0-40.0)  %


 


Mono % (Auto)   (0.0-10.0)  %


 


Eos % (Auto)   (0.0-4.0)  %


 


Baso % (Auto)   (0.0-2.0)  %


 


Neut # (Auto)   (1.8-7.0)  K/uL


 


Lymph # (Auto)   (1.0-4.3)  K/uL


 


Mono # (Auto)   (0.0-0.8)  K/uL


 


Eos # (Auto)   (0.0-0.7)  K/uL


 


Baso # (Auto)   (0.0-0.2)  K/uL


 


Neutrophils % (Manual)   (50-75)  %


 


Band Neutrophils %   (0-2)  %


 


Lymphocytes % (Manual)   (20-40)  %


 


Reactive Lymphs %   (0-0)  %


 


Monocytes % (Manual)   (0-10)  %


 


Platelet Estimate   (NORMAL)  


 


Puncture Site   


 


pCO2   (35-45)  mm/Hg


 


pO2   ()  mm/Hg


 


HCO3   (21-28)  mmol/L


 


ABG pH   (7.35-7.45)  


 


ABG Total CO2   (22-28)  mmol/L


 


ABG O2 Saturation   (95-98)  %


 


ABG Base Excess   (-2.0-3.0)  mmol/L


 


ABG Hemoglobin   (11.7-17.4)  g/dL


 


ABG Carboxyhemoglobin   (0.5-1.5)  %


 


POC ABG HHb (Measured)   (0.0-5.0)  %


 


ABG Methemoglobin   (0.0-3.0)  %


 


Austin Test   


 


A-a O2 Difference   mm/Hg


 


Respiratory Index   


 


Hgb O2 Saturation   (95.0-98.0)  %


 


Vent Mode   


 


Mechanical Rate   


 


FiO2   %


 


Tidal Volume   


 


PEEP   


 


Sodium   (132-148)  mmol/L


 


Potassium   (3.6-5.2)  mmol/L


 


Chloride   ()  mmol/L


 


Carbon Dioxide   (22-30)  mmol/L


 


Anion Gap   (10-20)  


 


BUN   (9-20)  mg/dL


 


Creatinine   (0.8-1.5)  mg/dL


 


Est GFR (African Amer)   


 


Est GFR (Non-Af Amer)   


 


POC Glucose (mg/dL)  283 H  ()  mg/dL


 


Random Glucose   ()  mg/dL


 


Calcium   (8.6-10.4)  mg/dl


 


Phosphorus   (2.5-4.5)  mg/dL


 


Magnesium   (1.6-2.3)  mg/dL


 


Total Bilirubin   (0.2-1.3)  mg/dL


 


AST   (17-59)  U/L


 


ALT   (21-72)  U/L


 


Alkaline Phosphatase   ()  U/L


 


Total Protein   (6.3-8.3)  g/dL


 


Albumin   (3.5-5.0)  g/dL


 


Globulin   (2.2-3.9)  gm/dL


 


Albumin/Globulin Ratio   (1.0-2.1)  








                         Laboratory Results - last 24 hr











  01/10/19 01/10/19 01/10/19





  00:18 05:17 05:55


 


WBC   


 


RBC   


 


Hgb   


 


Hct   


 


MCV   


 


MCH   


 


MCHC   


 


RDW   


 


Plt Count   


 


MPV   


 


Neut % (Auto)   


 


Lymph % (Auto)   


 


Mono % (Auto)   


 


Eos % (Auto)   


 


Baso % (Auto)   


 


Neut # (Auto)   


 


Lymph # (Auto)   


 


Mono # (Auto)   


 


Eos # (Auto)   


 


Baso # (Auto)   


 


Neutrophils % (Manual)   


 


Band Neutrophils %   


 


Lymphocytes % (Manual)   


 


Reactive Lymphs %   


 


Monocytes % (Manual)   


 


Platelet Estimate   


 


Puncture Site   R brac 


 


pCO2   42 


 


pO2   134 H 


 


HCO3   34.3 H 


 


ABG pH   7.54 H 


 


ABG Total CO2   37.2 H 


 


ABG O2 Saturation   97.0 


 


ABG Base Excess   12.0 H 


 


ABG Hemoglobin   15.3 


 


ABG Carboxyhemoglobin   0 L 


 


POC ABG HHb (Measured)   3.0 


 


ABG Methemoglobin   0.2 


 


[place] Test   Na 


 


A-a O2 Difference   170.0 


 


Respiratory Index   1.3 


 


Hgb O2 Saturation   96.7 


 


Vent Mode   Prvc 


 


Mechanical Rate   14 


 


FiO2   50.0 


 


Tidal Volume   500 


 


PEEP   5 


 


Sodium   


 


Potassium   


 


Chloride   


 


Carbon Dioxide   


 


Anion Gap   


 


BUN   


 


Creatinine   


 


Est GFR ( Amer)   


 


Est GFR (Non-Af Amer)   


 


POC Glucose (mg/dL)  283 H   232 H


 


Random Glucose   


 


Calcium   


 


Phosphorus   


 


Magnesium   


 


Total Bilirubin   


 


AST   


 


ALT   


 


Alkaline Phosphatase   


 


Total Protein   


 


Albumin   


 


Globulin   


 


Albumin/Globulin Ratio   














  01/10/19 01/10/19 01/10/19





  06:41 06:41 11:12


 


WBC  12.1 H  


 


RBC  6.25 H  


 


Hgb  15.4  


 


Hct  48.4  


 


MCV  77.5 L  


 


MCH  24.6 L  


 


MCHC  31.8 L  


 


RDW  18.3 H  


 


Plt Count  191  


 


MPV  9.6  


 


Neut % (Auto)  91.4 H  


 


Lymph % (Auto)  1.5 L  


 


Mono % (Auto)  6.9  


 


Eos % (Auto)  0.0  


 


Baso % (Auto)  0.2  


 


Neut # (Auto)  11.1 H  


 


Lymph # (Auto)  0.2 L  


 


Mono # (Auto)  0.8  


 


Eos # (Auto)  0.0  


 


Baso # (Auto)  0.0  


 


Neutrophils % (Manual)  88 H  


 


Band Neutrophils %  2  


 


Lymphocytes % (Manual)  3 L  


 


Reactive Lymphs %  3 H  


 


Monocytes % (Manual)  4  


 


Platelet Estimate  Normal  


 


Puncture Site   


 


pCO2   


 


pO2   


 


HCO3   


 


ABG pH   


 


ABG Total CO2   


 


ABG O2 Saturation   


 


ABG Base Excess   


 


ABG Hemoglobin   


 


ABG Carboxyhemoglobin   


 


POC ABG HHb (Measured)   


 


ABG Methemoglobin   


 


[place] Test   


 


A-a O2 Difference   


 


Respiratory Index   


 


Hgb O2 Saturation   


 


Vent Mode   


 


Mechanical Rate   


 


FiO2   


 


Tidal Volume   


 


PEEP   


 


Sodium   144 


 


Potassium   3.6 


 


Chloride   105 


 


Carbon Dioxide   34 H 


 


Anion Gap   9 L 


 


BUN   71 H 


 


Creatinine   1.0 


 


Est GFR ( Amer)   > 60 


 


Est GFR (Non-Af Amer)   > 60 


 


POC Glucose (mg/dL)    244 H


 


Random Glucose   220 H 


 


Calcium   7.7 L 


 


Phosphorus   4.8 H 


 


Magnesium   2.7 H 


 


Total Bilirubin   0.9 


 


AST   54 


 


ALT   85 H D 


 


Alkaline Phosphatase   57 


 


Total Protein   5.6 L 


 


Albumin   2.8 L 


 


Globulin   2.7 


 


Albumin/Globulin Ratio   1.0 














  01/10/19





  17:57


 


WBC 


 


RBC 


 


Hgb 


 


Hct 


 


MCV 


 


MCH 


 


MCHC 


 


RDW 


 


Plt Count 


 


MPV 


 


Neut % (Auto) 


 


Lymph % (Auto) 


 


Mono % (Auto) 


 


Eos % (Auto) 


 


Baso % (Auto) 


 


Neut # (Auto) 


 


Lymph # (Auto) 


 


Mono # (Auto) 


 


Eos # (Auto) 


 


Baso # (Auto) 


 


Neutrophils % (Manual) 


 


Band Neutrophils % 


 


Lymphocytes % (Manual) 


 


Reactive Lymphs % 


 


Monocytes % (Manual) 


 


Platelet Estimate 


 


Puncture Site 


 


pCO2 


 


pO2 


 


HCO3 


 


ABG pH 


 


ABG Total CO2 


 


ABG O2 Saturation 


 


ABG Base Excess 


 


ABG Hemoglobin 


 


ABG Carboxyhemoglobin 


 


POC ABG HHb (Measured) 


 


ABG Methemoglobin 


 


[place] Test 


 


A-a O2 Difference 


 


Respiratory Index 


 


Hgb O2 Saturation 


 


Vent Mode 


 


Mechanical Rate 


 


FiO2 


 


Tidal Volume 


 


PEEP 


 


Sodium 


 


Potassium 


 


Chloride 


 


Carbon Dioxide 


 


Anion Gap 


 


BUN 


 


Creatinine 


 


Est GFR ( Amer) 


 


Est GFR (Non-Af Amer) 


 


POC Glucose (mg/dL)  152 H


 


Random Glucose 


 


Calcium 


 


Phosphorus 


 


Magnesium 


 


Total Bilirubin 


 


AST 


 


ALT 


 


Alkaline Phosphatase 


 


Total Protein 


 


Albumin 


 


Globulin 


 


Albumin/Globulin Ratio 











Radiology Impressions: 


                              Radiology Impressions





Chest CT  01/10/19 10:56


IMPRESSION:


No evidence of pulmonary embolism. 


 


Bilateral apical spiculated pleural thickening with ana encasement 


of the left upper lobe pulmonary artery branch.  This encasing 


behavior is unchanged compared to examinations dating back to 


6/17/2018.  The overall size of this pleural thickening is unchanged. 


 This is still suspicious for malignant neoplasm.  Question whether 


this represents treated neoplasm mild aneurysmal dilatation of the 


ascending thoracic aorta.  Minimal dilatation of the main pulmonary 


artery.  Additional minor findings as above. 


 


 














Attending/Attestation





- Attestation


I have personally seen and examined this patient.: Yes


I have fully participated in the care of the patient.: Yes


I have reviewed all pertinent clinical information: Yes


Notes (Text): 





01/10/19 20:23


Today: Thursday, January 10, 2019





The Patient was seen and examined at the bedside, Medical records reviewed, and 

management issues were discussed and formulated with the house staff.


I have reviewed all the relevant clinical, laboratory, hemodynamic, radiographic

data and medications


Events reviewed


Pain issues, skin care, head of the bed elevation, glycemic control were 

addressed.


Agree with above resident's assessment and treatment plans of care as 

transcribed in Dr. Strong's note.

## 2019-01-10 NOTE — CT
Date of service: 



01/10/2019



PROCEDURE:  CT Chest with contrast (Pulmonary Angiogram)



HISTORY:

tachycardia, sob



COMPARISON:

1/2/2019 and 8/20/2018 



TECHNIQUE:

Axial computed tomography images were obtained of the chest in the 

pulmonary arterial phase of enhancement. Coronal and sagittal 

reformatted images were created and reviewed.



Intravenous contrast dose: 100 mL Visipaque 320



Radiation dose:



Total exam DLP = 607.96 mGy-cm.



This CT exam was performed using one or more of the following dose 

reduction techniques: Automated exposure control, adjustment of the 

mA and/or kV according to patient size, and/or use of iterative 

reconstruction technique.



FINDINGS:



PULMONARY ARTERIES:

Unremarkable. No pulmonary embolism. 



AORTA:

There is mild aneurysmal dilatation of the ascending thoracic aorta 

to a diameter of 4 cm.  There is atherosclerotic calcification of the 

thoracic aorta.  Minimal dilatation of the main pulmonary artery up 

to 3.2 cm diameter.  This may correlate with pulmonary arterial 

hypertension. 



LUNGS:

There is pleural-based soft tissue density in the left apex extending 

along the anterior medial pleural surface of the left lung.  Overall, 

this irregular pleural thickening measures roughly 1.4 x 4.7 by 7.2 

cm.  This compresses the left upper lobe pulmonary artery branch 

circumferentially narrowing it.  There is similar pleural-based soft 

tissue density in the anterior right upper lobe measuring roughly 1.4 

x 2.8 by 2.7 cm.  Again this is irregular.  Both areas of pleural 

thickening show spiculated margins and are suspicious for malignant 

neoplasm.  This is consistent with the behavior manifested as 

encasement and narrowing of the left upper lobe pulmonary artery.  

However, when compared to prior examinations dating back to at least 

6/17/2018, there has been no change in the appearance of this 

bilateral apical pleural thickening.  The left upper lobe pulmonary 

artery branches encasement is also unchanged compared to 6/17/2018.  

Significance uncertain.  There is extension of the left upper lobe 

pleural-based process to the left hilum.  There is mild right hilar 

lymphadenopathy.  There is no significant mediastinal 

lymphadenopathy. 



There is subsegmental lingular atelectasis noted.  There is minimal 

bilateral lower lobe subsegmental atelectasis. 



There is vaguely nodular opacity in the superior segment of the left 

lower lobe unchanged compared to multiple prior examinations.  

Uncertain significance.  No new consolidation elsewhere. 



PLEURAL SPACES:

No pleural effusion.  No pneumothorax. 



HEART:

Unremarkable. No cardiomegaly. No significant pericardial effusion. 



There is an endotracheal tube noted with its tip 3.1 cm above the 

tracheal omari.  There is a nasogastric tube noted traversing the 

thoracic esophagus to the gastric lumen. 



LYMPH NODES:

Mild right hilar lymphadenopathy..  Correlate with history.  No 

mediastinal or left hilar lymphadenopathy.  There is a 10 mm right 

retrocrural lymph node noted likely related to thoracic pathology. 



BONES, CHEST WALL:

Unremarkable. No fracture or destructive lesion 



OTHER FINDINGS:

Several coarse calcifications are noted adjacent to the pancreatic 

head and in the steve hepatis region, of uncertain significance.  No 

evidence of pancreatitis. 



IMPRESSION:

No evidence of pulmonary embolism. 



Bilateral apical spiculated pleural thickening with ana encasement 

of the left upper lobe pulmonary artery branch.  This encasing 

behavior is unchanged compared to examinations dating back to 

6/17/2018.  The overall size of this pleural thickening is unchanged. 

 This is still suspicious for malignant neoplasm.  Question whether 

this represents treated neoplasm mild aneurysmal dilatation of the 

ascending thoracic aorta.  Minimal dilatation of the main pulmonary 

artery.  Additional minor findings as above.

## 2019-01-11 LAB
ALBUMIN SERPL-MCNC: 3 G/DL (ref 3.5–5)
ALBUMIN/GLOB SERPL: 1.1 {RATIO} (ref 1–2.1)
ALT SERPL-CCNC: 142 U/L (ref 21–72)
ANISOCYTOSIS BLD QL SMEAR: SLIGHT
ARTERIAL BLOOD GAS HEMOGLOBIN: 16.2 G/DL (ref 11.7–17.4)
ARTERIAL BLOOD GAS O2 SAT: 95.9 % (ref 95–98)
ARTERIAL BLOOD GAS PCO2: 45 MM/HG (ref 35–45)
ARTERIAL BLOOD GAS TCO2: 34.9 MMOL/L (ref 22–28)
ARTERIAL PATENCY WRIST A: (no result)
AST SERPL-CCNC: 137 U/L (ref 17–59)
BASOPHILS # BLD AUTO: 0 K/UL (ref 0–0.2)
BASOPHILS NFR BLD: 0.1 % (ref 0–2)
BUN SERPL-MCNC: 80 MG/DL (ref 9–20)
CALCIUM SERPL-MCNC: 7.5 MG/DL (ref 8.6–10.4)
EOSINOPHIL # BLD AUTO: 0 K/UL (ref 0–0.7)
EOSINOPHIL NFR BLD: 0 % (ref 0–4)
ERYTHROCYTE [DISTWIDTH] IN BLOOD BY AUTOMATED COUNT: 18.2 % (ref 11.5–14.5)
GFR NON-AFRICAN AMERICAN: > 60
HCO3 BLDA-SCNC: 31.6 MMOL/L (ref 21–28)
HGB BLD-MCNC: 16.7 G/DL (ref 12–18)
INHALED O2 CONCENTRATION: 40 %
LYMPHOCYTE: 1 % (ref 20–40)
LYMPHOCYTES # BLD AUTO: 0.2 K/UL (ref 1–4.3)
LYMPHOCYTES NFR BLD AUTO: 1.5 % (ref 20–40)
MCH RBC QN AUTO: 24.4 PG (ref 27–31)
MCHC RBC AUTO-ENTMCNC: 31.2 G/DL (ref 33–37)
MCV RBC AUTO: 78.2 FL (ref 80–94)
MONOCYTE: 3 % (ref 0–10)
MONOCYTES # BLD: 1 K/UL (ref 0–0.8)
MONOCYTES NFR BLD: 6.3 % (ref 0–10)
NEUTROPHILS # BLD: 14.5 K/UL (ref 1.8–7)
NEUTROPHILS NFR BLD AUTO: 92.1 % (ref 50–75)
NEUTROPHILS NFR BLD AUTO: 96 % (ref 50–75)
NRBC BLD AUTO-RTO: 0.1 % (ref 0–2)
OVALOCYTES BLD QL SMEAR: SLIGHT
PH BLDA: 7.48 [PH] (ref 7.35–7.45)
PLATELET # BLD EST: NORMAL 10*3/UL
PLATELET # BLD: 179 K/UL (ref 130–400)
PMV BLD AUTO: 9.9 FL (ref 7.2–11.7)
PO2 BLDA: 87 MM/HG (ref 80–100)
RBC # BLD AUTO: 6.84 MIL/UL (ref 4.4–5.9)
TOTAL CELLS COUNTED BLD: 100
WBC # BLD AUTO: 15.8 K/UL (ref 4.8–10.8)

## 2019-01-11 RX ADMIN — INSULIN ASPART SCH U: 100 INJECTION, SOLUTION INTRAVENOUS; SUBCUTANEOUS at 06:28

## 2019-01-11 RX ADMIN — ENOXAPARIN SODIUM SCH MG: 80 INJECTION SUBCUTANEOUS at 10:15

## 2019-01-11 RX ADMIN — INSULIN GLARGINE SCH UNITS: 100 INJECTION, SOLUTION SUBCUTANEOUS at 22:11

## 2019-01-11 RX ADMIN — IPRATROPIUM BROMIDE AND ALBUTEROL SULFATE SCH ML: .5; 3 SOLUTION RESPIRATORY (INHALATION) at 08:19

## 2019-01-11 RX ADMIN — METHYLPREDNISOLONE SODIUM SUCCINATE SCH MG: 40 INJECTION, POWDER, FOR SOLUTION INTRAMUSCULAR; INTRAVENOUS at 22:12

## 2019-01-11 RX ADMIN — IPRATROPIUM BROMIDE AND ALBUTEROL SULFATE SCH ML: .5; 3 SOLUTION RESPIRATORY (INHALATION) at 00:59

## 2019-01-11 RX ADMIN — TAZOBACTAM SODIUM AND PIPERACILLIN SODIUM SCH MLS/HR: 375; 3 INJECTION, SOLUTION INTRAVENOUS at 06:28

## 2019-01-11 RX ADMIN — ACETYLCYSTEINE SCH ML: 200 INHALANT RESPIRATORY (INHALATION) at 08:19

## 2019-01-11 RX ADMIN — METHYLPREDNISOLONE SODIUM SUCCINATE SCH MG: 40 INJECTION, POWDER, FOR SOLUTION INTRAMUSCULAR; INTRAVENOUS at 13:55

## 2019-01-11 RX ADMIN — METHYLPREDNISOLONE SODIUM SUCCINATE SCH MG: 40 INJECTION, POWDER, FOR SOLUTION INTRAMUSCULAR; INTRAVENOUS at 06:28

## 2019-01-11 RX ADMIN — IPRATROPIUM BROMIDE AND ALBUTEROL SULFATE SCH ML: .5; 3 SOLUTION RESPIRATORY (INHALATION) at 16:34

## 2019-01-11 RX ADMIN — TAZOBACTAM SODIUM AND PIPERACILLIN SODIUM SCH MLS/HR: 375; 3 INJECTION, SOLUTION INTRAVENOUS at 22:10

## 2019-01-11 RX ADMIN — PROPOFOL PRN MLS/HR: 10 INJECTION, EMULSION INTRAVENOUS at 06:27

## 2019-01-11 RX ADMIN — POLYETHYLENE GLYCOL 3350 SCH GM: 17 POWDER, FOR SOLUTION ORAL at 10:15

## 2019-01-11 RX ADMIN — DEXMEDETOMIDINE HYDROCHLORIDE PRN MLS/HR: 100 INJECTION, SOLUTION, CONCENTRATE INTRAVENOUS at 09:30

## 2019-01-11 RX ADMIN — ENOXAPARIN SODIUM SCH MG: 80 INJECTION SUBCUTANEOUS at 22:09

## 2019-01-11 RX ADMIN — IPRATROPIUM BROMIDE AND ALBUTEROL SULFATE SCH ML: .5; 3 SOLUTION RESPIRATORY (INHALATION) at 03:28

## 2019-01-11 RX ADMIN — ACETYLCYSTEINE SCH ML: 200 INHALANT RESPIRATORY (INHALATION) at 03:29

## 2019-01-11 RX ADMIN — IPRATROPIUM BROMIDE AND ALBUTEROL SULFATE SCH ML: .5; 3 SOLUTION RESPIRATORY (INHALATION) at 19:41

## 2019-01-11 RX ADMIN — INSULIN ASPART SCH U: 100 INJECTION, SOLUTION INTRAVENOUS; SUBCUTANEOUS at 13:00

## 2019-01-11 RX ADMIN — INSULIN ASPART SCH U: 100 INJECTION, SOLUTION INTRAVENOUS; SUBCUTANEOUS at 18:30

## 2019-01-11 RX ADMIN — IPRATROPIUM BROMIDE AND ALBUTEROL SULFATE SCH ML: .5; 3 SOLUTION RESPIRATORY (INHALATION) at 11:27

## 2019-01-11 RX ADMIN — IPRATROPIUM BROMIDE AND ALBUTEROL SULFATE SCH ML: .5; 3 SOLUTION RESPIRATORY (INHALATION) at 03:30

## 2019-01-11 RX ADMIN — TAZOBACTAM SODIUM AND PIPERACILLIN SODIUM SCH MLS/HR: 375; 3 INJECTION, SOLUTION INTRAVENOUS at 13:30

## 2019-01-11 NOTE — CP.PCM.PN
<FernandoShreya long - Last Filed: 01/11/19 18:24>





Subjective





- Date & Time of Evaluation


Date of Evaluation: 01/11/19


Time of Evaluation: 12:10





- Subjective


Subjective: 





PGY3 cardio progress note





Pt seen and examined at bedside.  No acute events overnight.  Pt was reintubated

on 1/9/19 due to respiratory distress post-extubation.  Currently family at 

bedside.  ROSC unobtainable due to sedation and intubation.





Objective





- Vital Signs/Intake and Output


Vital Signs (last 24 hours): 


                                        











Temp Pulse Resp BP Pulse Ox


 


 99.1 F   118 H  22   115/84   92 L


 


 01/11/19 07:42  01/11/19 10:27  01/11/19 10:27  01/11/19 10:27  01/11/19 10:27








Intake and Output: 


                                        











 01/11/19 01/11/19





 06:59 18:59


 


Intake Total 676.6 42.3


 


Output Total 600 


 


Balance 76.6 42.3














- Medications


Medications: 


                               Current Medications





Albuterol/Ipratropium (Duoneb 3 Mg/0.5 Mg (3 Ml) Ud)  3 ml INH RQ4 [facility]


   Last Admin: 01/11/19 11:27 Dose:  3 ml


Aspirin (Aspirin Chewable)  81 mg PO DAILY [facility]


   Last Admin: 01/11/19 10:14 Dose:  81 mg


Diltiazem HCl (Cardizem)  60 mg PO QID [facility]


   Last Admin: 01/11/19 10:15 Dose:  60 mg


Enoxaparin Sodium (Lovenox)  70 mg SC Q12H [facility]


   Last Admin: 01/11/19 10:15 Dose:  70 mg


Finasteride (Proscar)  5 mg PO DAILY [facility]


   Last Admin: 01/11/19 10:15 Dose:  5 mg


Piperacillin Sod/Tazobactam Sod (Zosyn 3.375 Gm Iv Premix)  3.375 gm in 50 mls @

100 mls/hr IVPB Q8H [facility]; Protocol


   Last Admin: 01/11/19 06:28 Dose:  100 mls/hr


Dexmedetomidine HCl 200 mcg/ (Sodium Chloride)  50 mls @ 3.55 mls/hr IV TITR 

PRN; Protocol


   PRN Reason: Agitation


Insulin Aspart (Novolog)  0 unit SC Q6 [facility]; Protocol


   Last Admin: 01/11/19 06:28 Dose:  8 u


Insulin Glargine (Lantus)  20 unit SC HS [facility]


   Last Admin: 01/10/19 21:53 Dose:  20 units


Methylprednisolone (Solu-Medrol)  40 mg IVP Q8 [facility]


   Last Admin: 01/11/19 06:28 Dose:  40 mg


Pantoprazole Sodium (Protonix Inj)  40 mg IVP DAILY [facility]


   Last Admin: 01/11/19 10:15 Dose:  40 mg


Polyethylene Glycol (Miralax)  17 gm PO DAILY [facility]


   Last Admin: 01/11/19 10:15 Dose:  17 gm


Tamsulosin HCl (Flomax)  0.4 mg PO BID [facility]


   Last Admin: 01/11/19 10:15 Dose:  0.4 mg











- Labs


Labs: 


                                        





                                 01/11/19 06:21 





                                 01/11/19 06:21 





                                        











PT  11.4 SECONDS (9.7-12.2)   01/04/19  15:24    


 


INR  1.0   01/04/19  15:24    


 


APTT  26 SECONDS (21-34)   01/04/19  15:24    














- Constitutional


Appears: Non-toxic, No Acute Distress





- Head Exam


Head Exam: ATRAUMATIC, NORMOCEPHALIC





- ENT Exam


ENT Exam: Mucous Membranes Moist





- Respiratory Exam


Respiratory Exam: Clear to Ausculation Bilateral.  absent: Rhonchi, Wheezes





- Cardiovascular Exam


Cardiovascular Exam: REGULAR RHYTHM, +S1, +S2





- GI/Abdominal Exam


GI & Abdominal Exam: Soft.  absent: Guarding, Rigid, Tenderness





- Skin


Skin Exam: Dry, Intact, Normal Color, Warm





Assessment and Plan





- Assessment and Plan (Free Text)


Assessment: 





79 year old male with past medical history of COPD, asthma, sleep apnea, HTN, 

DM, GERD and arthritis is being seen for A fib.  





A fib


- Continue current management with cardizem 30 mg po QID


- Continue AC with Lovenox 70 mg SC Q12


- Continue Aspirin





HTN


-Receiving cardizem 





Respiratory arrest s/p asthma exacerbation and PNA


- Pt re-intubated on 1/9/19 due to respiratory distress 


- management per ICU team 





Case discussed with attending, Dr. Randle 








<Quinton Randle - Last Filed: 01/11/19 18:48>





Objective





- Vital Signs/Intake and Output


Vital Signs (last 24 hours): 


                                        











Temp Pulse Resp BP Pulse Ox


 


 99.1 F   102 H  25 H  127/91 H  93 L


 


 01/11/19 16:00  01/11/19 18:27  01/11/19 18:27  01/11/19 18:27  01/11/19 18:00








Intake and Output: 


                                        











 01/11/19 01/11/19





 06:59 18:59


 


Intake Total 676.6 569.6


 


Output Total 600 840


 


Balance 76.6 -270.4














- Medications


Medications: 


                               Current Medications





Albuterol/Ipratropium (Duoneb 3 Mg/0.5 Mg (3 Ml) Ud)  3 ml INH RQ4 [facility]


   Last Admin: 01/11/19 11:27 Dose:  3 ml


Aspirin (Aspirin Chewable)  81 mg PO DAILY [facility]


   Last Admin: 01/11/19 10:14 Dose:  81 mg


Diltiazem HCl (Cardizem)  60 mg PO QID [facility]


   Last Admin: 01/11/19 17:10 Dose:  60 mg


Enoxaparin Sodium (Lovenox)  70 mg SC Q12H [facility]


   Last Admin: 01/11/19 10:15 Dose:  70 mg


Finasteride (Proscar)  5 mg PO DAILY [facility]


   Last Admin: 01/11/19 10:15 Dose:  5 mg


Piperacillin Sod/Tazobactam Sod (Zosyn 3.375 Gm Iv Premix)  3.375 gm in 50 mls @

100 mls/hr IVPB Q8H [facility]; Protocol


   Last Admin: 01/11/19 13:30 Dose:  100 mls/hr


Dexmedetomidine HCl 200 mcg/ (Sodium Chloride)  50 mls @ 3.55 mls/hr IV TITR 

PRN; Protocol


   PRN Reason: Agitation


   Last Admin: 01/11/19 09:30 Dose:  0.2 mcg/kg/hr, 3.55 mls/hr


Insulin Aspart (Novolog)  0 unit SC Q6 [facility]; Protocol


   Last Admin: 01/11/19 13:00 Dose:  8 u


Insulin Glargine (Lantus)  20 unit SC HS [facility]


   Last Admin: 01/10/19 21:53 Dose:  20 units


Methylprednisolone (Solu-Medrol)  40 mg IVP Q8 [facility]


   Last Admin: 01/11/19 13:55 Dose:  40 mg


Montelukast Sodium (Singulair)  10 mg PO HS [facility]


Pantoprazole Sodium (Protonix Inj)  40 mg IVP DAILY [facility]


   Last Admin: 01/11/19 10:15 Dose:  40 mg


Polyethylene Glycol (Miralax)  17 gm PO DAILY [facility]


   Last Admin: 01/11/19 10:15 Dose:  17 gm


Tamsulosin HCl (Flomax)  0.4 mg PO BID [facility]


   Last Admin: 01/11/19 17:10 Dose:  0.4 mg











- Labs


Labs: 


                                        





                                 01/11/19 06:21 





                                 01/11/19 06:21 





                                        











PT  11.4 SECONDS (9.7-12.2)   01/04/19  15:24    


 


INR  1.0   01/04/19  15:24    


 


APTT  26 SECONDS (21-34)   01/04/19  15:24    














Assessment and Plan





- Assessment and Plan (Free Text)


Assessment: 


Patient seen and evaluated personally by me. Plan of care d/w the medical 

resident and as documented

## 2019-01-11 NOTE — PN
DATE:  01/10/2019



SUBJECTIVE:  The patient is on ventilator.  He is sedated.  He is

paralyzed.  He is comfortable.  No acute distress.  The patient is

tachycardic.  No fever.



PHYSICAL EXAMINATION:

GENERAL:  Blood pressure 116/69, pulse 111, respiratory rate 16,

temperature 98.

LUNGS:  Bilateral transmitted breath sounds positive.  Decreased air entry.

Positive rhonchi.

CARDIOVASCULAR SYSTEM:  S1 and S2, regular.

ABDOMEN:  Soft.



ASSESSMENT:

1.  Acute respiratory failure with exacerbation of chronic obstructive

pulmonary disease.

2.  Diabetes type 2, worsened by steroids.

3.  Hypertension.

4.  Pneumonia.



PLAN:  Continue antibiotics, continue to attempt weaning.  Monitor the

patient.







__________________________________________

Tae Hensley MD





DD:  01/10/2019 21:06:51

DT:  01/11/2019 1:02:06

Job # [id number]

## 2019-01-11 NOTE — CP.PCM.PN
Subjective





- Date & Time of Evaluation


Date of Evaluation: 01/11/19


Time of Evaluation: 09:40





- Subjective


Subjective: 





Johnie was seen and examined at bedside this morning. Attempts were made 

yesterday to extubate, however patient became tachypneic within a few hours. Re-

intubation was performed thereafter. Today, he remains on a ventilator, Prvc, 

rate 14, FiO2 40.0%, Tidal volume 500, PEEP 5. He is in noted tachycardia 130-

140s. 








VS: T: 99.8      /84   RR 17   SpO2 93%





Exam:


Gen: Sedated on ventilator. No acute distress. 


Cardio: Tachycardic with irregular rhythm.


Resp: ET tube in place. Decreased breath sounds, diffuse wheezing heard on 

auscultation


Abd: Soft, non-distended, non-tender.








A&P:


1. Acute respiratory failure


- ABG today pH: 7.48, pCO2  45,   pO2  87,  HCO3  31.6,  base excess 8.6


- Ventilator settings Prvc, rate 14, FiO2 40.0%, Tidal volume 500, PEEP 5


- Will attempt extubation again at later time 





2. COPD exacerbation


- Continue current therapy with Duoneb 3mL INH RQ4, Solumedrol 40mg IV, Zosyn IV


- Stop Acetylcysteine


- Start Cardizem drip 


- CT Chest (1/10): Bilateral apical pleural thickening, unchanged in size and 

unchanged degree of encasement of the left upper pulmonary artery brach from 

June 2018. Neoplasm not ruled out at this point.





Objective





- Vital Signs/Intake and Output


Vital Signs (last 24 hours): 


                                        











Temp Pulse Resp BP Pulse Ox


 


 98.6 F   117 H  18   147/97 H  93 L


 


 01/11/19 12:00  01/11/19 13:27  01/11/19 13:27  01/11/19 13:27  01/11/19 13:27








Intake and Output: 


                                        











 01/11/19 01/11/19





 06:59 18:59


 


Intake Total 676.6 352.1


 


Output Total 600 540


 


Balance 76.6 -187.9














- Medications


Medications: 


                               Current Medications





Albuterol/Ipratropium (Duoneb 3 Mg/0.5 Mg (3 Ml) Ud)  3 ml INH RQ4 [facility]


   Last Admin: 01/11/19 11:27 Dose:  3 ml


Aspirin (Aspirin Chewable)  81 mg PO DAILY [facility]


   Last Admin: 01/11/19 10:14 Dose:  81 mg


Diltiazem HCl (Cardizem)  60 mg PO QID [facility]


   Last Admin: 01/11/19 13:56 Dose:  60 mg


Enoxaparin Sodium (Lovenox)  70 mg SC Q12H [facility]


   Last Admin: 01/11/19 10:15 Dose:  70 mg


Finasteride (Proscar)  5 mg PO DAILY [facility]


   Last Admin: 01/11/19 10:15 Dose:  5 mg


Piperacillin Sod/Tazobactam Sod (Zosyn 3.375 Gm Iv Premix)  3.375 gm in 50 mls @

100 mls/hr IVPB Q8H [facility]; Protocol


   Last Admin: 01/11/19 13:30 Dose:  100 mls/hr


Dexmedetomidine HCl 200 mcg/ (Sodium Chloride)  50 mls @ 3.55 mls/hr IV TITR 

PRN; Protocol


   PRN Reason: Agitation


   Last Admin: 01/11/19 09:30 Dose:  0.2 mcg/kg/hr, 3.55 mls/hr


Insulin Aspart (Novolog)  0 unit SC Q6 [facility]; Protocol


   Last Admin: 01/11/19 13:00 Dose:  8 u


Insulin Glargine (Lantus)  20 unit SC HS [facility]


   Last Admin: 01/10/19 21:53 Dose:  20 units


Methylprednisolone (Solu-Medrol)  40 mg IVP Q8 [facility]


   Last Admin: 01/11/19 13:55 Dose:  40 mg


Pantoprazole Sodium (Protonix Inj)  40 mg IVP DAILY [facility]


   Last Admin: 01/11/19 10:15 Dose:  40 mg


Polyethylene Glycol (Miralax)  17 gm PO DAILY [facility]


   Last Admin: 01/11/19 10:15 Dose:  17 gm


Tamsulosin HCl (Flomax)  0.4 mg PO BID [facility]


   Last Admin: 01/11/19 10:15 Dose:  0.4 mg











- Labs


Labs: 


                                        





                                 01/11/19 06:21 





                                 01/11/19 06:21 





                                        











PT  11.4 SECONDS (9.7-12.2)   01/04/19  15:24    


 


INR  1.0   01/04/19  15:24    


 


APTT  26 SECONDS (21-34)   01/04/19  15:24    














Assessment and Plan


(1) Acute respiratory failure


Status: Acute   





(2) COPD exacerbation


Status: Acute

## 2019-01-11 NOTE — PN
DATE:  01/11/2019



SUBJECTIVE:  The patient is a male on mechanical ventilator.  No signs of

improvement.  He is congested.  He is coughing.  He is tachycardic.  He is

on antibiotic with low fever.  No agitation.  At times he requires

sedation.



PHYSICAL EXAMINATION

VITAL SIGNS:  Blood pressure 121/68, pulse 100, respiratory rate 20,

temperature 99.

LUNGS:  Bilateral transmitted breath sounds.  Positive rales.

CARDIOVASCULAR SYSTEM:  S1 and S2 regular.

ABDOMEN:  Soft.



ASSESSMENT:

1.  Exacerbation of bronchial asthma.

2.  Respiratory failure.

3.  Diabetes.



PLAN:  Continue ventilator.  Attempt weaning.  Monitor the patient.





__________________________________________

Tae Hensley MD





DD:  01/11/2019 19:10:53

DT:  01/11/2019 21:56:30

Job # [id number]

## 2019-01-11 NOTE — CP.CCUPN
<Luis Felipe Strong - Last Filed: 01/11/19 13:06>





CCU Subjective





- Physician Review


Subjective (Free Text): 





Critical Care Progress Note for Dr. Street's service





Patient seen and examined at bedside. Patient intubated limited ROS. CPAP trial 

for possible extubation.











CCU Objective





- Vital Signs / Intake & Output


Vital Signs (Last 4 hours): 


Vital Signs











  Pulse Resp BP Pulse Ox


 


 01/11/19 10:27  118 H  22  115/84  92 L


 


 01/11/19 10:00  138 H  17   93 L


 


 01/11/19 09:27  131 H  22  129/86  93 L











Intake and Output (Last 8hrs): 


                                 Intake & Output











 01/10/19 01/11/19 01/11/19





 22:59 06:59 14:59


 


Intake Total 393.6 479.8 42.3


 


Output Total 1200 400 


 


Balance -806.4 79.8 42.3


 


Weight  156 lb 8 oz 


 


Intake:   


 


  IV  100 


 


  Intake, IV Amount 73.6 59.8 2.3


 


    Right Forearm 73.6 59.8 2.3


 


  Tube Feeding 320 320 40


 


Output:   


 


  Urine 1200 400 


 


    Urethral (Manzano) 1200 400 


 


Other:   


 


  # Bowel Movements 0 1 














- Physical Exam


Head: Positive for: Atraumatic, Normocephalic


Conjunctiva: Positive for: Normal


Mouth: Positive for: Other (ET tube and gastric tube in place)


Respiratory/Chest: Positive for: Good Air Exchange.  Negative for: Clear to 

Auscultation, Respiratory Distress


Cardiovascular: Positive for: Regular Rate and Rhythm, Normal S1, S2, 

Tachycardic


Abdomen: Positive for: Normal Bowel Sounds.  Negative for: Tenderness, 

Distention, Rebound, Guarding


Upper Extremity: Positive for: Normal Inspection.  Negative for: Cyanosis, Edema


Lower Extremity: Positive for: Normal Inspection, NORMAL PULSES


Neurological: Positive for: Other (unable to assess due to patient condition).  

Negative for: GCS=15


Psychiatric: Negative for: Alert, Oriented x 3





- Medications


Active Medications: 


Active Medications











Generic Name Dose Route Start Last Admin





  Trade Name Freq  PRN Reason Stop Dose Admin


 


Albuterol/Ipratropium  3 ml  12/31/18 08:00  01/11/19 11:27





  Duoneb 3 Mg/0.5 Mg (3 Ml) Ud  INH   3 ml





  RQ4 OLEG   Administration





     





     





     





     


 


Aspirin  81 mg  01/11/19 10:00  01/11/19 10:14





  Aspirin Chewable  PO   81 mg





  DAILY OLEG   Administration





     





     





     





     


 


Diltiazem HCl  60 mg  01/11/19 10:00  01/11/19 10:15





  Cardizem  PO   60 mg





  QID OLEG   Administration





     





     





     





     


 


Enoxaparin Sodium  70 mg  01/06/19 10:00  01/11/19 10:15





  Lovenox  SC   70 mg





  Q12H OLEG   Administration





     





     





     





     


 


Finasteride  5 mg  12/31/18 10:00  01/11/19 10:15





  Proscar  PO   5 mg





  DAILY OLEG   Administration





     





     





     





     


 


Piperacillin Sod/Tazobactam Sod  3.375 gm in 50 mls @ 100 mls/hr  01/02/19 14:30

 01/11/19 06:28





  Zosyn 3.375 Gm Iv Premix  IVPB   100 mls/hr





  Q8H OLEG   Administration





     





     





  Protocol   





     


 


Dexmedetomidine HCl 200 mcg/  50 mls @ 3.55 mls/hr  01/11/19 09:08  





  Sodium Chloride  IV   





  TITR PRN   





  Agitation   





     





  Protocol   





  0.2 MCG/KG/HR   


 


Insulin Aspart  0 unit  01/05/19 10:13  01/11/19 06:28





  Novolog  SC   8 u





  Q6 OLEG   Administration





     





     





  Protocol   





     


 


Insulin Glargine  20 unit  01/06/19 14:12  01/10/19 21:53





  Lantus  SC   20 units





  HS OLEG   Administration





     





     





     





     


 


Methylprednisolone  40 mg  01/06/19 14:00  01/11/19 06:28





  Solu-Medrol  IVP   40 mg





  Q8 OLEG   Administration





     





     





     





     


 


Pantoprazole Sodium  40 mg  01/09/19 10:00  01/11/19 10:15





  Protonix Inj  IVP   40 mg





  DAILY OLEG   Administration





     





     





     





     


 


Polyethylene Glycol  17 gm  01/09/19 10:15  01/11/19 10:15





  Miralax  PO   17 gm





  DAILY OLEG   Administration





     





     





     





     


 


Tamsulosin HCl  0.4 mg  12/31/18 10:00  01/11/19 10:15





  Flomax  PO   0.4 mg





  BID OLEG   Administration





     





     





     





     














- Patient Studies


Lab Studies: 


                                   Lab Studies











  01/11/19 01/11/19 01/11/19 Range/Units





  11:41 06:21 06:21 


 


WBC    15.8 H  (4.8-10.8)  K/uL


 


RBC    6.84 H  (4.40-5.90)  Mil/uL


 


Hgb    16.7  (12.0-18.0)  g/dL


 


Hct    53.5 H  (35.0-51.0)  %


 


MCV    78.2 L  (80.0-94.0)  fL


 


MCH    24.4 L  (27.0-31.0)  pg


 


MCHC    31.2 L  (33.0-37.0)  g/dL


 


RDW    18.2 H  (11.5-14.5)  %


 


Plt Count    179  (130-400)  K/uL


 


MPV    9.9  (7.2-11.7)  fL


 


Neut % (Auto)    92.1 H  (50.0-75.0)  %


 


Lymph % (Auto)    1.5 L  (20.0-40.0)  %


 


Mono % (Auto)    6.3  (0.0-10.0)  %


 


Eos % (Auto)    0.0  (0.0-4.0)  %


 


Baso % (Auto)    0.1  (0.0-2.0)  %


 


Neut # (Auto)    14.5 H  (1.8-7.0)  K/uL


 


Lymph # (Auto)    0.2 L  (1.0-4.3)  K/uL


 


Mono # (Auto)    1.0 H  (0.0-0.8)  K/uL


 


Eos # (Auto)    0.0  (0.0-0.7)  K/uL


 


Baso # (Auto)    0.0  (0.0-0.2)  K/uL


 


Neutrophils % (Manual)    96 H  (50-75)  %


 


Lymphocytes % (Manual)    1 L  (20-40)  %


 


Monocytes % (Manual)    3  (0-10)  %


 


Platelet Estimate    Normal  (NORMAL)  


 


Anisocytosis (manual)    Slight  


 


Ovalocytes    Slight  


 


Puncture Site     


 


pCO2     (35-45)  mm/Hg


 


pO2     ()  mm/Hg


 


HCO3     (21-28)  mmol/L


 


ABG pH     (7.35-7.45)  


 


ABG Total CO2     (22-28)  mmol/L


 


ABG O2 Saturation     (95-98)  %


 


ABG Base Excess     (-2.0-3.0)  mmol/L


 


ABG Hemoglobin     (11.7-17.4)  g/dL


 


ABG Carboxyhemoglobin     (0.5-1.5)  %


 


POC ABG HHb (Measured)     (0.0-5.0)  %


 


ABG Methemoglobin     (0.0-3.0)  %


 


Austin Test     


 


A-a O2 Difference     mm/Hg


 


Respiratory Index     


 


Hgb O2 Saturation     (95.0-98.0)  %


 


Vent Mode     


 


Mechanical Rate     


 


FiO2     %


 


Tidal Volume     


 


PEEP     


 


Sodium   145   (132-148)  mmol/L


 


Potassium   4.6   (3.6-5.2)  mmol/L


 


Chloride   107   ()  mmol/L


 


Carbon Dioxide   34 H   (22-30)  mmol/L


 


Anion Gap   9 L   (10-20)  


 


BUN   80 H   (9-20)  mg/dL


 


Creatinine   1.1   (0.8-1.5)  mg/dL


 


Est GFR (African Amer)   > 60   


 


Est GFR (Non-Af Amer)   > 60   


 


POC Glucose (mg/dL)  315 H    ()  mg/dL


 


Random Glucose   314 H D   ()  mg/dL


 


Calcium   7.5 L   (8.6-10.4)  mg/dl


 


Phosphorus   3.7   (2.5-4.5)  mg/dL


 


Magnesium   2.9 H   (1.6-2.3)  mg/dL


 


Total Bilirubin   0.9   (0.2-1.3)  mg/dL


 


AST   137 H D   (17-59)  U/L


 


ALT   142 H D   (21-72)  U/L


 


Alkaline Phosphatase   85   ()  U/L


 


Total Protein   5.7 L   (6.3-8.3)  g/dL


 


Albumin   3.0 L   (3.5-5.0)  g/dL


 


Globulin   2.7   (2.2-3.9)  gm/dL


 


Albumin/Globulin Ratio   1.1   (1.0-2.1)  














  01/11/19 01/11/19 01/10/19 Range/Units





  05:21 05:03 23:28 


 


WBC     (4.8-10.8)  K/uL


 


RBC     (4.40-5.90)  Mil/uL


 


Hgb     (12.0-18.0)  g/dL


 


Hct     (35.0-51.0)  %


 


MCV     (80.0-94.0)  fL


 


MCH     (27.0-31.0)  pg


 


MCHC     (33.0-37.0)  g/dL


 


RDW     (11.5-14.5)  %


 


Plt Count     (130-400)  K/uL


 


MPV     (7.2-11.7)  fL


 


Neut % (Auto)     (50.0-75.0)  %


 


Lymph % (Auto)     (20.0-40.0)  %


 


Mono % (Auto)     (0.0-10.0)  %


 


Eos % (Auto)     (0.0-4.0)  %


 


Baso % (Auto)     (0.0-2.0)  %


 


Neut # (Auto)     (1.8-7.0)  K/uL


 


Lymph # (Auto)     (1.0-4.3)  K/uL


 


Mono # (Auto)     (0.0-0.8)  K/uL


 


Eos # (Auto)     (0.0-0.7)  K/uL


 


Baso # (Auto)     (0.0-0.2)  K/uL


 


Neutrophils % (Manual)     (50-75)  %


 


Lymphocytes % (Manual)     (20-40)  %


 


Monocytes % (Manual)     (0-10)  %


 


Platelet Estimate     (NORMAL)  


 


Anisocytosis (manual)     


 


Ovalocytes     


 


Puncture Site  R rad    


 


pCO2  45    (35-45)  mm/Hg


 


pO2  87    ()  mm/Hg


 


HCO3  31.6 H    (21-28)  mmol/L


 


ABG pH  7.48 H    (7.35-7.45)  


 


ABG Total CO2  34.9 H    (22-28)  mmol/L


 


ABG O2 Saturation  95.9    (95-98)  %


 


ABG Base Excess  8.6 H    (-2.0-3.0)  mmol/L


 


ABG Hemoglobin  16.2    (11.7-17.4)  g/dL


 


ABG Carboxyhemoglobin  0.4 L    (0.5-1.5)  %


 


POC ABG HHb (Measured)  4.1    (0.0-5.0)  %


 


ABG Methemoglobin  0.2    (0.0-3.0)  %


 


Austin Test  Pos    


 


A-a O2 Difference  142.0    mm/Hg


 


Respiratory Index  1.6    


 


Hgb O2 Saturation  95.3    (95.0-98.0)  %


 


Vent Mode  Prvc    


 


Mechanical Rate  14    


 


FiO2  40.0    %


 


Tidal Volume  500    


 


PEEP  5    


 


Sodium     (132-148)  mmol/L


 


Potassium     (3.6-5.2)  mmol/L


 


Chloride     ()  mmol/L


 


Carbon Dioxide     (22-30)  mmol/L


 


Anion Gap     (10-20)  


 


BUN     (9-20)  mg/dL


 


Creatinine     (0.8-1.5)  mg/dL


 


Est GFR (African Amer)     


 


Est GFR (Non-Af Amer)     


 


POC Glucose (mg/dL)   341 H  252 H  ()  mg/dL


 


Random Glucose     ()  mg/dL


 


Calcium     (8.6-10.4)  mg/dl


 


Phosphorus     (2.5-4.5)  mg/dL


 


Magnesium     (1.6-2.3)  mg/dL


 


Total Bilirubin     (0.2-1.3)  mg/dL


 


AST     (17-59)  U/L


 


ALT     (21-72)  U/L


 


Alkaline Phosphatase     ()  U/L


 


Total Protein     (6.3-8.3)  g/dL


 


Albumin     (3.5-5.0)  g/dL


 


Globulin     (2.2-3.9)  gm/dL


 


Albumin/Globulin Ratio     (1.0-2.1)  














  01/10/19 01/10/19 Range/Units





  17:57 11:12 


 


WBC    (4.8-10.8)  K/uL


 


RBC    (4.40-5.90)  Mil/uL


 


Hgb    (12.0-18.0)  g/dL


 


Hct    (35.0-51.0)  %


 


MCV    (80.0-94.0)  fL


 


MCH    (27.0-31.0)  pg


 


MCHC    (33.0-37.0)  g/dL


 


RDW    (11.5-14.5)  %


 


Plt Count    (130-400)  K/uL


 


MPV    (7.2-11.7)  fL


 


Neut % (Auto)    (50.0-75.0)  %


 


Lymph % (Auto)    (20.0-40.0)  %


 


Mono % (Auto)    (0.0-10.0)  %


 


Eos % (Auto)    (0.0-4.0)  %


 


Baso % (Auto)    (0.0-2.0)  %


 


Neut # (Auto)    (1.8-7.0)  K/uL


 


Lymph # (Auto)    (1.0-4.3)  K/uL


 


Mono # (Auto)    (0.0-0.8)  K/uL


 


Eos # (Auto)    (0.0-0.7)  K/uL


 


Baso # (Auto)    (0.0-0.2)  K/uL


 


Neutrophils % (Manual)    (50-75)  %


 


Lymphocytes % (Manual)    (20-40)  %


 


Monocytes % (Manual)    (0-10)  %


 


Platelet Estimate    (NORMAL)  


 


Anisocytosis (manual)    


 


Ovalocytes    


 


Puncture Site    


 


pCO2    (35-45)  mm/Hg


 


pO2    ()  mm/Hg


 


HCO3    (21-28)  mmol/L


 


ABG pH    (7.35-7.45)  


 


ABG Total CO2    (22-28)  mmol/L


 


ABG O2 Saturation    (95-98)  %


 


ABG Base Excess    (-2.0-3.0)  mmol/L


 


ABG Hemoglobin    (11.7-17.4)  g/dL


 


ABG Carboxyhemoglobin    (0.5-1.5)  %


 


POC ABG HHb (Measured)    (0.0-5.0)  %


 


ABG Methemoglobin    (0.0-3.0)  %


 


Austin Test    


 


A-a O2 Difference    mm/Hg


 


Respiratory Index    


 


Hgb O2 Saturation    (95.0-98.0)  %


 


Vent Mode    


 


Mechanical Rate    


 


FiO2    %


 


Tidal Volume    


 


PEEP    


 


Sodium    (132-148)  mmol/L


 


Potassium    (3.6-5.2)  mmol/L


 


Chloride    ()  mmol/L


 


Carbon Dioxide    (22-30)  mmol/L


 


Anion Gap    (10-20)  


 


BUN    (9-20)  mg/dL


 


Creatinine    (0.8-1.5)  mg/dL


 


Est GFR (African Amer)    


 


Est GFR (Non-Af Amer)    


 


POC Glucose (mg/dL)  152 H  244 H  ()  mg/dL


 


Random Glucose    ()  mg/dL


 


Calcium    (8.6-10.4)  mg/dl


 


Phosphorus    (2.5-4.5)  mg/dL


 


Magnesium    (1.6-2.3)  mg/dL


 


Total Bilirubin    (0.2-1.3)  mg/dL


 


AST    (17-59)  U/L


 


ALT    (21-72)  U/L


 


Alkaline Phosphatase    ()  U/L


 


Total Protein    (6.3-8.3)  g/dL


 


Albumin    (3.5-5.0)  g/dL


 


Globulin    (2.2-3.9)  gm/dL


 


Albumin/Globulin Ratio    (1.0-2.1)  








                         Laboratory Results - last 24 hr











  01/10/19 01/10/19 01/10/19





  11:12 17:57 23:28


 


WBC   


 


RBC   


 


Hgb   


 


Hct   


 


MCV   


 


MCH   


 


MCHC   


 


RDW   


 


Plt Count   


 


MPV   


 


Neut % (Auto)   


 


Lymph % (Auto)   


 


Mono % (Auto)   


 


Eos % (Auto)   


 


Baso % (Auto)   


 


Neut # (Auto)   


 


Lymph # (Auto)   


 


Mono # (Auto)   


 


Eos # (Auto)   


 


Baso # (Auto)   


 


Neutrophils % (Manual)   


 


Lymphocytes % (Manual)   


 


Monocytes % (Manual)   


 


Platelet Estimate   


 


Anisocytosis (manual)   


 


Ovalocytes   


 


Puncture Site   


 


pCO2   


 


pO2   


 


HCO3   


 


ABG pH   


 


ABG Total CO2   


 


ABG O2 Saturation   


 


ABG Base Excess   


 


ABG Hemoglobin   


 


ABG Carboxyhemoglobin   


 


POC ABG HHb (Measured)   


 


ABG Methemoglobin   


 


Austin Test   


 


A-a O2 Difference   


 


Respiratory Index   


 


Hgb O2 Saturation   


 


Vent Mode   


 


Mechanical Rate   


 


FiO2   


 


Tidal Volume   


 


PEEP   


 


Sodium   


 


Potassium   


 


Chloride   


 


Carbon Dioxide   


 


Anion Gap   


 


BUN   


 


Creatinine   


 


Est GFR ( Amer)   


 


Est GFR (Non-Af Amer)   


 


POC Glucose (mg/dL)  244 H  152 H  252 H


 


Random Glucose   


 


Calcium   


 


Phosphorus   


 


Magnesium   


 


Total Bilirubin   


 


AST   


 


ALT   


 


Alkaline Phosphatase   


 


Total Protein   


 


Albumin   


 


Globulin   


 


Albumin/Globulin Ratio   














  01/11/19 01/11/19 01/11/19





  05:03 05:21 06:21


 


WBC    15.8 H


 


RBC    6.84 H


 


Hgb    16.7


 


Hct    53.5 H


 


MCV    78.2 L


 


MCH    24.4 L


 


MCHC    31.2 L


 


RDW    18.2 H


 


Plt Count    179


 


MPV    9.9


 


Neut % (Auto)    92.1 H


 


Lymph % (Auto)    1.5 L


 


Mono % (Auto)    6.3


 


Eos % (Auto)    0.0


 


Baso % (Auto)    0.1


 


Neut # (Auto)    14.5 H


 


Lymph # (Auto)    0.2 L


 


Mono # (Auto)    1.0 H


 


Eos # (Auto)    0.0


 


Baso # (Auto)    0.0


 


Neutrophils % (Manual)    96 H


 


Lymphocytes % (Manual)    1 L


 


Monocytes % (Manual)    3


 


Platelet Estimate    Normal


 


Anisocytosis (manual)    Slight


 


Ovalocytes    Slight


 


Puncture Site   R rad 


 


pCO2   45 


 


pO2   87 


 


HCO3   31.6 H 


 


ABG pH   7.48 H 


 


ABG Total CO2   34.9 H 


 


ABG O2 Saturation   95.9 


 


ABG Base Excess   8.6 H 


 


ABG Hemoglobin   16.2 


 


ABG Carboxyhemoglobin   0.4 L 


 


POC ABG HHb (Measured)   4.1 


 


ABG Methemoglobin   0.2 


 


Austin Test   Pos 


 


A-a O2 Difference   142.0 


 


Respiratory Index   1.6 


 


Hgb O2 Saturation   95.3 


 


Vent Mode   Prvc 


 


Mechanical Rate   14 


 


FiO2   40.0 


 


Tidal Volume   500 


 


PEEP   5 


 


Sodium   


 


Potassium   


 


Chloride   


 


Carbon Dioxide   


 


Anion Gap   


 


BUN   


 


Creatinine   


 


Est GFR ( Amer)   


 


Est GFR (Non-Af Amer)   


 


POC Glucose (mg/dL)  341 H  


 


Random Glucose   


 


Calcium   


 


Phosphorus   


 


Magnesium   


 


Total Bilirubin   


 


AST   


 


ALT   


 


Alkaline Phosphatase   


 


Total Protein   


 


Albumin   


 


Globulin   


 


Albumin/Globulin Ratio   














  01/11/19 01/11/19





  06:21 11:41


 


WBC  


 


RBC  


 


Hgb  


 


Hct  


 


MCV  


 


MCH  


 


MCHC  


 


RDW  


 


Plt Count  


 


MPV  


 


Neut % (Auto)  


 


Lymph % (Auto)  


 


Mono % (Auto)  


 


Eos % (Auto)  


 


Baso % (Auto)  


 


Neut # (Auto)  


 


Lymph # (Auto)  


 


Mono # (Auto)  


 


Eos # (Auto)  


 


Baso # (Auto)  


 


Neutrophils % (Manual)  


 


Lymphocytes % (Manual)  


 


Monocytes % (Manual)  


 


Platelet Estimate  


 


Anisocytosis (manual)  


 


Ovalocytes  


 


Puncture Site  


 


pCO2  


 


pO2  


 


HCO3  


 


ABG pH  


 


ABG Total CO2  


 


ABG O2 Saturation  


 


ABG Base Excess  


 


ABG Hemoglobin  


 


ABG Carboxyhemoglobin  


 


POC ABG HHb (Measured)  


 


ABG Methemoglobin  


 


[place] Test  


 


A-a O2 Difference  


 


Respiratory Index  


 


Hgb O2 Saturation  


 


Vent Mode  


 


Mechanical Rate  


 


FiO2  


 


Tidal Volume  


 


PEEP  


 


Sodium  145 


 


Potassium  4.6 


 


Chloride  107 


 


Carbon Dioxide  34 H 


 


Anion Gap  9 L 


 


BUN  80 H 


 


Creatinine  1.1 


 


Est GFR ( Amer)  > 60 


 


Est GFR (Non-Af Amer)  > 60 


 


POC Glucose (mg/dL)   315 H


 


Random Glucose  314 H D 


 


Calcium  7.5 L 


 


Phosphorus  3.7 


 


Magnesium  2.9 H 


 


Total Bilirubin  0.9 


 


AST  137 H D 


 


ALT  142 H D 


 


Alkaline Phosphatase  85 


 


Total Protein  5.7 L 


 


Albumin  3.0 L 


 


Globulin  2.7 


 


Albumin/Globulin Ratio  1.1 











Radiology Impressions: 


                              Radiology Impressions





Chest CT  01/10/19 10:56


IMPRESSION:


No evidence of pulmonary embolism. 


 


Bilateral apical spiculated pleural thickening with ana encasement 


of the left upper lobe pulmonary artery branch.  This encasing 


behavior is unchanged compared to examinations dating back to 


6/17/2018.  The overall size of this pleural thickening is unchanged. 


 This is still suspicious for malignant neoplasm.  Question whether 


this represents treated neoplasm mild aneurysmal dilatation of the 


ascending thoracic aorta.  Minimal dilatation of the main pulmonary 


artery.  Additional minor findings as above. 


 


 











Fingerstick Blood Sugar Results: 209





Review of Systems





- Review of Systems


Systems not reviewed;Unavailable: Intubated





Critical Care Progress Note





- Ventilator Checklist


Head of Bed 30 Degrees: Yes


Daily Sedation Vacation: Yes


Daily Assessment of Readiness to Wean: Yes


Daily Spontaneous Breathing Trial: Yes


PUD Prophalyxis: Yes


DVT Prophylaxis: Yes


Oral Care with Chlorhexidine Gluconate {CHG}: Yes





- Vent Settings


MODE:: PRVC


TIDAL VOLUME:: 500


RESP RATE:: 14


FIO2:: 40


PEEP:: 5





- Extremities/Vascular


Does the Patient have a Manzano Catheter?: Yes


Does the Patient need a Manzano Catheter?: Yes


Catheter Insertion Criteria: Need for accurate measurement of output in 

critically ill patient





- Restraints


Justification for Restraints: High risk for self extubation, High risk for 

removing IV access, High risk for harming self





- Prophylaxis GI


Prophylaxis GI: PPI





- Prophylaxis DVT


Prophylaxis DVT: Lovenox





Assessment/Plan





- Assessment and Plan (Free Text)


Assessment: 





Patient is a 80 yo male w/PMH COPD, asthma, HTN, diabetes, pulm HTN, sleep 

apnea, and GERD admitted to ICU for respiratory distress requiring intubation. 

Patient was extubated yesterday however became severely dypsenic in 3 hours 

requiring reintubation.





Neuro


Intubated; On precedex for sedation





CV


Cardizem qid


Aspirin


1x dose verapamil





Pulm


On Cpap tiral for possible extubation


Duoneb


Solumedrol





GI


On tube feeds with oral gastric tube


Protonix; Miralax


no acute issues





Endo


Q6 accuchek


Glargine 


Insulin Aspart





Renal


Flomax


Finasteride


Manzano for strict Is/Os





ID


Zosyn (started on 1-2 consider dc after 14 days)


elevated white count possible steroid induced; afebrile; no positive cx





Disposition: CPAP trial titrate fio2 down to maintain sp02; optimize lungs via 

no betablockers, duoneb, solumedrol; attempt to extubate if SBI <105





DVT ppx: Lovenox


GI ppx: Protonix





Luis Felipe Strong PGY-1 


Case d/w Dr. Street








<Eva Street - Last Filed: 01/11/19 16:58>





CCU Subjective





- Physician Review


Critical Care Time Spent (in minutes): 46





CCU Objective





- Vital Signs / Intake & Output


Vital Signs (Last 4 hours): 


Vital Signs











  Pulse Resp BP Pulse Ox


 


 01/11/19 13:27  117 H  18  147/97 H  93 L


 


 01/11/19 13:00  108 H  17   93 L











Intake and Output (Last 8hrs): 


                                 Intake & Output











 01/11/19 01/11/19 01/11/19





 06:59 14:59 22:59


 


Intake Total 479.8 352.1 


 


Output Total 400 540 


 


Balance 79.8 -187.9 


 


Weight 156 lb 8 oz  


 


Intake:   


 


    


 


  Intake, IV Amount 59.8 72.1 


 


    Left Forearm  50 


 


    Right Forearm 59.8 22.1 


 


  Tube Feeding 320 280 


 


Output:   


 


  Urine 400 540 


 


    Urethral (Manzano) 400 540 


 


Other:   


 


  # Bowel Movements 1  














- Medications


Active Medications: 


Active Medications











Generic Name Dose Route Start Last Admin





  Trade Name Freq  PRN Reason Stop Dose Admin


 


Albuterol/Ipratropium  3 ml  12/31/18 08:00  01/11/19 11:27





  Duoneb 3 Mg/0.5 Mg (3 Ml) Ud  INH   3 ml





  RQ4 OLEG   Administration





     





     





     





     


 


Aspirin  81 mg  01/11/19 10:00  01/11/19 10:14





  Aspirin Chewable  PO   81 mg





  DAILY OLEG   Administration





     





     





     





     


 


Diltiazem HCl  60 mg  01/11/19 10:00  01/11/19 13:56





  Cardizem  PO   60 mg





  QID OLEG   Administration





     





     





     





     


 


Enoxaparin Sodium  70 mg  01/06/19 10:00  01/11/19 10:15





  Lovenox  SC   70 mg





  Q12H OLEG   Administration





     





     





     





     


 


Finasteride  5 mg  12/31/18 10:00  01/11/19 10:15





  Proscar  PO   5 mg





  DAILY OLEG   Administration





     





     





     





     


 


Piperacillin Sod/Tazobactam Sod  3.375 gm in 50 mls @ 100 mls/hr  01/02/19 14:30

 01/11/19 13:30





  Zosyn 3.375 Gm Iv Premix  IVPB   100 mls/hr





  Q8H OLEG   Administration





     





     





  Protocol   





     


 


Dexmedetomidine HCl 200 mcg/  50 mls @ 3.55 mls/hr  01/11/19 09:08  01/11/19 

09:30





  Sodium Chloride  IV   0.2 mcg/kg/hr





  TITR PRN   3.55 mls/hr





  Agitation   Administration





     





  Protocol   





  0.2 MCG/KG/HR   


 


Insulin Aspart  0 unit  01/05/19 10:13  01/11/19 13:00





  Novolog  SC   8 u





  Q6 OLEG   Administration





     





     





  Protocol   





     


 


Insulin Glargine  20 unit  01/06/19 14:12  01/10/19 21:53





  Lantus  SC   20 units





  HS OLEG   Administration





     





     





     





     


 


Methylprednisolone  40 mg  01/06/19 14:00  01/11/19 13:55





  Solu-Medrol  IVP   40 mg





  Q8 OLEG   Administration





     





     





     





     


 


Pantoprazole Sodium  40 mg  01/09/19 10:00  01/11/19 10:15





  Protonix Inj  IVP   40 mg





  DAILY OLEG   Administration





     





     





     





     


 


Polyethylene Glycol  17 gm  01/09/19 10:15  01/11/19 10:15





  Miralax  PO   17 gm





  DAILY OLEG   Administration





     





     





     





     


 


Tamsulosin HCl  0.4 mg  12/31/18 10:00  01/11/19 10:15





  Flomax  PO   0.4 mg





  BID OLEG   Administration





     





     





     





     














- Patient Studies


Lab Studies: 


                                   Lab Studies











  01/11/19 01/11/19 01/11/19 Range/Units





  11:41 06:21 06:21 


 


WBC    15.8 H  (4.8-10.8)  K/uL


 


RBC    6.84 H  (4.40-5.90)  Mil/uL


 


Hgb    16.7  (12.0-18.0)  g/dL


 


Hct    53.5 H  (35.0-51.0)  %


 


MCV    78.2 L  (80.0-94.0)  fL


 


MCH    24.4 L  (27.0-31.0)  pg


 


MCHC    31.2 L  (33.0-37.0)  g/dL


 


RDW    18.2 H  (11.5-14.5)  %


 


Plt Count    179  (130-400)  K/uL


 


MPV    9.9  (7.2-11.7)  fL


 


Neut % (Auto)    92.1 H  (50.0-75.0)  %


 


Lymph % (Auto)    1.5 L  (20.0-40.0)  %


 


Mono % (Auto)    6.3  (0.0-10.0)  %


 


Eos % (Auto)    0.0  (0.0-4.0)  %


 


Baso % (Auto)    0.1  (0.0-2.0)  %


 


Neut # (Auto)    14.5 H  (1.8-7.0)  K/uL


 


Lymph # (Auto)    0.2 L  (1.0-4.3)  K/uL


 


Mono # (Auto)    1.0 H  (0.0-0.8)  K/uL


 


Eos # (Auto)    0.0  (0.0-0.7)  K/uL


 


Baso # (Auto)    0.0  (0.0-0.2)  K/uL


 


Neutrophils % (Manual)    96 H  (50-75)  %


 


Lymphocytes % (Manual)    1 L  (20-40)  %


 


Monocytes % (Manual)    3  (0-10)  %


 


Platelet Estimate    Normal  (NORMAL)  


 


Anisocytosis (manual)    Slight  


 


Ovalocytes    Slight  


 


Puncture Site     


 


pCO2     (35-45)  mm/Hg


 


pO2     ()  mm/Hg


 


HCO3     (21-28)  mmol/L


 


ABG pH     (7.35-7.45)  


 


ABG Total CO2     (22-28)  mmol/L


 


ABG O2 Saturation     (95-98)  %


 


ABG Base Excess     (-2.0-3.0)  mmol/L


 


ABG Hemoglobin     (11.7-17.4)  g/dL


 


ABG Carboxyhemoglobin     (0.5-1.5)  %


 


POC ABG HHb (Measured)     (0.0-5.0)  %


 


ABG Methemoglobin     (0.0-3.0)  %


 


[place] Test     


 


A-a O2 Difference     mm/Hg


 


Respiratory Index     


 


Hgb O2 Saturation     (95.0-98.0)  %


 


Vent Mode     


 


Mechanical Rate     


 


FiO2     %


 


Tidal Volume     


 


PEEP     


 


Sodium   145   (132-148)  mmol/L


 


Potassium   4.6   (3.6-5.2)  mmol/L


 


Chloride   107   ()  mmol/L


 


Carbon Dioxide   34 H   (22-30)  mmol/L


 


Anion Gap   9 L   (10-20)  


 


BUN   80 H   (9-20)  mg/dL


 


Creatinine   1.1   (0.8-1.5)  mg/dL


 


Est GFR (African Amer)   > 60   


 


Est GFR (Non-Af Amer)   > 60   


 


POC Glucose (mg/dL)  315 H    ()  mg/dL


 


Random Glucose   314 H D   ()  mg/dL


 


Calcium   7.5 L   (8.6-10.4)  mg/dl


 


Phosphorus   3.7   (2.5-4.5)  mg/dL


 


Magnesium   2.9 H   (1.6-2.3)  mg/dL


 


Total Bilirubin   0.9   (0.2-1.3)  mg/dL


 


AST   137 H D   (17-59)  U/L


 


ALT   142 H D   (21-72)  U/L


 


Alkaline Phosphatase   85   ()  U/L


 


Total Protein   5.7 L   (6.3-8.3)  g/dL


 


Albumin   3.0 L   (3.5-5.0)  g/dL


 


Globulin   2.7   (2.2-3.9)  gm/dL


 


Albumin/Globulin Ratio   1.1   (1.0-2.1)  














  01/11/19 01/11/19 01/10/19 Range/Units





  05:21 05:03 23:28 


 


WBC     (4.8-10.8)  K/uL


 


RBC     (4.40-5.90)  Mil/uL


 


Hgb     (12.0-18.0)  g/dL


 


Hct     (35.0-51.0)  %


 


MCV     (80.0-94.0)  fL


 


MCH     (27.0-31.0)  pg


 


MCHC     (33.0-37.0)  g/dL


 


RDW     (11.5-14.5)  %


 


Plt Count     (130-400)  K/uL


 


MPV     (7.2-11.7)  fL


 


Neut % (Auto)     (50.0-75.0)  %


 


Lymph % (Auto)     (20.0-40.0)  %


 


Mono % (Auto)     (0.0-10.0)  %


 


Eos % (Auto)     (0.0-4.0)  %


 


Baso % (Auto)     (0.0-2.0)  %


 


Neut # (Auto)     (1.8-7.0)  K/uL


 


Lymph # (Auto)     (1.0-4.3)  K/uL


 


Mono # (Auto)     (0.0-0.8)  K/uL


 


Eos # (Auto)     (0.0-0.7)  K/uL


 


Baso # (Auto)     (0.0-0.2)  K/uL


 


Neutrophils % (Manual)     (50-75)  %


 


Lymphocytes % (Manual)     (20-40)  %


 


Monocytes % (Manual)     (0-10)  %


 


Platelet Estimate     (NORMAL)  


 


Anisocytosis (manual)     


 


Ovalocytes     


 


Puncture Site  R rad    


 


pCO2  45    (35-45)  mm/Hg


 


pO2  87    ()  mm/Hg


 


HCO3  31.6 H    (21-28)  mmol/L


 


ABG pH  7.48 H    (7.35-7.45)  


 


ABG Total CO2  34.9 H    (22-28)  mmol/L


 


ABG O2 Saturation  95.9    (95-98)  %


 


ABG Base Excess  8.6 H    (-2.0-3.0)  mmol/L


 


ABG Hemoglobin  16.2    (11.7-17.4)  g/dL


 


ABG Carboxyhemoglobin  0.4 L    (0.5-1.5)  %


 


POC ABG HHb (Measured)  4.1    (0.0-5.0)  %


 


ABG Methemoglobin  0.2    (0.0-3.0)  %


 


[place] Test  Pos    


 


A-a O2 Difference  142.0    mm/Hg


 


Respiratory Index  1.6    


 


Hgb O2 Saturation  95.3    (95.0-98.0)  %


 


Vent Mode  Prvc    


 


Mechanical Rate  14    


 


FiO2  40.0    %


 


Tidal Volume  500    


 


PEEP  5    


 


Sodium     (132-148)  mmol/L


 


Potassium     (3.6-5.2)  mmol/L


 


Chloride     ()  mmol/L


 


Carbon Dioxide     (22-30)  mmol/L


 


Anion Gap     (10-20)  


 


BUN     (9-20)  mg/dL


 


Creatinine     (0.8-1.5)  mg/dL


 


Est GFR (African Amer)     


 


Est GFR (Non-Af Amer)     


 


POC Glucose (mg/dL)   341 H  252 H  ()  mg/dL


 


Random Glucose     ()  mg/dL


 


Calcium     (8.6-10.4)  mg/dl


 


Phosphorus     (2.5-4.5)  mg/dL


 


Magnesium     (1.6-2.3)  mg/dL


 


Total Bilirubin     (0.2-1.3)  mg/dL


 


AST     (17-59)  U/L


 


ALT     (21-72)  U/L


 


Alkaline Phosphatase     ()  U/L


 


Total Protein     (6.3-8.3)  g/dL


 


Albumin     (3.5-5.0)  g/dL


 


Globulin     (2.2-3.9)  gm/dL


 


Albumin/Globulin Ratio     (1.0-2.1)  














  01/10/19 01/10/19 Range/Units





  17:57 11:12 


 


WBC    (4.8-10.8)  K/uL


 


RBC    (4.40-5.90)  Mil/uL


 


Hgb    (12.0-18.0)  g/dL


 


Hct    (35.0-51.0)  %


 


MCV    (80.0-94.0)  fL


 


MCH    (27.0-31.0)  pg


 


MCHC    (33.0-37.0)  g/dL


 


RDW    (11.5-14.5)  %


 


Plt Count    (130-400)  K/uL


 


MPV    (7.2-11.7)  fL


 


Neut % (Auto)    (50.0-75.0)  %


 


Lymph % (Auto)    (20.0-40.0)  %


 


Mono % (Auto)    (0.0-10.0)  %


 


Eos % (Auto)    (0.0-4.0)  %


 


Baso % (Auto)    (0.0-2.0)  %


 


Neut # (Auto)    (1.8-7.0)  K/uL


 


Lymph # (Auto)    (1.0-4.3)  K/uL


 


Mono # (Auto)    (0.0-0.8)  K/uL


 


Eos # (Auto)    (0.0-0.7)  K/uL


 


Baso # (Auto)    (0.0-0.2)  K/uL


 


Neutrophils % (Manual)    (50-75)  %


 


Lymphocytes % (Manual)    (20-40)  %


 


Monocytes % (Manual)    (0-10)  %


 


Platelet Estimate    (NORMAL)  


 


Anisocytosis (manual)    


 


Ovalocytes    


 


Puncture Site    


 


pCO2    (35-45)  mm/Hg


 


pO2    ()  mm/Hg


 


HCO3    (21-28)  mmol/L


 


ABG pH    (7.35-7.45)  


 


ABG Total CO2    (22-28)  mmol/L


 


ABG O2 Saturation    (95-98)  %


 


ABG Base Excess    (-2.0-3.0)  mmol/L


 


ABG Hemoglobin    (11.7-17.4)  g/dL


 


ABG Carboxyhemoglobin    (0.5-1.5)  %


 


POC ABG HHb (Measured)    (0.0-5.0)  %


 


ABG Methemoglobin    (0.0-3.0)  %


 


[place] Test    


 


A-a O2 Difference    mm/Hg


 


Respiratory Index    


 


Hgb O2 Saturation    (95.0-98.0)  %


 


Vent Mode    


 


Mechanical Rate    


 


FiO2    %


 


Tidal Volume    


 


PEEP    


 


Sodium    (132-148)  mmol/L


 


Potassium    (3.6-5.2)  mmol/L


 


Chloride    ()  mmol/L


 


Carbon Dioxide    (22-30)  mmol/L


 


Anion Gap    (10-20)  


 


BUN    (9-20)  mg/dL


 


Creatinine    (0.8-1.5)  mg/dL


 


Est GFR (African Amer)    


 


Est GFR (Non-Af Amer)    


 


POC Glucose (mg/dL)  152 H  244 H  ()  mg/dL


 


Random Glucose    ()  mg/dL


 


Calcium    (8.6-10.4)  mg/dl


 


Phosphorus    (2.5-4.5)  mg/dL


 


Magnesium    (1.6-2.3)  mg/dL


 


Total Bilirubin    (0.2-1.3)  mg/dL


 


AST    (17-59)  U/L


 


ALT    (21-72)  U/L


 


Alkaline Phosphatase    ()  U/L


 


Total Protein    (6.3-8.3)  g/dL


 


Albumin    (3.5-5.0)  g/dL


 


Globulin    (2.2-3.9)  gm/dL


 


Albumin/Globulin Ratio    (1.0-2.1)  








                         Laboratory Results - last 24 hr











  01/10/19 01/10/19 01/10/19





  11:12 17:57 23:28


 


WBC   


 


RBC   


 


Hgb   


 


Hct   


 


MCV   


 


MCH   


 


MCHC   


 


RDW   


 


Plt Count   


 


MPV   


 


Neut % (Auto)   


 


Lymph % (Auto)   


 


Mono % (Auto)   


 


Eos % (Auto)   


 


Baso % (Auto)   


 


Neut # (Auto)   


 


Lymph # (Auto)   


 


Mono # (Auto)   


 


Eos # (Auto)   


 


Baso # (Auto)   


 


Neutrophils % (Manual)   


 


Lymphocytes % (Manual)   


 


Monocytes % (Manual)   


 


Platelet Estimate   


 


Anisocytosis (manual)   


 


Ovalocytes   


 


Puncture Site   


 


pCO2   


 


pO2   


 


HCO3   


 


ABG pH   


 


ABG Total CO2   


 


ABG O2 Saturation   


 


ABG Base Excess   


 


ABG Hemoglobin   


 


ABG Carboxyhemoglobin   


 


POC ABG HHb (Measured)   


 


ABG Methemoglobin   


 


[place] Test   


 


A-a O2 Difference   


 


Respiratory Index   


 


Hgb O2 Saturation   


 


Vent Mode   


 


Mechanical Rate   


 


FiO2   


 


Tidal Volume   


 


PEEP   


 


Sodium   


 


Potassium   


 


Chloride   


 


Carbon Dioxide   


 


Anion Gap   


 


BUN   


 


Creatinine   


 


Est GFR ( Amer)   


 


Est GFR (Non-Af Amer)   


 


POC Glucose (mg/dL)  244 H  152 H  252 H


 


Random Glucose   


 


Calcium   


 


Phosphorus   


 


Magnesium   


 


Total Bilirubin   


 


AST   


 


ALT   


 


Alkaline Phosphatase   


 


Total Protein   


 


Albumin   


 


Globulin   


 


Albumin/Globulin Ratio   














  01/11/19 01/11/19 01/11/19





  05:03 05:21 06:21


 


WBC    15.8 H


 


RBC    6.84 H


 


Hgb    16.7


 


Hct    53.5 H


 


MCV    78.2 L


 


MCH    24.4 L


 


MCHC    31.2 L


 


RDW    18.2 H


 


Plt Count    179


 


MPV    9.9


 


Neut % (Auto)    92.1 H


 


Lymph % (Auto)    1.5 L


 


Mono % (Auto)    6.3


 


Eos % (Auto)    0.0


 


Baso % (Auto)    0.1


 


Neut # (Auto)    14.5 H


 


Lymph # (Auto)    0.2 L


 


Mono # (Auto)    1.0 H


 


Eos # (Auto)    0.0


 


Baso # (Auto)    0.0


 


Neutrophils % (Manual)    96 H


 


Lymphocytes % (Manual)    1 L


 


Monocytes % (Manual)    3


 


Platelet Estimate    Normal


 


Anisocytosis (manual)    Slight


 


Ovalocytes    Slight


 


Puncture Site   R rad 


 


pCO2   45 


 


pO2   87 


 


HCO3   31.6 H 


 


ABG pH   7.48 H 


 


ABG Total CO2   34.9 H 


 


ABG O2 Saturation   95.9 


 


ABG Base Excess   8.6 H 


 


ABG Hemoglobin   16.2 


 


ABG Carboxyhemoglobin   0.4 L 


 


POC ABG HHb (Measured)   4.1 


 


ABG Methemoglobin   0.2 


 


[place] Test   Pos 


 


A-a O2 Difference   142.0 


 


Respiratory Index   1.6 


 


Hgb O2 Saturation   95.3 


 


Vent Mode   Prvc 


 


Mechanical Rate   14 


 


FiO2   40.0 


 


Tidal Volume   500 


 


PEEP   5 


 


Sodium   


 


Potassium   


 


Chloride   


 


Carbon Dioxide   


 


Anion Gap   


 


BUN   


 


Creatinine   


 


Est GFR ( Amer)   


 


Est GFR (Non-Af Amer)   


 


POC Glucose (mg/dL)  341 H  


 


Random Glucose   


 


Calcium   


 


Phosphorus   


 


Magnesium   


 


Total Bilirubin   


 


AST   


 


ALT   


 


Alkaline Phosphatase   


 


Total Protein   


 


Albumin   


 


Globulin   


 


Albumin/Globulin Ratio   














  01/11/19 01/11/19





  06:21 11:41


 


WBC  


 


RBC  


 


Hgb  


 


Hct  


 


MCV  


 


MCH  


 


MCHC  


 


RDW  


 


Plt Count  


 


MPV  


 


Neut % (Auto)  


 


Lymph % (Auto)  


 


Mono % (Auto)  


 


Eos % (Auto)  


 


Baso % (Auto)  


 


Neut # (Auto)  


 


Lymph # (Auto)  


 


Mono # (Auto)  


 


Eos # (Auto)  


 


Baso # (Auto)  


 


Neutrophils % (Manual)  


 


Lymphocytes % (Manual)  


 


Monocytes % (Manual)  


 


Platelet Estimate  


 


Anisocytosis (manual)  


 


Ovalocytes  


 


Puncture Site  


 


pCO2  


 


pO2  


 


HCO3  


 


ABG pH  


 


ABG Total CO2  


 


ABG O2 Saturation  


 


ABG Base Excess  


 


ABG Hemoglobin  


 


ABG Carboxyhemoglobin  


 


POC ABG HHb (Measured)  


 


ABG Methemoglobin  


 


[place] Test  


 


A-a O2 Difference  


 


Respiratory Index  


 


Hgb O2 Saturation  


 


Vent Mode  


 


Mechanical Rate  


 


FiO2  


 


Tidal Volume  


 


PEEP  


 


Sodium  145 


 


Potassium  4.6 


 


Chloride  107 


 


Carbon Dioxide  34 H 


 


Anion Gap  9 L 


 


BUN  80 H 


 


Creatinine  1.1 


 


Est GFR ( Amer)  > 60 


 


Est GFR (Non-Af Amer)  > 60 


 


POC Glucose (mg/dL)   315 H


 


Random Glucose  314 H D 


 


Calcium  7.5 L 


 


Phosphorus  3.7 


 


Magnesium  2.9 H 


 


Total Bilirubin  0.9 


 


AST  137 H D 


 


ALT  142 H D 


 


Alkaline Phosphatase  85 


 


Total Protein  5.7 L 


 


Albumin  3.0 L 


 


Globulin  2.7 


 


Albumin/Globulin Ratio  1.1 











Radiology Impressions: 


                              Radiology Impressions





Chest X-Ray  01/11/19 15:40


IMPRESSION:


ET tube positioned 2.1 cm above the tracheal omari.  Nasogastric 


tube appropriately positioned.  No acute infiltrate.  


 


 














Assessment/Plan





- Assessment and Plan (Free Text)


Plan: 


Patient admitted to ICU for respiratory failure: continue bronchodilators + 

solumedorl (clnically wheezing onventilator)





-A-fib: (+)wheezing, avoid cadioselective av ines blocker) increase cardizem, 

verapamil x1 AC with lovenox full does





-continue tube feeds





-at risk of CAD: continue asa + statin





-empriicall on abx, f/u cultures





-contineu dvt/pud ppx





Patient remains critical.





-CPAP trials performed; however, patient still wheezing, will continue 

optimizing lung function











- Date & Time


Date: 01/11/19


Time: 16:58

## 2019-01-11 NOTE — RAD
Date of service: 



01/11/2019



HISTORY:

 confirm ET tube placement; increased secretions 



COMPARISON:

1/9/2019 



FINDINGS:



LUNGS:

No active pulmonary disease.



PLEURA:

Pleural thickening noted at the medial left apex consistent with 

findings on CT examination of 1/10/2019.  Mild left apical pleural 

thickening.  No pleural effusion or pneumothorax.



CARDIOVASCULAR:

No aortic atherosclerotic calcification present.



Normal heart size.  ET tube situated with its tip approximately 2.1 

cm above the tracheal omari.  Nasogastric tube extends to the 

central upper abdomen.  No pulmonary vascular congestion. 



OSSEOUS STRUCTURES:

No significant abnormalities.



VISUALIZED UPPER ABDOMEN:

Normal.



OTHER FINDINGS:

None.



IMPRESSION:

ET tube positioned 2.1 cm above the tracheal omari.  Nasogastric 

tube appropriately positioned.  No acute infiltrate.

## 2019-01-12 VITALS
DIASTOLIC BLOOD PRESSURE: 67 MMHG | RESPIRATION RATE: 26 BRPM | SYSTOLIC BLOOD PRESSURE: 109 MMHG | HEART RATE: 122 BPM | OXYGEN SATURATION: 41 %

## 2019-01-12 VITALS — TEMPERATURE: 90 F

## 2019-01-12 LAB
ALBUMIN SERPL-MCNC: 1.4 G/DL (ref 3.5–5)
ALBUMIN SERPL-MCNC: 3 G/DL (ref 3.5–5)
ALBUMIN/GLOB SERPL: 0.7 {RATIO} (ref 1–2.1)
ALBUMIN/GLOB SERPL: 1.1 {RATIO} (ref 1–2.1)
ALT SERPL-CCNC: 462 U/L (ref 21–72)
ANISOCYTOSIS BLD QL SMEAR: SLIGHT
ANISOCYTOSIS BLD QL SMEAR: SLIGHT
APTT BLD: 93 SECONDS (ref 21–34)
ARTERIAL BLOOD GAS O2 SAT: 95.1 % (ref 95–98)
ARTERIAL BLOOD GAS O2 SAT: 97.4 % (ref 95–98)
ARTERIAL BLOOD GAS O2 SAT: 98.5 % (ref 95–98)
ARTERIAL BLOOD GAS PCO2: 26 MM/HG (ref 35–45)
ARTERIAL BLOOD GAS PCO2: 36 MM/HG (ref 35–45)
ARTERIAL BLOOD GAS PCO2: 37 MM/HG (ref 35–45)
ARTERIAL BLOOD GAS TCO2: 12.5 MMOL/L (ref 22–28)
ARTERIAL BLOOD GAS TCO2: 15.2 MMOL/L (ref 22–28)
ARTERIAL BLOOD GAS TCO2: 22 MMOL/L (ref 22–28)
AST SERPL-CCNC: 470 U/L (ref 17–59)
BASOPHILS # BLD AUTO: 0 K/UL (ref 0–0.2)
BASOPHILS # BLD AUTO: 0 K/UL (ref 0–0.2)
BASOPHILS # BLD AUTO: 0.1 K/UL (ref 0–0.2)
BASOPHILS # BLD AUTO: 0.1 K/UL (ref 0–0.2)
BASOPHILS NFR BLD: 0.2 % (ref 0–2)
BASOPHILS NFR BLD: 0.3 % (ref 0–2)
BNP SERPL-MCNC: 576 PG/ML (ref 0–900)
BUN SERPL-MCNC: 102 MG/DL (ref 9–20)
BUN SERPL-MCNC: 83 MG/DL (ref 9–20)
CALCIUM SERPL-MCNC: 7.3 MG/DL (ref 8.6–10.4)
CALCIUM SERPL-MCNC: 7.5 MG/DL (ref 8.6–10.4)
CK MB SERPL-MCNC: 1.39 NG/ML (ref 0–3.38)
EOSINOPHIL # BLD AUTO: 0 K/UL (ref 0–0.7)
EOSINOPHIL # BLD AUTO: 0.1 K/UL (ref 0–0.7)
EOSINOPHIL NFR BLD: 0 % (ref 0–4)
EOSINOPHIL NFR BLD: 0.1 % (ref 0–4)
EOSINOPHIL NFR BLD: 0.1 % (ref 0–4)
EOSINOPHIL NFR BLD: 0.3 % (ref 0–4)
ERYTHROCYTE [DISTWIDTH] IN BLOOD BY AUTOMATED COUNT: 18.9 % (ref 11.5–14.5)
ERYTHROCYTE [DISTWIDTH] IN BLOOD BY AUTOMATED COUNT: 19 % (ref 11.5–14.5)
ERYTHROCYTE [DISTWIDTH] IN BLOOD BY AUTOMATED COUNT: 19.1 % (ref 11.5–14.5)
ERYTHROCYTE [DISTWIDTH] IN BLOOD BY AUTOMATED COUNT: 19.5 % (ref 11.5–14.5)
GFR NON-AFRICAN AMERICAN: 28
GFR NON-AFRICAN AMERICAN: 32
HCO3 BLDA-SCNC: 11.5 MMOL/L (ref 21–28)
HCO3 BLDA-SCNC: 14.6 MMOL/L (ref 21–28)
HCO3 BLDA-SCNC: 24.7 MMOL/L (ref 21–28)
HGB BLD-MCNC: 10.2 G/DL (ref 12–18)
HGB BLD-MCNC: 11.9 G/DL (ref 12–18)
HGB BLD-MCNC: 12.5 G/DL (ref 12–18)
HGB BLD-MCNC: 18 G/DL (ref 12–18)
HYPOCHROMIC: SLIGHT
INHALED O2 CONCENTRATION: 100 %
INHALED O2 CONCENTRATION: 100 %
INHALED O2 CONCENTRATION: 40 %
INR PPP: 3.4
LYMPHOCYTE: 2 % (ref 20–40)
LYMPHOCYTE: 4 % (ref 20–40)
LYMPHOCYTES # BLD AUTO: 0.4 K/UL (ref 1–4.3)
LYMPHOCYTES # BLD AUTO: 0.8 K/UL (ref 1–4.3)
LYMPHOCYTES # BLD AUTO: 1.4 K/UL (ref 1–4.3)
LYMPHOCYTES # BLD AUTO: 1.5 K/UL (ref 1–4.3)
LYMPHOCYTES NFR BLD AUTO: 2.3 % (ref 20–40)
LYMPHOCYTES NFR BLD AUTO: 3.1 % (ref 20–40)
LYMPHOCYTES NFR BLD AUTO: 5.2 % (ref 20–40)
LYMPHOCYTES NFR BLD AUTO: 7.6 % (ref 20–40)
MCH RBC QN AUTO: 24.1 PG (ref 27–31)
MCH RBC QN AUTO: 24.2 PG (ref 27–31)
MCH RBC QN AUTO: 24.6 PG (ref 27–31)
MCH RBC QN AUTO: 24.7 PG (ref 27–31)
MCHC RBC AUTO-ENTMCNC: 29.5 G/DL (ref 33–37)
MCHC RBC AUTO-ENTMCNC: 30 G/DL (ref 33–37)
MCHC RBC AUTO-ENTMCNC: 30.1 G/DL (ref 33–37)
MCHC RBC AUTO-ENTMCNC: 30.5 G/DL (ref 33–37)
MCV RBC AUTO: 79.1 FL (ref 80–94)
MCV RBC AUTO: 81.6 FL (ref 80–94)
MCV RBC AUTO: 82.2 FL (ref 80–94)
MCV RBC AUTO: 82.4 FL (ref 80–94)
MONOCYTE: 11 % (ref 0–10)
MONOCYTE: 6 % (ref 0–10)
MONOCYTES # BLD: 0.2 K/UL (ref 0–0.8)
MONOCYTES # BLD: 0.8 K/UL (ref 0–0.8)
MONOCYTES NFR BLD: 0.8 % (ref 0–10)
MONOCYTES NFR BLD: 0.9 % (ref 0–10)
MONOCYTES NFR BLD: 1 % (ref 0–10)
MONOCYTES NFR BLD: 4.6 % (ref 0–10)
NEUTROPHILS # BLD: 17.1 K/UL (ref 1.8–7)
NEUTROPHILS # BLD: 18.2 K/UL (ref 1.8–7)
NEUTROPHILS # BLD: 23.9 K/UL (ref 1.8–7)
NEUTROPHILS # BLD: 25.4 K/UL (ref 1.8–7)
NEUTROPHILS NFR BLD AUTO: 78 % (ref 50–75)
NEUTROPHILS NFR BLD AUTO: 91 % (ref 50–75)
NEUTROPHILS NFR BLD AUTO: 92 % (ref 50–75)
NEUTROPHILS NFR BLD AUTO: 92.8 % (ref 50–75)
NEUTROPHILS NFR BLD AUTO: 93.6 % (ref 50–75)
NEUTROPHILS NFR BLD AUTO: 95.5 % (ref 50–75)
NEUTS BAND NFR BLD: 7 % (ref 0–2)
NRBC BLD AUTO-RTO: 0.4 % (ref 0–2)
NRBC BLD AUTO-RTO: 0.8 % (ref 0–2)
NRBC BLD AUTO-RTO: 0.9 % (ref 0–2)
NRBC BLD AUTO-RTO: 1 % (ref 0–2)
NRBC BLD AUTO-RTO: 2 % (ref 0–0)
OVALOCYTES BLD QL SMEAR: SLIGHT
OVALOCYTES BLD QL SMEAR: SLIGHT
PH BLDA: 7.11 [PH] (ref 7.35–7.45)
PH BLDA: 7.19 [PH] (ref 7.35–7.45)
PH BLDA: 7.52 [PH] (ref 7.35–7.45)
PLATELET # BLD EST: (no result) 10*3/UL
PLATELET # BLD EST: NORMAL 10*3/UL
PLATELET # BLD: 171 K/UL (ref 130–400)
PLATELET # BLD: 51 K/UL (ref 130–400)
PLATELET # BLD: 82 K/UL (ref 130–400)
PLATELET # BLD: 92 K/UL (ref 130–400)
PMV BLD AUTO: 10 FL (ref 7.2–11.7)
PMV BLD AUTO: 10.1 FL (ref 7.2–11.7)
PMV BLD AUTO: 10.2 FL (ref 7.2–11.7)
PMV BLD AUTO: 10.4 FL (ref 7.2–11.7)
PO2 BLDA: 148 MM/HG (ref 80–100)
PO2 BLDA: 364 MM/HG (ref 80–100)
PO2 BLDA: 76 MM/HG (ref 80–100)
POLYCHROMIC: SLIGHT
PROTHROMBIN TIME: 37.8 SECONDS (ref 9.7–12.2)
RBC # BLD AUTO: 4.12 MIL/UL (ref 4.4–5.9)
RBC # BLD AUTO: 4.85 MIL/UL (ref 4.4–5.9)
RBC # BLD AUTO: 5.16 MIL/UL (ref 4.4–5.9)
RBC # BLD AUTO: 7.46 MIL/UL (ref 4.4–5.9)
TARGETS BLD QL SMEAR: SLIGHT
TOTAL CELLS COUNTED BLD: 100
TOTAL CELLS COUNTED BLD: 100
TROPONIN I SERPL-MCNC: 0.54 NG/ML (ref 0–0.12)
WBC # BLD AUTO: 18.4 K/UL (ref 4.8–10.8)
WBC # BLD AUTO: 20 K/UL (ref 4.8–10.8)
WBC # BLD AUTO: 25 K/UL (ref 4.8–10.8)
WBC # BLD AUTO: 27.1 K/UL (ref 4.8–10.8)

## 2019-01-12 RX ADMIN — SODIUM CHLORIDE PRN MLS/HR: 0.9 INJECTION, SOLUTION INTRAVENOUS at 14:30

## 2019-01-12 RX ADMIN — SODIUM CHLORIDE PRN MLS/HR: 0.9 INJECTION, SOLUTION INTRAVENOUS at 09:00

## 2019-01-12 RX ADMIN — INSULIN ASPART SCH U: 100 INJECTION, SOLUTION INTRAVENOUS; SUBCUTANEOUS at 06:16

## 2019-01-12 RX ADMIN — SODIUM CHLORIDE PRN MLS/HR: 0.9 INJECTION, SOLUTION INTRAVENOUS at 16:35

## 2019-01-12 RX ADMIN — SODIUM CHLORIDE PRN MLS/HR: 0.9 INJECTION, SOLUTION INTRAVENOUS at 10:15

## 2019-01-12 RX ADMIN — IPRATROPIUM BROMIDE AND ALBUTEROL SULFATE SCH ML: .5; 3 SOLUTION RESPIRATORY (INHALATION) at 00:46

## 2019-01-12 RX ADMIN — INSULIN ASPART SCH: 100 INJECTION, SOLUTION INTRAVENOUS; SUBCUTANEOUS at 12:32

## 2019-01-12 RX ADMIN — TAZOBACTAM SODIUM AND PIPERACILLIN SODIUM SCH MLS/HR: 375; 3 INJECTION, SOLUTION INTRAVENOUS at 15:30

## 2019-01-12 RX ADMIN — VASOPRESSIN SCH MLS/HR: 20 INJECTION, SOLUTION INTRAMUSCULAR; SUBCUTANEOUS at 08:00

## 2019-01-12 RX ADMIN — TAZOBACTAM SODIUM AND PIPERACILLIN SODIUM SCH MLS/HR: 375; 3 INJECTION, SOLUTION INTRAVENOUS at 22:31

## 2019-01-12 RX ADMIN — SODIUM CHLORIDE PRN MLS/HR: 0.9 INJECTION, SOLUTION INTRAVENOUS at 07:45

## 2019-01-12 RX ADMIN — INSULIN ASPART SCH U: 100 INJECTION, SOLUTION INTRAVENOUS; SUBCUTANEOUS at 00:25

## 2019-01-12 RX ADMIN — IPRATROPIUM BROMIDE AND ALBUTEROL SULFATE SCH ML: .5; 3 SOLUTION RESPIRATORY (INHALATION) at 19:50

## 2019-01-12 RX ADMIN — SODIUM CHLORIDE PRN MLS/HR: 0.9 INJECTION, SOLUTION INTRAVENOUS at 12:42

## 2019-01-12 RX ADMIN — DEXMEDETOMIDINE HYDROCHLORIDE PRN MLS/HR: 100 INJECTION, SOLUTION, CONCENTRATE INTRAVENOUS at 00:33

## 2019-01-12 RX ADMIN — INSULIN ASPART SCH: 100 INJECTION, SOLUTION INTRAVENOUS; SUBCUTANEOUS at 18:00

## 2019-01-12 RX ADMIN — TAZOBACTAM SODIUM AND PIPERACILLIN SODIUM SCH MLS/HR: 375; 3 INJECTION, SOLUTION INTRAVENOUS at 06:01

## 2019-01-12 RX ADMIN — IPRATROPIUM BROMIDE AND ALBUTEROL SULFATE SCH ML: .5; 3 SOLUTION RESPIRATORY (INHALATION) at 07:30

## 2019-01-12 RX ADMIN — IPRATROPIUM BROMIDE AND ALBUTEROL SULFATE SCH ML: .5; 3 SOLUTION RESPIRATORY (INHALATION) at 11:30

## 2019-01-12 RX ADMIN — SODIUM CHLORIDE PRN MLS/HR: 0.9 INJECTION, SOLUTION INTRAVENOUS at 11:30

## 2019-01-12 RX ADMIN — POLYETHYLENE GLYCOL 3350 SCH: 17 POWDER, FOR SOLUTION ORAL at 09:00

## 2019-01-12 RX ADMIN — VASOPRESSIN SCH MLS/HR: 20 INJECTION, SOLUTION INTRAMUSCULAR; SUBCUTANEOUS at 16:43

## 2019-01-12 RX ADMIN — METHYLPREDNISOLONE SODIUM SUCCINATE SCH MG: 40 INJECTION, POWDER, FOR SOLUTION INTRAMUSCULAR; INTRAVENOUS at 05:13

## 2019-01-12 RX ADMIN — IPRATROPIUM BROMIDE AND ALBUTEROL SULFATE SCH ML: .5; 3 SOLUTION RESPIRATORY (INHALATION) at 16:50

## 2019-01-12 RX ADMIN — IPRATROPIUM BROMIDE AND ALBUTEROL SULFATE SCH ML: .5; 3 SOLUTION RESPIRATORY (INHALATION) at 04:30

## 2019-01-12 NOTE — CP.PCM.PN
Subjective





- Date & Time of Evaluation


Date of Evaluation: 01/12/19


Time of Evaluation: 17:30





- Subjective


Subjective: 





Pt seen and examined at bedside.  No acute events overnight.  Pt was reintubated

on 1/9/19 due to respiratory distress post-extubation.  





Physical Examination








- Constitutional


Appears: Non-toxic, No Acute Distress





- Head Exam


Head Exam: ATRAUMATIC, NORMOCEPHALIC





- ENT Exam


ENT Exam: Mucous Membranes Moist





- Respiratory Exam


Respiratory Exam: Clear to Ausculation Bilateral.  absent: Rhonchi, Wheezes





- Cardiovascular Exam


Cardiovascular Exam: REGULAR RHYTHM, +S1, +S2





- GI/Abdominal Exam


GI & Abdominal Exam: Soft.  absent: Guarding, Rigid, Tenderness





- Skin


Skin Exam: Dry, Intact, Normal Color, Warm





Assessment and Plan





- Assessment and Plan (Free Text)


Assessment: 





79 year old male with past medical history of COPD, asthma, sleep apnea, HTN, 

DM, GERD and arthritis is being seen for A fib.  





A fib


- Continue current management with cardizem 30 mg po QID


- Anti coagulation held due to GIB


HTN


-Receiving cardizem 





Respiratory arrest s/p asthma exacerbation and PNA


- Pt re-intubated on 1/9/19 due to respiratory distress 


- management per ICU team 





Objective





- Vital Signs/Intake and Output


Vital Signs (last 24 hours): 


                                        











Temp Pulse Resp BP Pulse Ox


 


 90.0 F L  93 H  19   114/83   73 L


 


 01/12/19 17:00  01/12/19 20:26  01/12/19 20:26  01/12/19 20:27  01/12/19 15:00








Intake and Output: 


                                        











 01/12/19 01/13/19





 18:59 06:59


 


Intake Total 45407.8 161.2


 


Output Total 1550 


 


Balance 9540.8 161.2














- Medications


Medications: 


                               Current Medications





Albuterol/Ipratropium (Duoneb 3 Mg/0.5 Mg (3 Ml) Ud)  3 ml INH RQ4 OLEG


   Last Admin: 01/12/19 19:50 Dose:  3 ml


Piperacillin Sod/Tazobactam Sod (Zosyn 3.375 Gm Iv Premix)  3.375 gm in 50 mls @

100 mls/hr IVPB Q8H OLEG; Protocol


   Last Admin: 01/12/19 15:30 Dose:  100 mls/hr


Norepinephrine Bitartrate 4 mg (/ Sodium Chloride)  254 mls @ 15.24 mls/hr IV 

.[id number] PRN; Protocol


   PRN Reason: TITRATE PER MD ORDER


   Last Admin: 01/12/19 17:32 Dose:  20 mcg/min, 76.2 mls/hr


Epinephrine HCl 1 mg/ Sodium (Chloride)  251 mls @ 15.06 mls/hr IV .[id number] PRN;

Protocol


   PRN Reason: TITRATE PER MD ORDER


   Stop: 01/13/19 07:37


   Last Titration: 01/12/19 18:20 Dose:  Infused


Vasopressin 40 units/ Dextrose  42 mls @ 2.52 mls/hr IV .[id number] OLEG; Protocol


   Last Admin: 01/12/19 16:43 Dose:  0.02 units/min, 1.26 mls/hr


Pantoprazole Sodium 80 mg/ (Sodium Chloride)  100 mls @ 10 mls/hr IVP .Q10H OLEG


   Last Admin: 01/12/19 17:31 Dose:  10 mls/hr


Sodium Bicarbonate 150 meq/ (Dextrose)  1,150 mls @ 75 mls/hr IV .[id number] OLEG


   Last Admin: 01/12/19 09:15 Dose:  75 mls/hr


Phytonadione 10 mg/ Sodium (Chloride)  51 mls @ 102 mls/hr IV Q24H OLEG


   Stop: 01/14/19 15:29


   Last Admin: 01/12/19 15:15 Dose:  102 mls/hr


Insulin Aspart (Novolog)  0 unit SC Q6 OLEG; Protocol


   Last Admin: 01/12/19 18:00 Dose:  Not Given


Montelukast Sodium (Singulair)  10 mg PO HS [facility]


   Last Admin: 01/11/19 22:09 Dose:  10 mg


Polyethylene Glycol (Miralax)  17 gm PO DAILY OLEG


   Last Admin: 01/12/19 09:00 Dose:  Not Given


Tamsulosin HCl (Flomax)  0.4 mg PO BID [facility]


   Last Admin: 01/12/19 18:00 Dose:  Not Given











- Labs


Labs: 


                                        





                                 01/12/19 14:16 





                                 01/12/19 09:08 





                                        











PT  37.8 SECONDS (9.7-12.2)  H  01/12/19  10:29    


 


INR  3.4  H*  01/12/19  10:29    


 


APTT  93 SECONDS (21-34)  H  01/12/19  10:29

## 2019-01-12 NOTE — PCM.PROC
Procedures


Attestation:: I certify that I have explained the specified Operation(s) or 

Procedure(s), risks, benefits and reasonable alternatives to the Patient and/or 

other person responsible. The opportunity was given to ask questions and all 

questions answered





- Arterial Line


  ** Right Radial


Aseptic technique was employed throughout the procedure: Full sterile barriers 

(mask, hair cover, sterile gown, sterile gloves), Chloraprep Antiseptic: 30 

second prep for IJ or SC sites


Time Out Performed: Yes


Pt. placed on Pulse Ox Monitor: Yes


Local Anesthesia Used: Lidocaine 1%


Amount of Anesthesia Used (mls): 0


Ultrasound Used for Placement: No


Gauge (Size): 20 gauge


Technique Used: Direct Puncture Technique


Secured by: Suture


Post procedure dressing: Gauze, Clear vapor permeable, Chlorhexidine disc 

(Biopatch)


Patient Tolerated Procedure: well


Immediate Complications: none

## 2019-01-12 NOTE — RAD
Date of service: 



01/12/2019



HISTORY:

 s/p cpr 



COMPARISON:

01/11/2019. 



FINDINGS:

Endotracheal tube terminates 1.8 cm proximal to the omari.  The 

nasogastric tube terminates in the stomach. 



LUNGS:

The lungs are hyperinflated and there is peribronchial thickening 

with chronic changes in both lungs. 



There is airspace disease in the right lower lobe and confluent 

airspace disease in the left mid lung and lower lobe. 



PLEURA:

Small effusions.  No pneumothorax. 



CARDIOVASCULAR:

The heart is normal in size.  No aortic atherosclerotic 

calcifications present. 



OSSEOUS STRUCTURES:

Within normal limits for the patient's age.



VISUALIZED UPPER ABDOMEN:

Normal.



OTHER FINDINGS:

None.



IMPRESSION:

Endotracheal tube terminates 1.8 cm proximal to the omari.  

Confluent airspace disease in the right lower lobe, left mid lung and 

left lower lobe may represent pneumonia, aspiration pneumonitis is a 

consideration.

## 2019-01-12 NOTE — PCM.PROC
Procedures


Attestation:: I certify that I have explained the specified Operation(s) or 

Procedure(s), risks, benefits and reasonable alternatives to the Patient and/or 

other person responsible. The opportunity was given to ask questions and all 

questions answered





- Central Line Placement


  ** Right Femoral Triple Lumen Catheter


Aseptic technique was employed throughout the procedure: Full sterile barriers 

(mask, hair cover, sterile gown, sterile gloves)


CVP Time Out Performed: Yes


Pt. Placed on Pulse Ox Monitor: Yes


Local Anesthesia Used: Lidocaine 1%


Amount of Anesthesia Used (mls): 5


Ultrasound Used for Placement: No


Central Line Lumen Inserted: triple


Central Line Length: 20 cm


Post Procedure: Sutured in Place, Good Blood Return, All Ports Aspirated, 

Flushed, Capped, Sterile Dressing Applied


Secured by: Suture


Post procedure dressing: Gauze, Clear vapor permeable, Chlorhexidine disc 

(Biopatch)


Post Procedure X-Ray: No


Patient Tolerated Procedure: Well


Immediate Complications: None


Additional Comments: 


Central line placed during code, OK to use central line as venous blood return 

from all ports

## 2019-01-12 NOTE — US
Date of service: 



01/12/2019



HISTORY:

distension



COMPARISON:

None.



TECHNIQUE:

Sonographic evaluation of the abdomen.



FINDINGS:



LIVER:

Measures 19.0 cm.  There is diffuse increased echogenicity of the 

liver parenchyma. No mass. No intrahepatic bile duct dilatation.



GALLBLADDER:





There are no gallstones.  The gallbladder is partially distended.  

There is apparent moderate gallbladder wall thickening and fluid.  

The sonographic Fierro's sign is negative. 



COMMON BILE DUCT:

Measures 6.1 mm. No stones. No dilatation.



PANCREAS:

Unremarkable as visualized. No mass. No ductal dilatation.



RIGHT KIDNEY:

Measures 11.2cm.  Diffuse increased echogenicity.  No calculus, mass, 

or hydronephrosis.



LEFT KIDNEY:

Measures 11.6cm.  Diffuse increased echogenicity.  No calculus, mass, 

or hydronephrosis.



SPLEEN:

Normal in size and contour. No mass.



AORTA:

No aneurysmal dilatation. 



IVC:

Unremarkable. 



OTHER FINDINGS:

None. 



IMPRESSION:

Mild hepatomegaly.  Fatty liver.



Thick gallbladder wall with fluid within is nonspecific and could be 

related to underdistention however nonspecific infection/inflammation 

is also a consideration.  No evidence for cholelithiasis or biliary 

dilatation.



Medical renal disease.

## 2019-01-12 NOTE — C.PDOC
History Of Present Illness





pt sustained a cardiac arrest at 6.55am and code blue called and resuscitated 

after 30 minutes


cause unclear


possible ischemic heart condition and GI event


family at bed side


Time Seen by Provider: 12/31/18 04:06


Chief Complaint (Nursing): Shortness Of Breath





Past Medical History


Vital Signs: 





                                Last Vital Signs











Temp  99.2 F   01/12/19 04:00


 


Pulse  113 H  01/12/19 05:00


 


Resp  16   01/12/19 05:00


 


BP  96/59 L  01/12/19 04:28


 


Pulse Ox  96   01/12/19 05:00














- Medical History


PMH: Arthritis (B/L KNEE JT), Asthma, COPD (ASTHMA), Gastritis, HTN


   Denies: Chronic Kidney Disease


Surgical History: Endoscopy (2013)





- [facility] Procedures











EXCISION OF UPPER ESOPHAGUS, ENDO, DIAGN (04/27/16)


FLUOROSCOPY OF MULT COR ART USING L OSM CONTRAST (05/16/17)


FLUOROSCOPY OF RIGHT AND LEFT HEART USING L OSM CONTRAST (05/16/17)


INSERTION OF INFUSION DEV INTO SUP VENA CAVA, PERC APPROACH (05/27/17)


MEASURE CARDIAC SAMPL & PRESSURE, BILATERAL, PERC (05/16/17)


ULTRASONOGRAPHY OF SUPERIOR VENA CAVA, GUIDANCE (05/27/17)








Family History: States: No Known Family Hx, Unknown Family Hx





- Social History


Hx Tobacco Use: No


Hx Alcohol Use: No


Hx Substance Use: No





- Immunization History


Hx Tetanus Toxoid Vaccination: No


Hx Influenza Vaccination: No


Hx Pneumococcal Vaccination: No





ED Course And Treatment





- Laboratory Results


Result Diagrams: 


                                 01/12/19 06:20





                                 01/12/19 06:20


Lab Results: 





                                        











Puncture Site  R brac   01/12/19  05:18    


 


pCO2  26 mm/Hg (35-45)  L  01/12/19  05:18    


 


pO2  76 mm/Hg ()  L  01/12/19  05:18    


 


HCO3  24.7 mmol/L (21-28)   01/12/19  05:18    


 


ABG pH  7.52  (7.35-7.45)  H  01/12/19  05:18    


 


ABG Total CO2  22.0 mmol/L (22-28)   01/12/19  05:18    


 


ABG O2 Saturation  95.1 % (95-98)   01/12/19  05:18    


 


ABG Base Excess  -0.3 mmol/L (-2.0-3.0)   01/12/19  05:18    


 


ABG Hemoglobin  16.2 g/dL (11.7-17.4)   01/11/19  05:21    


 


ABG Carboxyhemoglobin  0.4 % (0.5-1.5)  L  01/11/19  05:21    


 


POC ABG HHb (Measured)  4.1 % (0.0-5.0)   01/11/19  05:21    


 


ABG Methemoglobin  0.2 % (0.0-3.0)   01/11/19  05:21    


 


[place] Test  Na   01/12/19  05:18    


 


ABG Potassium  6.0 mmol/L (3.6-5.2)  H  01/12/19  05:18    


 


VBG pH  7.32  (7.32-7.43)   01/04/19  08:22    


 


VBG pCO2  65 mmHg (40-60)  H  01/04/19  08:22    


 


VBG HCO3  28.0 mmol/L  01/04/19  08:22    


 


VBG Total CO2  35.5 mmol/L (22-28)  H  01/04/19  08:22    


 


VBG O2 Sat (Calc)  64.7 % (40-65)   01/04/19  08:22    


 


VBG Base Excess  5.3 mmol/L (0.0-2.0)  H  01/04/19  08:22    


 


VBG Potassium  4.9 mmol/L (3.6-5.2)   01/04/19  08:22    


 


A-a O2 Difference  177.0 mm/Hg  01/12/19  05:18    


 


Respiratory Index  2.3   01/12/19  05:18    


 


Hgb O2 Saturation  95.3 % (95.0-98.0)   01/11/19  05:21    


 


Sodium  149.0 mmol/l (132-148)  H  01/12/19  05:18    


 


Chloride  114.0 mmol/L ()  H  01/12/19  05:18    


 


Glucose  217 mg/dl ()  H  01/12/19  05:18    


 


Lactate  6.6 mmol/L (0.7-2.1)  H*  01/12/19  05:18    


 


Liter Flow  3   01/02/19  12:02    


 


Vent Mode  Prvc   01/12/19  05:18    


 


Mechanical Rate  14   01/12/19  05:18    


 


FiO2  40.0 %  01/12/19  05:18    


 


Tidal Volume  500   01/12/19  05:18    


 


PEEP  5   01/12/19  05:18    


 


Crit Value Called To  Jason icu rn   01/12/19  05:18    


 


Crit Value Called By  Julia mays rt   01/12/19  05:18    


 


Crit Value Read Back  Y   01/12/19  05:18    


 


Blood Gas Notified Time  625   01/12/19  05:18    








                                        











PT  11.4 SECONDS (9.7-12.2)   01/04/19  15:24    


 


INR  1.0   01/04/19  15:24    


 


APTT  26 SECONDS (21-34)   01/04/19  15:24    








                                        











Troponin I  < 0.0120 ng/mL (0.00-0.120)   12/31/18  04:48    








                                        











NT-Pro-B Natriuret Pep  576 pg/mL (0-900)   01/12/19  06:20    








                                        











Total Bilirubin  1.8 mg/dL (0.2-1.3)  H  01/12/19  06:20    


 


AST  470 U/L (17-59)  H D 01/12/19  06:20    


 


ALT  462 U/L (21-72)  H D 01/12/19  06:20    


 


Alkaline Phosphatase  65 U/L ()   01/12/19  06:20    


 


Total Protein  5.9 g/dL (6.3-8.3)  L  01/12/19  06:20    


 


Albumin  3.0 g/dL (3.5-5.0)  L  01/12/19  06:20    


 


Globulin  2.9 gm/dL (2.2-3.9)   01/12/19  06:20    


 


Albumin/Globulin Ratio  1.1  (1.0-2.1)   01/12/19  06:20    








                                        











Urine Color  Yellow  (YELLOW)   01/04/19  22:41    


 


Urine Clarity  Clear  (Clear)   01/04/19  22:41    


 


Urine pH  5.0  (5.0-8.0)   01/04/19  22:41    


 


Ur Specific Gravity  1.030  (1.003-1.030)   01/04/19  22:41    


 


Urine Protein  1+ mg/dL (NEGATIVE)  H  01/04/19  22:41    


 


Urine Glucose (UA)  1+ mg/dL (Normal)  H  01/04/19  22:41    


 


Urine Ketones  Trace mg/dL (NEGATIVE)   01/04/19  22:41    


 


Urine Blood  1+  (NEGATIVE)  H  01/04/19  22:41    


 


Urine Nitrate  Negative  (NEGATIVE)   01/04/19  22:41    


 


Urine Bilirubin  Negative  (NEGATIVE)   01/04/19  22:41    


 


Urine Urobilinogen  Normal mg/dL (0.2-1.0)   01/04/19  22:41    


 


Ur Leukocyte Esterase  Neg Sherry/uL (Negative)   01/04/19  22:41    


 


Urine WBC (Auto)  4 /hpf (0-5)   01/04/19  22:41    


 


Urine RBC (Auto)  29 /hpf (0-3)  H  01/04/19  22:41    


 


Ur Squamous Epith Cells  < 1 /hpf (0-5)   01/04/19  22:41    


 


Urine Bacteria  Rare  (<OCC)   01/04/19  22:41    











O2 Sat by Pulse Oximetry: 96





Disposition


Discussed With : Peter Mireles





- Disposition


Disposition: HOSPITALIZED


Disposition Time: 07:36


Condition: CRITICAL





- Clinical Impression


Clinical Impression: 


 COPD exacerbation, Dyspnea

## 2019-01-12 NOTE — CP.CCUPN
CCU Subjective





- Physician Review


Subjective (Free Text): 


(+)CPR in progress in AM


01/12/19 11:52





Critical Care Time Spent (in minutes): 50





CCU Objective





- Vital Signs / Intake & Output


Intake and Output (Last 8hrs): 


                                 Intake & Output











 01/11/19 01/12/19 01/12/19





 22:59 06:59 14:59


 


Intake Total 554.1 841.8 


 


Output Total 450 320 


 


Balance 104.1 521.8 


 


Intake:   


 


  IV  90 


 


  Intake, IV Amount 34.1 71.8 


 


    Right Forearm 34.1 71.8 


 


  Tube Feeding 320 280 


 


  Other 200 400 


 


Output:   


 


  Urine 450 320 


 


    Urethral ([place]) 450 320 


 


Other:   


 


  # Bowel Movements 0 0 














- Physical Exam


Head: Positive for: Atraumatic, Normocephalic


Conjunctiva: Positive for: Normal


Mouth: Positive for: Other (ET tube and gastric tube in place)


Respiratory/Chest: Positive for: Good Air Exchange.  Negative for: Clear to 

Auscultation, Respiratory Distress


Cardiovascular: Positive for: Regular Rate and Rhythm, Normal S1, S2, 

Tachycardic


Abdomen: Positive for: Normal Bowel Sounds.  Negative for: Tenderness, 

Distention, Rebound, Guarding


Upper Extremity: Positive for: Normal Inspection.  Negative for: Cyanosis, Edema


Lower Extremity: Positive for: Normal Inspection, NORMAL PULSES


Neurological: Positive for: Other (pupils not reactive to light).  Negative for:

GCS=15


Psychiatric: Negative for: Alert, Oriented x 3





- Medications


Active Medications: 


Active Medications











Generic Name Dose Route Start Last Admin





  Trade Name Freq  PRN Reason Stop Dose Admin


 


Albuterol/Ipratropium  3 ml  12/31/18 08:00  01/12/19 04:30





  Duoneb 3 Mg/0.5 Mg (3 Ml) Ud  INH   3 ml





  RQ4 OLEG   Administration





     





     





     





     


 


Piperacillin Sod/Tazobactam Sod  3.375 gm in 50 mls @ 100 mls/hr  01/02/19 14:30

 01/12/19 06:01





  Zosyn 3.375 Gm Iv Premix  IVPB   100 mls/hr





  Q8H OLEG   Administration





     





     





  Protocol   





     


 


Norepinephrine Bitartrate 4 mg  254 mls @ 15.24 mls/hr  01/12/19 07:24  





  / Sodium Chloride  IV   





  .[id number] PRN   





  TITRATE PER MD ORDER   





     





  Protocol   





  4 MCG/MIN   


 


Epinephrine HCl 1 mg/ Sodium  251 mls @ 15.06 mls/hr  01/12/19 07:36  





  Chloride  IV  01/13/19 07:37  





  .[id number] PRN   





  TITRATE PER MD ORDER   





     





  Protocol   





  1 MCG/MIN   


 


Vasopressin 40 units/ Dextrose  42 mls @ 2.52 mls/hr  01/12/19 07:45  





  IV   





  .[id number] OLEG   





     





     





  Protocol   





  0.04 UNITS/MIN   


 


Pantoprazole Sodium 80 mg/  100 mls @ 10 mls/hr  01/12/19 07:45  





  Sodium Chloride  IVP   





  .Q10H OLEG   





     





     





     





  8 MG/HR   


 


Sodium Bicarbonate 150 meq/  1,150 mls @ 75 mls/hr  01/12/19 09:00  





  Dextrose  IV   





  .U31Q26V OLEG   





     





     





     





     


 


Insulin Aspart  0 unit  01/05/19 10:13  01/12/19 06:16





  Novolog  SC   4 u





  Q6 OLEG   Administration





     





     





  Protocol   





     


 


Montelukast Sodium  10 mg  01/11/19 22:00  01/11/19 22:09





  Singulair  PO   10 mg





  HS OLEG   Administration





     





     





     





     


 


Polyethylene Glycol  17 gm  01/09/19 10:15  01/11/19 10:15





  Miralax  PO   17 gm





  DAILY OLEG   Administration





     





     





     





     


 


Tamsulosin HCl  0.4 mg  12/31/18 10:00  01/11/19 17:10





  Flomax  PO   0.4 mg





  BID OLEG   Administration





     





     





     





     














- Patient Studies


Lab Studies: 


                                   Lab Studies











  01/12/19 01/12/19 01/12/19 Range/Units





  11:38 10:29 10:29 


 


WBC    27.1 H  (4.8-10.8)  K/uL


 


RBC    4.85  (4.40-5.90)  Mil/uL


 


Hgb    11.9 L  (12.0-18.0)  g/dL


 


Hct    39.6  (35.0-51.0)  %


 


MCV    81.6  (80.0-94.0)  fL


 


MCH    24.6 L  (27.0-31.0)  pg


 


MCHC    30.1 L  (33.0-37.0)  g/dL


 


RDW    19.5 H  (11.5-14.5)  %


 


Plt Count    92 L  (130-400)  K/uL


 


MPV    10.1  (7.2-11.7)  fL


 


Neut % (Auto)    93.6 H  (50.0-75.0)  %


 


Lymph % (Auto)    5.2 L  (20.0-40.0)  %


 


Mono % (Auto)    0.8  (0.0-10.0)  %


 


Eos % (Auto)    0.1  (0.0-4.0)  %


 


Baso % (Auto)    0.3  (0.0-2.0)  %


 


Neut # (Auto)    25.4 H  (1.8-7.0)  K/uL


 


Lymph # (Auto)    1.4  (1.0-4.3)  K/uL


 


Mono # (Auto)    0.2  (0.0-0.8)  K/uL


 


Eos # (Auto)    0.0  (0.0-0.7)  K/uL


 


Baso # (Auto)    0.1  (0.0-0.2)  K/uL


 


Neutrophils % (Manual)    78 H  (50-75)  %


 


Band Neutrophils %    7 H  (0-2)  %


 


Lymphocytes % (Manual)    4 L  (20-40)  %


 


Monocytes % (Manual)    11 H  (0-10)  %


 


Nucleated RBC %    2 H  (0-0)  %


 


Platelet Estimate    Decreased L  (NORMAL)  


 


Polychromasia    Slight  


 


Hypochromasia (manual)    Slight  


 


Anisocytosis (manual)    Slight  


 


Target Cells     


 


Ovalocytes    Slight  


 


PT   37.8 H   (9.7-12.2)  SECONDS


 


INR   3.4 H*   


 


APTT   93 H   (21-34)  SECONDS


 


Puncture Site     


 


pCO2     (35-45)  mm/Hg


 


pO2     ()  mm/Hg


 


HCO3     (21-28)  mmol/L


 


ABG pH     (7.35-7.45)  


 


ABG Total CO2     (22-28)  mmol/L


 


ABG O2 Saturation     (95-98)  %


 


ABG Base Excess     (-2.0-3.0)  mmol/L


 


[place] Test     


 


ABG Potassium     (3.6-5.2)  mmol/L


 


A-a O2 Difference     mm/Hg


 


Respiratory Index     


 


Sodium     (132-148)  mmol/l


 


Chloride     ()  mmol/L


 


Glucose     ()  mg/dl


 


Lactate     (0.7-2.1)  mmol/L


 


Vent Mode     


 


Mechanical Rate     


 


FiO2     %


 


Tidal Volume     


 


PEEP     


 


Crit Value Called To     


 


Crit Value Called By     


 


Crit Value Read Back     


 


Blood Gas Notified Time     


 


Potassium     (3.6-5.2)  mmol/L


 


Carbon Dioxide     (22-30)  mmol/L


 


Anion Gap     (10-20)  


 


BUN     (9-20)  mg/dL


 


Creatinine     (0.8-1.5)  mg/dL


 


Est GFR (African Amer)     


 


Est GFR (Non-Af Amer)     


 


POC Glucose (mg/dL)  93    ()  mg/dL


 


Random Glucose     ()  mg/dL


 


Lactic Acid     (0.7-2.1)  mmol/L


 


Calcium     (8.6-10.4)  mg/dl


 


Phosphorus     (2.5-4.5)  mg/dL


 


Magnesium     (1.6-2.3)  mg/dL


 


Total Bilirubin     (0.2-1.3)  mg/dL


 


AST     (17-59)  U/L


 


ALT     (21-72)  U/L


 


Alkaline Phosphatase     ()  U/L


 


CK-MB (Mass)     (0.0-3.38)  ng/mL


 


Troponin I     (0.00-0.120)  ng/mL


 


NT-Pro-B Natriuret Pep     (0-900)  pg/mL


 


Total Protein     (6.3-8.3)  g/dL


 


Albumin     (3.5-5.0)  g/dL


 


Globulin     (2.2-3.9)  gm/dL


 


Albumin/Globulin Ratio     (1.0-2.1)  


 


Arterial Blood Potassium     (3.6-5.2)  mmol/L


 


Random Vancomycin     ug/mL


 


Blood Type     


 


Blood Type Confirm     


 


Antibody Screen     














  01/12/19 01/12/19 01/12/19 Range/Units





  09:52 09:32 09:08 


 


WBC     (4.8-10.8)  K/uL


 


RBC     (4.40-5.90)  Mil/uL


 


Hgb     (12.0-18.0)  g/dL


 


Hct     (35.0-51.0)  %


 


MCV     (80.0-94.0)  fL


 


MCH     (27.0-31.0)  pg


 


MCHC     (33.0-37.0)  g/dL


 


RDW     (11.5-14.5)  %


 


Plt Count     (130-400)  K/uL


 


MPV     (7.2-11.7)  fL


 


Neut % (Auto)     (50.0-75.0)  %


 


Lymph % (Auto)     (20.0-40.0)  %


 


Mono % (Auto)     (0.0-10.0)  %


 


Eos % (Auto)     (0.0-4.0)  %


 


Baso % (Auto)     (0.0-2.0)  %


 


Neut # (Auto)     (1.8-7.0)  K/uL


 


Lymph # (Auto)     (1.0-4.3)  K/uL


 


Mono # (Auto)     (0.0-0.8)  K/uL


 


Eos # (Auto)     (0.0-0.7)  K/uL


 


Baso # (Auto)     (0.0-0.2)  K/uL


 


Neutrophils % (Manual)     (50-75)  %


 


Band Neutrophils %     (0-2)  %


 


Lymphocytes % (Manual)     (20-40)  %


 


Monocytes % (Manual)     (0-10)  %


 


Nucleated RBC %     (0-0)  %


 


Platelet Estimate     (NORMAL)  


 


Polychromasia     


 


Hypochromasia (manual)     


 


Anisocytosis (manual)     


 


Target Cells     


 


Ovalocytes     


 


PT     (9.7-12.2)  SECONDS


 


INR     


 


APTT     (21-34)  SECONDS


 


Puncture Site     


 


pCO2     (35-45)  mm/Hg


 


pO2     ()  mm/Hg


 


HCO3     (21-28)  mmol/L


 


ABG pH     (7.35-7.45)  


 


ABG Total CO2     (22-28)  mmol/L


 


ABG O2 Saturation     (95-98)  %


 


ABG Base Excess     (-2.0-3.0)  mmol/L


 


[place] Test     


 


ABG Potassium     (3.6-5.2)  mmol/L


 


A-a O2 Difference     mm/Hg


 


Respiratory Index     


 


Sodium    152 H  (132-148)  mmol/l


 


Chloride    122 H  ()  mmol/L


 


Glucose     ()  mg/dl


 


Lactate     (0.7-2.1)  mmol/L


 


Vent Mode     


 


Mechanical Rate     


 


FiO2     %


 


Tidal Volume     


 


PEEP     


 


Crit Value Called To     


 


Crit Value Called By     


 


Crit Value Read Back     


 


Blood Gas Notified Time     


 


Potassium    6.0 H  (3.6-5.2)  mmol/L


 


Carbon Dioxide    22  (22-30)  mmol/L


 


Anion Gap    14  (10-20)  


 


BUN    83 H  (9-20)  mg/dL


 


Creatinine    2.3 H  (0.8-1.5)  mg/dL


 


Est GFR ( Amer)    33  


 


Est GFR (Non-Af Amer)    28  


 


POC Glucose (mg/dL)     ()  mg/dL


 


Random Glucose    97  D  ()  mg/dL


 


Lactic Acid     (0.7-2.1)  mmol/L


 


Calcium    7.3 L  (8.6-10.4)  mg/dl


 


Phosphorus    9.5 H  (2.5-4.5)  mg/dL


 


Magnesium    3.2 H  (1.6-2.3)  mg/dL


 


Total Bilirubin    1.7 H  (0.2-1.3)  mg/dL


 


AST     (17-59)  U/L


 


ALT    8477 H  (21-72)  U/L


 


Alkaline Phosphatase    32 L D  ()  U/L


 


CK-MB (Mass)    1.39  (0.0-3.38)  ng/mL


 


Troponin I    0.5380 H*  (0.00-0.120)  ng/mL


 


NT-Pro-B Natriuret Pep     (0-900)  pg/mL


 


Total Protein    3.5 L  (6.3-8.3)  g/dL


 


Albumin    1.4 L D  (3.5-5.0)  g/dL


 


Globulin    2.1 L  (2.2-3.9)  gm/dL


 


Albumin/Globulin Ratio    0.7 L  (1.0-2.1)  


 


Arterial Blood Potassium     (3.6-5.2)  mmol/L


 


Random Vancomycin   < 5.0   ug/mL


 


Blood Type  O POSITIVE    


 


Blood Type Confirm  O POSITIVE    


 


Antibody Screen  Negative    














  01/12/19 01/12/19 01/12/19 Range/Units





  09:08 09:08 06:20 


 


WBC  20.0 H   18.4 H  (4.8-10.8)  K/uL


 


RBC  5.16   7.46 H  (4.40-5.90)  Mil/uL


 


Hgb  12.5  D   18.0  (12.0-18.0)  g/dL


 


Hct  42.4   59.0 H  (35.0-51.0)  %


 


MCV  82.2  D   79.1 L  (80.0-94.0)  fL


 


MCH  24.2 L   24.1 L  (27.0-31.0)  pg


 


MCHC  29.5 L   30.5 L  (33.0-37.0)  g/dL


 


RDW  19.0 H   19.1 H  (11.5-14.5)  %


 


Plt Count  82 L D   171  (130-400)  K/uL


 


MPV  10.2   10.4  (7.2-11.7)  fL


 


Neut % (Auto)  91.0 H   92.8 H  (50.0-75.0)  %


 


Lymph % (Auto)  7.6 L   2.3 L  (20.0-40.0)  %


 


Mono % (Auto)  1.0   4.6  (0.0-10.0)  %


 


Eos % (Auto)  0.1   0.0  (0.0-4.0)  %


 


Baso % (Auto)  0.3   0.3  (0.0-2.0)  %


 


Neut # (Auto)  18.2 H   17.1 H  (1.8-7.0)  K/uL


 


Lymph # (Auto)  1.5   0.4 L  (1.0-4.3)  K/uL


 


Mono # (Auto)  0.2   0.8  (0.0-0.8)  K/uL


 


Eos # (Auto)  0.0   0.0  (0.0-0.7)  K/uL


 


Baso # (Auto)  0.1   0.0  (0.0-0.2)  K/uL


 


Neutrophils % (Manual)    92 H  (50-75)  %


 


Band Neutrophils %     (0-2)  %


 


Lymphocytes % (Manual)    2 L  (20-40)  %


 


Monocytes % (Manual)    6  (0-10)  %


 


Nucleated RBC %     (0-0)  %


 


Platelet Estimate    Normal  (NORMAL)  


 


Polychromasia     


 


Hypochromasia (manual)     


 


Anisocytosis (manual)    Slight  


 


Target Cells    Slight  


 


Ovalocytes    Slight  


 


PT     (9.7-12.2)  SECONDS


 


INR     


 


APTT     (21-34)  SECONDS


 


Puncture Site     


 


pCO2     (35-45)  mm/Hg


 


pO2     ()  mm/Hg


 


HCO3     (21-28)  mmol/L


 


ABG pH     (7.35-7.45)  


 


ABG Total CO2     (22-28)  mmol/L


 


ABG O2 Saturation     (95-98)  %


 


ABG Base Excess     (-2.0-3.0)  mmol/L


 


[place] Test     


 


ABG Potassium     (3.6-5.2)  mmol/L


 


A-a O2 Difference     mm/Hg


 


Respiratory Index     


 


Sodium     (132-148)  mmol/l


 


Chloride     ()  mmol/L


 


Glucose     ()  mg/dl


 


Lactate     (0.7-2.1)  mmol/L


 


Vent Mode     


 


Mechanical Rate     


 


FiO2     %


 


Tidal Volume     


 


PEEP     


 


Crit Value Called To     


 


Crit Value Called By     


 


Crit Value Read Back     


 


Blood Gas Notified Time     


 


Potassium     (3.6-5.2)  mmol/L


 


Carbon Dioxide     (22-30)  mmol/L


 


Anion Gap     (10-20)  


 


BUN     (9-20)  mg/dL


 


Creatinine     (0.8-1.5)  mg/dL


 


Est GFR (African Amer)     


 


Est GFR (Non-Af Amer)     


 


POC Glucose (mg/dL)     ()  mg/dL


 


Random Glucose     ()  mg/dL


 


Lactic Acid   8.5 H*   (0.7-2.1)  mmol/L


 


Calcium     (8.6-10.4)  mg/dl


 


Phosphorus     (2.5-4.5)  mg/dL


 


Magnesium     (1.6-2.3)  mg/dL


 


Total Bilirubin     (0.2-1.3)  mg/dL


 


AST     (17-59)  U/L


 


ALT     (21-72)  U/L


 


Alkaline Phosphatase     ()  U/L


 


CK-MB (Mass)     (0.0-3.38)  ng/mL


 


Troponin I     (0.00-0.120)  ng/mL


 


NT-Pro-B Natriuret Pep     (0-900)  pg/mL


 


Total Protein     (6.3-8.3)  g/dL


 


Albumin     (3.5-5.0)  g/dL


 


Globulin     (2.2-3.9)  gm/dL


 


Albumin/Globulin Ratio     (1.0-2.1)  


 


Arterial Blood Potassium     (3.6-5.2)  mmol/L


 


Random Vancomycin     ug/mL


 


Blood Type     


 


Blood Type Confirm     


 


Antibody Screen     














  01/12/19 01/12/19 01/12/19 Range/Units





  06:20 06:07 05:18 


 


WBC     (4.8-10.8)  K/uL


 


RBC     (4.40-5.90)  Mil/uL


 


Hgb     (12.0-18.0)  g/dL


 


Hct     (35.0-51.0)  %


 


MCV     (80.0-94.0)  fL


 


MCH     (27.0-31.0)  pg


 


MCHC     (33.0-37.0)  g/dL


 


RDW     (11.5-14.5)  %


 


Plt Count     (130-400)  K/uL


 


MPV     (7.2-11.7)  fL


 


Neut % (Auto)     (50.0-75.0)  %


 


Lymph % (Auto)     (20.0-40.0)  %


 


Mono % (Auto)     (0.0-10.0)  %


 


Eos % (Auto)     (0.0-4.0)  %


 


Baso % (Auto)     (0.0-2.0)  %


 


Neut # (Auto)     (1.8-7.0)  K/uL


 


Lymph # (Auto)     (1.0-4.3)  K/uL


 


Mono # (Auto)     (0.0-0.8)  K/uL


 


Eos # (Auto)     (0.0-0.7)  K/uL


 


Baso # (Auto)     (0.0-0.2)  K/uL


 


Neutrophils % (Manual)     (50-75)  %


 


Band Neutrophils %     (0-2)  %


 


Lymphocytes % (Manual)     (20-40)  %


 


Monocytes % (Manual)     (0-10)  %


 


Nucleated RBC %     (0-0)  %


 


Platelet Estimate     (NORMAL)  


 


Polychromasia     


 


Hypochromasia (manual)     


 


Anisocytosis (manual)     


 


Target Cells     


 


Ovalocytes     


 


PT     (9.7-12.2)  SECONDS


 


INR     


 


APTT     (21-34)  SECONDS


 


Puncture Site    R brac  


 


pCO2    26 L  (35-45)  mm/Hg


 


pO2    76 L  ()  mm/Hg


 


HCO3    24.7  (21-28)  mmol/L


 


ABG pH    7.52 H  (7.35-7.45)  


 


ABG Total CO2    22.0  (22-28)  mmol/L


 


ABG O2 Saturation    95.1  (95-98)  %


 


ABG Base Excess    -0.3  (-2.0-3.0)  mmol/L


 


[place] Test    Na  


 


ABG Potassium    6.0 H  (3.6-5.2)  mmol/L


 


A-a O2 Difference    177.0  mm/Hg


 


Respiratory Index    2.3  


 


Sodium  150 H   149.0 H  (132-148)  mmol/l


 


Chloride  109 H   114.0 H  ()  mmol/L


 


Glucose    217 H  ()  mg/dl


 


Lactate    6.6 H*  (0.7-2.1)  mmol/L


 


Vent Mode    Prvc  


 


Mechanical Rate    14  


 


FiO2    40.0  %


 


Tidal Volume    500  


 


PEEP    5  


 


Crit Value Called To    [facility] icu rn  


 


Crit Value Called By    Julia mays rt  


 


Crit Value Read Back    Y  


 


Blood Gas Notified Time    625  


 


Potassium  5.4 H    (3.6-5.2)  mmol/L


 


Carbon Dioxide  32 H    (22-30)  mmol/L


 


Anion Gap  14    (10-20)  


 


BUN  102 H* D    (9-20)  mg/dL


 


Creatinine  2.0 H    (0.8-1.5)  mg/dL


 


Est GFR ( Amer)  39    


 


Est GFR (Non-Af Amer)  32    


 


POC Glucose (mg/dL)   246 H   ()  mg/dL


 


Random Glucose  222 H D    ()  mg/dL


 


Lactic Acid     (0.7-2.1)  mmol/L


 


Calcium  7.5 L    (8.6-10.4)  mg/dl


 


Phosphorus  3.8    (2.5-4.5)  mg/dL


 


Magnesium  3.3 H    (1.6-2.3)  mg/dL


 


Total Bilirubin  1.8 H    (0.2-1.3)  mg/dL


 


AST  470 H D    (17-59)  U/L


 


ALT  462 H D    (21-72)  U/L


 


Alkaline Phosphatase  65    ()  U/L


 


CK-MB (Mass)     (0.0-3.38)  ng/mL


 


Troponin I     (0.00-0.120)  ng/mL


 


NT-Pro-B Natriuret Pep  576    (0-900)  pg/mL


 


Total Protein  5.9 L    (6.3-8.3)  g/dL


 


Albumin  3.0 L    (3.5-5.0)  g/dL


 


Globulin  2.9    (2.2-3.9)  gm/dL


 


Albumin/Globulin Ratio  1.1    (1.0-2.1)  


 


Arterial Blood Potassium    6.0 H  (3.6-5.2)  mmol/L


 


Random Vancomycin     ug/mL


 


Blood Type     


 


Blood Type Confirm     


 


Antibody Screen     














  01/11/19 01/11/19 01/11/19 Range/Units





  23:43 18:12 11:41 


 


WBC     (4.8-10.8)  K/uL


 


RBC     (4.40-5.90)  Mil/uL


 


Hgb     (12.0-18.0)  g/dL


 


Hct     (35.0-51.0)  %


 


MCV     (80.0-94.0)  fL


 


MCH     (27.0-31.0)  pg


 


MCHC     (33.0-37.0)  g/dL


 


RDW     (11.5-14.5)  %


 


Plt Count     (130-400)  K/uL


 


MPV     (7.2-11.7)  fL


 


Neut % (Auto)     (50.0-75.0)  %


 


Lymph % (Auto)     (20.0-40.0)  %


 


Mono % (Auto)     (0.0-10.0)  %


 


Eos % (Auto)     (0.0-4.0)  %


 


Baso % (Auto)     (0.0-2.0)  %


 


Neut # (Auto)     (1.8-7.0)  K/uL


 


Lymph # (Auto)     (1.0-4.3)  K/uL


 


Mono # (Auto)     (0.0-0.8)  K/uL


 


Eos # (Auto)     (0.0-0.7)  K/uL


 


Baso # (Auto)     (0.0-0.2)  K/uL


 


Neutrophils % (Manual)     (50-75)  %


 


Band Neutrophils %     (0-2)  %


 


Lymphocytes % (Manual)     (20-40)  %


 


Monocytes % (Manual)     (0-10)  %


 


Nucleated RBC %     (0-0)  %


 


Platelet Estimate     (NORMAL)  


 


Polychromasia     


 


Hypochromasia (manual)     


 


Anisocytosis (manual)     


 


Target Cells     


 


Ovalocytes     


 


PT     (9.7-12.2)  SECONDS


 


INR     


 


APTT     (21-34)  SECONDS


 


Puncture Site     


 


pCO2     (35-45)  mm/Hg


 


pO2     ()  mm/Hg


 


HCO3     (21-28)  mmol/L


 


ABG pH     (7.35-7.45)  


 


ABG Total CO2     (22-28)  mmol/L


 


ABG O2 Saturation     (95-98)  %


 


ABG Base Excess     (-2.0-3.0)  mmol/L


 


[place] Test     


 


ABG Potassium     (3.6-5.2)  mmol/L


 


A-a O2 Difference     mm/Hg


 


Respiratory Index     


 


Sodium     (132-148)  mmol/l


 


Chloride     ()  mmol/L


 


Glucose     ()  mg/dl


 


Lactate     (0.7-2.1)  mmol/L


 


Vent Mode     


 


Mechanical Rate     


 


FiO2     %


 


Tidal Volume     


 


PEEP     


 


Crit Value Called To     


 


Crit Value Called By     


 


Crit Value Read Back     


 


Blood Gas Notified Time     


 


Potassium     (3.6-5.2)  mmol/L


 


Carbon Dioxide     (22-30)  mmol/L


 


Anion Gap     (10-20)  


 


BUN     (9-20)  mg/dL


 


Creatinine     (0.8-1.5)  mg/dL


 


Est GFR (African Amer)     


 


Est GFR (Non-Af Amer)     


 


POC Glucose (mg/dL)  198 H  203 H  315 H  ()  mg/dL


 


Random Glucose     ()  mg/dL


 


Lactic Acid     (0.7-2.1)  mmol/L


 


Calcium     (8.6-10.4)  mg/dl


 


Phosphorus     (2.5-4.5)  mg/dL


 


Magnesium     (1.6-2.3)  mg/dL


 


Total Bilirubin     (0.2-1.3)  mg/dL


 


AST     (17-59)  U/L


 


ALT     (21-72)  U/L


 


Alkaline Phosphatase     ()  U/L


 


CK-MB (Mass)     (0.0-3.38)  ng/mL


 


Troponin I     (0.00-0.120)  ng/mL


 


NT-Pro-B Natriuret Pep     (0-900)  pg/mL


 


Total Protein     (6.3-8.3)  g/dL


 


Albumin     (3.5-5.0)  g/dL


 


Globulin     (2.2-3.9)  gm/dL


 


Albumin/Globulin Ratio     (1.0-2.1)  


 


Arterial Blood Potassium     (3.6-5.2)  mmol/L


 


Random Vancomycin     ug/mL


 


Blood Type     


 


Blood Type Confirm     


 


Antibody Screen     








                         Laboratory Results - last 24 hr











  01/11/19 01/11/19 01/11/19





  11:41 18:12 23:43


 


WBC   


 


RBC   


 


Hgb   


 


Hct   


 


MCV   


 


MCH   


 


MCHC   


 


RDW   


 


Plt Count   


 


MPV   


 


Neut % (Auto)   


 


Lymph % (Auto)   


 


Mono % (Auto)   


 


Eos % (Auto)   


 


Baso % (Auto)   


 


Neut # (Auto)   


 


Lymph # (Auto)   


 


Mono # (Auto)   


 


Eos # (Auto)   


 


Baso # (Auto)   


 


Neutrophils % (Manual)   


 


Band Neutrophils %   


 


Lymphocytes % (Manual)   


 


Monocytes % (Manual)   


 


Nucleated RBC %   


 


Platelet Estimate   


 


Polychromasia   


 


Hypochromasia (manual)   


 


Anisocytosis (manual)   


 


Target Cells   


 


Ovalocytes   


 


PT   


 


INR   


 


APTT   


 


Puncture Site   


 


pCO2   


 


pO2   


 


HCO3   


 


ABG pH   


 


ABG Total CO2   


 


ABG O2 Saturation   


 


ABG Base Excess   


 


[place] Test   


 


ABG Potassium   


 


A-a O2 Difference   


 


Respiratory Index   


 


Sodium   


 


Chloride   


 


Glucose   


 


Lactate   


 


Vent Mode   


 


Mechanical Rate   


 


FiO2   


 


Tidal Volume   


 


PEEP   


 


Crit Value Called To   


 


Crit Value Called By   


 


Crit Value Read Back   


 


Blood Gas Notified Time   


 


Potassium   


 


Carbon Dioxide   


 


Anion Gap   


 


BUN   


 


Creatinine   


 


Est GFR ( Amer)   


 


Est GFR (Non-Af Amer)   


 


POC Glucose (mg/dL)  315 H  203 H  198 H


 


Random Glucose   


 


Lactic Acid   


 


Calcium   


 


Phosphorus   


 


Magnesium   


 


Total Bilirubin   


 


AST   


 


ALT   


 


Alkaline Phosphatase   


 


CK-MB (Mass)   


 


Troponin I   


 


NT-Pro-B Natriuret Pep   


 


Total Protein   


 


Albumin   


 


Globulin   


 


Albumin/Globulin Ratio   


 


Arterial Blood Potassium   


 


Random Vancomycin   


 


Blood Type   


 


Blood Type Confirm   


 


Antibody Screen   














  01/12/19 01/12/19 01/12/19





  05:18 06:07 06:20


 


WBC   


 


RBC   


 


Hgb   


 


Hct   


 


MCV   


 


MCH   


 


MCHC   


 


RDW   


 


Plt Count   


 


MPV   


 


Neut % (Auto)   


 


Lymph % (Auto)   


 


Mono % (Auto)   


 


Eos % (Auto)   


 


Baso % (Auto)   


 


Neut # (Auto)   


 


Lymph # (Auto)   


 


Mono # (Auto)   


 


Eos # (Auto)   


 


Baso # (Auto)   


 


Neutrophils % (Manual)   


 


Band Neutrophils %   


 


Lymphocytes % (Manual)   


 


Monocytes % (Manual)   


 


Nucleated RBC %   


 


Platelet Estimate   


 


Polychromasia   


 


Hypochromasia (manual)   


 


Anisocytosis (manual)   


 


Target Cells   


 


Ovalocytes   


 


PT   


 


INR   


 


APTT   


 


Puncture Site  R brac  


 


pCO2  26 L  


 


pO2  76 L  


 


HCO3  24.7  


 


ABG pH  7.52 H  


 


ABG Total CO2  22.0  


 


ABG O2 Saturation  95.1  


 


ABG Base Excess  -0.3  


 


[place] Test  Na  


 


ABG Potassium  6.0 H  


 


A-a O2 Difference  177.0  


 


Respiratory Index  2.3  


 


Sodium  149.0 H   150 H


 


Chloride  114.0 H   109 H


 


Glucose  217 H  


 


Lactate  6.6 H*  


 


Vent Mode  Prvc  


 


Mechanical Rate  14  


 


FiO2  40.0  


 


Tidal Volume  500  


 


PEEP  5  


 


Crit Value Called To  [facility] icu rn  


 


Crit Value Called By  Julia mays rt  


 


Crit Value Read Back  Y  


 


Blood Gas Notified Time  625  


 


Potassium    5.4 H


 


Carbon Dioxide    32 H


 


Anion Gap    14


 


BUN    102 H* D


 


Creatinine    2.0 H


 


Est GFR ( Amer)    39


 


Est GFR (Non-Af Amer)    32


 


POC Glucose (mg/dL)   246 H 


 


Random Glucose    222 H D


 


Lactic Acid   


 


Calcium    7.5 L


 


Phosphorus    3.8


 


Magnesium    3.3 H


 


Total Bilirubin    1.8 H


 


AST    470 H D


 


ALT    462 H D


 


Alkaline Phosphatase    65


 


CK-MB (Mass)   


 


Troponin I   


 


NT-Pro-B Natriuret Pep    576


 


Total Protein    5.9 L


 


Albumin    3.0 L


 


Globulin    2.9


 


Albumin/Globulin Ratio    1.1


 


Arterial Blood Potassium  6.0 H  


 


Random Vancomycin   


 


Blood Type   


 


Blood Type Confirm   


 


Antibody Screen   














  01/12/19 01/12/19 01/12/19





  06:20 09:08 09:08


 


WBC  18.4 H   20.0 H


 


RBC  7.46 H   5.16


 


Hgb  18.0   12.5  D


 


Hct  59.0 H   42.4


 


MCV  79.1 L   82.2  D


 


MCH  24.1 L   24.2 L


 


MCHC  30.5 L   29.5 L


 


RDW  19.1 H   19.0 H


 


Plt Count  171   82 L D


 


MPV  10.4   10.2


 


Neut % (Auto)  92.8 H   91.0 H


 


Lymph % (Auto)  2.3 L   7.6 L


 


Mono % (Auto)  4.6   1.0


 


Eos % (Auto)  0.0   0.1


 


Baso % (Auto)  0.3   0.3


 


Neut # (Auto)  17.1 H   18.2 H


 


Lymph # (Auto)  0.4 L   1.5


 


Mono # (Auto)  0.8   0.2


 


Eos # (Auto)  0.0   0.0


 


Baso # (Auto)  0.0   0.1


 


Neutrophils % (Manual)  92 H  


 


Band Neutrophils %   


 


Lymphocytes % (Manual)  2 L  


 


Monocytes % (Manual)  6  


 


Nucleated RBC %   


 


Platelet Estimate  Normal  


 


Polychromasia   


 


Hypochromasia (manual)   


 


Anisocytosis (manual)  Slight  


 


Target Cells  Slight  


 


Ovalocytes  Slight  


 


PT   


 


INR   


 


APTT   


 


Puncture Site   


 


pCO2   


 


pO2   


 


HCO3   


 


ABG pH   


 


ABG Total CO2   


 


ABG O2 Saturation   


 


ABG Base Excess   


 


[place] Test   


 


ABG Potassium   


 


A-a O2 Difference   


 


Respiratory Index   


 


Sodium   


 


Chloride   


 


Glucose   


 


Lactate   


 


Vent Mode   


 


Mechanical Rate   


 


FiO2   


 


Tidal Volume   


 


PEEP   


 


Crit Value Called To   


 


Crit Value Called By   


 


Crit Value Read Back   


 


Blood Gas Notified Time   


 


Potassium   


 


Carbon Dioxide   


 


Anion Gap   


 


BUN   


 


Creatinine   


 


Est GFR ( Amer)   


 


Est GFR (Non-Af Amer)   


 


POC Glucose (mg/dL)   


 


Random Glucose   


 


Lactic Acid   8.5 H* 


 


Calcium   


 


Phosphorus   


 


Magnesium   


 


Total Bilirubin   


 


AST   


 


ALT   


 


Alkaline Phosphatase   


 


CK-MB (Mass)   


 


Troponin I   


 


NT-Pro-B Natriuret Pep   


 


Total Protein   


 


Albumin   


 


Globulin   


 


Albumin/Globulin Ratio   


 


Arterial Blood Potassium   


 


Random Vancomycin   


 


Blood Type   


 


Blood Type Confirm   


 


Antibody Screen   














  01/12/19 01/12/19 01/12/19





  09:08 09:32 09:52


 


WBC   


 


RBC   


 


Hgb   


 


Hct   


 


MCV   


 


MCH   


 


MCHC   


 


RDW   


 


Plt Count   


 


MPV   


 


Neut % (Auto)   


 


Lymph % (Auto)   


 


Mono % (Auto)   


 


Eos % (Auto)   


 


Baso % (Auto)   


 


Neut # (Auto)   


 


Lymph # (Auto)   


 


Mono # (Auto)   


 


Eos # (Auto)   


 


Baso # (Auto)   


 


Neutrophils % (Manual)   


 


Band Neutrophils %   


 


Lymphocytes % (Manual)   


 


Monocytes % (Manual)   


 


Nucleated RBC %   


 


Platelet Estimate   


 


Polychromasia   


 


Hypochromasia (manual)   


 


Anisocytosis (manual)   


 


Target Cells   


 


Ovalocytes   


 


PT   


 


INR   


 


APTT   


 


Puncture Site   


 


pCO2   


 


pO2   


 


HCO3   


 


ABG pH   


 


ABG Total CO2   


 


ABG O2 Saturation   


 


ABG Base Excess   


 


[place] Test   


 


ABG Potassium   


 


A-a O2 Difference   


 


Respiratory Index   


 


Sodium  152 H  


 


Chloride  122 H  


 


Glucose   


 


Lactate   


 


Vent Mode   


 


Mechanical Rate   


 


FiO2   


 


Tidal Volume   


 


PEEP   


 


Crit Value Called To   


 


Crit Value Called By   


 


Crit Value Read Back   


 


Blood Gas Notified Time   


 


Potassium  6.0 H  


 


Carbon Dioxide  22  


 


Anion Gap  14  


 


BUN  83 H  


 


Creatinine  2.3 H  


 


Est GFR ( Amer)  33  


 


Est GFR (Non-Af Amer)  28  


 


POC Glucose (mg/dL)   


 


Random Glucose  97  D  


 


Lactic Acid   


 


Calcium  7.3 L  


 


Phosphorus  9.5 H  


 


Magnesium  3.2 H  


 


Total Bilirubin  1.7 H  


 


AST   


 


ALT  8477 H  


 


Alkaline Phosphatase  32 L D  


 


CK-MB (Mass)  1.39  


 


Troponin I  0.5380 H*  


 


NT-Pro-B Natriuret Pep   


 


Total Protein  3.5 L  


 


Albumin  1.4 L D  


 


Globulin  2.1 L  


 


Albumin/Globulin Ratio  0.7 L  


 


Arterial Blood Potassium   


 


Random Vancomycin   < 5.0 


 


Blood Type    O POSITIVE


 


Blood Type Confirm    O POSITIVE


 


Antibody Screen    Negative














  01/12/19 01/12/19 01/12/19





  10:29 10:29 11:38


 


WBC  27.1 H  


 


RBC  4.85  


 


Hgb  11.9 L  


 


Hct  39.6  


 


MCV  81.6  


 


MCH  24.6 L  


 


MCHC  30.1 L  


 


RDW  19.5 H  


 


Plt Count  92 L  


 


MPV  10.1  


 


Neut % (Auto)  93.6 H  


 


Lymph % (Auto)  5.2 L  


 


Mono % (Auto)  0.8  


 


Eos % (Auto)  0.1  


 


Baso % (Auto)  0.3  


 


Neut # (Auto)  25.4 H  


 


Lymph # (Auto)  1.4  


 


Mono # (Auto)  0.2  


 


Eos # (Auto)  0.0  


 


Baso # (Auto)  0.1  


 


Neutrophils % (Manual)  78 H  


 


Band Neutrophils %  7 H  


 


Lymphocytes % (Manual)  4 L  


 


Monocytes % (Manual)  11 H  


 


Nucleated RBC %  2 H  


 


Platelet Estimate  Decreased L  


 


Polychromasia  Slight  


 


Hypochromasia (manual)  Slight  


 


Anisocytosis (manual)  Slight  


 


Target Cells   


 


Ovalocytes  Slight  


 


PT   37.8 H 


 


INR   3.4 H* 


 


APTT   93 H 


 


Puncture Site   


 


pCO2   


 


pO2   


 


HCO3   


 


ABG pH   


 


ABG Total CO2   


 


ABG O2 Saturation   


 


ABG Base Excess   


 


[place] Test   


 


ABG Potassium   


 


A-a O2 Difference   


 


Respiratory Index   


 


Sodium   


 


Chloride   


 


Glucose   


 


Lactate   


 


Vent Mode   


 


Mechanical Rate   


 


FiO2   


 


Tidal Volume   


 


PEEP   


 


Crit Value Called To   


 


Crit Value Called By   


 


Crit Value Read Back   


 


Blood Gas Notified Time   


 


Potassium   


 


Carbon Dioxide   


 


Anion Gap   


 


BUN   


 


Creatinine   


 


Est GFR ( Amer)   


 


Est GFR (Non-Af Amer)   


 


POC Glucose (mg/dL)    93


 


Random Glucose   


 


Lactic Acid   


 


Calcium   


 


Phosphorus   


 


Magnesium   


 


Total Bilirubin   


 


AST   


 


ALT   


 


Alkaline Phosphatase   


 


CK-MB (Mass)   


 


Troponin I   


 


NT-Pro-B Natriuret Pep   


 


Total Protein   


 


Albumin   


 


Globulin   


 


Albumin/Globulin Ratio   


 


Arterial Blood Potassium   


 


Random Vancomycin   


 


Blood Type   


 


Blood Type Confirm   


 


Antibody Screen   











Radiology Impressions: 


                              Radiology Impressions





Chest X-Ray  01/11/19 15:40


IMPRESSION:


ET tube positioned 2.1 cm above the tracheal omari.  Nasogastric 


tube appropriately positioned.  No acute infiltrate.  


 


 











Fingerstick Blood Sugar Results: 246





Review of Systems





- Review of Systems


Systems not reviewed;Unavailable: Intubated





Assessment/Plan





- Assessment and Plan (Free Text)


Assessment: 


Patient admitted to ICU for respiratory failure and developed cardiac arrest in 

AM





Cardiac arrest: suspect 2nd acute blood loss anemia, serial cbc, transfuse blood

to keep hemodynamic stability





-Acute blood loss anemia: IV PPI, NPO, serial cbc and transfuse PRN





COPD: continue bronchodilators + solumedorl (clnically wheezing onventilator)





-Cardiac arrest/shock: currently on max pressors to keep MAP >65, titrate off 

pressrors slowly





-continue ventilation to keep spo2 >92 and pH b/w 7.35-7.45





-A-fib: off all av gissell blocker as patietn is hypotensive, d/c AC





-GAVINO: avoid nephrotoxic drugs, monitor urine output





-Anoxic brain injury: d/c all sedation, suspect signficant down time





-CAD/MI/cardiac arrest: would hold AC/asa in light of acute blood loss anemia





-emprically on abx, f/u cultures





-contineu dvt/pud ppx





Patient remains critical.





cc time 50 minutes














- Date & Time


Date: 01/12/19


Time: 11:54

## 2019-01-12 NOTE — CP.PCM.PN
Subjective





- Date & Time of Evaluation


Date of Evaluation: 01/12/19


Time of Evaluation: 15:30





- Subjective


Subjective: 





patient seen and examined


Previous events noted


Status post cardiac arrest/resuscitation


on ventilatory support


Upper GI bleeding


Hypotensive On multiple pressors








Objective





- Vital Signs/Intake and Output


Vital Signs (last 24 hours): 


                                        











Temp Pulse Resp BP Pulse Ox


 


 99.2 F   113 H  16   120/82   96 


 


 01/12/19 04:00  01/12/19 05:00  01/12/19 05:00  01/12/19 14:59  01/12/19 07:36








Intake and Output: 


                                        











 01/12/19 01/12/19





 06:59 18:59


 


Intake Total 1221.9 2017


 


Output Total 530 


 


Balance 691.9 2017














- Medications


Medications: 


                               Current Medications





Albuterol/Ipratropium (Duoneb 3 Mg/0.5 Mg (3 Ml) Ud)  3 ml INH RQ4 OLEG


   Last Admin: 01/12/19 11:30 Dose:  3 ml


Piperacillin Sod/Tazobactam Sod (Zosyn 3.375 Gm Iv Premix)  3.375 gm in 50 mls @

100 mls/hr IVPB Q8H [facility]; Protocol


   Last Admin: 01/12/19 06:01 Dose:  100 mls/hr


Norepinephrine Bitartrate 4 mg (/ Sodium Chloride)  254 mls @ 15.24 mls/hr IV 

.[id number] PRN; Protocol


   PRN Reason: TITRATE PER MD ORDER


   Last Admin: 01/12/19 14:59 Dose:  30 mcg/min, 114.3 mls/hr


Epinephrine HCl 1 mg/ Sodium (Chloride)  251 mls @ 15.06 mls/hr IV .[id number] PRN;

Protocol


   PRN Reason: TITRATE PER MD ORDER


   Stop: 01/13/19 07:37


   Last Admin: 01/12/19 14:30 Dose:  10 mcg/min, 150.6 mls/hr


Vasopressin 40 units/ Dextrose  42 mls @ 2.52 mls/hr IV .[id number] [facility]G; Protocol


   Last Admin: 01/12/19 08:00 Dose:  0.06 units/min, 3.78 mls/hr


Pantoprazole Sodium 80 mg/ (Sodium Chloride)  100 mls @ 10 mls/hr IVP .Q10H [facility]


   Last Admin: 01/12/19 08:00 Dose:  10 mls/hr


Sodium Bicarbonate 150 meq/ (Dextrose)  1,150 mls @ 75 mls/hr IV .[id number] [facility]


   Last Admin: 01/12/19 09:15 Dose:  75 mls/hr


Phytonadione 10 mg/ Sodium (Chloride)  51 mls @ 102 mls/hr IV Q24H [facility]


   Stop: 01/14/19 15:29


Insulin Aspart (Novolog)  0 unit SC Q6 [facility]; Protocol


   Last Admin: 01/12/19 12:32 Dose:  Not Given


Montelukast Sodium (Singulair)  10 mg PO HS [facility]


   Last Admin: 01/11/19 22:09 Dose:  10 mg


Polyethylene Glycol (Miralax)  17 gm PO DAILY [facility]


   Last Admin: 01/12/19 09:00 Dose:  Not Given


Tamsulosin HCl (Flomax)  0.4 mg PO BID [facility]


   Last Admin: 01/12/19 09:00 Dose:  Not Given











- Labs


Labs: 


                                        





                                 01/12/19 14:16 





                                 01/12/19 09:08 





                                        











PT  37.8 SECONDS (9.7-12.2)  H  01/12/19  10:29    


 


INR  3.4  H*  01/12/19  10:29    


 


APTT  93 SECONDS (21-34)  H  01/12/19  10:29    














- Head Exam


Head Exam: ATRAUMATIC, NORMOCEPHALIC





- Neck Exam


Neck Exam: Normal Inspection





- Respiratory Exam


Respiratory Exam: Decreased Breath Sounds





- Cardiovascular Exam


Cardiovascular Exam: Tachycardia





- GI/Abdominal Exam


GI & Abdominal Exam: Soft





Assessment and Plan


(1) Cardiac arrest


Assessment & Plan: 


status post cardiac arrest and resuscitation


Multi-organ failure


On multiple pressors


Transfuse packed RBCs


Protonix


Continue antibiotics


Prognosis poor


Spoke with family at length


Status: Acute   





(2) GI bleeding


Status: Acute   





(3) Acute respiratory failure


Status: Acute   





(4) COPD exacerbation


Status: Acute

## 2019-01-13 NOTE — CP.PCM.DIS
Provider





- Provider


Date of Admission: 


18 16:23





Attending physician: 


Tae Hensley MD





Consults: 








19 18:29


Cardiology Consult Routine 


   Comment: 


   Consulting Provider: Quinton Randle


   Consulting Physician: Quinton Randle


   Reason for Consult: cp





19 13:54


Gastroenterology Consult Routine 


   Comment: 


   Consulting Provider: Jay Solorio


   Consulting Physician: Jay Solorio


   Reason for Consult: GI BLEED





18 06:12


Physician Consult Routine 


   Comment: copd exacerbation


   Consulting Provider: Raza Gonsalez


   Consulting Physician: Raza Gonsalez


   Reason for Consult: pulmonary














Hospital Course





- Lab Results


Lab Results: 


                                  Micro Results





19 22:41   Urine,Catheterized   Urine Culture - Final


                            No Growth (<1,000 CFU/ML)


19 17:55   Naris   MRSA Culture (Admit) - Final


                            MRSA NOT DETECTED





                             Most Recent Lab Values











WBC  25.0 K/uL (4.8-10.8)  H  19  14:16    


 


RBC  4.12 Mil/uL (4.40-5.90)  L  19  14:16    


 


Hgb  10.2 g/dL (12.0-18.0)  L  19  14:16    


 


Hct  34.0 % (35.0-51.0)  L  19  14:16    


 


MCV  82.4 fL (80.0-94.0)   19  14:16    


 


MCH  24.7 pg (27.0-31.0)  L  19  14:16    


 


MCHC  30.0 g/dL (33.0-37.0)  L  19  14:16    


 


RDW  18.9 % (11.5-14.5)  H  19  14:16    


 


Plt Count  51 K/uL (130-400)  L D 19  14:16    


 


MPV  10.0 fL (7.2-11.7)   19  14:16    


 


Neut % (Auto)  95.5 % (50.0-75.0)  H  19  14:16    


 


Lymph % (Auto)  3.1 % (20.0-40.0)  L  19  14:16    


 


Mono % (Auto)  0.9 % (0.0-10.0)   19  14:16    


 


Eos % (Auto)  0.3 % (0.0-4.0)   19  14:16    


 


Baso % (Auto)  0.2 % (0.0-2.0)   19  14:16    


 


Neut # (Auto)  23.9 K/uL (1.8-7.0)  H  19  14:16    


 


Lymph # (Auto)  0.8 K/uL (1.0-4.3)  L  19  14:16    


 


Mono # (Auto)  0.2 K/uL (0.0-0.8)   19  14:16    


 


Eos # (Auto)  0.1 K/uL (0.0-0.7)   19  14:16    


 


Baso # (Auto)  0.0 K/uL (0.0-0.2)   19  14:16    


 


Neutrophils % (Manual)  78 % (50-75)  H  19  10:29    


 


Band Neutrophils %  7 % (0-2)  H  19  10:29    


 


Lymphocytes % (Manual)  4 % (20-40)  L  19  10:29    


 


Reactive Lymphs %  3 % (0-0)  H  01/10/19  06:41    


 


Monocytes % (Manual)  11 % (0-10)  H  19  10:29    


 


Eosinophils % (Manual)  1 % (0-4)   19  15:24    


 


Nucleated RBC %  2 % (0-0)  H  19  10:29    


 


Toxic Granulation  Present   19  05:22    


 


Platelet Estimate  Decreased  (NORMAL)  L  19  10:29    


 


Large Platelets  Present   19  05:22    


 


Polychromasia  Slight   19  10:29    


 


Hypochromasia (manual)  Slight   19  10:29    


 


Poikilocytosis (manual  Slight   19  05:22    


 


Anisocytosis (manual)  Slight   19  10:29    


 


Microcytosis (manual)  Slight   19  06:35    


 


Target Cells  Slight   19  06:20    


 


Ovalocytes  Slight   19  10:29    


 


Laura Cells  Slight   19  15:24    


 


PT  37.8 SECONDS (9.7-12.2)  H  19  10:29    


 


INR  3.4  H*  19  10:29    


 


APTT  93 SECONDS (21-34)  H  19  10:29    


 


Fibrinogen  149 mg/dL (200-400)  L  19  19:26    


 


Puncture Site  [place] rr   19  22:15    


 


pCO2  36 mm/Hg (35-45)   19  22:15    


 


pO2  148 mm/Hg ()  H  19  22:15    


 


HCO3  11.5 mmol/L (21-28)  L  19  22:15    


 


ABG pH  7.11  (7.35-7.45)  L*  19  22:15    


 


ABG Total CO2  12.5 mmol/L (22-28)  L  19  22:15    


 


ABG O2 Saturation  97.4 % (95-98)   19  22:15    


 


ABG Base Excess  -17.2 mmol/L (-2.0-3.0)  L  19  22:15    


 


ABG Hemoglobin  16.2 g/dL (11.7-17.4)   19  05:21    


 


ABG Carboxyhemoglobin  0.4 % (0.5-1.5)  L  19  05:21    


 


POC ABG HHb (Measured)  4.1 % (0.0-5.0)   19  05:21    


 


ABG Methemoglobin  0.2 % (0.0-3.0)   19  05:21    


 


[place] Test  Na   19  22:15    


 


ABG Potassium  5.7 mmol/L (3.6-5.2)  H  19  22:15    


 


VBG pH  7.32  (7.32-7.43)   19  08:22    


 


VBG pCO2  65 mmHg (40-60)  H  19  08:22    


 


VBG HCO3  28.0 mmol/L  19  08:22    


 


VBG Total CO2  35.5 mmol/L (22-28)  H  19  08:22    


 


VBG O2 Sat (Calc)  64.7 % (40-65)   19  08:22    


 


VBG Base Excess  5.3 mmol/L (0.0-2.0)  H  19  08:22    


 


VBG Potassium  4.9 mmol/L (3.6-5.2)   19  08:22    


 


A-a O2 Difference  520.0 mm/Hg  19  22:15    


 


Respiratory Index  3.5   19  22:15    


 


Hgb O2 Saturation  95.3 % (95.0-98.0)   19  05:21    


 


Sodium  154.0 mmol/l (132-148)  H  19  22:15    


 


Chloride  119.0 mmol/L ()  H  19  22:15    


 


Glucose  105 mg/dl ()   19  22:15    


 


Lactate  13.7 mmol/L (0.7-2.1)  H*  19  22:15    


 


Liter Flow  3   19  12:02    


 


Vent Mode  Prvc   19  22:15    


 


Mechanical Rate  25   19  22:15    


 


FiO2  100.0 %  19  22:15    


 


Tidal Volume  500   19  22:15    


 


PEEP  5   19  22:15    


 


Crit Value Called To  Dr. iglesias   19  22:15    


 


Crit Value Called By  Conchita restrepo rcp   19  22:15    


 


Crit Value Read Back  Y   19  22:15    


 


Blood Gas Notified Time  2220   19  22:15    


 


Sodium  152 mmol/L (132-148)  H  19  09:08    


 


Potassium  6.0 mmol/L (3.6-5.2)  H  19  09:08    


 


Chloride  122 mmol/L ()  H  19  09:08    


 


Carbon Dioxide  22 mmol/L (22-30)   19  09:08    


 


Anion Gap  14  (10-20)   19  09:08    


 


BUN  83 mg/dL (9-20)  H  19  09:08    


 


Creatinine  2.3 mg/dL (0.8-1.5)  H  19  09:08    


 


Est GFR ( Amer)  33   19  09:08    


 


Est GFR (Non-Af Amer)  28   19  09:08    


 


POC Glucose (mg/dL)  83 mg/dL ()   19  23:43    


 


Random Glucose  97 mg/dL ()  D 19  09:08    


 


Lactic Acid  8.4 mmol/L (0.7-2.1)  H*  19  14:16    


 


Calcium  7.3 mg/dl (8.6-10.4)  L  19  09:08    


 


Phosphorus  9.5 mg/dL (2.5-4.5)  H  19  09:08    


 


Magnesium  3.2 mg/dL (1.6-2.3)  H  19  09:08    


 


Total Bilirubin  1.7 mg/dL (0.2-1.3)  H  19  09:08    


 


AST  66562 U/L (17-59)  H  19  09:08    


 


ALT  8477 U/L (21-72)  H  19  09:08    


 


Alkaline Phosphatase  32 U/L ()  L D 19  09:08    


 


Total Creatine Kinase  40 U/L ()  L  18  04:48    


 


CK-MB (Mass)  1.39 ng/mL (0.0-3.38)   19  09:08    


 


Troponin I  0.5380 ng/mL (0.00-0.120)  H*  19  09:08    


 


NT-Pro-B Natriuret Pep  576 pg/mL (0-900)   19  06:20    


 


Total Protein  3.5 g/dL (6.3-8.3)  L  19  09:08    


 


Albumin  1.4 g/dL (3.5-5.0)  L D 19  09:08    


 


Globulin  2.1 gm/dL (2.2-3.9)  L  19  09:08    


 


Albumin/Globulin Ratio  0.7  (1.0-2.1)  L  19  09:08    


 


Arterial Blood Potassium  5.7 mmol/L (3.6-5.2)  H  19  22:15    


 


Venous Blood Potassium  4.9 mmol/L (3.6-5.2)   19  08:22    


 


Urine Color  Yellow  (YELLOW)   19  22:41    


 


Urine Clarity  Clear  (Clear)   19  22:41    


 


Urine pH  5.0  (5.0-8.0)   19  22:41    


 


Ur Specific Gravity  1.030  (1.003-1.030)   19  22:41    


 


Urine Protein  1+ mg/dL (NEGATIVE)  H  19  22:41    


 


Urine Glucose (UA)  1+ mg/dL (Normal)  H  19  22:41    


 


Urine Ketones  Trace mg/dL (NEGATIVE)   19  22:41    


 


Urine Blood  1+  (NEGATIVE)  H  19  22:41    


 


Urine Nitrate  Negative  (NEGATIVE)   19  22:41    


 


Urine Bilirubin  Negative  (NEGATIVE)   19  22:41    


 


Urine Urobilinogen  Normal mg/dL (0.2-1.0)   19  22:41    


 


Ur Leukocyte Esterase  Neg Sherry/uL (Negative)   19  22:41    


 


Urine WBC (Auto)  4 /hpf (0-5)   19  22:41    


 


Urine RBC (Auto)  29 /hpf (0-3)  H  19  22:41    


 


Ur Squamous Epith Cells  < 1 /hpf (0-5)   19  22:41    


 


Urine Bacteria  Rare  (<OCC)   19  22:41    


 


Stool Occult Blood  Positive  (NEGATIVE)  H  19  13:50    


 


Random Vancomycin  < 5.0 ug/mL  19  09:32    


 


C. difficile Ag & Toxin  Negative  (NEGATIVE)   19  13:50    


 


Blood Type  O POSITIVE   19  09:52    


 


Blood Type Confirm  O POSITIVE   19  09:52    


 


Antibody Screen  Negative   19  09:52    














Discharge Exam





- Head Exam


Head Exam: ATRAUMATIC, NORMOCEPHALIC





Discharge Plan





- Follow Up Plan


Condition: CRITICAL


Disposition:  WITH WITHOUT AUTOPSY

## 2019-01-13 NOTE — CON
SHORT NOTE FOR CONSULTATION



LOCATION:  ICU 5



This is a 78-year-old male, post full code, intubated, reported to have

_____ through the NG tube.  The patient is not responsive and was fully

examined in the presence of his son and the grandson as well as the

intensivist in the ICU with the ICU staff.



The patient's vital signs are extremely unstable with reported heart rate

at the time of my presence of 48 to 50 with low blood pressure despite

extensive treatment provided by the intensivist to the maximum, however, he

started making some urine.  The patient is still getting epinephrine,

vasopressin, Protonix.  Most recent lab result showed white blood cells of

25, hemoglobin 10.2 with significant drop from 18 with hematocrit 34 from

59 early this morning with thrombocytopenia of 51 PT.  Increased lactic

acid to 3.4.  Troponin level reported to be 0.5380 with extensive increase

of AST was 11,340, ALT 8477 with _____ alkaline phosphatase at 632 with

total bilirubin of 1.7 with increased magnesium 32, phosphorus 9.5,

potassium is 6, BUN 83, creatinine 2.3.



At this point, I spent over 45 to 50 minutes discussing the case with the

son and the grandson as well as Dr. Street, the intensivist as the patient

is extremely unstable clinically for any aggressive procedure in the

meantime.



IMPRESSION:

1.  Gastrointestinal bleeding with anemia.

2.  Status post respiratory failure, code, the patient is intubated.

3.  Extensive elevation of liver function tests, most likely secondary to

right-sided failure with possible right-sided myocardial infarction.

4.  Electrolyte imbalance.

5.  Severe lactic acidosis with post-code syndrome.

6.  Multiple past medical history, including, but not limited to chronic

obstructive pulmonary disease, atrial fibrillation, recently through acute

renal insufficiency and recent history of anoxic encephalopathy with brain

injury post code.

7.  Anemia, most likely secondary to above.

8.  Thrombocytopenia with severe coagulation, the possibility of an early

phase of disseminated intravascular coagulation was raised.



SUGGESTIONS:  Conservative treatment in the meantime.  We will schedule the

patient for upper endoscopy at a.m. only if he is stable clinically as the

patient is in a very unstable clinical picture and clinical condition at

this point, and any aggressive GI procedure may trigger his condition to

worse.



We will follow up closely with you, and the blood transfusion as well as

platelet transfusion and fresh frozen plasma may be required in the setting

after correcting his electrolyte imbalance and his thrombocytopenia.  Fresh

frozen plasma to be given.  Further recommendations to follow.



Thank you for letting me to participate in your patient's case management. 

Full consultation achieved to follow.





__________________________________________

Jay Bowles MD





DD:  01/12/2019 15:07:56

DT:  01/13/2019 4:09:07

Job # [id number]

## 2019-01-13 NOTE — PN
DATE:  01/12/2019



SUBJECTIVE:  Case discussed with Dr. Mireles.  The patient had acute

cardiac arrest and the patient was coded with pulseless electrical activity

and he was revived.  The patient is post cardiac arrest.  He is on

ventilator.  He is unresponsive.  He is tachycardic.  No fever.  No

involuntary movements.  He is on ventilator.



PHYSICAL EXAMINATION:

VITAL SIGNS:  Blood pressure 110/70, pulse 112, respiratory rate 20,

temperature 99.

LUNGS:  Bilateral transmitted breath sounds.  Scattered rales.

CARDIOVASCULAR SYSTEM:  S1 and S2 regular.

ABDOMEN:  Soft.



ASSESSMENT:

1.  Acute cardiac arrest, etiology unclear, could be mucus plug with

hypoxemia, less likely to be pulmonary embolism.

2.  Respiratory failure due to severe chronic obstructive pulmonary

disease.

3.  Diabetes.



PLAN:  Continue intensive care unit.  Monitor the patient.  Discussed with

the family about possible end of life care.







__________________________________________

Tae Hensley MD



DD:  01/13/2019 8:56:12

DT:  01/13/2019 10:29:36

Job # [id number]

## 2019-01-13 NOTE — CP.PCM.PRO
Pronouncement of Death Note





- Clinical Findings


Physical Exam: No Response Verbal/Painful Stimuli, Absent Peripheral Puls

es{Carotid & Femoral}, Absent Heart & Breath Sounds, No Pupillary Light Reflex, 

No Corneal Reflex, Pupils Fixed & Dilated, Absence of Vital Signs





- Pronouncement Time


Time of Pronouncement of Death: 23:56





- Notifications


Pronouncement Notifications: Family Notified, Atending Notified


Medical Examiner Notified: No





- Autopsy


Autopsy Requested: No





- [place] [id number]


Additional Comments: The Patient was DNR

## 2019-01-14 NOTE — CARD
--------------- APPROVED REPORT --------------





Date of service: 01/12/2019



EKG Measurement

Heart [id number]

[id number]

[id number]

[id number]



<Conclusion>

baseline artifacts,probably sinus jean-paul,

Right bundle branch block

Inferior infarct, age undetermined

Abnormal ECG

please repeat

## 2019-01-15 NOTE — DS
DISCHARGE DIAGNOSES:

1.  Acute exacerbation of chronic obstructive pulmonary disease/acute

respiratory failure.

2.  Tracheobronchitis, rule out pneumonia.

3.  Diabetes.

4.  Hypertension.



HISTORY OF PRESENT ILLNESS:  This is a 79-year-old male who was admitted

because of cough, congestion, shortness of breath, wheezing and placed on

nebulizers, prednisone, oxygen, and guaifenesin.  Seen by Pulmonary and

Cardiology and the patient was monitored.  He had some improvement in the

beginning, but then he deteriorated.  He began increasingly short of breath

with cough, congestion, wheezing, _____ the patient ended up on mechanical

ventilator.  The patient was placed on mechanical ventilator.  Later on,

the patient's condition never improved and the patient's condition

continued to deteriorate.  The patient later on had a cardiac arrest and

then the patient .



LABORATORY DATA:  WBC 25, hemoglobin 10.2, hematocrit 34 and platelets 51. 

ABG showed pH of 7.19.  The patient's sodium 152, potassium 6.3, chloride

122, bicarb 22, BUN 83, creatinine 2.



CONDITION UPON DISCHARGE: The patient .





__________________________________________

Tae Hensley MD





DD:  2019 23:20:41

DT:  2019 2:39:13

Job # [id number]

## 2019-08-30 NOTE — RAD
HISTORY:

Shortness of breath, cough.  Portable study 12:30.



COMPARISON:

05/20/2017. 



FINDINGS:



LUNGS:

Stable perihilar, right lower lobe infiltrate.



PLEURA:

No significant pleural effusion identified, no pneumothorax apparent.



CARDIOVASCULAR:

Cardiomegaly.  No evidence of acute, significant cardiovascular 

disease. 



OSSEOUS STRUCTURES:

No significant abnormalities.



VISUALIZED UPPER ABDOMEN:

Normal.



OTHER FINDINGS:

None.



IMPRESSION:

Right lower lobe infiltrate, unchanged compared to prior studies.
HISTORY:

pna  



COMPARISON:

Chest x-ray performed 5/27/17 



TECHNIQUE:

Chest PA and lateral



FINDINGS:





LUNGS:

Biapical pleural thickening.Medial left upper lobe opacity of unclear 

origin. Medial right lower lobe atelectasis or infiltrate. 



Please note that chest x-ray has limited sensitivity for the 

detection of pulmonary masses.



PLEURA:

No significant pleural effusion identified. No definite pneumothorax .



CARDIOVASCULAR:

Enlargement of the cardiomediastinal silhouette.  



OSSEOUS STRUCTURES:

 Degenerative changes. Osseous demineralization. 



VISUALIZED UPPER ABDOMEN:

Unremarkable.



OTHER FINDINGS:

None.



IMPRESSION:

Biapical pleural thickening.  Medial left upper lobe opacity of 

unclear origin ; possibly pneumonia. Medial right lower lobe 

atelectasis or infiltrate. Correlate clinically and recommend 

follow-up to complete resolution. 



Enlargement of the cardiomediastinal silhouette.
PROCEDURE:  CHEST RADIOGRAPH, 1 VIEW



HISTORY:

verify right PICC



COMPARISON:

6/1/2017



FINDINGS:



LUNGS:

Clear.



PLEURA:

No pneumothorax or pleural fluid seen.



CARDIOVASCULAR:

New right PICC catheter terminating in the region of the cavoatrial 

junction. Normal heart size. No congestive change.



OSSEOUS STRUCTURES:

No significant abnormalities.



VISUALIZED UPPER ABDOMEN:

Normal.



OTHER FINDINGS:

None. 



IMPRESSION:

New right PICC catheter terminating in the region of the cavoatrial 

junction.
no

## 2022-06-30 NOTE — RAD
Date of service: 



01/07/2019



HISTORY:

 intubated 



COMPARISON:

Portable chest 01/06/2019. 



FINDINGS:



LUNGS:

Endotracheal and nasogastric tubes do not appear significantly 

changed in position.  Right central venous catheter appears retracted 

to the potential mid severe vena cava level.



Limited patchy consolidation remains in the right upper lobe with 

persistent biapical pleural thickening again evident.  No 

pneumothorax bilaterally.  Trace left pleural effusion questioned but 

not definite.  No right pleural effusion.



PLEURA:

As above.



CARDIOVASCULAR:

Calcific atherosclerotic changes are seen related to the thoracic 

aorta.



Stable cardiomegaly.  No pulmonary vascular congestion. 



OSSEOUS STRUCTURES:

No significant abnormalities.



VISUALIZED UPPER ABDOMEN:

Normal.



OTHER FINDINGS:

None.



IMPRESSION:

No interval change in mild right apical patchy infiltrate and minimal 

left pleural effusion and biapical pleural thickening reiterated.  

Right central venous line retracted slightly.  Nasogastric and 

endotracheal tubes unchanged in position. What Is The Reason For Today's Visit?: Full Body Skin Examination with No Concerns What Is The Reason For Today's Visit? (Being Monitored For X): the development of new lesions